# Patient Record
Sex: FEMALE | Race: WHITE | NOT HISPANIC OR LATINO
[De-identification: names, ages, dates, MRNs, and addresses within clinical notes are randomized per-mention and may not be internally consistent; named-entity substitution may affect disease eponyms.]

---

## 2021-03-29 PROBLEM — Z00.00 ENCOUNTER FOR PREVENTIVE HEALTH EXAMINATION: Status: ACTIVE | Noted: 2021-03-29

## 2021-04-06 ENCOUNTER — APPOINTMENT (OUTPATIENT)
Dept: ORTHOPEDIC SURGERY | Facility: CLINIC | Age: 71
End: 2021-04-06

## 2021-09-02 ENCOUNTER — TRANSCRIPTION ENCOUNTER (OUTPATIENT)
Age: 71
End: 2021-09-02

## 2022-01-01 ENCOUNTER — NON-APPOINTMENT (OUTPATIENT)
Age: 72
End: 2022-01-01

## 2022-04-12 ENCOUNTER — TRANSCRIPTION ENCOUNTER (OUTPATIENT)
Age: 72
End: 2022-04-12

## 2023-01-01 ENCOUNTER — TRANSCRIPTION ENCOUNTER (OUTPATIENT)
Age: 73
End: 2023-01-01

## 2023-01-01 ENCOUNTER — INPATIENT (INPATIENT)
Facility: HOSPITAL | Age: 73
LOS: 7 days | DRG: 870 | End: 2023-08-26
Attending: STUDENT IN AN ORGANIZED HEALTH CARE EDUCATION/TRAINING PROGRAM | Admitting: STUDENT IN AN ORGANIZED HEALTH CARE EDUCATION/TRAINING PROGRAM
Payer: MEDICARE

## 2023-01-01 ENCOUNTER — INPATIENT (INPATIENT)
Facility: HOSPITAL | Age: 73
LOS: 1 days | Discharge: HOME | DRG: 871 | End: 2023-07-21
Attending: INTERNAL MEDICINE | Admitting: INTERNAL MEDICINE
Payer: MEDICARE

## 2023-01-01 ENCOUNTER — INPATIENT (INPATIENT)
Facility: HOSPITAL | Age: 73
LOS: 7 days | Discharge: HOME | DRG: 435 | End: 2023-05-22
Attending: INTERNAL MEDICINE | Admitting: INTERNAL MEDICINE
Payer: COMMERCIAL

## 2023-01-01 ENCOUNTER — INPATIENT (INPATIENT)
Facility: HOSPITAL | Age: 73
LOS: 5 days | Discharge: HOME HEALTH | DRG: 435 | End: 2023-07-28
Attending: INTERNAL MEDICINE | Admitting: INTERNAL MEDICINE
Payer: MEDICARE

## 2023-01-01 VITALS
SYSTOLIC BLOOD PRESSURE: 153 MMHG | HEART RATE: 107 BPM | RESPIRATION RATE: 16 BRPM | HEIGHT: 66 IN | OXYGEN SATURATION: 96 % | TEMPERATURE: 99 F | WEIGHT: 145.06 LBS | DIASTOLIC BLOOD PRESSURE: 77 MMHG

## 2023-01-01 VITALS
HEART RATE: 138 BPM | RESPIRATION RATE: 18 BRPM | WEIGHT: 110.01 LBS | DIASTOLIC BLOOD PRESSURE: 48 MMHG | OXYGEN SATURATION: 91 % | HEIGHT: 65 IN | SYSTOLIC BLOOD PRESSURE: 82 MMHG

## 2023-01-01 VITALS
RESPIRATION RATE: 18 BRPM | DIASTOLIC BLOOD PRESSURE: 55 MMHG | TEMPERATURE: 98 F | OXYGEN SATURATION: 93 % | SYSTOLIC BLOOD PRESSURE: 104 MMHG | HEART RATE: 100 BPM

## 2023-01-01 VITALS
SYSTOLIC BLOOD PRESSURE: 117 MMHG | RESPIRATION RATE: 18 BRPM | DIASTOLIC BLOOD PRESSURE: 61 MMHG | TEMPERATURE: 99 F | HEART RATE: 102 BPM

## 2023-01-01 VITALS
SYSTOLIC BLOOD PRESSURE: 115 MMHG | HEIGHT: 65 IN | DIASTOLIC BLOOD PRESSURE: 70 MMHG | HEART RATE: 101 BPM | OXYGEN SATURATION: 96 % | RESPIRATION RATE: 22 BRPM | TEMPERATURE: 97 F

## 2023-01-01 VITALS — RESPIRATION RATE: 32 BRPM

## 2023-01-01 VITALS
DIASTOLIC BLOOD PRESSURE: 68 MMHG | HEART RATE: 89 BPM | TEMPERATURE: 98 F | SYSTOLIC BLOOD PRESSURE: 122 MMHG | OXYGEN SATURATION: 92 % | RESPIRATION RATE: 18 BRPM

## 2023-01-01 VITALS
RESPIRATION RATE: 26 BRPM | HEART RATE: 116 BPM | HEIGHT: 65 IN | SYSTOLIC BLOOD PRESSURE: 97 MMHG | DIASTOLIC BLOOD PRESSURE: 57 MMHG | WEIGHT: 147.05 LBS | OXYGEN SATURATION: 100 %

## 2023-01-01 DIAGNOSIS — N17.9 ACUTE KIDNEY FAILURE, UNSPECIFIED: ICD-10-CM

## 2023-01-01 DIAGNOSIS — C25.9 MALIGNANT NEOPLASM OF PANCREAS, UNSPECIFIED: ICD-10-CM

## 2023-01-01 DIAGNOSIS — A41.9 SEPSIS, UNSPECIFIED ORGANISM: ICD-10-CM

## 2023-01-01 DIAGNOSIS — E87.1 HYPO-OSMOLALITY AND HYPONATREMIA: ICD-10-CM

## 2023-01-01 DIAGNOSIS — Z78.9 OTHER SPECIFIED HEALTH STATUS: Chronic | ICD-10-CM

## 2023-01-01 DIAGNOSIS — Z71.89 OTHER SPECIFIED COUNSELING: ICD-10-CM

## 2023-01-01 DIAGNOSIS — T14.8XXA OTHER INJURY OF UNSPECIFIED BODY REGION, INITIAL ENCOUNTER: ICD-10-CM

## 2023-01-01 DIAGNOSIS — Z02.9 ENCOUNTER FOR ADMINISTRATIVE EXAMINATIONS, UNSPECIFIED: ICD-10-CM

## 2023-01-01 DIAGNOSIS — E87.20 ACIDOSIS, UNSPECIFIED: ICD-10-CM

## 2023-01-01 DIAGNOSIS — D64.9 ANEMIA, UNSPECIFIED: ICD-10-CM

## 2023-01-01 DIAGNOSIS — I81 PORTAL VEIN THROMBOSIS: ICD-10-CM

## 2023-01-01 DIAGNOSIS — D70.9 NEUTROPENIA, UNSPECIFIED: ICD-10-CM

## 2023-01-01 DIAGNOSIS — Z51.5 ENCOUNTER FOR PALLIATIVE CARE: ICD-10-CM

## 2023-01-01 DIAGNOSIS — Z29.9 ENCOUNTER FOR PROPHYLACTIC MEASURES, UNSPECIFIED: ICD-10-CM

## 2023-01-01 DIAGNOSIS — E03.9 HYPOTHYROIDISM, UNSPECIFIED: ICD-10-CM

## 2023-01-01 DIAGNOSIS — R79.89 OTHER SPECIFIED ABNORMAL FINDINGS OF BLOOD CHEMISTRY: ICD-10-CM

## 2023-01-01 DIAGNOSIS — R41.82 ALTERED MENTAL STATUS, UNSPECIFIED: ICD-10-CM

## 2023-01-01 DIAGNOSIS — R53.2 FUNCTIONAL QUADRIPLEGIA: ICD-10-CM

## 2023-01-01 DIAGNOSIS — E87.6 HYPOKALEMIA: ICD-10-CM

## 2023-01-01 DIAGNOSIS — I26.99 OTHER PULMONARY EMBOLISM WITHOUT ACUTE COR PULMONALE: ICD-10-CM

## 2023-01-01 LAB
-  AMIKACIN: SIGNIFICANT CHANGE UP
-  AMPICILLIN/SULBACTAM: SIGNIFICANT CHANGE UP
-  AMPICILLIN: SIGNIFICANT CHANGE UP
-  AZTREONAM: SIGNIFICANT CHANGE UP
-  CEFAZOLIN: SIGNIFICANT CHANGE UP
-  CEFEPIME: SIGNIFICANT CHANGE UP
-  CEFOXITIN: SIGNIFICANT CHANGE UP
-  CEFTRIAXONE: SIGNIFICANT CHANGE UP
-  CIPROFLOXACIN: SIGNIFICANT CHANGE UP
-  ERTAPENEM: SIGNIFICANT CHANGE UP
-  GENTAMICIN: SIGNIFICANT CHANGE UP
-  IMIPENEM: SIGNIFICANT CHANGE UP
-  LEVOFLOXACIN: SIGNIFICANT CHANGE UP
-  MEROPENEM: SIGNIFICANT CHANGE UP
-  PIPERACILLIN/TAZOBACTAM: SIGNIFICANT CHANGE UP
-  TOBRAMYCIN: SIGNIFICANT CHANGE UP
-  TRIMETHOPRIM/SULFAMETHOXAZOLE: SIGNIFICANT CHANGE UP
24R-OH-CALCIDIOL SERPL-MCNC: 15 NG/ML — LOW (ref 30–80)
A1C WITH ESTIMATED AVERAGE GLUCOSE RESULT: 5.3 % — SIGNIFICANT CHANGE UP (ref 4–5.6)
ABO RH CONFIRMATION: SIGNIFICANT CHANGE UP
AFP-TM SERPL-MCNC: <1.8 NG/ML — SIGNIFICANT CHANGE UP
ALBUMIN FLD-MCNC: 0.5 G/DL — SIGNIFICANT CHANGE UP
ALBUMIN SERPL ELPH-MCNC: 1.6 G/DL — LOW (ref 3.5–5)
ALBUMIN SERPL ELPH-MCNC: 1.6 G/DL — LOW (ref 3.5–5)
ALBUMIN SERPL ELPH-MCNC: 1.7 G/DL — LOW (ref 3.5–5)
ALBUMIN SERPL ELPH-MCNC: 1.9 G/DL — LOW (ref 3.3–5)
ALBUMIN SERPL ELPH-MCNC: 2 G/DL — LOW (ref 3.3–5)
ALBUMIN SERPL ELPH-MCNC: 2 G/DL — LOW (ref 3.3–5)
ALBUMIN SERPL ELPH-MCNC: 2.2 G/DL — LOW (ref 3.3–5)
ALBUMIN SERPL ELPH-MCNC: 2.2 G/DL — LOW (ref 3.3–5)
ALBUMIN SERPL ELPH-MCNC: 2.2 G/DL — LOW (ref 3.5–5)
ALBUMIN SERPL ELPH-MCNC: 2.3 G/DL — LOW (ref 3.3–5)
ALBUMIN SERPL ELPH-MCNC: 2.3 G/DL — LOW (ref 3.3–5)
ALBUMIN SERPL ELPH-MCNC: 2.4 G/DL — LOW (ref 3.3–5)
ALBUMIN SERPL ELPH-MCNC: 2.5 G/DL — LOW (ref 3.3–5)
ALBUMIN SERPL ELPH-MCNC: 2.6 G/DL — LOW (ref 3.3–5)
ALBUMIN SERPL ELPH-MCNC: 2.7 G/DL — LOW (ref 3.3–5)
ALBUMIN SERPL ELPH-MCNC: 2.8 G/DL — LOW (ref 3.3–5)
ALBUMIN SERPL ELPH-MCNC: 2.8 G/DL — LOW (ref 3.3–5)
ALBUMIN SERPL ELPH-MCNC: 2.9 G/DL — LOW (ref 3.3–5)
ALBUMIN SERPL ELPH-MCNC: 2.9 G/DL — LOW (ref 3.3–5)
ALBUMIN SERPL ELPH-MCNC: 3.1 G/DL — LOW (ref 3.3–5)
ALBUMIN SERPL ELPH-MCNC: 3.1 G/DL — LOW (ref 3.3–5)
ALBUMIN SERPL ELPH-MCNC: 3.2 G/DL — LOW (ref 3.3–5)
ALBUMIN SERPL ELPH-MCNC: 3.3 G/DL — SIGNIFICANT CHANGE UP (ref 3.3–5)
ALBUMIN SERPL ELPH-MCNC: 3.6 G/DL — SIGNIFICANT CHANGE UP (ref 3.3–5)
ALBUMIN SERPL ELPH-MCNC: 3.8 G/DL — SIGNIFICANT CHANGE UP (ref 3.3–5)
ALP SERPL-CCNC: 101 U/L — SIGNIFICANT CHANGE UP (ref 40–120)
ALP SERPL-CCNC: 104 U/L — SIGNIFICANT CHANGE UP (ref 40–120)
ALP SERPL-CCNC: 108 U/L — SIGNIFICANT CHANGE UP (ref 40–120)
ALP SERPL-CCNC: 110 U/L — SIGNIFICANT CHANGE UP (ref 40–120)
ALP SERPL-CCNC: 124 U/L — HIGH (ref 40–120)
ALP SERPL-CCNC: 131 U/L — HIGH (ref 40–120)
ALP SERPL-CCNC: 133 U/L — HIGH (ref 40–120)
ALP SERPL-CCNC: 142 U/L — HIGH (ref 40–120)
ALP SERPL-CCNC: 144 U/L — HIGH (ref 40–120)
ALP SERPL-CCNC: 145 U/L — HIGH (ref 40–120)
ALP SERPL-CCNC: 176 U/L — HIGH (ref 40–120)
ALP SERPL-CCNC: 178 U/L — HIGH (ref 40–120)
ALP SERPL-CCNC: 178 U/L — HIGH (ref 40–120)
ALP SERPL-CCNC: 183 U/L — HIGH (ref 40–120)
ALP SERPL-CCNC: 197 U/L — HIGH (ref 40–120)
ALP SERPL-CCNC: 201 U/L — HIGH (ref 40–120)
ALP SERPL-CCNC: 212 U/L — HIGH (ref 40–120)
ALP SERPL-CCNC: 482 U/L — HIGH (ref 40–120)
ALP SERPL-CCNC: 489 U/L — HIGH (ref 40–120)
ALP SERPL-CCNC: 493 U/L — HIGH (ref 40–120)
ALP SERPL-CCNC: 559 U/L — HIGH (ref 40–120)
ALP SERPL-CCNC: 575 U/L — HIGH (ref 40–120)
ALP SERPL-CCNC: 592 U/L — HIGH (ref 40–120)
ALP SERPL-CCNC: 614 U/L — HIGH (ref 40–120)
ALP SERPL-CCNC: 625 U/L — HIGH (ref 40–120)
ALP SERPL-CCNC: 674 U/L — HIGH (ref 40–120)
ALP SERPL-CCNC: 68 U/L — SIGNIFICANT CHANGE UP (ref 40–120)
ALP SERPL-CCNC: 75 U/L — SIGNIFICANT CHANGE UP (ref 40–120)
ALP SERPL-CCNC: 85 U/L — SIGNIFICANT CHANGE UP (ref 40–120)
ALP SERPL-CCNC: 93 U/L — SIGNIFICANT CHANGE UP (ref 40–120)
ALP SERPL-CCNC: 93 U/L — SIGNIFICANT CHANGE UP (ref 40–120)
ALP SERPL-CCNC: 95 U/L — SIGNIFICANT CHANGE UP (ref 40–120)
ALP SERPL-CCNC: 99 U/L — SIGNIFICANT CHANGE UP (ref 40–120)
ALT FLD-CCNC: 102 U/L — HIGH (ref 10–45)
ALT FLD-CCNC: 102 U/L — HIGH (ref 10–45)
ALT FLD-CCNC: 114 U/L — HIGH (ref 10–45)
ALT FLD-CCNC: 115 U/L — HIGH (ref 10–45)
ALT FLD-CCNC: 118 U/L — HIGH (ref 10–45)
ALT FLD-CCNC: 134 U/L — HIGH (ref 10–45)
ALT FLD-CCNC: 139 U/L — HIGH (ref 10–45)
ALT FLD-CCNC: 144 U/L — HIGH (ref 10–45)
ALT FLD-CCNC: 15 U/L — SIGNIFICANT CHANGE UP (ref 10–45)
ALT FLD-CCNC: 154 U/L — HIGH (ref 10–45)
ALT FLD-CCNC: 16 U/L — SIGNIFICANT CHANGE UP (ref 10–45)
ALT FLD-CCNC: 17 U/L — SIGNIFICANT CHANGE UP (ref 10–45)
ALT FLD-CCNC: 17 U/L — SIGNIFICANT CHANGE UP (ref 10–45)
ALT FLD-CCNC: 19 U/L — SIGNIFICANT CHANGE UP (ref 10–45)
ALT FLD-CCNC: 22 U/L — SIGNIFICANT CHANGE UP (ref 10–45)
ALT FLD-CCNC: 22 U/L — SIGNIFICANT CHANGE UP (ref 10–45)
ALT FLD-CCNC: 23 U/L — SIGNIFICANT CHANGE UP (ref 10–45)
ALT FLD-CCNC: 27 U/L — SIGNIFICANT CHANGE UP (ref 10–45)
ALT FLD-CCNC: 28 U/L — SIGNIFICANT CHANGE UP (ref 10–45)
ALT FLD-CCNC: 29 U/L — SIGNIFICANT CHANGE UP (ref 10–45)
ALT FLD-CCNC: 29 U/L — SIGNIFICANT CHANGE UP (ref 10–45)
ALT FLD-CCNC: 30 U/L — SIGNIFICANT CHANGE UP (ref 10–45)
ALT FLD-CCNC: 30 U/L — SIGNIFICANT CHANGE UP (ref 10–45)
ALT FLD-CCNC: 31 U/L — SIGNIFICANT CHANGE UP (ref 10–45)
ALT FLD-CCNC: 40 U/L DA — SIGNIFICANT CHANGE UP (ref 10–60)
ALT FLD-CCNC: 51 U/L DA — SIGNIFICANT CHANGE UP (ref 10–60)
ALT FLD-CCNC: 53 U/L DA — SIGNIFICANT CHANGE UP (ref 10–60)
ALT FLD-CCNC: 55 U/L DA — SIGNIFICANT CHANGE UP (ref 10–60)
AMMONIA BLD-MCNC: 10 UMOL/L — LOW (ref 11–55)
AMMONIA BLD-MCNC: 63 UMOL/L — HIGH (ref 11–55)
ANION GAP SERPL CALC-SCNC: 10 MMOL/L — SIGNIFICANT CHANGE UP (ref 5–17)
ANION GAP SERPL CALC-SCNC: 10 MMOL/L — SIGNIFICANT CHANGE UP (ref 5–17)
ANION GAP SERPL CALC-SCNC: 11 MMOL/L — SIGNIFICANT CHANGE UP (ref 5–17)
ANION GAP SERPL CALC-SCNC: 12 MMOL/L — SIGNIFICANT CHANGE UP (ref 5–17)
ANION GAP SERPL CALC-SCNC: 12 MMOL/L — SIGNIFICANT CHANGE UP (ref 5–17)
ANION GAP SERPL CALC-SCNC: 13 MMOL/L — SIGNIFICANT CHANGE UP (ref 5–17)
ANION GAP SERPL CALC-SCNC: 14 MMOL/L — SIGNIFICANT CHANGE UP (ref 5–17)
ANION GAP SERPL CALC-SCNC: 15 MMOL/L — SIGNIFICANT CHANGE UP (ref 5–17)
ANION GAP SERPL CALC-SCNC: 16 MMOL/L — SIGNIFICANT CHANGE UP (ref 5–17)
ANION GAP SERPL CALC-SCNC: 17 MMOL/L — SIGNIFICANT CHANGE UP (ref 5–17)
ANION GAP SERPL CALC-SCNC: 21 MMOL/L — HIGH (ref 5–17)
ANION GAP SERPL CALC-SCNC: 25 MMOL/L — HIGH (ref 5–17)
ANION GAP SERPL CALC-SCNC: 26 MMOL/L — HIGH (ref 5–17)
ANION GAP SERPL CALC-SCNC: 27 MMOL/L — HIGH (ref 5–17)
ANION GAP SERPL CALC-SCNC: 27 MMOL/L — HIGH (ref 5–17)
ANION GAP SERPL CALC-SCNC: 28 MMOL/L — HIGH (ref 5–17)
ANION GAP SERPL CALC-SCNC: 29 MMOL/L — HIGH (ref 5–17)
ANION GAP SERPL CALC-SCNC: 31 MMOL/L — HIGH (ref 5–17)
ANION GAP SERPL CALC-SCNC: 8 MMOL/L — SIGNIFICANT CHANGE UP (ref 5–17)
ANION GAP SERPL CALC-SCNC: 9 MMOL/L — SIGNIFICANT CHANGE UP (ref 5–17)
ANISOCYTOSIS BLD QL: SIGNIFICANT CHANGE UP
ANISOCYTOSIS BLD QL: SLIGHT — SIGNIFICANT CHANGE UP
APPEARANCE UR: ABNORMAL
APPEARANCE UR: ABNORMAL
APPEARANCE UR: CLEAR — SIGNIFICANT CHANGE UP
APTT BLD: 147.9 SEC — CRITICAL HIGH (ref 27.5–35.5)
APTT BLD: 174.5 SEC — CRITICAL HIGH (ref 27.5–35.5)
APTT BLD: 26.7 SEC — LOW (ref 27.5–35.5)
APTT BLD: 27.1 SEC — LOW (ref 27.5–35.5)
APTT BLD: 27.6 SEC — SIGNIFICANT CHANGE UP (ref 24.5–35.6)
APTT BLD: 27.6 SEC — SIGNIFICANT CHANGE UP (ref 27.5–35.5)
APTT BLD: 30.1 SEC — SIGNIFICANT CHANGE UP (ref 27.5–35.5)
APTT BLD: 35.2 SEC — SIGNIFICANT CHANGE UP (ref 27.5–35.5)
APTT BLD: 36.1 SEC — HIGH (ref 24.5–35.6)
APTT BLD: 38.5 SEC — HIGH (ref 27.5–35.5)
APTT BLD: 41.5 SEC — HIGH (ref 27.5–35.5)
APTT BLD: 47.4 SEC — HIGH (ref 24.5–35.6)
APTT BLD: 49.7 SEC — HIGH (ref 24.5–35.6)
APTT BLD: 51.6 SEC — HIGH (ref 27.5–35.5)
APTT BLD: 54.9 SEC — HIGH (ref 24.5–35.6)
APTT BLD: 60.5 SEC — HIGH (ref 24.5–35.6)
APTT BLD: 64.3 SEC — HIGH (ref 24.5–35.6)
APTT BLD: 72.4 SEC — HIGH (ref 24.5–35.6)
APTT BLD: 72.4 SEC — HIGH (ref 24.5–35.6)
APTT BLD: 75 SEC — HIGH (ref 24.5–35.6)
APTT BLD: 77.2 SEC — HIGH (ref 27.5–35.5)
AST SERPL-CCNC: 113 U/L — HIGH (ref 10–40)
AST SERPL-CCNC: 124 U/L — HIGH (ref 10–40)
AST SERPL-CCNC: 136 U/L — HIGH (ref 10–40)
AST SERPL-CCNC: 138 U/L — HIGH (ref 10–40)
AST SERPL-CCNC: 143 U/L — HIGH (ref 10–40)
AST SERPL-CCNC: 156 U/L — HIGH (ref 10–40)
AST SERPL-CCNC: 186 U/L — HIGH (ref 10–40)
AST SERPL-CCNC: 189 U/L — HIGH (ref 10–40)
AST SERPL-CCNC: 193 U/L — HIGH (ref 10–40)
AST SERPL-CCNC: 36 U/L — SIGNIFICANT CHANGE UP (ref 10–40)
AST SERPL-CCNC: 37 U/L — SIGNIFICANT CHANGE UP (ref 10–40)
AST SERPL-CCNC: 38 U/L — SIGNIFICANT CHANGE UP (ref 10–40)
AST SERPL-CCNC: 38 U/L — SIGNIFICANT CHANGE UP (ref 10–40)
AST SERPL-CCNC: 39 U/L — SIGNIFICANT CHANGE UP (ref 10–40)
AST SERPL-CCNC: 39 U/L — SIGNIFICANT CHANGE UP (ref 10–40)
AST SERPL-CCNC: 41 U/L — HIGH (ref 10–40)
AST SERPL-CCNC: 42 U/L — HIGH (ref 10–40)
AST SERPL-CCNC: 43 U/L — HIGH (ref 10–40)
AST SERPL-CCNC: 43 U/L — HIGH (ref 10–40)
AST SERPL-CCNC: 44 U/L — HIGH (ref 10–40)
AST SERPL-CCNC: 45 U/L — HIGH (ref 10–40)
AST SERPL-CCNC: 48 U/L — HIGH (ref 10–40)
AST SERPL-CCNC: 48 U/L — HIGH (ref 10–40)
AST SERPL-CCNC: 51 U/L — HIGH (ref 10–40)
AST SERPL-CCNC: 52 U/L — HIGH (ref 10–40)
AST SERPL-CCNC: 52 U/L — HIGH (ref 10–40)
AST SERPL-CCNC: 57 U/L — HIGH (ref 10–40)
AST SERPL-CCNC: 57 U/L — HIGH (ref 10–40)
AST SERPL-CCNC: 58 U/L — HIGH (ref 10–40)
AST SERPL-CCNC: 61 U/L — HIGH (ref 10–40)
AST SERPL-CCNC: 74 U/L — HIGH (ref 10–40)
AST SERPL-CCNC: 81 U/L — HIGH (ref 10–40)
AST SERPL-CCNC: 82 U/L — HIGH (ref 10–40)
B PERT IGG+IGM PNL SER: CLEAR — SIGNIFICANT CHANGE UP
BACTERIA # UR AUTO: ABNORMAL /HPF
BACTERIA # UR AUTO: NEGATIVE — SIGNIFICANT CHANGE UP
BASE EXCESS BLDV CALC-SCNC: -12.4 MMOL/L — LOW (ref -2–3)
BASE EXCESS BLDV CALC-SCNC: -14.2 MMOL/L — LOW (ref -2–3)
BASE EXCESS BLDV CALC-SCNC: -3.7 MMOL/L — LOW (ref -2–3)
BASE EXCESS BLDV CALC-SCNC: -8.1 MMOL/L — SIGNIFICANT CHANGE UP
BASE EXCESS BLDV CALC-SCNC: -8.2 MMOL/L — LOW (ref -2–3)
BASOPHILS # BLD AUTO: 0 K/UL — SIGNIFICANT CHANGE UP (ref 0–0.2)
BASOPHILS # BLD AUTO: 0.01 K/UL — SIGNIFICANT CHANGE UP (ref 0–0.2)
BASOPHILS # BLD AUTO: 0.04 K/UL — SIGNIFICANT CHANGE UP (ref 0–0.2)
BASOPHILS # BLD AUTO: 0.05 K/UL — SIGNIFICANT CHANGE UP (ref 0–0.2)
BASOPHILS # BLD AUTO: 0.06 K/UL — SIGNIFICANT CHANGE UP (ref 0–0.2)
BASOPHILS # BLD AUTO: 0.06 K/UL — SIGNIFICANT CHANGE UP (ref 0–0.2)
BASOPHILS # BLD AUTO: 0.07 K/UL — SIGNIFICANT CHANGE UP (ref 0–0.2)
BASOPHILS # BLD AUTO: 0.08 K/UL — SIGNIFICANT CHANGE UP (ref 0–0.2)
BASOPHILS # BLD AUTO: 0.08 K/UL — SIGNIFICANT CHANGE UP (ref 0–0.2)
BASOPHILS # BLD AUTO: 0.09 K/UL — SIGNIFICANT CHANGE UP (ref 0–0.2)
BASOPHILS # BLD AUTO: 0.1 K/UL — SIGNIFICANT CHANGE UP (ref 0–0.2)
BASOPHILS # BLD AUTO: SIGNIFICANT CHANGE UP K/UL (ref 0–0.2)
BASOPHILS NFR BLD AUTO: 0 % — SIGNIFICANT CHANGE UP (ref 0–2)
BASOPHILS NFR BLD AUTO: 0.1 % — SIGNIFICANT CHANGE UP (ref 0–2)
BASOPHILS NFR BLD AUTO: 0.2 % — SIGNIFICANT CHANGE UP (ref 0–2)
BASOPHILS NFR BLD AUTO: 0.4 % — SIGNIFICANT CHANGE UP (ref 0–2)
BASOPHILS NFR BLD AUTO: 0.5 % — SIGNIFICANT CHANGE UP (ref 0–2)
BASOPHILS NFR BLD AUTO: 0.7 % — SIGNIFICANT CHANGE UP (ref 0–2)
BASOPHILS NFR BLD AUTO: 0.7 % — SIGNIFICANT CHANGE UP (ref 0–2)
BASOPHILS NFR BLD AUTO: 0.8 % — SIGNIFICANT CHANGE UP (ref 0–2)
BASOPHILS NFR BLD AUTO: 0.8 % — SIGNIFICANT CHANGE UP (ref 0–2)
BASOPHILS NFR BLD AUTO: 1 % — SIGNIFICANT CHANGE UP (ref 0–2)
BASOPHILS NFR BLD AUTO: 1.1 % — SIGNIFICANT CHANGE UP (ref 0–2)
BASOPHILS NFR BLD AUTO: 1.3 % — SIGNIFICANT CHANGE UP (ref 0–2)
BASOPHILS NFR BLD AUTO: 1.9 % — SIGNIFICANT CHANGE UP (ref 0–2)
BASOPHILS NFR BLD AUTO: 2 % — SIGNIFICANT CHANGE UP (ref 0–2)
BASOPHILS NFR BLD AUTO: SIGNIFICANT CHANGE UP % (ref 0–2)
BILIRUB DIRECT SERPL-MCNC: 3.5 MG/DL — HIGH (ref 0–0.3)
BILIRUB DIRECT SERPL-MCNC: 3.8 MG/DL — HIGH (ref 0–0.3)
BILIRUB DIRECT SERPL-MCNC: 5.3 MG/DL — HIGH (ref 0–0.3)
BILIRUB INDIRECT FLD-MCNC: 1.9 MG/DL — HIGH (ref 0.2–1)
BILIRUB INDIRECT FLD-MCNC: 2 MG/DL — HIGH (ref 0.2–1)
BILIRUB SERPL-MCNC: 4.2 MG/DL — HIGH (ref 0.2–1.2)
BILIRUB SERPL-MCNC: 4.2 MG/DL — HIGH (ref 0.2–1.2)
BILIRUB SERPL-MCNC: 4.3 MG/DL — HIGH (ref 0.2–1.2)
BILIRUB SERPL-MCNC: 4.5 MG/DL — HIGH (ref 0.2–1.2)
BILIRUB SERPL-MCNC: 5.1 MG/DL — HIGH (ref 0.2–1.2)
BILIRUB SERPL-MCNC: 5.4 MG/DL — HIGH (ref 0.2–1.2)
BILIRUB SERPL-MCNC: 5.5 MG/DL — HIGH (ref 0.2–1.2)
BILIRUB SERPL-MCNC: 5.8 MG/DL — HIGH (ref 0.2–1.2)
BILIRUB SERPL-MCNC: 6.6 MG/DL — HIGH (ref 0.2–1.2)
BILIRUB SERPL-MCNC: 6.7 MG/DL — HIGH (ref 0.2–1.2)
BILIRUB SERPL-MCNC: 6.7 MG/DL — HIGH (ref 0.2–1.2)
BILIRUB SERPL-MCNC: 6.8 MG/DL — HIGH (ref 0.2–1.2)
BILIRUB SERPL-MCNC: 6.9 MG/DL — HIGH (ref 0.2–1.2)
BILIRUB SERPL-MCNC: 7 MG/DL — HIGH (ref 0.2–1.2)
BILIRUB SERPL-MCNC: 7.1 MG/DL — HIGH (ref 0.2–1.2)
BILIRUB SERPL-MCNC: 7.2 MG/DL — HIGH (ref 0.2–1.2)
BILIRUB SERPL-MCNC: 7.2 MG/DL — HIGH (ref 0.2–1.2)
BILIRUB SERPL-MCNC: 7.3 MG/DL — HIGH (ref 0.2–1.2)
BILIRUB SERPL-MCNC: 7.4 MG/DL — HIGH (ref 0.2–1.2)
BILIRUB SERPL-MCNC: 7.6 MG/DL — HIGH (ref 0.2–1.2)
BILIRUB SERPL-MCNC: 7.7 MG/DL — HIGH (ref 0.2–1.2)
BILIRUB SERPL-MCNC: 7.7 MG/DL — HIGH (ref 0.2–1.2)
BILIRUB SERPL-MCNC: 8 MG/DL — HIGH (ref 0.2–1.2)
BILIRUB SERPL-MCNC: 8.6 MG/DL — HIGH (ref 0.2–1.2)
BILIRUB SERPL-MCNC: 8.8 MG/DL — HIGH (ref 0.2–1.2)
BILIRUB SERPL-MCNC: 8.9 MG/DL — HIGH (ref 0.2–1.2)
BILIRUB UR-MCNC: ABNORMAL
BILIRUB UR-MCNC: NEGATIVE — SIGNIFICANT CHANGE UP
BLD GP AB SCN SERPL QL: NEGATIVE — SIGNIFICANT CHANGE UP
BLD GP AB SCN SERPL QL: SIGNIFICANT CHANGE UP
BLOOD GAS COMMENTS, VENOUS: SIGNIFICANT CHANGE UP
BUN SERPL-MCNC: 10 MG/DL — SIGNIFICANT CHANGE UP (ref 7–23)
BUN SERPL-MCNC: 10 MG/DL — SIGNIFICANT CHANGE UP (ref 7–23)
BUN SERPL-MCNC: 11 MG/DL — SIGNIFICANT CHANGE UP (ref 7–23)
BUN SERPL-MCNC: 11 MG/DL — SIGNIFICANT CHANGE UP (ref 7–23)
BUN SERPL-MCNC: 12 MG/DL — SIGNIFICANT CHANGE UP (ref 7–23)
BUN SERPL-MCNC: 12 MG/DL — SIGNIFICANT CHANGE UP (ref 7–23)
BUN SERPL-MCNC: 13 MG/DL — SIGNIFICANT CHANGE UP (ref 7–23)
BUN SERPL-MCNC: 15 MG/DL — SIGNIFICANT CHANGE UP (ref 7–18)
BUN SERPL-MCNC: 16 MG/DL — SIGNIFICANT CHANGE UP (ref 7–18)
BUN SERPL-MCNC: 16 MG/DL — SIGNIFICANT CHANGE UP (ref 7–23)
BUN SERPL-MCNC: 17 MG/DL — SIGNIFICANT CHANGE UP (ref 7–18)
BUN SERPL-MCNC: 17 MG/DL — SIGNIFICANT CHANGE UP (ref 7–23)
BUN SERPL-MCNC: 18 MG/DL — SIGNIFICANT CHANGE UP (ref 7–18)
BUN SERPL-MCNC: 22 MG/DL — SIGNIFICANT CHANGE UP (ref 7–23)
BUN SERPL-MCNC: 22 MG/DL — SIGNIFICANT CHANGE UP (ref 7–23)
BUN SERPL-MCNC: 23 MG/DL — SIGNIFICANT CHANGE UP (ref 7–23)
BUN SERPL-MCNC: 4 MG/DL — LOW (ref 7–23)
BUN SERPL-MCNC: 4 MG/DL — LOW (ref 7–23)
BUN SERPL-MCNC: 5 MG/DL — LOW (ref 7–23)
BUN SERPL-MCNC: 6 MG/DL — LOW (ref 7–23)
BUN SERPL-MCNC: 6 MG/DL — LOW (ref 7–23)
BUN SERPL-MCNC: 7 MG/DL — SIGNIFICANT CHANGE UP (ref 7–23)
BUN SERPL-MCNC: 8 MG/DL — SIGNIFICANT CHANGE UP (ref 7–23)
BUN SERPL-MCNC: 8 MG/DL — SIGNIFICANT CHANGE UP (ref 7–23)
BUN SERPL-MCNC: 9 MG/DL — SIGNIFICANT CHANGE UP (ref 7–23)
BURR CELLS BLD QL SMEAR: PRESENT — SIGNIFICANT CHANGE UP
CA-I SERPL-SCNC: 0.9 MMOL/L — LOW (ref 1.15–1.33)
CA-I SERPL-SCNC: 1.05 MMOL/L — LOW (ref 1.15–1.33)
CA-I SERPL-SCNC: 1.13 MMOL/L — LOW (ref 1.15–1.33)
CA-I SERPL-SCNC: 1.13 MMOL/L — LOW (ref 1.15–1.33)
CA-I SERPL-SCNC: SIGNIFICANT CHANGE UP MMOL/L (ref 1.15–1.33)
CALCIUM SERPL-MCNC: 6.9 MG/DL — LOW (ref 8.4–10.5)
CALCIUM SERPL-MCNC: 6.9 MG/DL — LOW (ref 8.4–10.5)
CALCIUM SERPL-MCNC: 7 MG/DL — LOW (ref 8.4–10.5)
CALCIUM SERPL-MCNC: 7.1 MG/DL — LOW (ref 8.4–10.5)
CALCIUM SERPL-MCNC: 7.2 MG/DL — LOW (ref 8.4–10.5)
CALCIUM SERPL-MCNC: 7.2 MG/DL — LOW (ref 8.4–10.5)
CALCIUM SERPL-MCNC: 7.3 MG/DL — LOW (ref 8.4–10.5)
CALCIUM SERPL-MCNC: 7.3 MG/DL — LOW (ref 8.4–10.5)
CALCIUM SERPL-MCNC: 7.4 MG/DL — LOW (ref 8.4–10.5)
CALCIUM SERPL-MCNC: 7.5 MG/DL — LOW (ref 8.4–10.5)
CALCIUM SERPL-MCNC: 7.6 MG/DL — LOW (ref 8.4–10.5)
CALCIUM SERPL-MCNC: 7.7 MG/DL — LOW (ref 8.4–10.5)
CALCIUM SERPL-MCNC: 7.7 MG/DL — LOW (ref 8.4–10.5)
CALCIUM SERPL-MCNC: 7.9 MG/DL — LOW (ref 8.4–10.5)
CALCIUM SERPL-MCNC: 8 MG/DL — LOW (ref 8.4–10.5)
CALCIUM SERPL-MCNC: 8.1 MG/DL — LOW (ref 8.4–10.5)
CALCIUM SERPL-MCNC: 8.2 MG/DL — LOW (ref 8.4–10.5)
CALCIUM SERPL-MCNC: 8.3 MG/DL — LOW (ref 8.4–10.5)
CALCIUM SERPL-MCNC: 8.4 MG/DL — SIGNIFICANT CHANGE UP (ref 8.4–10.5)
CALCIUM SERPL-MCNC: 8.5 MG/DL — SIGNIFICANT CHANGE UP (ref 8.4–10.5)
CALCIUM SERPL-MCNC: 8.6 MG/DL — SIGNIFICANT CHANGE UP (ref 8.4–10.5)
CALCIUM SERPL-MCNC: 8.7 MG/DL — SIGNIFICANT CHANGE UP (ref 8.4–10.5)
CALCIUM SERPL-MCNC: 8.8 MG/DL — SIGNIFICANT CHANGE UP (ref 8.4–10.5)
CALCIUM SERPL-MCNC: 8.9 MG/DL — SIGNIFICANT CHANGE UP (ref 8.4–10.5)
CALCIUM SERPL-MCNC: 9 MG/DL — SIGNIFICANT CHANGE UP (ref 8.4–10.5)
CALCIUM SERPL-MCNC: 9.1 MG/DL — SIGNIFICANT CHANGE UP (ref 8.4–10.5)
CALCIUM SERPL-MCNC: 9.1 MG/DL — SIGNIFICANT CHANGE UP (ref 8.4–10.5)
CALCIUM SERPL-MCNC: 9.3 MG/DL — SIGNIFICANT CHANGE UP (ref 8.4–10.5)
CALCIUM SERPL-MCNC: 9.3 MG/DL — SIGNIFICANT CHANGE UP (ref 8.4–10.5)
CANCER AG125 SERPL-ACNC: 4346 U/ML — HIGH
CANCER AG19-9 SERPL-ACNC: 16 U/ML — SIGNIFICANT CHANGE UP
CEA SERPL-MCNC: 68.5 NG/ML — HIGH (ref 0–3.8)
CHLORIDE BLDV-SCNC: 92 MMOL/L — LOW (ref 96–108)
CHLORIDE BLDV-SCNC: 96 MMOL/L — SIGNIFICANT CHANGE UP (ref 96–108)
CHLORIDE BLDV-SCNC: 96 MMOL/L — SIGNIFICANT CHANGE UP (ref 96–108)
CHLORIDE BLDV-SCNC: 97 MMOL/L — SIGNIFICANT CHANGE UP (ref 96–108)
CHLORIDE SERPL-SCNC: 100 MMOL/L — SIGNIFICANT CHANGE UP (ref 96–108)
CHLORIDE SERPL-SCNC: 100 MMOL/L — SIGNIFICANT CHANGE UP (ref 96–108)
CHLORIDE SERPL-SCNC: 101 MMOL/L — SIGNIFICANT CHANGE UP (ref 96–108)
CHLORIDE SERPL-SCNC: 102 MMOL/L — SIGNIFICANT CHANGE UP (ref 96–108)
CHLORIDE SERPL-SCNC: 102 MMOL/L — SIGNIFICANT CHANGE UP (ref 96–108)
CHLORIDE SERPL-SCNC: 103 MMOL/L — SIGNIFICANT CHANGE UP (ref 96–108)
CHLORIDE SERPL-SCNC: 103 MMOL/L — SIGNIFICANT CHANGE UP (ref 96–108)
CHLORIDE SERPL-SCNC: 104 MMOL/L — SIGNIFICANT CHANGE UP (ref 96–108)
CHLORIDE SERPL-SCNC: 104 MMOL/L — SIGNIFICANT CHANGE UP (ref 96–108)
CHLORIDE SERPL-SCNC: 105 MMOL/L — SIGNIFICANT CHANGE UP (ref 96–108)
CHLORIDE SERPL-SCNC: 107 MMOL/L — SIGNIFICANT CHANGE UP (ref 96–108)
CHLORIDE SERPL-SCNC: 94 MMOL/L — LOW (ref 96–108)
CHLORIDE SERPL-SCNC: 95 MMOL/L — LOW (ref 96–108)
CHLORIDE SERPL-SCNC: 96 MMOL/L — SIGNIFICANT CHANGE UP (ref 96–108)
CHLORIDE SERPL-SCNC: 96 MMOL/L — SIGNIFICANT CHANGE UP (ref 96–108)
CHLORIDE SERPL-SCNC: 97 MMOL/L — SIGNIFICANT CHANGE UP (ref 96–108)
CHLORIDE SERPL-SCNC: 98 MMOL/L — SIGNIFICANT CHANGE UP (ref 96–108)
CHLORIDE SERPL-SCNC: 99 MMOL/L — SIGNIFICANT CHANGE UP (ref 96–108)
CO2 BLDV-SCNC: 13 MMOL/L — LOW (ref 22–26)
CO2 BLDV-SCNC: 14 MMOL/L — LOW (ref 22–26)
CO2 BLDV-SCNC: 20 MMOL/L — LOW (ref 22–26)
CO2 BLDV-SCNC: 21 MMOL/L — LOW (ref 22–26)
CO2 SERPL-SCNC: 10 MMOL/L — CRITICAL LOW (ref 22–31)
CO2 SERPL-SCNC: 11 MMOL/L — LOW (ref 22–31)
CO2 SERPL-SCNC: 11 MMOL/L — LOW (ref 22–31)
CO2 SERPL-SCNC: 12 MMOL/L — LOW (ref 22–31)
CO2 SERPL-SCNC: 14 MMOL/L — LOW (ref 22–31)
CO2 SERPL-SCNC: 16 MMOL/L — LOW (ref 22–31)
CO2 SERPL-SCNC: 17 MMOL/L — LOW (ref 22–31)
CO2 SERPL-SCNC: 17 MMOL/L — LOW (ref 22–31)
CO2 SERPL-SCNC: 19 MMOL/L — LOW (ref 22–31)
CO2 SERPL-SCNC: 19 MMOL/L — LOW (ref 22–31)
CO2 SERPL-SCNC: 21 MMOL/L — LOW (ref 22–31)
CO2 SERPL-SCNC: 22 MMOL/L — SIGNIFICANT CHANGE UP (ref 22–31)
CO2 SERPL-SCNC: 23 MMOL/L — SIGNIFICANT CHANGE UP (ref 22–31)
CO2 SERPL-SCNC: 24 MMOL/L — SIGNIFICANT CHANGE UP (ref 22–31)
CO2 SERPL-SCNC: 25 MMOL/L — SIGNIFICANT CHANGE UP (ref 22–31)
CO2 SERPL-SCNC: <10 MMOL/L — CRITICAL LOW (ref 22–31)
COLOR FLD: YELLOW
COLOR SPEC: ABNORMAL
COLOR SPEC: YELLOW — SIGNIFICANT CHANGE UP
COMMENT - URINE: SIGNIFICANT CHANGE UP
CREAT ?TM UR-MCNC: 37 MG/DL — SIGNIFICANT CHANGE UP
CREAT ?TM UR-MCNC: 45 MG/DL — SIGNIFICANT CHANGE UP
CREAT SERPL-MCNC: 0.3 MG/DL — LOW (ref 0.5–1.3)
CREAT SERPL-MCNC: 0.37 MG/DL — LOW (ref 0.5–1.3)
CREAT SERPL-MCNC: 0.46 MG/DL — LOW (ref 0.5–1.3)
CREAT SERPL-MCNC: 0.46 MG/DL — LOW (ref 0.5–1.3)
CREAT SERPL-MCNC: 0.48 MG/DL — LOW (ref 0.5–1.3)
CREAT SERPL-MCNC: 0.51 MG/DL — SIGNIFICANT CHANGE UP (ref 0.5–1.3)
CREAT SERPL-MCNC: 0.52 MG/DL — SIGNIFICANT CHANGE UP (ref 0.5–1.3)
CREAT SERPL-MCNC: 0.52 MG/DL — SIGNIFICANT CHANGE UP (ref 0.5–1.3)
CREAT SERPL-MCNC: 0.54 MG/DL — SIGNIFICANT CHANGE UP (ref 0.5–1.3)
CREAT SERPL-MCNC: 0.55 MG/DL — SIGNIFICANT CHANGE UP (ref 0.5–1.3)
CREAT SERPL-MCNC: 0.55 MG/DL — SIGNIFICANT CHANGE UP (ref 0.5–1.3)
CREAT SERPL-MCNC: 0.56 MG/DL — SIGNIFICANT CHANGE UP (ref 0.5–1.3)
CREAT SERPL-MCNC: 0.58 MG/DL — SIGNIFICANT CHANGE UP (ref 0.5–1.3)
CREAT SERPL-MCNC: 0.58 MG/DL — SIGNIFICANT CHANGE UP (ref 0.5–1.3)
CREAT SERPL-MCNC: 0.61 MG/DL — SIGNIFICANT CHANGE UP (ref 0.5–1.3)
CREAT SERPL-MCNC: 0.61 MG/DL — SIGNIFICANT CHANGE UP (ref 0.5–1.3)
CREAT SERPL-MCNC: 0.63 MG/DL — SIGNIFICANT CHANGE UP (ref 0.5–1.3)
CREAT SERPL-MCNC: 0.64 MG/DL — SIGNIFICANT CHANGE UP (ref 0.5–1.3)
CREAT SERPL-MCNC: 0.66 MG/DL — SIGNIFICANT CHANGE UP (ref 0.5–1.3)
CREAT SERPL-MCNC: 0.68 MG/DL — SIGNIFICANT CHANGE UP (ref 0.5–1.3)
CREAT SERPL-MCNC: 0.78 MG/DL — SIGNIFICANT CHANGE UP (ref 0.5–1.3)
CREAT SERPL-MCNC: 0.84 MG/DL — SIGNIFICANT CHANGE UP (ref 0.5–1.3)
CREAT SERPL-MCNC: 0.9 MG/DL — SIGNIFICANT CHANGE UP (ref 0.5–1.3)
CREAT SERPL-MCNC: 0.9 MG/DL — SIGNIFICANT CHANGE UP (ref 0.5–1.3)
CREAT SERPL-MCNC: 0.92 MG/DL — SIGNIFICANT CHANGE UP (ref 0.5–1.3)
CREAT SERPL-MCNC: 0.92 MG/DL — SIGNIFICANT CHANGE UP (ref 0.5–1.3)
CREAT SERPL-MCNC: 0.96 MG/DL — SIGNIFICANT CHANGE UP (ref 0.5–1.3)
CREAT SERPL-MCNC: 0.97 MG/DL — SIGNIFICANT CHANGE UP (ref 0.5–1.3)
CREAT SERPL-MCNC: 0.97 MG/DL — SIGNIFICANT CHANGE UP (ref 0.5–1.3)
CREAT SERPL-MCNC: 1.03 MG/DL — SIGNIFICANT CHANGE UP (ref 0.5–1.3)
CREAT SERPL-MCNC: 1.54 MG/DL — HIGH (ref 0.5–1.3)
CREAT SERPL-MCNC: <0.3 MG/DL — LOW (ref 0.5–1.3)
CULTURE RESULTS: NO GROWTH — SIGNIFICANT CHANGE UP
CULTURE RESULTS: SIGNIFICANT CHANGE UP
D DIMER BLD IA.RAPID-MCNC: 2450 NG/ML DDU — HIGH
DACRYOCYTES BLD QL SMEAR: SLIGHT — SIGNIFICANT CHANGE UP
DIFF PNL FLD: ABNORMAL
DIFF PNL FLD: NEGATIVE — SIGNIFICANT CHANGE UP
E COLI DNA BLD POS QL NAA+NON-PROBE: SIGNIFICANT CHANGE UP
EGFR: 100 ML/MIN/1.73M2 — SIGNIFICANT CHANGE UP
EGFR: 101 ML/MIN/1.73M2 — SIGNIFICANT CHANGE UP
EGFR: 101 ML/MIN/1.73M2 — SIGNIFICANT CHANGE UP
EGFR: 106 ML/MIN/1.73M2 — SIGNIFICANT CHANGE UP
EGFR: 112 ML/MIN/1.73M2 — SIGNIFICANT CHANGE UP
EGFR: 112 ML/MIN/1.73M2 — SIGNIFICANT CHANGE UP
EGFR: 35 ML/MIN/1.73M2 — LOW
EGFR: 57 ML/MIN/1.73M2 — LOW
EGFR: 62 ML/MIN/1.73M2 — SIGNIFICANT CHANGE UP
EGFR: 66 ML/MIN/1.73M2 — SIGNIFICANT CHANGE UP
EGFR: 66 ML/MIN/1.73M2 — SIGNIFICANT CHANGE UP
EGFR: 68 ML/MIN/1.73M2 — SIGNIFICANT CHANGE UP
EGFR: 68 ML/MIN/1.73M2 — SIGNIFICANT CHANGE UP
EGFR: 73 ML/MIN/1.73M2 — SIGNIFICANT CHANGE UP
EGFR: 80 ML/MIN/1.73M2 — SIGNIFICANT CHANGE UP
EGFR: 92 ML/MIN/1.73M2 — SIGNIFICANT CHANGE UP
EGFR: 93 ML/MIN/1.73M2 — SIGNIFICANT CHANGE UP
EGFR: 94 ML/MIN/1.73M2 — SIGNIFICANT CHANGE UP
EGFR: 95 ML/MIN/1.73M2 — SIGNIFICANT CHANGE UP
EGFR: 95 ML/MIN/1.73M2 — SIGNIFICANT CHANGE UP
EGFR: 96 ML/MIN/1.73M2 — SIGNIFICANT CHANGE UP
EGFR: 97 ML/MIN/1.73M2 — SIGNIFICANT CHANGE UP
EGFR: 98 ML/MIN/1.73M2 — SIGNIFICANT CHANGE UP
ELLIPTOCYTES BLD QL SMEAR: SLIGHT — SIGNIFICANT CHANGE UP
EOSINOPHIL # BLD AUTO: 0 K/UL — SIGNIFICANT CHANGE UP (ref 0–0.5)
EOSINOPHIL # BLD AUTO: 0.02 K/UL — SIGNIFICANT CHANGE UP (ref 0–0.5)
EOSINOPHIL # BLD AUTO: 0.03 K/UL — SIGNIFICANT CHANGE UP (ref 0–0.5)
EOSINOPHIL # BLD AUTO: 0.03 K/UL — SIGNIFICANT CHANGE UP (ref 0–0.5)
EOSINOPHIL # BLD AUTO: 0.04 K/UL — SIGNIFICANT CHANGE UP (ref 0–0.5)
EOSINOPHIL # BLD AUTO: 0.07 K/UL — SIGNIFICANT CHANGE UP (ref 0–0.5)
EOSINOPHIL # BLD AUTO: 0.07 K/UL — SIGNIFICANT CHANGE UP (ref 0–0.5)
EOSINOPHIL # BLD AUTO: 0.08 K/UL — SIGNIFICANT CHANGE UP (ref 0–0.5)
EOSINOPHIL # BLD AUTO: 0.09 K/UL — SIGNIFICANT CHANGE UP (ref 0–0.5)
EOSINOPHIL # BLD AUTO: 0.14 K/UL — SIGNIFICANT CHANGE UP (ref 0–0.5)
EOSINOPHIL # BLD AUTO: 0.19 K/UL — SIGNIFICANT CHANGE UP (ref 0–0.5)
EOSINOPHIL # BLD AUTO: 0.19 K/UL — SIGNIFICANT CHANGE UP (ref 0–0.5)
EOSINOPHIL # BLD AUTO: 0.24 K/UL — SIGNIFICANT CHANGE UP (ref 0–0.5)
EOSINOPHIL # BLD AUTO: SIGNIFICANT CHANGE UP K/UL (ref 0–0.5)
EOSINOPHIL NFR BLD AUTO: 0 % — SIGNIFICANT CHANGE UP (ref 0–6)
EOSINOPHIL NFR BLD AUTO: 0.1 % — SIGNIFICANT CHANGE UP (ref 0–6)
EOSINOPHIL NFR BLD AUTO: 0.3 % — SIGNIFICANT CHANGE UP (ref 0–6)
EOSINOPHIL NFR BLD AUTO: 0.6 % — SIGNIFICANT CHANGE UP (ref 0–6)
EOSINOPHIL NFR BLD AUTO: 0.7 % — SIGNIFICANT CHANGE UP (ref 0–6)
EOSINOPHIL NFR BLD AUTO: 0.8 % — SIGNIFICANT CHANGE UP (ref 0–6)
EOSINOPHIL NFR BLD AUTO: 0.9 % — SIGNIFICANT CHANGE UP (ref 0–6)
EOSINOPHIL NFR BLD AUTO: 1.2 % — SIGNIFICANT CHANGE UP (ref 0–6)
EOSINOPHIL NFR BLD AUTO: 1.4 % — SIGNIFICANT CHANGE UP (ref 0–6)
EOSINOPHIL NFR BLD AUTO: 2 % — SIGNIFICANT CHANGE UP (ref 0–6)
EOSINOPHIL NFR BLD AUTO: 3 % — SIGNIFICANT CHANGE UP (ref 0–6)
EOSINOPHIL NFR BLD AUTO: 3 % — SIGNIFICANT CHANGE UP (ref 0–6)
EOSINOPHIL NFR BLD AUTO: 3.2 % — SIGNIFICANT CHANGE UP (ref 0–6)
EOSINOPHIL NFR BLD AUTO: 4.5 % — SIGNIFICANT CHANGE UP (ref 0–6)
EOSINOPHIL NFR BLD AUTO: 4.7 % — SIGNIFICANT CHANGE UP (ref 0–6)
EOSINOPHIL NFR BLD AUTO: SIGNIFICANT CHANGE UP % (ref 0–6)
EPI CELLS # UR: 0 /HPF — SIGNIFICANT CHANGE UP
EPI CELLS # UR: 0 /HPF — SIGNIFICANT CHANGE UP
EPI CELLS # UR: 2 /HPF — SIGNIFICANT CHANGE UP
EPI CELLS # UR: 3 /HPF — SIGNIFICANT CHANGE UP
EPI CELLS # UR: 5 /HPF — SIGNIFICANT CHANGE UP
EPI CELLS # UR: SIGNIFICANT CHANGE UP /HPF
ESTIMATED AVERAGE GLUCOSE: 105 MG/DL — SIGNIFICANT CHANGE UP (ref 68–114)
FACT VIII ACT/NOR PPP: 137 % — HIGH (ref 60–125)
FERRITIN SERPL-MCNC: 652 NG/ML — HIGH (ref 15–150)
FIBRINOGEN PPP-MCNC: 135 MG/DL — LOW (ref 200–445)
FIBRINOGEN PPP-MCNC: 155 MG/DL — LOW (ref 200–445)
FIBRINOGEN PPP-MCNC: 157 MG/DL — LOW (ref 200–445)
FIBRINOGEN PPP-MCNC: 84 MG/DL — CRITICAL LOW (ref 200–445)
FLUID INTAKE SUBSTANCE CLASS: SIGNIFICANT CHANGE UP
FSP PPP-MCNC: >=5 <20 UG/ML
GAS PNL BLDA: SIGNIFICANT CHANGE UP
GAS PNL BLDV: 128 MMOL/L — LOW (ref 136–145)
GAS PNL BLDV: 128 MMOL/L — LOW (ref 136–145)
GAS PNL BLDV: 130 MMOL/L — LOW (ref 136–145)
GAS PNL BLDV: 130 MMOL/L — LOW (ref 136–145)
GAS PNL BLDV: 131 MMOL/L — LOW (ref 136–145)
GAS PNL BLDV: SIGNIFICANT CHANGE UP
GI PCR PANEL: SIGNIFICANT CHANGE UP
GIANT PLATELETS BLD QL SMEAR: PRESENT — SIGNIFICANT CHANGE UP
GLUCOSE BLDC GLUCOMTR-MCNC: 117 MG/DL — HIGH (ref 70–99)
GLUCOSE BLDC GLUCOMTR-MCNC: 121 MG/DL — HIGH (ref 70–99)
GLUCOSE BLDC GLUCOMTR-MCNC: 138 MG/DL — HIGH (ref 70–99)
GLUCOSE BLDC GLUCOMTR-MCNC: 141 MG/DL — HIGH (ref 70–99)
GLUCOSE BLDC GLUCOMTR-MCNC: 155 MG/DL — HIGH (ref 70–99)
GLUCOSE BLDC GLUCOMTR-MCNC: 163 MG/DL — HIGH (ref 70–99)
GLUCOSE BLDC GLUCOMTR-MCNC: 189 MG/DL — HIGH (ref 70–99)
GLUCOSE BLDC GLUCOMTR-MCNC: 203 MG/DL — HIGH (ref 70–99)
GLUCOSE BLDC GLUCOMTR-MCNC: 206 MG/DL — HIGH (ref 70–99)
GLUCOSE BLDC GLUCOMTR-MCNC: 207 MG/DL — HIGH (ref 70–99)
GLUCOSE BLDC GLUCOMTR-MCNC: 212 MG/DL — HIGH (ref 70–99)
GLUCOSE BLDC GLUCOMTR-MCNC: 213 MG/DL — HIGH (ref 70–99)
GLUCOSE BLDC GLUCOMTR-MCNC: 214 MG/DL — HIGH (ref 70–99)
GLUCOSE BLDC GLUCOMTR-MCNC: 236 MG/DL — HIGH (ref 70–99)
GLUCOSE BLDC GLUCOMTR-MCNC: 251 MG/DL — HIGH (ref 70–99)
GLUCOSE BLDC GLUCOMTR-MCNC: 47 MG/DL — CRITICAL LOW (ref 70–99)
GLUCOSE BLDC GLUCOMTR-MCNC: 56 MG/DL — LOW (ref 70–99)
GLUCOSE BLDC GLUCOMTR-MCNC: 69 MG/DL — LOW (ref 70–99)
GLUCOSE BLDC GLUCOMTR-MCNC: 74 MG/DL — SIGNIFICANT CHANGE UP (ref 70–99)
GLUCOSE BLDC GLUCOMTR-MCNC: 76 MG/DL — SIGNIFICANT CHANGE UP (ref 70–99)
GLUCOSE BLDC GLUCOMTR-MCNC: 83 MG/DL — SIGNIFICANT CHANGE UP (ref 70–99)
GLUCOSE BLDC GLUCOMTR-MCNC: 86 MG/DL — SIGNIFICANT CHANGE UP (ref 70–99)
GLUCOSE BLDC GLUCOMTR-MCNC: 87 MG/DL — SIGNIFICANT CHANGE UP (ref 70–99)
GLUCOSE BLDC GLUCOMTR-MCNC: 97 MG/DL — SIGNIFICANT CHANGE UP (ref 70–99)
GLUCOSE BLDV-MCNC: 188 MG/DL — HIGH (ref 70–99)
GLUCOSE BLDV-MCNC: 356 MG/DL — HIGH (ref 70–99)
GLUCOSE BLDV-MCNC: 63 MG/DL — LOW (ref 70–99)
GLUCOSE BLDV-MCNC: 91 MG/DL — SIGNIFICANT CHANGE UP (ref 70–99)
GLUCOSE FLD-MCNC: 120 MG/DL — SIGNIFICANT CHANGE UP
GLUCOSE SERPL-MCNC: 100 MG/DL — HIGH (ref 70–99)
GLUCOSE SERPL-MCNC: 100 MG/DL — HIGH (ref 70–99)
GLUCOSE SERPL-MCNC: 103 MG/DL — HIGH (ref 70–99)
GLUCOSE SERPL-MCNC: 104 MG/DL — HIGH (ref 70–99)
GLUCOSE SERPL-MCNC: 107 MG/DL — HIGH (ref 70–99)
GLUCOSE SERPL-MCNC: 109 MG/DL — HIGH (ref 70–99)
GLUCOSE SERPL-MCNC: 109 MG/DL — HIGH (ref 70–99)
GLUCOSE SERPL-MCNC: 112 MG/DL — HIGH (ref 70–99)
GLUCOSE SERPL-MCNC: 114 MG/DL — HIGH (ref 70–99)
GLUCOSE SERPL-MCNC: 115 MG/DL — HIGH (ref 70–99)
GLUCOSE SERPL-MCNC: 116 MG/DL — HIGH (ref 70–99)
GLUCOSE SERPL-MCNC: 123 MG/DL — HIGH (ref 70–99)
GLUCOSE SERPL-MCNC: 124 MG/DL — HIGH (ref 70–99)
GLUCOSE SERPL-MCNC: 128 MG/DL — HIGH (ref 70–99)
GLUCOSE SERPL-MCNC: 129 MG/DL — HIGH (ref 70–99)
GLUCOSE SERPL-MCNC: 143 MG/DL — HIGH (ref 70–99)
GLUCOSE SERPL-MCNC: 144 MG/DL — HIGH (ref 70–99)
GLUCOSE SERPL-MCNC: 145 MG/DL — HIGH (ref 70–99)
GLUCOSE SERPL-MCNC: 145 MG/DL — HIGH (ref 70–99)
GLUCOSE SERPL-MCNC: 147 MG/DL — HIGH (ref 70–99)
GLUCOSE SERPL-MCNC: 151 MG/DL — HIGH (ref 70–99)
GLUCOSE SERPL-MCNC: 201 MG/DL — HIGH (ref 70–99)
GLUCOSE SERPL-MCNC: 224 MG/DL — HIGH (ref 70–99)
GLUCOSE SERPL-MCNC: 49 MG/DL — CRITICAL LOW (ref 70–99)
GLUCOSE SERPL-MCNC: 51 MG/DL — CRITICAL LOW (ref 70–99)
GLUCOSE SERPL-MCNC: 68 MG/DL — LOW (ref 70–99)
GLUCOSE SERPL-MCNC: 71 MG/DL — SIGNIFICANT CHANGE UP (ref 70–99)
GLUCOSE SERPL-MCNC: 73 MG/DL — SIGNIFICANT CHANGE UP (ref 70–99)
GLUCOSE SERPL-MCNC: 90 MG/DL — SIGNIFICANT CHANGE UP (ref 70–99)
GLUCOSE SERPL-MCNC: 91 MG/DL — SIGNIFICANT CHANGE UP (ref 70–99)
GLUCOSE SERPL-MCNC: 92 MG/DL — SIGNIFICANT CHANGE UP (ref 70–99)
GLUCOSE SERPL-MCNC: 97 MG/DL — SIGNIFICANT CHANGE UP (ref 70–99)
GLUCOSE SERPL-MCNC: 97 MG/DL — SIGNIFICANT CHANGE UP (ref 70–99)
GLUCOSE SERPL-MCNC: 98 MG/DL — SIGNIFICANT CHANGE UP (ref 70–99)
GLUCOSE SERPL-MCNC: 98 MG/DL — SIGNIFICANT CHANGE UP (ref 70–99)
GLUCOSE UR QL: ABNORMAL
GLUCOSE UR QL: NEGATIVE — SIGNIFICANT CHANGE UP
GRAM STN FLD: SIGNIFICANT CHANGE UP
HAPTOGLOB SERPL-MCNC: <20 MG/DL — LOW (ref 34–200)
HAPTOGLOB SERPL-MCNC: <20 MG/DL — LOW (ref 34–200)
HAV IGM SER-ACNC: SIGNIFICANT CHANGE UP
HBV CORE IGM SER-ACNC: SIGNIFICANT CHANGE UP
HBV SURFACE AG SER-ACNC: SIGNIFICANT CHANGE UP
HCO3 BLDV-SCNC: 12 MMOL/L — LOW (ref 22–29)
HCO3 BLDV-SCNC: 13 MMOL/L — LOW (ref 22–29)
HCO3 BLDV-SCNC: 17 MMOL/L — LOW (ref 22–29)
HCO3 BLDV-SCNC: 19 MMOL/L — LOW (ref 22–29)
HCO3 BLDV-SCNC: 20 MMOL/L — LOW (ref 22–29)
HCT VFR BLD CALC: 18.3 % — CRITICAL LOW (ref 34.5–45)
HCT VFR BLD CALC: 19.7 % — CRITICAL LOW (ref 34.5–45)
HCT VFR BLD CALC: 20.5 % — CRITICAL LOW (ref 34.5–45)
HCT VFR BLD CALC: 21 % — CRITICAL LOW (ref 34.5–45)
HCT VFR BLD CALC: 21.3 % — LOW (ref 34.5–45)
HCT VFR BLD CALC: 21.4 % — LOW (ref 34.5–45)
HCT VFR BLD CALC: 21.7 % — LOW (ref 34.5–45)
HCT VFR BLD CALC: 22.9 % — LOW (ref 34.5–45)
HCT VFR BLD CALC: 23.2 % — LOW (ref 34.5–45)
HCT VFR BLD CALC: 23.4 % — LOW (ref 34.5–45)
HCT VFR BLD CALC: 23.6 % — LOW (ref 34.5–45)
HCT VFR BLD CALC: 23.9 % — LOW (ref 34.5–45)
HCT VFR BLD CALC: 23.9 % — LOW (ref 34.5–45)
HCT VFR BLD CALC: 24 % — LOW (ref 34.5–45)
HCT VFR BLD CALC: 24.4 % — LOW (ref 34.5–45)
HCT VFR BLD CALC: 24.5 % — LOW (ref 34.5–45)
HCT VFR BLD CALC: 24.5 % — LOW (ref 34.5–45)
HCT VFR BLD CALC: 24.6 % — LOW (ref 34.5–45)
HCT VFR BLD CALC: 24.8 % — LOW (ref 34.5–45)
HCT VFR BLD CALC: 24.9 % — LOW (ref 34.5–45)
HCT VFR BLD CALC: 25.1 % — LOW (ref 34.5–45)
HCT VFR BLD CALC: 25.2 % — LOW (ref 34.5–45)
HCT VFR BLD CALC: 25.3 % — LOW (ref 34.5–45)
HCT VFR BLD CALC: 25.3 % — LOW (ref 34.5–45)
HCT VFR BLD CALC: 25.5 % — LOW (ref 34.5–45)
HCT VFR BLD CALC: 25.8 % — LOW (ref 34.5–45)
HCT VFR BLD CALC: 25.9 % — LOW (ref 34.5–45)
HCT VFR BLD CALC: 26.2 % — LOW (ref 34.5–45)
HCT VFR BLD CALC: 26.8 % — LOW (ref 34.5–45)
HCT VFR BLD CALC: 29.6 % — LOW (ref 34.5–45)
HCT VFR BLD CALC: 29.7 % — LOW (ref 34.5–45)
HCT VFR BLD CALC: 29.9 % — LOW (ref 34.5–45)
HCT VFR BLD CALC: 30.3 % — LOW (ref 34.5–45)
HCT VFR BLD CALC: 32.1 % — LOW (ref 34.5–45)
HCT VFR BLD CALC: 32.8 % — LOW (ref 34.5–45)
HCT VFR BLD CALC: 33.6 % — LOW (ref 34.5–45)
HCT VFR BLD CALC: 33.7 % — LOW (ref 34.5–45)
HCT VFR BLD CALC: 34.2 % — LOW (ref 34.5–45)
HCT VFR BLD CALC: 34.4 % — LOW (ref 34.5–45)
HCT VFR BLD CALC: 35.7 % — SIGNIFICANT CHANGE UP (ref 34.5–45)
HCT VFR BLDA CALC: 19 % — CRITICAL LOW (ref 34.5–46.5)
HCT VFR BLDA CALC: 24 % — LOW (ref 34.5–46.5)
HCT VFR BLDA CALC: 25 % — LOW (ref 34.5–46.5)
HCT VFR BLDA CALC: 27 % — LOW (ref 34.5–46.5)
HCV AB S/CO SERPL IA: 0.19 S/CO — SIGNIFICANT CHANGE UP (ref 0–0.99)
HCV AB S/CO SERPL IA: 0.2 S/CO — SIGNIFICANT CHANGE UP (ref 0–0.99)
HCV AB SERPL-IMP: SIGNIFICANT CHANGE UP
HCV AB SERPL-IMP: SIGNIFICANT CHANGE UP
HGB BLD CALC-MCNC: 6.3 G/DL — CRITICAL LOW (ref 11.7–16.1)
HGB BLD CALC-MCNC: 8 G/DL — LOW (ref 11.7–16.1)
HGB BLD CALC-MCNC: 8.3 G/DL — LOW (ref 11.7–16.1)
HGB BLD CALC-MCNC: 8.9 G/DL — LOW (ref 11.7–16.1)
HGB BLD-MCNC: 10 G/DL — LOW (ref 11.5–15.5)
HGB BLD-MCNC: 10.3 G/DL — LOW (ref 11.5–15.5)
HGB BLD-MCNC: 10.4 G/DL — LOW (ref 11.5–15.5)
HGB BLD-MCNC: 10.5 G/DL — LOW (ref 11.5–15.5)
HGB BLD-MCNC: 10.9 G/DL — LOW (ref 11.5–15.5)
HGB BLD-MCNC: 11 G/DL — LOW (ref 11.5–15.5)
HGB BLD-MCNC: 11.4 G/DL — LOW (ref 11.5–15.5)
HGB BLD-MCNC: 11.5 G/DL — SIGNIFICANT CHANGE UP (ref 11.5–15.5)
HGB BLD-MCNC: 5.9 G/DL — CRITICAL LOW (ref 11.5–15.5)
HGB BLD-MCNC: 6.3 G/DL — CRITICAL LOW (ref 11.5–15.5)
HGB BLD-MCNC: 6.6 G/DL — CRITICAL LOW (ref 11.5–15.5)
HGB BLD-MCNC: 6.8 G/DL — CRITICAL LOW (ref 11.5–15.5)
HGB BLD-MCNC: 6.9 G/DL — CRITICAL LOW (ref 11.5–15.5)
HGB BLD-MCNC: 6.9 G/DL — CRITICAL LOW (ref 11.5–15.5)
HGB BLD-MCNC: 7.1 G/DL — LOW (ref 11.5–15.5)
HGB BLD-MCNC: 7.2 G/DL — LOW (ref 11.5–15.5)
HGB BLD-MCNC: 7.5 G/DL — LOW (ref 11.5–15.5)
HGB BLD-MCNC: 7.7 G/DL — LOW (ref 11.5–15.5)
HGB BLD-MCNC: 7.8 G/DL — LOW (ref 11.5–15.5)
HGB BLD-MCNC: 7.8 G/DL — LOW (ref 11.5–15.5)
HGB BLD-MCNC: 8 G/DL — LOW (ref 11.5–15.5)
HGB BLD-MCNC: 8.1 G/DL — LOW (ref 11.5–15.5)
HGB BLD-MCNC: 8.1 G/DL — LOW (ref 11.5–15.5)
HGB BLD-MCNC: 8.2 G/DL — LOW (ref 11.5–15.5)
HGB BLD-MCNC: 8.4 G/DL — LOW (ref 11.5–15.5)
HGB BLD-MCNC: 8.4 G/DL — LOW (ref 11.5–15.5)
HGB BLD-MCNC: 8.5 G/DL — LOW (ref 11.5–15.5)
HGB BLD-MCNC: 8.5 G/DL — LOW (ref 11.5–15.5)
HGB BLD-MCNC: 8.6 G/DL — LOW (ref 11.5–15.5)
HGB BLD-MCNC: 8.6 G/DL — LOW (ref 11.5–15.5)
HGB BLD-MCNC: 8.8 G/DL — LOW (ref 11.5–15.5)
HGB BLD-MCNC: 8.8 G/DL — LOW (ref 11.5–15.5)
HGB BLD-MCNC: 9.1 G/DL — LOW (ref 11.5–15.5)
HGB BLD-MCNC: 9.4 G/DL — LOW (ref 11.5–15.5)
HGB BLD-MCNC: 9.7 G/DL — LOW (ref 11.5–15.5)
HGB BLD-MCNC: 9.9 G/DL — LOW (ref 11.5–15.5)
HOROWITZ INDEX BLDV+IHG-RTO: 21 — SIGNIFICANT CHANGE UP
HYALINE CASTS # UR AUTO: 29 /LPF — HIGH (ref 0–2)
HYALINE CASTS # UR AUTO: 6 /LPF — HIGH (ref 0–2)
HYALINE CASTS # UR AUTO: 8 /LPF — HIGH (ref 0–2)
HYPOCHROMIA BLD QL: SIGNIFICANT CHANGE UP
HYPOCHROMIA BLD QL: SLIGHT — SIGNIFICANT CHANGE UP
IMM GRANULOCYTES NFR BLD AUTO: 0.3 % — SIGNIFICANT CHANGE UP (ref 0–0.9)
IMM GRANULOCYTES NFR BLD AUTO: 0.4 % — SIGNIFICANT CHANGE UP (ref 0–0.9)
IMM GRANULOCYTES NFR BLD AUTO: 0.5 % — SIGNIFICANT CHANGE UP (ref 0–0.9)
IMM GRANULOCYTES NFR BLD AUTO: 0.9 % — SIGNIFICANT CHANGE UP (ref 0–0.9)
IMM GRANULOCYTES NFR BLD AUTO: 1.1 % — HIGH (ref 0–0.9)
IMM GRANULOCYTES NFR BLD AUTO: 1.9 % — HIGH (ref 0–0.9)
IMM GRANULOCYTES NFR BLD AUTO: 2 % — HIGH (ref 0–0.9)
IMM GRANULOCYTES NFR BLD AUTO: 2.2 % — HIGH (ref 0–0.9)
IMM GRANULOCYTES NFR BLD AUTO: 2.5 % — HIGH (ref 0–0.9)
IMM GRANULOCYTES NFR BLD AUTO: 2.5 % — HIGH (ref 0–0.9)
IMM GRANULOCYTES NFR BLD AUTO: 2.7 % — HIGH (ref 0–0.9)
IMM GRANULOCYTES NFR BLD AUTO: SIGNIFICANT CHANGE UP % (ref 0–0.9)
INR BLD: 1 RATIO — SIGNIFICANT CHANGE UP (ref 0.88–1.16)
INR BLD: 1.07 RATIO — SIGNIFICANT CHANGE UP (ref 0.88–1.16)
INR BLD: 1.21 RATIO — HIGH (ref 0.85–1.18)
INR BLD: 1.27 RATIO — HIGH (ref 0.88–1.16)
INR BLD: 1.66 RATIO — HIGH (ref 0.85–1.18)
INR BLD: 1.86 RATIO — HIGH (ref 0.88–1.16)
INR BLD: 2.19 RATIO — HIGH (ref 0.88–1.16)
INR BLD: 2.22 RATIO — HIGH (ref 0.85–1.18)
INR BLD: 2.24 RATIO — HIGH (ref 0.85–1.18)
INR BLD: 2.36 RATIO — HIGH (ref 0.85–1.18)
INR BLD: 2.65 RATIO — HIGH (ref 0.88–1.16)
INR BLD: 2.96 RATIO — HIGH (ref 0.85–1.18)
INR BLD: 3.09 RATIO — HIGH (ref 0.88–1.16)
INR BLD: 3.37 RATIO — HIGH (ref 0.85–1.18)
INR BLD: 3.58 RATIO — HIGH (ref 0.85–1.18)
INR BLD: 3.66 RATIO — HIGH (ref 0.85–1.18)
INR BLD: 3.99 RATIO — HIGH (ref 0.85–1.18)
KETONES UR-MCNC: NEGATIVE — SIGNIFICANT CHANGE UP
LACTATE BLDV-MCNC: 10.2 MMOL/L — CRITICAL HIGH (ref 0.5–2)
LACTATE BLDV-MCNC: 11.3 MMOL/L — CRITICAL HIGH (ref 0.5–2)
LACTATE BLDV-MCNC: 12.6 MMOL/L — CRITICAL HIGH (ref 0.5–2)
LACTATE BLDV-MCNC: 2 MMOL/L — SIGNIFICANT CHANGE UP (ref 0.5–2)
LACTATE BLDV-MCNC: 9.3 MMOL/L — CRITICAL HIGH (ref 0.5–2)
LACTATE BLDV-MCNC: >16 MMOL/L — CRITICAL HIGH (ref 0.5–2)
LACTATE SERPL-SCNC: 10.9 MMOL/L — CRITICAL HIGH (ref 0.7–2)
LACTATE SERPL-SCNC: 11.9 MMOL/L — CRITICAL HIGH (ref 0.7–2)
LACTATE SERPL-SCNC: 2.1 MMOL/L — HIGH (ref 0.7–2)
LACTATE SERPL-SCNC: 3.3 MMOL/L — HIGH (ref 0.7–2)
LACTATE SERPL-SCNC: 7.9 MMOL/L — CRITICAL HIGH (ref 0.7–2)
LDH SERPL L TO P-CCNC: 270 U/L — HIGH (ref 50–242)
LDH SERPL L TO P-CCNC: 356 U/L — HIGH (ref 50–242)
LDH SERPL L TO P-CCNC: 82 U/L — SIGNIFICANT CHANGE UP
LEUKOCYTE ESTERASE UR-ACNC: ABNORMAL
LEUKOCYTE ESTERASE UR-ACNC: ABNORMAL
LEUKOCYTE ESTERASE UR-ACNC: NEGATIVE — SIGNIFICANT CHANGE UP
LG PLATELETS BLD QL AUTO: SLIGHT — SIGNIFICANT CHANGE UP
LIDOCAIN IGE QN: 11 U/L — SIGNIFICANT CHANGE UP (ref 7–60)
LIDOCAIN IGE QN: 54 U/L — SIGNIFICANT CHANGE UP (ref 7–60)
LYMPHOCYTES # BLD AUTO: 0.18 K/UL — LOW (ref 1–3.3)
LYMPHOCYTES # BLD AUTO: 0.26 K/UL — LOW (ref 1–3.3)
LYMPHOCYTES # BLD AUTO: 0.38 K/UL — LOW (ref 1–3.3)
LYMPHOCYTES # BLD AUTO: 0.43 K/UL — LOW (ref 1–3.3)
LYMPHOCYTES # BLD AUTO: 0.47 K/UL — LOW (ref 1–3.3)
LYMPHOCYTES # BLD AUTO: 0.5 K/UL — LOW (ref 1–3.3)
LYMPHOCYTES # BLD AUTO: 0.53 K/UL — LOW (ref 1–3.3)
LYMPHOCYTES # BLD AUTO: 0.55 K/UL — LOW (ref 1–3.3)
LYMPHOCYTES # BLD AUTO: 0.6 K/UL — LOW (ref 1–3.3)
LYMPHOCYTES # BLD AUTO: 0.71 K/UL — LOW (ref 1–3.3)
LYMPHOCYTES # BLD AUTO: 0.73 K/UL — LOW (ref 1–3.3)
LYMPHOCYTES # BLD AUTO: 0.83 K/UL — LOW (ref 1–3.3)
LYMPHOCYTES # BLD AUTO: 0.84 K/UL — LOW (ref 1–3.3)
LYMPHOCYTES # BLD AUTO: 0.94 K/UL — LOW (ref 1–3.3)
LYMPHOCYTES # BLD AUTO: 1.03 K/UL — SIGNIFICANT CHANGE UP (ref 1–3.3)
LYMPHOCYTES # BLD AUTO: 1.15 K/UL — SIGNIFICANT CHANGE UP (ref 1–3.3)
LYMPHOCYTES # BLD AUTO: 1.25 K/UL — SIGNIFICANT CHANGE UP (ref 1–3.3)
LYMPHOCYTES # BLD AUTO: 1.36 K/UL — SIGNIFICANT CHANGE UP (ref 1–3.3)
LYMPHOCYTES # BLD AUTO: 1.49 K/UL — SIGNIFICANT CHANGE UP (ref 1–3.3)
LYMPHOCYTES # BLD AUTO: 1.49 K/UL — SIGNIFICANT CHANGE UP (ref 1–3.3)
LYMPHOCYTES # BLD AUTO: 1.66 K/UL — SIGNIFICANT CHANGE UP (ref 1–3.3)
LYMPHOCYTES # BLD AUTO: 12 % — LOW (ref 13–44)
LYMPHOCYTES # BLD AUTO: 13.6 % — SIGNIFICANT CHANGE UP (ref 13–44)
LYMPHOCYTES # BLD AUTO: 13.8 % — SIGNIFICANT CHANGE UP (ref 13–44)
LYMPHOCYTES # BLD AUTO: 14 % — SIGNIFICANT CHANGE UP (ref 13–44)
LYMPHOCYTES # BLD AUTO: 14 % — SIGNIFICANT CHANGE UP (ref 13–44)
LYMPHOCYTES # BLD AUTO: 15 % — SIGNIFICANT CHANGE UP (ref 13–44)
LYMPHOCYTES # BLD AUTO: 15.3 % — SIGNIFICANT CHANGE UP (ref 13–44)
LYMPHOCYTES # BLD AUTO: 17.5 % — SIGNIFICANT CHANGE UP (ref 13–44)
LYMPHOCYTES # BLD AUTO: 17.9 % — SIGNIFICANT CHANGE UP (ref 13–44)
LYMPHOCYTES # BLD AUTO: 18.9 % — SIGNIFICANT CHANGE UP (ref 13–44)
LYMPHOCYTES # BLD AUTO: 19.7 % — SIGNIFICANT CHANGE UP (ref 13–44)
LYMPHOCYTES # BLD AUTO: 2.06 K/UL — SIGNIFICANT CHANGE UP (ref 1–3.3)
LYMPHOCYTES # BLD AUTO: 23 % — SIGNIFICANT CHANGE UP (ref 13–44)
LYMPHOCYTES # BLD AUTO: 26 % — SIGNIFICANT CHANGE UP (ref 13–44)
LYMPHOCYTES # BLD AUTO: 31.7 % — SIGNIFICANT CHANGE UP (ref 13–44)
LYMPHOCYTES # BLD AUTO: 35.5 % — SIGNIFICANT CHANGE UP (ref 13–44)
LYMPHOCYTES # BLD AUTO: 54 % — HIGH (ref 13–44)
LYMPHOCYTES # BLD AUTO: 55 % — HIGH (ref 13–44)
LYMPHOCYTES # BLD AUTO: 6.1 % — LOW (ref 13–44)
LYMPHOCYTES # BLD AUTO: 61.1 % — HIGH (ref 13–44)
LYMPHOCYTES # BLD AUTO: 7.1 % — LOW (ref 13–44)
LYMPHOCYTES # BLD AUTO: 7.9 % — LOW (ref 13–44)
LYMPHOCYTES # BLD AUTO: 72 % — HIGH (ref 13–44)
LYMPHOCYTES # BLD AUTO: SIGNIFICANT CHANGE UP % (ref 13–44)
LYMPHOCYTES # BLD AUTO: SIGNIFICANT CHANGE UP K/UL (ref 1–3.3)
LYMPHOCYTES # FLD: 75 % — SIGNIFICANT CHANGE UP
MACROCYTES BLD QL: SIGNIFICANT CHANGE UP
MACROCYTES BLD QL: SLIGHT — SIGNIFICANT CHANGE UP
MAGNESIUM SERPL-MCNC: 1.4 MG/DL — LOW (ref 1.6–2.6)
MAGNESIUM SERPL-MCNC: 1.5 MG/DL — LOW (ref 1.6–2.6)
MAGNESIUM SERPL-MCNC: 1.6 MG/DL — SIGNIFICANT CHANGE UP (ref 1.6–2.6)
MAGNESIUM SERPL-MCNC: 1.7 MG/DL — SIGNIFICANT CHANGE UP (ref 1.6–2.6)
MAGNESIUM SERPL-MCNC: 1.8 MG/DL — SIGNIFICANT CHANGE UP (ref 1.6–2.6)
MAGNESIUM SERPL-MCNC: 1.8 MG/DL — SIGNIFICANT CHANGE UP (ref 1.6–2.6)
MAGNESIUM SERPL-MCNC: 1.9 MG/DL — SIGNIFICANT CHANGE UP (ref 1.6–2.6)
MAGNESIUM SERPL-MCNC: 2 MG/DL — SIGNIFICANT CHANGE UP (ref 1.6–2.6)
MAGNESIUM SERPL-MCNC: 2.1 MG/DL — SIGNIFICANT CHANGE UP (ref 1.6–2.6)
MAGNESIUM SERPL-MCNC: 2.1 MG/DL — SIGNIFICANT CHANGE UP (ref 1.6–2.6)
MANUAL SMEAR VERIFICATION: SIGNIFICANT CHANGE UP
MCHC RBC-ENTMCNC: 27.3 PG — SIGNIFICANT CHANGE UP (ref 27–34)
MCHC RBC-ENTMCNC: 27.4 PG — SIGNIFICANT CHANGE UP (ref 27–34)
MCHC RBC-ENTMCNC: 27.4 PG — SIGNIFICANT CHANGE UP (ref 27–34)
MCHC RBC-ENTMCNC: 28.1 PG — SIGNIFICANT CHANGE UP (ref 27–34)
MCHC RBC-ENTMCNC: 28.2 PG — SIGNIFICANT CHANGE UP (ref 27–34)
MCHC RBC-ENTMCNC: 28.2 PG — SIGNIFICANT CHANGE UP (ref 27–34)
MCHC RBC-ENTMCNC: 28.4 PG — SIGNIFICANT CHANGE UP (ref 27–34)
MCHC RBC-ENTMCNC: 29.1 PG — SIGNIFICANT CHANGE UP (ref 27–34)
MCHC RBC-ENTMCNC: 29.5 GM/DL — LOW (ref 32–36)
MCHC RBC-ENTMCNC: 29.6 PG — SIGNIFICANT CHANGE UP (ref 27–34)
MCHC RBC-ENTMCNC: 29.9 PG — SIGNIFICANT CHANGE UP (ref 27–34)
MCHC RBC-ENTMCNC: 30.1 PG — SIGNIFICANT CHANGE UP (ref 27–34)
MCHC RBC-ENTMCNC: 30.1 PG — SIGNIFICANT CHANGE UP (ref 27–34)
MCHC RBC-ENTMCNC: 30.2 PG — SIGNIFICANT CHANGE UP (ref 27–34)
MCHC RBC-ENTMCNC: 30.2 PG — SIGNIFICANT CHANGE UP (ref 27–34)
MCHC RBC-ENTMCNC: 30.4 PG — SIGNIFICANT CHANGE UP (ref 27–34)
MCHC RBC-ENTMCNC: 30.4 PG — SIGNIFICANT CHANGE UP (ref 27–34)
MCHC RBC-ENTMCNC: 30.5 PG — SIGNIFICANT CHANGE UP (ref 27–34)
MCHC RBC-ENTMCNC: 30.8 GM/DL — LOW (ref 32–36)
MCHC RBC-ENTMCNC: 31.1 PG — SIGNIFICANT CHANGE UP (ref 27–34)
MCHC RBC-ENTMCNC: 31.2 GM/DL — LOW (ref 32–36)
MCHC RBC-ENTMCNC: 31.2 PG — SIGNIFICANT CHANGE UP (ref 27–34)
MCHC RBC-ENTMCNC: 31.2 PG — SIGNIFICANT CHANGE UP (ref 27–34)
MCHC RBC-ENTMCNC: 31.3 GM/DL — LOW (ref 32–36)
MCHC RBC-ENTMCNC: 31.4 GM/DL — LOW (ref 32–36)
MCHC RBC-ENTMCNC: 31.4 PG — SIGNIFICANT CHANGE UP (ref 27–34)
MCHC RBC-ENTMCNC: 31.5 PG — SIGNIFICANT CHANGE UP (ref 27–34)
MCHC RBC-ENTMCNC: 31.7 GM/DL — LOW (ref 32–36)
MCHC RBC-ENTMCNC: 31.8 GM/DL — LOW (ref 32–36)
MCHC RBC-ENTMCNC: 31.8 GM/DL — LOW (ref 32–36)
MCHC RBC-ENTMCNC: 31.8 PG — SIGNIFICANT CHANGE UP (ref 27–34)
MCHC RBC-ENTMCNC: 31.9 GM/DL — LOW (ref 32–36)
MCHC RBC-ENTMCNC: 32 GM/DL — SIGNIFICANT CHANGE UP (ref 32–36)
MCHC RBC-ENTMCNC: 32.1 GM/DL — SIGNIFICANT CHANGE UP (ref 32–36)
MCHC RBC-ENTMCNC: 32.1 GM/DL — SIGNIFICANT CHANGE UP (ref 32–36)
MCHC RBC-ENTMCNC: 32.1 PG — SIGNIFICANT CHANGE UP (ref 27–34)
MCHC RBC-ENTMCNC: 32.2 GM/DL — SIGNIFICANT CHANGE UP (ref 32–36)
MCHC RBC-ENTMCNC: 32.3 GM/DL — SIGNIFICANT CHANGE UP (ref 32–36)
MCHC RBC-ENTMCNC: 32.3 GM/DL — SIGNIFICANT CHANGE UP (ref 32–36)
MCHC RBC-ENTMCNC: 32.4 GM/DL — SIGNIFICANT CHANGE UP (ref 32–36)
MCHC RBC-ENTMCNC: 32.4 GM/DL — SIGNIFICANT CHANGE UP (ref 32–36)
MCHC RBC-ENTMCNC: 32.4 PG — SIGNIFICANT CHANGE UP (ref 27–34)
MCHC RBC-ENTMCNC: 32.5 GM/DL — SIGNIFICANT CHANGE UP (ref 32–36)
MCHC RBC-ENTMCNC: 32.5 GM/DL — SIGNIFICANT CHANGE UP (ref 32–36)
MCHC RBC-ENTMCNC: 32.5 PG — SIGNIFICANT CHANGE UP (ref 27–34)
MCHC RBC-ENTMCNC: 32.5 PG — SIGNIFICANT CHANGE UP (ref 27–34)
MCHC RBC-ENTMCNC: 32.6 GM/DL — SIGNIFICANT CHANGE UP (ref 32–36)
MCHC RBC-ENTMCNC: 32.6 PG — SIGNIFICANT CHANGE UP (ref 27–34)
MCHC RBC-ENTMCNC: 32.7 GM/DL — SIGNIFICANT CHANGE UP (ref 32–36)
MCHC RBC-ENTMCNC: 32.7 PG — SIGNIFICANT CHANGE UP (ref 27–34)
MCHC RBC-ENTMCNC: 32.8 GM/DL — SIGNIFICANT CHANGE UP (ref 32–36)
MCHC RBC-ENTMCNC: 32.8 GM/DL — SIGNIFICANT CHANGE UP (ref 32–36)
MCHC RBC-ENTMCNC: 32.8 PG — SIGNIFICANT CHANGE UP (ref 27–34)
MCHC RBC-ENTMCNC: 32.9 GM/DL — SIGNIFICANT CHANGE UP (ref 32–36)
MCHC RBC-ENTMCNC: 32.9 GM/DL — SIGNIFICANT CHANGE UP (ref 32–36)
MCHC RBC-ENTMCNC: 33.1 GM/DL — SIGNIFICANT CHANGE UP (ref 32–36)
MCHC RBC-ENTMCNC: 33.1 GM/DL — SIGNIFICANT CHANGE UP (ref 32–36)
MCHC RBC-ENTMCNC: 33.1 PG — SIGNIFICANT CHANGE UP (ref 27–34)
MCHC RBC-ENTMCNC: 33.1 PG — SIGNIFICANT CHANGE UP (ref 27–34)
MCHC RBC-ENTMCNC: 33.2 GM/DL — SIGNIFICANT CHANGE UP (ref 32–36)
MCHC RBC-ENTMCNC: 33.2 PG — SIGNIFICANT CHANGE UP (ref 27–34)
MCHC RBC-ENTMCNC: 33.3 GM/DL — SIGNIFICANT CHANGE UP (ref 32–36)
MCHC RBC-ENTMCNC: 33.5 GM/DL — SIGNIFICANT CHANGE UP (ref 32–36)
MCHC RBC-ENTMCNC: 33.5 PG — SIGNIFICANT CHANGE UP (ref 27–34)
MCHC RBC-ENTMCNC: 33.6 GM/DL — SIGNIFICANT CHANGE UP (ref 32–36)
MCHC RBC-ENTMCNC: 33.8 PG — SIGNIFICANT CHANGE UP (ref 27–34)
MCHC RBC-ENTMCNC: 34 GM/DL — SIGNIFICANT CHANGE UP (ref 32–36)
MCHC RBC-ENTMCNC: 34.1 PG — HIGH (ref 27–34)
MCHC RBC-ENTMCNC: 34.3 GM/DL — SIGNIFICANT CHANGE UP (ref 32–36)
MCV RBC AUTO: 100.9 FL — HIGH (ref 80–100)
MCV RBC AUTO: 101.5 FL — HIGH (ref 80–100)
MCV RBC AUTO: 101.7 FL — HIGH (ref 80–100)
MCV RBC AUTO: 103.3 FL — HIGH (ref 80–100)
MCV RBC AUTO: 103.6 FL — HIGH (ref 80–100)
MCV RBC AUTO: 104.4 FL — HIGH (ref 80–100)
MCV RBC AUTO: 104.6 FL — HIGH (ref 80–100)
MCV RBC AUTO: 104.8 FL — HIGH (ref 80–100)
MCV RBC AUTO: 115.6 FL — HIGH (ref 80–100)
MCV RBC AUTO: 84.9 FL — SIGNIFICANT CHANGE UP (ref 80–100)
MCV RBC AUTO: 85.2 FL — SIGNIFICANT CHANGE UP (ref 80–100)
MCV RBC AUTO: 85.8 FL — SIGNIFICANT CHANGE UP (ref 80–100)
MCV RBC AUTO: 85.9 FL — SIGNIFICANT CHANGE UP (ref 80–100)
MCV RBC AUTO: 86 FL — SIGNIFICANT CHANGE UP (ref 80–100)
MCV RBC AUTO: 86.3 FL — SIGNIFICANT CHANGE UP (ref 80–100)
MCV RBC AUTO: 87 FL — SIGNIFICANT CHANGE UP (ref 80–100)
MCV RBC AUTO: 87.9 FL — SIGNIFICANT CHANGE UP (ref 80–100)
MCV RBC AUTO: 88.1 FL — SIGNIFICANT CHANGE UP (ref 80–100)
MCV RBC AUTO: 89 FL — SIGNIFICANT CHANGE UP (ref 80–100)
MCV RBC AUTO: 89.6 FL — SIGNIFICANT CHANGE UP (ref 80–100)
MCV RBC AUTO: 89.7 FL — SIGNIFICANT CHANGE UP (ref 80–100)
MCV RBC AUTO: 90.6 FL — SIGNIFICANT CHANGE UP (ref 80–100)
MCV RBC AUTO: 90.8 FL — SIGNIFICANT CHANGE UP (ref 80–100)
MCV RBC AUTO: 91.8 FL — SIGNIFICANT CHANGE UP (ref 80–100)
MCV RBC AUTO: 92.2 FL — SIGNIFICANT CHANGE UP (ref 80–100)
MCV RBC AUTO: 92.4 FL — SIGNIFICANT CHANGE UP (ref 80–100)
MCV RBC AUTO: 92.9 FL — SIGNIFICANT CHANGE UP (ref 80–100)
MCV RBC AUTO: 93.2 FL — SIGNIFICANT CHANGE UP (ref 80–100)
MCV RBC AUTO: 93.8 FL — SIGNIFICANT CHANGE UP (ref 80–100)
MCV RBC AUTO: 94.2 FL — SIGNIFICANT CHANGE UP (ref 80–100)
MCV RBC AUTO: 94.4 FL — SIGNIFICANT CHANGE UP (ref 80–100)
MCV RBC AUTO: 94.9 FL — SIGNIFICANT CHANGE UP (ref 80–100)
MCV RBC AUTO: 95 FL — SIGNIFICANT CHANGE UP (ref 80–100)
MCV RBC AUTO: 95.6 FL — SIGNIFICANT CHANGE UP (ref 80–100)
MCV RBC AUTO: 96.5 FL — SIGNIFICANT CHANGE UP (ref 80–100)
MCV RBC AUTO: 96.8 FL — SIGNIFICANT CHANGE UP (ref 80–100)
MCV RBC AUTO: 98.3 FL — SIGNIFICANT CHANGE UP (ref 80–100)
MCV RBC AUTO: 98.8 FL — SIGNIFICANT CHANGE UP (ref 80–100)
MCV RBC AUTO: 98.8 FL — SIGNIFICANT CHANGE UP (ref 80–100)
MCV RBC AUTO: 99.1 FL — SIGNIFICANT CHANGE UP (ref 80–100)
MCV RBC AUTO: 99.6 FL — SIGNIFICANT CHANGE UP (ref 80–100)
MESOTHL CELL # FLD: 1 % — SIGNIFICANT CHANGE UP
METAMYELOCYTES # FLD: 1 % — HIGH (ref 0–0)
METAMYELOCYTES # FLD: 2 % — HIGH (ref 0–0)
METAMYELOCYTES # FLD: 2 % — HIGH (ref 0–0)
METAMYELOCYTES # FLD: 3.6 % — HIGH (ref 0–0)
METAMYELOCYTES # FLD: 4 % — HIGH (ref 0–0)
METAMYELOCYTES # FLD: 5.6 % — HIGH (ref 0–0)
METHOD TYPE: SIGNIFICANT CHANGE UP
METHOD TYPE: SIGNIFICANT CHANGE UP
MICROCYTES BLD QL: SLIGHT — SIGNIFICANT CHANGE UP
MICROCYTES BLD QL: SLIGHT — SIGNIFICANT CHANGE UP
MONOCYTES # BLD AUTO: 0.03 K/UL — SIGNIFICANT CHANGE UP (ref 0–0.9)
MONOCYTES # BLD AUTO: 0.04 K/UL — SIGNIFICANT CHANGE UP (ref 0–0.9)
MONOCYTES # BLD AUTO: 0.04 K/UL — SIGNIFICANT CHANGE UP (ref 0–0.9)
MONOCYTES # BLD AUTO: 0.05 K/UL — SIGNIFICANT CHANGE UP (ref 0–0.9)
MONOCYTES # BLD AUTO: 0.1 K/UL — SIGNIFICANT CHANGE UP (ref 0–0.9)
MONOCYTES # BLD AUTO: 0.1 K/UL — SIGNIFICANT CHANGE UP (ref 0–0.9)
MONOCYTES # BLD AUTO: 0.15 K/UL — SIGNIFICANT CHANGE UP (ref 0–0.9)
MONOCYTES # BLD AUTO: 0.16 K/UL — SIGNIFICANT CHANGE UP (ref 0–0.9)
MONOCYTES # BLD AUTO: 0.17 K/UL — SIGNIFICANT CHANGE UP (ref 0–0.9)
MONOCYTES # BLD AUTO: 0.18 K/UL — SIGNIFICANT CHANGE UP (ref 0–0.9)
MONOCYTES # BLD AUTO: 0.28 K/UL — SIGNIFICANT CHANGE UP (ref 0–0.9)
MONOCYTES # BLD AUTO: 0.38 K/UL — SIGNIFICANT CHANGE UP (ref 0–0.9)
MONOCYTES # BLD AUTO: 0.47 K/UL — SIGNIFICANT CHANGE UP (ref 0–0.9)
MONOCYTES # BLD AUTO: 0.53 K/UL — SIGNIFICANT CHANGE UP (ref 0–0.9)
MONOCYTES # BLD AUTO: 0.81 K/UL — SIGNIFICANT CHANGE UP (ref 0–0.9)
MONOCYTES # BLD AUTO: 0.83 K/UL — SIGNIFICANT CHANGE UP (ref 0–0.9)
MONOCYTES # BLD AUTO: 0.99 K/UL — HIGH (ref 0–0.9)
MONOCYTES # BLD AUTO: 1.16 K/UL — HIGH (ref 0–0.9)
MONOCYTES # BLD AUTO: 1.24 K/UL — HIGH (ref 0–0.9)
MONOCYTES # BLD AUTO: 1.32 K/UL — HIGH (ref 0–0.9)
MONOCYTES # BLD AUTO: SIGNIFICANT CHANGE UP K/UL (ref 0–0.9)
MONOCYTES NFR BLD AUTO: 1.5 % — LOW (ref 2–14)
MONOCYTES NFR BLD AUTO: 1.8 % — LOW (ref 2–14)
MONOCYTES NFR BLD AUTO: 11 % — SIGNIFICANT CHANGE UP (ref 2–14)
MONOCYTES NFR BLD AUTO: 11.1 % — SIGNIFICANT CHANGE UP (ref 2–14)
MONOCYTES NFR BLD AUTO: 16 % — HIGH (ref 2–14)
MONOCYTES NFR BLD AUTO: 18 % — HIGH (ref 2–14)
MONOCYTES NFR BLD AUTO: 2 % — SIGNIFICANT CHANGE UP (ref 2–14)
MONOCYTES NFR BLD AUTO: 2.7 % — SIGNIFICANT CHANGE UP (ref 2–14)
MONOCYTES NFR BLD AUTO: 24 % — HIGH (ref 2–14)
MONOCYTES NFR BLD AUTO: 3 % — SIGNIFICANT CHANGE UP (ref 2–14)
MONOCYTES NFR BLD AUTO: 4.1 % — SIGNIFICANT CHANGE UP (ref 2–14)
MONOCYTES NFR BLD AUTO: 4.6 % — SIGNIFICANT CHANGE UP (ref 2–14)
MONOCYTES NFR BLD AUTO: 5.1 % — SIGNIFICANT CHANGE UP (ref 2–14)
MONOCYTES NFR BLD AUTO: 5.6 % — SIGNIFICANT CHANGE UP (ref 2–14)
MONOCYTES NFR BLD AUTO: 6 % — SIGNIFICANT CHANGE UP (ref 2–14)
MONOCYTES NFR BLD AUTO: 6.8 % — SIGNIFICANT CHANGE UP (ref 2–14)
MONOCYTES NFR BLD AUTO: 7.8 % — SIGNIFICANT CHANGE UP (ref 2–14)
MONOCYTES NFR BLD AUTO: 8.7 % — SIGNIFICANT CHANGE UP (ref 2–14)
MONOCYTES NFR BLD AUTO: 8.8 % — SIGNIFICANT CHANGE UP (ref 2–14)
MONOCYTES NFR BLD AUTO: 9 % — SIGNIFICANT CHANGE UP (ref 2–14)
MONOCYTES NFR BLD AUTO: 9.8 % — SIGNIFICANT CHANGE UP (ref 2–14)
MONOCYTES NFR BLD AUTO: 9.9 % — SIGNIFICANT CHANGE UP (ref 2–14)
MONOCYTES NFR BLD AUTO: SIGNIFICANT CHANGE UP % (ref 2–14)
MONOS+MACROS # FLD: 19 % — SIGNIFICANT CHANGE UP
MRSA PCR RESULT.: SIGNIFICANT CHANGE UP
MRSA PCR RESULT.: SIGNIFICANT CHANGE UP
MYELOCYTES NFR BLD: 1 % — HIGH (ref 0–0)
MYELOCYTES NFR BLD: 1.8 % — HIGH (ref 0–0)
MYELOCYTES NFR BLD: 2 % — HIGH (ref 0–0)
MYELOCYTES NFR BLD: 4.7 % — HIGH (ref 0–0)
NEUTROPHILS # BLD AUTO: 0.05 K/UL — LOW (ref 1.8–7.4)
NEUTROPHILS # BLD AUTO: 0.22 K/UL — LOW (ref 1.8–7.4)
NEUTROPHILS # BLD AUTO: 0.26 K/UL — LOW (ref 1.8–7.4)
NEUTROPHILS # BLD AUTO: 0.28 K/UL — LOW (ref 1.8–7.4)
NEUTROPHILS # BLD AUTO: 0.35 K/UL — LOW (ref 1.8–7.4)
NEUTROPHILS # BLD AUTO: 0.96 K/UL — LOW (ref 1.8–7.4)
NEUTROPHILS # BLD AUTO: 1.36 K/UL — LOW (ref 1.8–7.4)
NEUTROPHILS # BLD AUTO: 1.49 K/UL — LOW (ref 1.8–7.4)
NEUTROPHILS # BLD AUTO: 1.99 K/UL — SIGNIFICANT CHANGE UP (ref 1.8–7.4)
NEUTROPHILS # BLD AUTO: 2.2 K/UL — SIGNIFICANT CHANGE UP (ref 1.8–7.4)
NEUTROPHILS # BLD AUTO: 23.45 K/UL — HIGH (ref 1.8–7.4)
NEUTROPHILS # BLD AUTO: 3.56 K/UL — SIGNIFICANT CHANGE UP (ref 1.8–7.4)
NEUTROPHILS # BLD AUTO: 3.77 K/UL — SIGNIFICANT CHANGE UP (ref 1.8–7.4)
NEUTROPHILS # BLD AUTO: 3.98 K/UL — SIGNIFICANT CHANGE UP (ref 1.8–7.4)
NEUTROPHILS # BLD AUTO: 4.48 K/UL — SIGNIFICANT CHANGE UP (ref 1.8–7.4)
NEUTROPHILS # BLD AUTO: 4.91 K/UL — SIGNIFICANT CHANGE UP (ref 1.8–7.4)
NEUTROPHILS # BLD AUTO: 5.16 K/UL — SIGNIFICANT CHANGE UP (ref 1.8–7.4)
NEUTROPHILS # BLD AUTO: 6.6 K/UL — SIGNIFICANT CHANGE UP (ref 1.8–7.4)
NEUTROPHILS # BLD AUTO: 6.91 K/UL — SIGNIFICANT CHANGE UP (ref 1.8–7.4)
NEUTROPHILS # BLD AUTO: 8.09 K/UL — HIGH (ref 1.8–7.4)
NEUTROPHILS # BLD AUTO: 8.68 K/UL — HIGH (ref 1.8–7.4)
NEUTROPHILS # BLD AUTO: 9.71 K/UL — HIGH (ref 1.8–7.4)
NEUTROPHILS # BLD AUTO: SIGNIFICANT CHANGE UP K/UL (ref 1.8–7.4)
NEUTROPHILS NFR BLD AUTO: 22 % — LOW (ref 43–77)
NEUTROPHILS NFR BLD AUTO: 24.5 % — LOW (ref 43–77)
NEUTROPHILS NFR BLD AUTO: 3.7 % — LOW (ref 43–77)
NEUTROPHILS NFR BLD AUTO: 35 % — LOW (ref 43–77)
NEUTROPHILS NFR BLD AUTO: 36 % — LOW (ref 43–77)
NEUTROPHILS NFR BLD AUTO: 48 % — SIGNIFICANT CHANGE UP (ref 43–77)
NEUTROPHILS NFR BLD AUTO: 52 % — SIGNIFICANT CHANGE UP (ref 43–77)
NEUTROPHILS NFR BLD AUTO: 56.2 % — SIGNIFICANT CHANGE UP (ref 43–77)
NEUTROPHILS NFR BLD AUTO: 64.7 % — SIGNIFICANT CHANGE UP (ref 43–77)
NEUTROPHILS NFR BLD AUTO: 70.2 % — SIGNIFICANT CHANGE UP (ref 43–77)
NEUTROPHILS NFR BLD AUTO: 72.4 % — SIGNIFICANT CHANGE UP (ref 43–77)
NEUTROPHILS NFR BLD AUTO: 73 % — SIGNIFICANT CHANGE UP (ref 43–77)
NEUTROPHILS NFR BLD AUTO: 73.3 % — SIGNIFICANT CHANGE UP (ref 43–77)
NEUTROPHILS NFR BLD AUTO: 74 % — SIGNIFICANT CHANGE UP (ref 43–77)
NEUTROPHILS NFR BLD AUTO: 74.8 % — SIGNIFICANT CHANGE UP (ref 43–77)
NEUTROPHILS NFR BLD AUTO: 75.5 % — SIGNIFICANT CHANGE UP (ref 43–77)
NEUTROPHILS NFR BLD AUTO: 75.9 % — SIGNIFICANT CHANGE UP (ref 43–77)
NEUTROPHILS NFR BLD AUTO: 76 % — SIGNIFICANT CHANGE UP (ref 43–77)
NEUTROPHILS NFR BLD AUTO: 76.3 % — SIGNIFICANT CHANGE UP (ref 43–77)
NEUTROPHILS NFR BLD AUTO: 76.4 % — SIGNIFICANT CHANGE UP (ref 43–77)
NEUTROPHILS NFR BLD AUTO: 86.5 % — HIGH (ref 43–77)
NEUTROPHILS NFR BLD AUTO: 87 % — HIGH (ref 43–77)
NEUTROPHILS NFR BLD AUTO: SIGNIFICANT CHANGE UP % (ref 43–77)
NEUTROPHILS-BODY FLUID: 1 % — SIGNIFICANT CHANGE UP
NEUTS BAND # BLD: 1 % — SIGNIFICANT CHANGE UP (ref 0–8)
NEUTS BAND # BLD: 3 % — SIGNIFICANT CHANGE UP (ref 0–8)
NEUTS BAND # BLD: 3.7 % — SIGNIFICANT CHANGE UP (ref 0–8)
NEUTS BAND # BLD: 4 % — SIGNIFICANT CHANGE UP (ref 0–8)
NEUTS BAND # BLD: 4 % — SIGNIFICANT CHANGE UP (ref 0–8)
NEUTS BAND # BLD: 5 % — SIGNIFICANT CHANGE UP (ref 0–8)
NEUTS BAND # BLD: 8 % — SIGNIFICANT CHANGE UP (ref 0–8)
NEUTS BAND # BLD: 8.9 % — HIGH (ref 0–8)
NITRITE UR-MCNC: NEGATIVE — SIGNIFICANT CHANGE UP
NON-GYNECOLOGICAL CYTOLOGY STUDY: SIGNIFICANT CHANGE UP
NRBC # BLD: 0 /100 WBCS — SIGNIFICANT CHANGE UP (ref 0–0)
NRBC # BLD: 0 /100 — SIGNIFICANT CHANGE UP (ref 0–0)
NRBC # BLD: 15 /100 WBCS — HIGH (ref 0–0)
NRBC # BLD: 2 /100 WBCS — HIGH (ref 0–0)
NRBC # BLD: 21 /100 WBCS — HIGH (ref 0–0)
NRBC # BLD: 3 /100 WBCS — HIGH (ref 0–0)
NRBC # BLD: 3 /100 WBCS — HIGH (ref 0–0)
NRBC # BLD: 32 /100 — HIGH (ref 0–0)
NRBC # BLD: 36 /100 — HIGH (ref 0–0)
NRBC # BLD: 38 /100 WBCS — HIGH (ref 0–0)
NRBC # BLD: 4 /100 WBCS — HIGH (ref 0–0)
NRBC # BLD: 8 /100 WBCS — HIGH (ref 0–0)
OB PNL STL: POSITIVE
ORGANISM # SPEC MICROSCOPIC CNT: SIGNIFICANT CHANGE UP
OSMOLALITY SERPL: 282 MOSMOL/KG — SIGNIFICANT CHANGE UP (ref 280–301)
OTHER CELLS FLD MANUAL: 4 % — SIGNIFICANT CHANGE UP
OVALOCYTES BLD QL SMEAR: SLIGHT — SIGNIFICANT CHANGE UP
PCO2 BLDV: 24 MMHG — LOW (ref 39–42)
PCO2 BLDV: 29 MMHG — LOW (ref 39–42)
PCO2 BLDV: 32 MMHG — LOW (ref 39–42)
PCO2 BLDV: 36 MMHG — LOW (ref 39–42)
PCO2 BLDV: 45 MMHG — HIGH (ref 39–42)
PH BLDV: 7.17 — CRITICAL LOW (ref 7.32–7.43)
PH BLDV: 7.23 — LOW (ref 7.32–7.43)
PH BLDV: 7.32 — SIGNIFICANT CHANGE UP (ref 7.32–7.43)
PH BLDV: 7.33 — SIGNIFICANT CHANGE UP (ref 7.32–7.43)
PH BLDV: 7.44 — HIGH (ref 7.32–7.43)
PH UR: 5 — SIGNIFICANT CHANGE UP (ref 5–8)
PH UR: 6 — SIGNIFICANT CHANGE UP (ref 5–8)
PH UR: 6.5 — SIGNIFICANT CHANGE UP (ref 5–8)
PH UR: 6.5 — SIGNIFICANT CHANGE UP (ref 5–8)
PHOSPHATE SERPL-MCNC: 1.9 MG/DL — LOW (ref 2.5–4.5)
PHOSPHATE SERPL-MCNC: 2.3 MG/DL — LOW (ref 2.5–4.5)
PHOSPHATE SERPL-MCNC: 2.4 MG/DL — LOW (ref 2.5–4.5)
PHOSPHATE SERPL-MCNC: 2.5 MG/DL — SIGNIFICANT CHANGE UP (ref 2.5–4.5)
PHOSPHATE SERPL-MCNC: 2.5 MG/DL — SIGNIFICANT CHANGE UP (ref 2.5–4.5)
PHOSPHATE SERPL-MCNC: 2.6 MG/DL — SIGNIFICANT CHANGE UP (ref 2.5–4.5)
PHOSPHATE SERPL-MCNC: 2.8 MG/DL — SIGNIFICANT CHANGE UP (ref 2.5–4.5)
PHOSPHATE SERPL-MCNC: 3.2 MG/DL — SIGNIFICANT CHANGE UP (ref 2.5–4.5)
PHOSPHATE SERPL-MCNC: 3.3 MG/DL — SIGNIFICANT CHANGE UP (ref 2.5–4.5)
PHOSPHATE SERPL-MCNC: 3.4 MG/DL — SIGNIFICANT CHANGE UP (ref 2.5–4.5)
PHOSPHATE SERPL-MCNC: 3.5 MG/DL — SIGNIFICANT CHANGE UP (ref 2.5–4.5)
PHOSPHATE SERPL-MCNC: 3.6 MG/DL — SIGNIFICANT CHANGE UP (ref 2.5–4.5)
PHOSPHATE SERPL-MCNC: 3.8 MG/DL — SIGNIFICANT CHANGE UP (ref 2.5–4.5)
PLAT MORPH BLD: ABNORMAL
PLAT MORPH BLD: NORMAL — SIGNIFICANT CHANGE UP
PLATELET # BLD AUTO: 11 K/UL — CRITICAL LOW (ref 150–400)
PLATELET # BLD AUTO: 11 K/UL — CRITICAL LOW (ref 150–400)
PLATELET # BLD AUTO: 14 K/UL — CRITICAL LOW (ref 150–400)
PLATELET # BLD AUTO: 15 K/UL — CRITICAL LOW (ref 150–400)
PLATELET # BLD AUTO: 15 K/UL — CRITICAL LOW (ref 150–400)
PLATELET # BLD AUTO: 16 K/UL — CRITICAL LOW (ref 150–400)
PLATELET # BLD AUTO: 17 K/UL — CRITICAL LOW (ref 150–400)
PLATELET # BLD AUTO: 18 K/UL — CRITICAL LOW (ref 150–400)
PLATELET # BLD AUTO: 185 K/UL — SIGNIFICANT CHANGE UP (ref 150–400)
PLATELET # BLD AUTO: 19 K/UL — CRITICAL LOW (ref 150–400)
PLATELET # BLD AUTO: 190 K/UL — SIGNIFICANT CHANGE UP (ref 150–400)
PLATELET # BLD AUTO: 193 K/UL — SIGNIFICANT CHANGE UP (ref 150–400)
PLATELET # BLD AUTO: 195 K/UL — SIGNIFICANT CHANGE UP (ref 150–400)
PLATELET # BLD AUTO: 20 K/UL — CRITICAL LOW (ref 150–400)
PLATELET # BLD AUTO: 21 K/UL — LOW (ref 150–400)
PLATELET # BLD AUTO: 213 K/UL — SIGNIFICANT CHANGE UP (ref 150–400)
PLATELET # BLD AUTO: 216 K/UL — SIGNIFICANT CHANGE UP (ref 150–400)
PLATELET # BLD AUTO: 229 K/UL — SIGNIFICANT CHANGE UP (ref 150–400)
PLATELET # BLD AUTO: 23 K/UL — LOW (ref 150–400)
PLATELET # BLD AUTO: 232 K/UL — SIGNIFICANT CHANGE UP (ref 150–400)
PLATELET # BLD AUTO: 236 K/UL — SIGNIFICANT CHANGE UP (ref 150–400)
PLATELET # BLD AUTO: 241 K/UL — SIGNIFICANT CHANGE UP (ref 150–400)
PLATELET # BLD AUTO: 27 K/UL — LOW (ref 150–400)
PLATELET # BLD AUTO: 27 K/UL — LOW (ref 150–400)
PLATELET # BLD AUTO: 29 K/UL — LOW (ref 150–400)
PLATELET # BLD AUTO: 31 K/UL — LOW (ref 150–400)
PLATELET # BLD AUTO: 33 K/UL — LOW (ref 150–400)
PLATELET # BLD AUTO: 34 K/UL — LOW (ref 150–400)
PLATELET # BLD AUTO: 42 K/UL — LOW (ref 150–400)
PLATELET # BLD AUTO: 45 K/UL — LOW (ref 150–400)
PLATELET # BLD AUTO: 5 K/UL — CRITICAL LOW (ref 150–400)
PLATELET # BLD AUTO: 52 K/UL — LOW (ref 150–400)
PLATELET # BLD AUTO: 60 K/UL — LOW (ref 150–400)
PLATELET # BLD AUTO: 63 K/UL — LOW (ref 150–400)
PLATELET # BLD AUTO: 69 K/UL — LOW (ref 150–400)
PLATELET # BLD AUTO: 7 K/UL — CRITICAL LOW (ref 150–400)
PLATELET # BLD AUTO: 7 K/UL — CRITICAL LOW (ref 150–400)
PLATELET # BLD AUTO: 78 K/UL — LOW (ref 150–400)
PLATELET # BLD AUTO: 82 K/UL — LOW (ref 150–400)
PLATELET # BLD AUTO: 95 K/UL — LOW (ref 150–400)
PLATELET # BLD AUTO: SIGNIFICANT CHANGE UP (ref 150–400)
PLATELET COUNT - ESTIMATE: ABNORMAL
PO2 BLDV: 31 MMHG — SIGNIFICANT CHANGE UP (ref 25–45)
PO2 BLDV: 33 MMHG — SIGNIFICANT CHANGE UP (ref 25–45)
PO2 BLDV: 36 MMHG — SIGNIFICANT CHANGE UP
PO2 BLDV: 47 MMHG — HIGH (ref 25–45)
PO2 BLDV: 53 MMHG — HIGH (ref 25–45)
POIKILOCYTOSIS BLD QL AUTO: SLIGHT — SIGNIFICANT CHANGE UP
POLYCHROMASIA BLD QL SMEAR: SLIGHT — SIGNIFICANT CHANGE UP
POTASSIUM BLDV-SCNC: 2.7 MMOL/L — CRITICAL LOW (ref 3.5–5.1)
POTASSIUM BLDV-SCNC: 2.9 MMOL/L — CRITICAL LOW (ref 3.5–5.1)
POTASSIUM BLDV-SCNC: 2.9 MMOL/L — CRITICAL LOW (ref 3.5–5.1)
POTASSIUM BLDV-SCNC: 3.5 MMOL/L — SIGNIFICANT CHANGE UP (ref 3.5–5.1)
POTASSIUM BLDV-SCNC: 3.8 MMOL/L — SIGNIFICANT CHANGE UP (ref 3.5–5.1)
POTASSIUM SERPL-MCNC: 2.9 MMOL/L — CRITICAL LOW (ref 3.5–5.3)
POTASSIUM SERPL-MCNC: 3 MMOL/L — LOW (ref 3.5–5.3)
POTASSIUM SERPL-MCNC: 3.1 MMOL/L — LOW (ref 3.5–5.3)
POTASSIUM SERPL-MCNC: 3.4 MMOL/L — LOW (ref 3.5–5.3)
POTASSIUM SERPL-MCNC: 3.5 MMOL/L — SIGNIFICANT CHANGE UP (ref 3.5–5.3)
POTASSIUM SERPL-MCNC: 3.5 MMOL/L — SIGNIFICANT CHANGE UP (ref 3.5–5.3)
POTASSIUM SERPL-MCNC: 3.6 MMOL/L — SIGNIFICANT CHANGE UP (ref 3.5–5.3)
POTASSIUM SERPL-MCNC: 3.6 MMOL/L — SIGNIFICANT CHANGE UP (ref 3.5–5.3)
POTASSIUM SERPL-MCNC: 3.7 MMOL/L — SIGNIFICANT CHANGE UP (ref 3.5–5.3)
POTASSIUM SERPL-MCNC: 3.8 MMOL/L — SIGNIFICANT CHANGE UP (ref 3.5–5.3)
POTASSIUM SERPL-MCNC: 3.8 MMOL/L — SIGNIFICANT CHANGE UP (ref 3.5–5.3)
POTASSIUM SERPL-MCNC: 3.9 MMOL/L — SIGNIFICANT CHANGE UP (ref 3.5–5.3)
POTASSIUM SERPL-MCNC: 4 MMOL/L — SIGNIFICANT CHANGE UP (ref 3.5–5.3)
POTASSIUM SERPL-MCNC: 4.1 MMOL/L — SIGNIFICANT CHANGE UP (ref 3.5–5.3)
POTASSIUM SERPL-MCNC: 4.1 MMOL/L — SIGNIFICANT CHANGE UP (ref 3.5–5.3)
POTASSIUM SERPL-MCNC: 4.2 MMOL/L — SIGNIFICANT CHANGE UP (ref 3.5–5.3)
POTASSIUM SERPL-MCNC: 4.4 MMOL/L — SIGNIFICANT CHANGE UP (ref 3.5–5.3)
POTASSIUM SERPL-MCNC: 4.6 MMOL/L — SIGNIFICANT CHANGE UP (ref 3.5–5.3)
POTASSIUM SERPL-SCNC: 2.9 MMOL/L — CRITICAL LOW (ref 3.5–5.3)
POTASSIUM SERPL-SCNC: 3 MMOL/L — LOW (ref 3.5–5.3)
POTASSIUM SERPL-SCNC: 3.1 MMOL/L — LOW (ref 3.5–5.3)
POTASSIUM SERPL-SCNC: 3.4 MMOL/L — LOW (ref 3.5–5.3)
POTASSIUM SERPL-SCNC: 3.5 MMOL/L — SIGNIFICANT CHANGE UP (ref 3.5–5.3)
POTASSIUM SERPL-SCNC: 3.5 MMOL/L — SIGNIFICANT CHANGE UP (ref 3.5–5.3)
POTASSIUM SERPL-SCNC: 3.6 MMOL/L — SIGNIFICANT CHANGE UP (ref 3.5–5.3)
POTASSIUM SERPL-SCNC: 3.6 MMOL/L — SIGNIFICANT CHANGE UP (ref 3.5–5.3)
POTASSIUM SERPL-SCNC: 3.7 MMOL/L — SIGNIFICANT CHANGE UP (ref 3.5–5.3)
POTASSIUM SERPL-SCNC: 3.8 MMOL/L — SIGNIFICANT CHANGE UP (ref 3.5–5.3)
POTASSIUM SERPL-SCNC: 3.8 MMOL/L — SIGNIFICANT CHANGE UP (ref 3.5–5.3)
POTASSIUM SERPL-SCNC: 3.9 MMOL/L — SIGNIFICANT CHANGE UP (ref 3.5–5.3)
POTASSIUM SERPL-SCNC: 4 MMOL/L — SIGNIFICANT CHANGE UP (ref 3.5–5.3)
POTASSIUM SERPL-SCNC: 4.1 MMOL/L — SIGNIFICANT CHANGE UP (ref 3.5–5.3)
POTASSIUM SERPL-SCNC: 4.1 MMOL/L — SIGNIFICANT CHANGE UP (ref 3.5–5.3)
POTASSIUM SERPL-SCNC: 4.2 MMOL/L — SIGNIFICANT CHANGE UP (ref 3.5–5.3)
POTASSIUM SERPL-SCNC: 4.4 MMOL/L — SIGNIFICANT CHANGE UP (ref 3.5–5.3)
POTASSIUM SERPL-SCNC: 4.6 MMOL/L — SIGNIFICANT CHANGE UP (ref 3.5–5.3)
POTASSIUM UR-SCNC: 31 MMOL/L — SIGNIFICANT CHANGE UP
PROCALCITONIN SERPL-MCNC: 11 NG/ML — HIGH (ref 0.02–0.1)
PROMYELOCYTES # FLD: 2 % — HIGH (ref 0–0)
PROT FLD-MCNC: 1 G/DL — SIGNIFICANT CHANGE UP
PROT SERPL-MCNC: 3.9 G/DL — LOW (ref 6–8.3)
PROT SERPL-MCNC: 4 G/DL — LOW (ref 6–8.3)
PROT SERPL-MCNC: 4.1 G/DL — LOW (ref 6–8.3)
PROT SERPL-MCNC: 4.2 G/DL — LOW (ref 6–8.3)
PROT SERPL-MCNC: 4.3 G/DL — LOW (ref 6–8.3)
PROT SERPL-MCNC: 4.4 G/DL — LOW (ref 6–8.3)
PROT SERPL-MCNC: 4.5 G/DL — LOW (ref 6–8.3)
PROT SERPL-MCNC: 4.6 G/DL — LOW (ref 6–8.3)
PROT SERPL-MCNC: 4.7 G/DL — LOW (ref 6–8.3)
PROT SERPL-MCNC: 4.8 G/DL — LOW (ref 6–8.3)
PROT SERPL-MCNC: 4.9 G/DL — LOW (ref 6–8.3)
PROT SERPL-MCNC: 5 G/DL — LOW (ref 6–8.3)
PROT SERPL-MCNC: 5.2 G/DL — LOW (ref 6–8.3)
PROT SERPL-MCNC: 5.3 G/DL — LOW (ref 6–8.3)
PROT SERPL-MCNC: 5.5 G/DL — LOW (ref 6–8.3)
PROT SERPL-MCNC: 5.8 G/DL — LOW (ref 6–8.3)
PROT SERPL-MCNC: 5.8 G/DL — LOW (ref 6–8.3)
PROT SERPL-MCNC: 6 G/DL — SIGNIFICANT CHANGE UP (ref 6–8.3)
PROT SERPL-MCNC: 6.4 G/DL — SIGNIFICANT CHANGE UP (ref 6–8.3)
PROT SERPL-MCNC: 6.7 G/DL — SIGNIFICANT CHANGE UP (ref 6–8.3)
PROT SERPL-MCNC: 7 G/DL — SIGNIFICANT CHANGE UP (ref 6–8.3)
PROT SERPL-MCNC: 7.8 G/DL — SIGNIFICANT CHANGE UP (ref 6–8.3)
PROT UR-MCNC: 30 MG/DL
PROT UR-MCNC: ABNORMAL
PROT UR-MCNC: NEGATIVE — SIGNIFICANT CHANGE UP
PROTHROM AB SERPL-ACNC: 11.6 SEC — SIGNIFICANT CHANGE UP (ref 10.5–13.4)
PROTHROM AB SERPL-ACNC: 12.3 SEC — SIGNIFICANT CHANGE UP (ref 10.5–13.4)
PROTHROM AB SERPL-ACNC: 12.6 SEC — SIGNIFICANT CHANGE UP (ref 9.5–13)
PROTHROM AB SERPL-ACNC: 14.8 SEC — HIGH (ref 10.5–13.4)
PROTHROM AB SERPL-ACNC: 17.2 SEC — HIGH (ref 9.5–13)
PROTHROM AB SERPL-ACNC: 21.5 SEC — HIGH (ref 10.5–13.4)
PROTHROM AB SERPL-ACNC: 22.8 SEC — HIGH (ref 9.5–13)
PROTHROM AB SERPL-ACNC: 24.1 SEC — HIGH (ref 9.5–13)
PROTHROM AB SERPL-ACNC: 24.2 SEC — HIGH (ref 9.5–13)
PROTHROM AB SERPL-ACNC: 25.4 SEC — HIGH (ref 10.5–13.4)
PROTHROM AB SERPL-ACNC: 31.5 SEC — HIGH (ref 9.5–13)
PROTHROM AB SERPL-ACNC: 31.8 SEC — HIGH (ref 10.5–13.4)
PROTHROM AB SERPL-ACNC: 35.8 SEC — HIGH (ref 9.5–13)
PROTHROM AB SERPL-ACNC: 36.2 SEC — HIGH (ref 9.5–13)
PROTHROM AB SERPL-ACNC: 37.2 SEC — HIGH (ref 10.5–13.4)
PROTHROM AB SERPL-ACNC: 38.8 SEC — HIGH (ref 9.5–13)
PROTHROM AB SERPL-ACNC: 42.2 SEC — HIGH (ref 9.5–13)
RAPID RVP RESULT: DETECTED
RAPID RVP RESULT: SIGNIFICANT CHANGE UP
RAPID RVP RESULT: SIGNIFICANT CHANGE UP
RBC # BLD: 1.88 M/UL — LOW (ref 3.8–5.2)
RBC # BLD: 1.97 M/UL — LOW (ref 3.8–5.2)
RBC # BLD: 2.02 M/UL — LOW (ref 3.8–5.2)
RBC # BLD: 2.11 M/UL — LOW (ref 3.8–5.2)
RBC # BLD: 2.12 M/UL — LOW (ref 3.8–5.2)
RBC # BLD: 2.19 M/UL — LOW (ref 3.8–5.2)
RBC # BLD: 2.23 M/UL — LOW (ref 3.8–5.2)
RBC # BLD: 2.26 M/UL — LOW (ref 3.8–5.2)
RBC # BLD: 2.27 M/UL — LOW (ref 3.8–5.2)
RBC # BLD: 2.36 M/UL — LOW (ref 3.8–5.2)
RBC # BLD: 2.4 M/UL — LOW (ref 3.8–5.2)
RBC # BLD: 2.41 M/UL — LOW (ref 3.8–5.2)
RBC # BLD: 2.42 M/UL — LOW (ref 3.8–5.2)
RBC # BLD: 2.45 M/UL — LOW (ref 3.8–5.2)
RBC # BLD: 2.47 M/UL — LOW (ref 3.8–5.2)
RBC # BLD: 2.49 M/UL — LOW (ref 3.8–5.2)
RBC # BLD: 2.5 M/UL — LOW (ref 3.8–5.2)
RBC # BLD: 2.51 M/UL — LOW (ref 3.8–5.2)
RBC # BLD: 2.51 M/UL — LOW (ref 3.8–5.2)
RBC # BLD: 2.54 M/UL — LOW (ref 3.8–5.2)
RBC # BLD: 2.55 M/UL — LOW (ref 3.8–5.2)
RBC # BLD: 2.58 M/UL — LOW (ref 3.8–5.2)
RBC # BLD: 2.6 M/UL — LOW (ref 3.8–5.2)
RBC # BLD: 2.6 M/UL — LOW (ref 3.8–5.2)
RBC # BLD: 2.68 M/UL — LOW (ref 3.8–5.2)
RBC # BLD: 2.7 M/UL — LOW (ref 3.8–5.2)
RBC # BLD: 2.7 M/UL — LOW (ref 3.8–5.2)
RBC # BLD: 2.73 M/UL — LOW (ref 3.8–5.2)
RBC # BLD: 2.78 M/UL — LOW (ref 3.8–5.2)
RBC # BLD: 2.82 M/UL — LOW (ref 3.8–5.2)
RBC # BLD: 2.82 M/UL — LOW (ref 3.8–5.2)
RBC # BLD: 2.89 M/UL — LOW (ref 3.8–5.2)
RBC # BLD: 2.92 M/UL — LOW (ref 3.8–5.2)
RBC # BLD: 3.01 M/UL — LOW (ref 3.8–5.2)
RBC # BLD: 3.01 M/UL — LOW (ref 3.8–5.2)
RBC # BLD: 3.44 M/UL — LOW (ref 3.8–5.2)
RBC # BLD: 3.51 M/UL — LOW (ref 3.8–5.2)
RBC # BLD: 3.57 M/UL — LOW (ref 3.8–5.2)
RBC # BLD: 3.65 M/UL — LOW (ref 3.8–5.2)
RBC # BLD: 3.7 M/UL — LOW (ref 3.8–5.2)
RBC # BLD: 3.77 M/UL — LOW (ref 3.8–5.2)
RBC # BLD: 3.92 M/UL — SIGNIFICANT CHANGE UP (ref 3.8–5.2)
RBC # BLD: 3.98 M/UL — SIGNIFICANT CHANGE UP (ref 3.8–5.2)
RBC # BLD: 4.01 M/UL — SIGNIFICANT CHANGE UP (ref 3.8–5.2)
RBC # BLD: 4.05 M/UL — SIGNIFICANT CHANGE UP (ref 3.8–5.2)
RBC # FLD: 15.2 % — HIGH (ref 10.3–14.5)
RBC # FLD: 15.2 % — HIGH (ref 10.3–14.5)
RBC # FLD: 15.7 % — HIGH (ref 10.3–14.5)
RBC # FLD: 15.8 % — HIGH (ref 10.3–14.5)
RBC # FLD: 15.9 % — HIGH (ref 10.3–14.5)
RBC # FLD: 16.7 % — HIGH (ref 10.3–14.5)
RBC # FLD: 17 % — HIGH (ref 10.3–14.5)
RBC # FLD: 17.1 % — HIGH (ref 10.3–14.5)
RBC # FLD: 17.2 % — HIGH (ref 10.3–14.5)
RBC # FLD: 17.7 % — HIGH (ref 10.3–14.5)
RBC # FLD: 21.6 % — HIGH (ref 10.3–14.5)
RBC # FLD: 21.7 % — HIGH (ref 10.3–14.5)
RBC # FLD: 22.1 % — HIGH (ref 10.3–14.5)
RBC # FLD: 22.6 % — HIGH (ref 10.3–14.5)
RBC # FLD: 22.8 % — HIGH (ref 10.3–14.5)
RBC # FLD: 23.1 % — HIGH (ref 10.3–14.5)
RBC # FLD: 23.6 % — HIGH (ref 10.3–14.5)
RBC # FLD: 24.1 % — HIGH (ref 10.3–14.5)
RBC # FLD: 24.2 % — HIGH (ref 10.3–14.5)
RBC # FLD: 24.2 % — HIGH (ref 10.3–14.5)
RBC # FLD: 24.6 % — HIGH (ref 10.3–14.5)
RBC # FLD: 24.8 % — HIGH (ref 10.3–14.5)
RBC # FLD: 25.5 % — HIGH (ref 10.3–14.5)
RBC # FLD: 25.8 % — HIGH (ref 10.3–14.5)
RBC # FLD: 26 % — HIGH (ref 10.3–14.5)
RBC # FLD: 26 % — HIGH (ref 10.3–14.5)
RBC # FLD: 26.1 % — HIGH (ref 10.3–14.5)
RBC # FLD: 26.2 % — HIGH (ref 10.3–14.5)
RBC # FLD: 26.3 % — HIGH (ref 10.3–14.5)
RBC # FLD: 26.3 % — HIGH (ref 10.3–14.5)
RBC # FLD: 26.5 % — HIGH (ref 10.3–14.5)
RBC # FLD: 26.5 % — HIGH (ref 10.3–14.5)
RBC # FLD: 29.9 % — HIGH (ref 10.3–14.5)
RBC # FLD: 31 % — HIGH (ref 10.3–14.5)
RBC # FLD: 31.6 % — HIGH (ref 10.3–14.5)
RBC # FLD: 32.1 % — HIGH (ref 10.3–14.5)
RBC # FLD: 32.5 % — HIGH (ref 10.3–14.5)
RBC # FLD: 32.9 % — HIGH (ref 10.3–14.5)
RBC # FLD: 32.9 % — HIGH (ref 10.3–14.5)
RBC # FLD: 33.1 % — HIGH (ref 10.3–14.5)
RBC # FLD: 33.2 % — HIGH (ref 10.3–14.5)
RBC # FLD: 33.2 % — HIGH (ref 10.3–14.5)
RBC # FLD: 33.3 % — HIGH (ref 10.3–14.5)
RBC BLD AUTO: ABNORMAL
RBC BLD AUTO: SIGNIFICANT CHANGE UP
RBC CASTS # UR COMP ASSIST: 1 /HPF — SIGNIFICANT CHANGE UP (ref 0–4)
RBC CASTS # UR COMP ASSIST: 1 /HPF — SIGNIFICANT CHANGE UP (ref 0–4)
RBC CASTS # UR COMP ASSIST: 2 /HPF — SIGNIFICANT CHANGE UP (ref 0–4)
RBC CASTS # UR COMP ASSIST: 29 /HPF — HIGH (ref 0–4)
RBC CASTS # UR COMP ASSIST: 32 /HPF — HIGH (ref 0–4)
RBC CASTS # UR COMP ASSIST: ABNORMAL /HPF (ref 0–2)
RCV VOL RI: <2000 CELLS/UL — HIGH (ref 0–5)
RETICS #: 160.5 K/UL — HIGH (ref 25–125)
RETICS #: 79.5 K/UL — SIGNIFICANT CHANGE UP (ref 25–125)
RETICS/RBC NFR: 2.8 % — HIGH (ref 0.5–2.5)
RETICS/RBC NFR: 6.3 % — HIGH (ref 0.5–2.5)
RH IG SCN BLD-IMP: POSITIVE — SIGNIFICANT CHANGE UP
S AUREUS DNA NOSE QL NAA+PROBE: SIGNIFICANT CHANGE UP
S AUREUS DNA NOSE QL NAA+PROBE: SIGNIFICANT CHANGE UP
SAO2 % BLDV: 40.5 % — LOW (ref 67–88)
SAO2 % BLDV: 51.1 % — SIGNIFICANT CHANGE UP
SAO2 % BLDV: 56.1 % — LOW (ref 67–88)
SAO2 % BLDV: 76.8 % — SIGNIFICANT CHANGE UP (ref 67–88)
SAO2 % BLDV: 78.5 % — SIGNIFICANT CHANGE UP (ref 67–88)
SARS-COV-2 RNA SPEC QL NAA+PROBE: DETECTED
SARS-COV-2 RNA SPEC QL NAA+PROBE: SIGNIFICANT CHANGE UP
SCHISTOCYTES BLD QL AUTO: SLIGHT — SIGNIFICANT CHANGE UP
SCHISTOCYTES BLD QL AUTO: SLIGHT — SIGNIFICANT CHANGE UP
SMUDGE CELLS # BLD: PRESENT — SIGNIFICANT CHANGE UP
SMUDGE CELLS # BLD: PRESENT — SIGNIFICANT CHANGE UP
SODIUM SERPL-SCNC: 130 MMOL/L — LOW (ref 135–145)
SODIUM SERPL-SCNC: 130 MMOL/L — LOW (ref 135–145)
SODIUM SERPL-SCNC: 132 MMOL/L — LOW (ref 135–145)
SODIUM SERPL-SCNC: 133 MMOL/L — LOW (ref 135–145)
SODIUM SERPL-SCNC: 134 MMOL/L — LOW (ref 135–145)
SODIUM SERPL-SCNC: 135 MMOL/L — SIGNIFICANT CHANGE UP (ref 135–145)
SODIUM SERPL-SCNC: 136 MMOL/L — SIGNIFICANT CHANGE UP (ref 135–145)
SODIUM SERPL-SCNC: 136 MMOL/L — SIGNIFICANT CHANGE UP (ref 135–145)
SODIUM SERPL-SCNC: 137 MMOL/L — SIGNIFICANT CHANGE UP (ref 135–145)
SODIUM SERPL-SCNC: 140 MMOL/L — SIGNIFICANT CHANGE UP (ref 135–145)
SODIUM UR-SCNC: 21 MMOL/L — SIGNIFICANT CHANGE UP
SP GR SPEC: 1 — LOW (ref 1.01–1.02)
SP GR SPEC: 1.01 — SIGNIFICANT CHANGE UP (ref 1.01–1.02)
SP GR SPEC: 1.01 — SIGNIFICANT CHANGE UP (ref 1.01–1.02)
SP GR SPEC: 1.03 — HIGH (ref 1.01–1.02)
SP GR SPEC: 1.03 — HIGH (ref 1.01–1.02)
SP GR SPEC: 1.04 — HIGH (ref 1.01–1.02)
SPECIMEN SOURCE: SIGNIFICANT CHANGE UP
SPHEROCYTES BLD QL SMEAR: SLIGHT — SIGNIFICANT CHANGE UP
STOMATOCYTES BLD QL SMEAR: SLIGHT — SIGNIFICANT CHANGE UP
T3 SERPL-MCNC: 83 NG/DL — SIGNIFICANT CHANGE UP (ref 80–200)
T3 SERPL-MCNC: 87 NG/DL — SIGNIFICANT CHANGE UP (ref 80–200)
T4 AB SER-ACNC: 8.7 UG/DL — SIGNIFICANT CHANGE UP (ref 4.6–12)
T4 AB SER-ACNC: 9.4 UG/DL — SIGNIFICANT CHANGE UP (ref 4.6–12)
TARGETS BLD QL SMEAR: SLIGHT — SIGNIFICANT CHANGE UP
TOTAL NUCLEATED CELL COUNT, BODY FLUID: 22 CELLS/UL — HIGH (ref 0–5)
TROPONIN T, HIGH SENSITIVITY RESULT: 32 NG/L — SIGNIFICANT CHANGE UP (ref 0–51)
TSH SERPL-MCNC: 10.5 UIU/ML — HIGH (ref 0.27–4.2)
TSH SERPL-MCNC: 8.46 UIU/ML — HIGH (ref 0.27–4.2)
TUBE TYPE: SIGNIFICANT CHANGE UP
UROBILINOGEN FLD QL: 1 MG/DL
UROBILINOGEN FLD QL: ABNORMAL
UROBILINOGEN FLD QL: NEGATIVE — SIGNIFICANT CHANGE UP
VANCOMYCIN TROUGH SERPL-MCNC: 14.4 UG/ML — SIGNIFICANT CHANGE UP (ref 10–20)
VANCOMYCIN TROUGH SERPL-MCNC: 6.6 UG/ML — LOW (ref 10–20)
VARIANT LYMPHS # BLD: 5 % — SIGNIFICANT CHANGE UP (ref 0–6)
WBC # BLD: 0.48 K/UL — CRITICAL LOW (ref 3.8–10.5)
WBC # BLD: 0.71 K/UL — CRITICAL LOW (ref 3.8–10.5)
WBC # BLD: 0.74 K/UL — CRITICAL LOW (ref 3.8–10.5)
WBC # BLD: 0.9 K/UL — CRITICAL LOW (ref 3.8–10.5)
WBC # BLD: 0.9 K/UL — CRITICAL LOW (ref 3.8–10.5)
WBC # BLD: 1.12 K/UL — LOW (ref 3.8–10.5)
WBC # BLD: 1.15 K/UL — LOW (ref 3.8–10.5)
WBC # BLD: 1.25 K/UL — LOW (ref 3.8–10.5)
WBC # BLD: 1.72 K/UL — LOW (ref 3.8–10.5)
WBC # BLD: 1.72 K/UL — LOW (ref 3.8–10.5)
WBC # BLD: 10.82 K/UL — HIGH (ref 3.8–10.5)
WBC # BLD: 10.89 K/UL — HIGH (ref 3.8–10.5)
WBC # BLD: 11.36 K/UL — HIGH (ref 3.8–10.5)
WBC # BLD: 11.72 K/UL — HIGH (ref 3.8–10.5)
WBC # BLD: 12.17 K/UL — HIGH (ref 3.8–10.5)
WBC # BLD: 13.44 K/UL — HIGH (ref 3.8–10.5)
WBC # BLD: 13.88 K/UL — HIGH (ref 3.8–10.5)
WBC # BLD: 2 K/UL — LOW (ref 3.8–10.5)
WBC # BLD: 2.65 K/UL — LOW (ref 3.8–10.5)
WBC # BLD: 2.71 K/UL — LOW (ref 3.8–10.5)
WBC # BLD: 2.72 K/UL — LOW (ref 3.8–10.5)
WBC # BLD: 27.12 K/UL — HIGH (ref 3.8–10.5)
WBC # BLD: 3.06 K/UL — LOW (ref 3.8–10.5)
WBC # BLD: 3.38 K/UL — LOW (ref 3.8–10.5)
WBC # BLD: 3.59 K/UL — LOW (ref 3.8–10.5)
WBC # BLD: 4.66 K/UL — SIGNIFICANT CHANGE UP (ref 3.8–10.5)
WBC # BLD: 4.77 K/UL — SIGNIFICANT CHANGE UP (ref 3.8–10.5)
WBC # BLD: 5.37 K/UL — SIGNIFICANT CHANGE UP (ref 3.8–10.5)
WBC # BLD: 5.44 K/UL — SIGNIFICANT CHANGE UP (ref 3.8–10.5)
WBC # BLD: 5.49 K/UL — SIGNIFICANT CHANGE UP (ref 3.8–10.5)
WBC # BLD: 5.82 K/UL — SIGNIFICANT CHANGE UP (ref 3.8–10.5)
WBC # BLD: 50.47 K/UL — CRITICAL HIGH (ref 3.8–10.5)
WBC # BLD: 58.2 K/UL — CRITICAL HIGH (ref 3.8–10.5)
WBC # BLD: 6 K/UL — SIGNIFICANT CHANGE UP (ref 3.8–10.5)
WBC # BLD: 6.09 K/UL — SIGNIFICANT CHANGE UP (ref 3.8–10.5)
WBC # BLD: 6.47 K/UL — SIGNIFICANT CHANGE UP (ref 3.8–10.5)
WBC # BLD: 7.57 K/UL — SIGNIFICANT CHANGE UP (ref 3.8–10.5)
WBC # BLD: 7.88 K/UL — SIGNIFICANT CHANGE UP (ref 3.8–10.5)
WBC # BLD: 7.91 K/UL — SIGNIFICANT CHANGE UP (ref 3.8–10.5)
WBC # BLD: 8.18 K/UL — SIGNIFICANT CHANGE UP (ref 3.8–10.5)
WBC # BLD: 8.59 K/UL — SIGNIFICANT CHANGE UP (ref 3.8–10.5)
WBC # BLD: 9.16 K/UL — SIGNIFICANT CHANGE UP (ref 3.8–10.5)
WBC # BLD: 9.29 K/UL — SIGNIFICANT CHANGE UP (ref 3.8–10.5)
WBC # FLD AUTO: 0.48 K/UL — CRITICAL LOW (ref 3.8–10.5)
WBC # FLD AUTO: 0.71 K/UL — CRITICAL LOW (ref 3.8–10.5)
WBC # FLD AUTO: 0.74 K/UL — CRITICAL LOW (ref 3.8–10.5)
WBC # FLD AUTO: 0.9 K/UL — CRITICAL LOW (ref 3.8–10.5)
WBC # FLD AUTO: 0.9 K/UL — CRITICAL LOW (ref 3.8–10.5)
WBC # FLD AUTO: 1.12 K/UL — LOW (ref 3.8–10.5)
WBC # FLD AUTO: 1.15 K/UL — LOW (ref 3.8–10.5)
WBC # FLD AUTO: 1.25 K/UL — LOW (ref 3.8–10.5)
WBC # FLD AUTO: 1.72 K/UL — LOW (ref 3.8–10.5)
WBC # FLD AUTO: 1.72 K/UL — LOW (ref 3.8–10.5)
WBC # FLD AUTO: 10.82 K/UL — HIGH (ref 3.8–10.5)
WBC # FLD AUTO: 10.89 K/UL — HIGH (ref 3.8–10.5)
WBC # FLD AUTO: 11.36 K/UL — HIGH (ref 3.8–10.5)
WBC # FLD AUTO: 11.72 K/UL — HIGH (ref 3.8–10.5)
WBC # FLD AUTO: 12.17 K/UL — HIGH (ref 3.8–10.5)
WBC # FLD AUTO: 13.44 K/UL — HIGH (ref 3.8–10.5)
WBC # FLD AUTO: 13.88 K/UL — HIGH (ref 3.8–10.5)
WBC # FLD AUTO: 2 K/UL — LOW (ref 3.8–10.5)
WBC # FLD AUTO: 2.65 K/UL — LOW (ref 3.8–10.5)
WBC # FLD AUTO: 2.71 K/UL — LOW (ref 3.8–10.5)
WBC # FLD AUTO: 2.72 K/UL — LOW (ref 3.8–10.5)
WBC # FLD AUTO: 27.12 K/UL — HIGH (ref 3.8–10.5)
WBC # FLD AUTO: 3.06 K/UL — LOW (ref 3.8–10.5)
WBC # FLD AUTO: 3.38 K/UL — LOW (ref 3.8–10.5)
WBC # FLD AUTO: 3.59 K/UL — LOW (ref 3.8–10.5)
WBC # FLD AUTO: 4.66 K/UL — SIGNIFICANT CHANGE UP (ref 3.8–10.5)
WBC # FLD AUTO: 4.77 K/UL — SIGNIFICANT CHANGE UP (ref 3.8–10.5)
WBC # FLD AUTO: 5.37 K/UL — SIGNIFICANT CHANGE UP (ref 3.8–10.5)
WBC # FLD AUTO: 5.44 K/UL — SIGNIFICANT CHANGE UP (ref 3.8–10.5)
WBC # FLD AUTO: 5.49 K/UL — SIGNIFICANT CHANGE UP (ref 3.8–10.5)
WBC # FLD AUTO: 5.82 K/UL — SIGNIFICANT CHANGE UP (ref 3.8–10.5)
WBC # FLD AUTO: 50.47 K/UL — CRITICAL HIGH (ref 3.8–10.5)
WBC # FLD AUTO: 58.2 K/UL — CRITICAL HIGH (ref 3.8–10.5)
WBC # FLD AUTO: 6 K/UL — SIGNIFICANT CHANGE UP (ref 3.8–10.5)
WBC # FLD AUTO: 6.09 K/UL — SIGNIFICANT CHANGE UP (ref 3.8–10.5)
WBC # FLD AUTO: 6.47 K/UL — SIGNIFICANT CHANGE UP (ref 3.8–10.5)
WBC # FLD AUTO: 7.57 K/UL — SIGNIFICANT CHANGE UP (ref 3.8–10.5)
WBC # FLD AUTO: 7.88 K/UL — SIGNIFICANT CHANGE UP (ref 3.8–10.5)
WBC # FLD AUTO: 7.91 K/UL — SIGNIFICANT CHANGE UP (ref 3.8–10.5)
WBC # FLD AUTO: 8.18 K/UL — SIGNIFICANT CHANGE UP (ref 3.8–10.5)
WBC # FLD AUTO: 8.59 K/UL — SIGNIFICANT CHANGE UP (ref 3.8–10.5)
WBC # FLD AUTO: 9.16 K/UL — SIGNIFICANT CHANGE UP (ref 3.8–10.5)
WBC # FLD AUTO: 9.29 K/UL — SIGNIFICANT CHANGE UP (ref 3.8–10.5)
WBC UR QL: 0 /HPF — SIGNIFICANT CHANGE UP (ref 0–5)
WBC UR QL: 1 /HPF — SIGNIFICANT CHANGE UP (ref 0–5)
WBC UR QL: 14 /HPF — HIGH (ref 0–5)
WBC UR QL: 21 /HPF — HIGH (ref 0–5)
WBC UR QL: 3 /HPF — SIGNIFICANT CHANGE UP (ref 0–5)
WBC UR QL: ABNORMAL /HPF (ref 0–5)

## 2023-01-01 PROCEDURE — 99292 CRITICAL CARE ADDL 30 MIN: CPT | Mod: 25

## 2023-01-01 PROCEDURE — 71250 CT THORAX DX C-: CPT

## 2023-01-01 PROCEDURE — 99291 CRITICAL CARE FIRST HOUR: CPT | Mod: GC,25

## 2023-01-01 PROCEDURE — 71045 X-RAY EXAM CHEST 1 VIEW: CPT

## 2023-01-01 PROCEDURE — 86923 COMPATIBILITY TEST ELECTRIC: CPT

## 2023-01-01 PROCEDURE — 99291 CRITICAL CARE FIRST HOUR: CPT

## 2023-01-01 PROCEDURE — 99233 SBSQ HOSP IP/OBS HIGH 50: CPT | Mod: GC

## 2023-01-01 PROCEDURE — 84480 ASSAY TRIIODOTHYRONINE (T3): CPT

## 2023-01-01 PROCEDURE — 84100 ASSAY OF PHOSPHORUS: CPT

## 2023-01-01 PROCEDURE — 70470 CT HEAD/BRAIN W/O & W/DYE: CPT | Mod: 26,MA

## 2023-01-01 PROCEDURE — 73701 CT LOWER EXTREMITY W/DYE: CPT

## 2023-01-01 PROCEDURE — 82570 ASSAY OF URINE CREATININE: CPT

## 2023-01-01 PROCEDURE — 83690 ASSAY OF LIPASE: CPT

## 2023-01-01 PROCEDURE — 36800 INSERTION OF CANNULA: CPT | Mod: GC

## 2023-01-01 PROCEDURE — 36415 COLL VENOUS BLD VENIPUNCTURE: CPT

## 2023-01-01 PROCEDURE — 70450 CT HEAD/BRAIN W/O DYE: CPT | Mod: MA

## 2023-01-01 PROCEDURE — 71045 X-RAY EXAM CHEST 1 VIEW: CPT | Mod: 26

## 2023-01-01 PROCEDURE — 86900 BLOOD TYPING SEROLOGIC ABO: CPT

## 2023-01-01 PROCEDURE — 93005 ELECTROCARDIOGRAM TRACING: CPT

## 2023-01-01 PROCEDURE — 74177 CT ABD & PELVIS W/CONTRAST: CPT | Mod: 26,MA

## 2023-01-01 PROCEDURE — 99223 1ST HOSP IP/OBS HIGH 75: CPT | Mod: GC

## 2023-01-01 PROCEDURE — 0225U NFCT DS DNA&RNA 21 SARSCOV2: CPT

## 2023-01-01 PROCEDURE — 93010 ELECTROCARDIOGRAM REPORT: CPT

## 2023-01-01 PROCEDURE — 74177 CT ABD & PELVIS W/CONTRAST: CPT | Mod: MA

## 2023-01-01 PROCEDURE — 47000 NEEDLE BIOPSY OF LIVER PERQ: CPT

## 2023-01-01 PROCEDURE — 99232 SBSQ HOSP IP/OBS MODERATE 35: CPT | Mod: GC

## 2023-01-01 PROCEDURE — 83880 ASSAY OF NATRIURETIC PEPTIDE: CPT

## 2023-01-01 PROCEDURE — 86304 IMMUNOASSAY TUMOR CA 125: CPT

## 2023-01-01 PROCEDURE — 82306 VITAMIN D 25 HYDROXY: CPT

## 2023-01-01 PROCEDURE — 96375 TX/PRO/DX INJ NEW DRUG ADDON: CPT

## 2023-01-01 PROCEDURE — 70470 CT HEAD/BRAIN W/O & W/DYE: CPT | Mod: MA

## 2023-01-01 PROCEDURE — 86850 RBC ANTIBODY SCREEN: CPT

## 2023-01-01 PROCEDURE — 74183 MRI ABD W/O CNTR FLWD CNTR: CPT

## 2023-01-01 PROCEDURE — 76942 ECHO GUIDE FOR BIOPSY: CPT

## 2023-01-01 PROCEDURE — 74177 CT ABD & PELVIS W/CONTRAST: CPT | Mod: 26

## 2023-01-01 PROCEDURE — 76705 ECHO EXAM OF ABDOMEN: CPT

## 2023-01-01 PROCEDURE — 84484 ASSAY OF TROPONIN QUANT: CPT

## 2023-01-01 PROCEDURE — 88342 IMHCHEM/IMCYTCHM 1ST ANTB: CPT | Mod: 26

## 2023-01-01 PROCEDURE — 81001 URINALYSIS AUTO W/SCOPE: CPT

## 2023-01-01 PROCEDURE — 73701 CT LOWER EXTREMITY W/DYE: CPT | Mod: 26,50

## 2023-01-01 PROCEDURE — 80053 COMPREHEN METABOLIC PANEL: CPT

## 2023-01-01 PROCEDURE — 82248 BILIRUBIN DIRECT: CPT

## 2023-01-01 PROCEDURE — 83735 ASSAY OF MAGNESIUM: CPT

## 2023-01-01 PROCEDURE — 93306 TTE W/DOPPLER COMPLETE: CPT | Mod: 26

## 2023-01-01 PROCEDURE — 93308 TTE F-UP OR LMTD: CPT | Mod: 26

## 2023-01-01 PROCEDURE — 99222 1ST HOSP IP/OBS MODERATE 55: CPT

## 2023-01-01 PROCEDURE — 99232 SBSQ HOSP IP/OBS MODERATE 35: CPT

## 2023-01-01 PROCEDURE — 85730 THROMBOPLASTIN TIME PARTIAL: CPT

## 2023-01-01 PROCEDURE — 36556 INSERT NON-TUNNEL CV CATH: CPT

## 2023-01-01 PROCEDURE — 99222 1ST HOSP IP/OBS MODERATE 55: CPT | Mod: GC

## 2023-01-01 PROCEDURE — 99497 ADVNCD CARE PLAN 30 MIN: CPT | Mod: 25

## 2023-01-01 PROCEDURE — 85027 COMPLETE CBC AUTOMATED: CPT

## 2023-01-01 PROCEDURE — P9016: CPT

## 2023-01-01 PROCEDURE — 85025 COMPLETE CBC W/AUTO DIFF WBC: CPT

## 2023-01-01 PROCEDURE — 87640 STAPH A DNA AMP PROBE: CPT

## 2023-01-01 PROCEDURE — 71275 CT ANGIOGRAPHY CHEST: CPT | Mod: MA

## 2023-01-01 PROCEDURE — 96374 THER/PROPH/DIAG INJ IV PUSH: CPT

## 2023-01-01 PROCEDURE — 86803 HEPATITIS C AB TEST: CPT

## 2023-01-01 PROCEDURE — 84436 ASSAY OF TOTAL THYROXINE: CPT

## 2023-01-01 PROCEDURE — 71275 CT ANGIOGRAPHY CHEST: CPT | Mod: 26,MA

## 2023-01-01 PROCEDURE — 85610 PROTHROMBIN TIME: CPT

## 2023-01-01 PROCEDURE — 71275 CT ANGIOGRAPHY CHEST: CPT | Mod: 26

## 2023-01-01 PROCEDURE — 82272 OCCULT BLD FECES 1-3 TESTS: CPT

## 2023-01-01 PROCEDURE — 82962 GLUCOSE BLOOD TEST: CPT

## 2023-01-01 PROCEDURE — P9100: CPT

## 2023-01-01 PROCEDURE — 86301 IMMUNOASSAY TUMOR CA 19-9: CPT

## 2023-01-01 PROCEDURE — 96372 THER/PROPH/DIAG INJ SC/IM: CPT | Mod: XU

## 2023-01-01 PROCEDURE — 88305 TISSUE EXAM BY PATHOLOGIST: CPT

## 2023-01-01 PROCEDURE — 84133 ASSAY OF URINE POTASSIUM: CPT

## 2023-01-01 PROCEDURE — 84295 ASSAY OF SERUM SODIUM: CPT

## 2023-01-01 PROCEDURE — 83036 HEMOGLOBIN GLYCOSYLATED A1C: CPT

## 2023-01-01 PROCEDURE — 99285 EMERGENCY DEPT VISIT HI MDM: CPT

## 2023-01-01 PROCEDURE — 97161 PT EVAL LOW COMPLEX 20 MIN: CPT

## 2023-01-01 PROCEDURE — 87086 URINE CULTURE/COLONY COUNT: CPT

## 2023-01-01 PROCEDURE — 88173 CYTOPATH EVAL FNA REPORT: CPT

## 2023-01-01 PROCEDURE — 80076 HEPATIC FUNCTION PANEL: CPT

## 2023-01-01 PROCEDURE — 82140 ASSAY OF AMMONIA: CPT

## 2023-01-01 PROCEDURE — 87641 MR-STAPH DNA AMP PROBE: CPT

## 2023-01-01 PROCEDURE — 43753 TX GASTRO INTUB W/ASP: CPT

## 2023-01-01 PROCEDURE — 87507 IADNA-DNA/RNA PROBE TQ 12-25: CPT

## 2023-01-01 PROCEDURE — 93970 EXTREMITY STUDY: CPT

## 2023-01-01 PROCEDURE — 99221 1ST HOSP IP/OBS SF/LOW 40: CPT

## 2023-01-01 PROCEDURE — 70450 CT HEAD/BRAIN W/O DYE: CPT | Mod: 26

## 2023-01-01 PROCEDURE — 80202 ASSAY OF VANCOMYCIN: CPT

## 2023-01-01 PROCEDURE — 93970 EXTREMITY STUDY: CPT | Mod: 26

## 2023-01-01 PROCEDURE — 49083 ABD PARACENTESIS W/IMAGING: CPT | Mod: GC

## 2023-01-01 PROCEDURE — 76705 ECHO EXAM OF ABDOMEN: CPT | Mod: 26

## 2023-01-01 PROCEDURE — 74183 MRI ABD W/O CNTR FLWD CNTR: CPT | Mod: 26

## 2023-01-01 PROCEDURE — 71250 CT THORAX DX C-: CPT | Mod: 26

## 2023-01-01 PROCEDURE — A9585: CPT

## 2023-01-01 PROCEDURE — 88172 CYTP DX EVAL FNA 1ST EA SITE: CPT

## 2023-01-01 PROCEDURE — 80048 BASIC METABOLIC PNL TOTAL CA: CPT

## 2023-01-01 PROCEDURE — 99231 SBSQ HOSP IP/OBS SF/LOW 25: CPT

## 2023-01-01 PROCEDURE — 99223 1ST HOSP IP/OBS HIGH 75: CPT

## 2023-01-01 PROCEDURE — 86901 BLOOD TYPING SEROLOGIC RH(D): CPT

## 2023-01-01 PROCEDURE — 97530 THERAPEUTIC ACTIVITIES: CPT

## 2023-01-01 PROCEDURE — 87045 FECES CULTURE AEROBIC BACT: CPT

## 2023-01-01 PROCEDURE — 36620 INSERTION CATHETER ARTERY: CPT

## 2023-01-01 PROCEDURE — 36569 INSJ PICC 5 YR+ W/O IMAGING: CPT

## 2023-01-01 PROCEDURE — 76604 US EXAM CHEST: CPT | Mod: 26

## 2023-01-01 PROCEDURE — 83605 ASSAY OF LACTIC ACID: CPT

## 2023-01-01 PROCEDURE — 84132 ASSAY OF SERUM POTASSIUM: CPT

## 2023-01-01 PROCEDURE — 99291 CRITICAL CARE FIRST HOUR: CPT | Mod: 25

## 2023-01-01 PROCEDURE — 87040 BLOOD CULTURE FOR BACTERIA: CPT

## 2023-01-01 PROCEDURE — 88341 IMHCHEM/IMCYTCHM EA ADD ANTB: CPT

## 2023-01-01 PROCEDURE — 97116 GAIT TRAINING THERAPY: CPT

## 2023-01-01 PROCEDURE — 76775 US EXAM ABDO BACK WALL LIM: CPT

## 2023-01-01 PROCEDURE — 93308 TTE F-UP OR LMTD: CPT

## 2023-01-01 PROCEDURE — 88108 CYTOPATH CONCENTRATE TECH: CPT | Mod: 26

## 2023-01-01 PROCEDURE — P9037: CPT

## 2023-01-01 PROCEDURE — 76942 ECHO GUIDE FOR BIOPSY: CPT | Mod: 26

## 2023-01-01 PROCEDURE — 87635 SARS-COV-2 COVID-19 AMP PRB: CPT

## 2023-01-01 PROCEDURE — 88173 CYTOPATH EVAL FNA REPORT: CPT | Mod: 26

## 2023-01-01 PROCEDURE — 87046 STOOL CULTR AEROBIC BACT EA: CPT

## 2023-01-01 PROCEDURE — 36430 TRANSFUSION BLD/BLD COMPNT: CPT

## 2023-01-01 PROCEDURE — 82378 CARCINOEMBRYONIC ANTIGEN: CPT

## 2023-01-01 PROCEDURE — C1751: CPT

## 2023-01-01 PROCEDURE — 82728 ASSAY OF FERRITIN: CPT

## 2023-01-01 PROCEDURE — P9047: CPT

## 2023-01-01 PROCEDURE — 88342 IMHCHEM/IMCYTCHM 1ST ANTB: CPT

## 2023-01-01 PROCEDURE — 84443 ASSAY THYROID STIM HORMONE: CPT

## 2023-01-01 PROCEDURE — 76775 US EXAM ABDO BACK WALL LIM: CPT | Mod: 26

## 2023-01-01 PROCEDURE — 82105 ALPHA-FETOPROTEIN SERUM: CPT

## 2023-01-01 PROCEDURE — 88307 TISSUE EXAM BY PATHOLOGIST: CPT | Mod: 26

## 2023-01-01 PROCEDURE — 88341 IMHCHEM/IMCYTCHM EA ADD ANTB: CPT | Mod: 26

## 2023-01-01 PROCEDURE — 82803 BLOOD GASES ANY COMBINATION: CPT

## 2023-01-01 PROCEDURE — 83930 ASSAY OF BLOOD OSMOLALITY: CPT

## 2023-01-01 PROCEDURE — 88305 TISSUE EXAM BY PATHOLOGIST: CPT | Mod: 26

## 2023-01-01 PROCEDURE — 93306 TTE W/DOPPLER COMPLETE: CPT

## 2023-01-01 PROCEDURE — 87077 CULTURE AEROBIC IDENTIFY: CPT

## 2023-01-01 PROCEDURE — P9040: CPT

## 2023-01-01 PROCEDURE — 80074 ACUTE HEPATITIS PANEL: CPT

## 2023-01-01 PROCEDURE — 82330 ASSAY OF CALCIUM: CPT

## 2023-01-01 PROCEDURE — 88307 TISSUE EXAM BY PATHOLOGIST: CPT

## 2023-01-01 RX ORDER — SENNA PLUS 8.6 MG/1
2 TABLET ORAL ONCE
Refills: 0 | Status: COMPLETED | OUTPATIENT
Start: 2023-01-01 | End: 2023-01-01

## 2023-01-01 RX ORDER — PANTOPRAZOLE SODIUM 20 MG/1
1 TABLET, DELAYED RELEASE ORAL
Qty: 30 | Refills: 0
Start: 2023-01-01 | End: 2023-01-01

## 2023-01-01 RX ORDER — CHLORHEXIDINE GLUCONATE 213 G/1000ML
1 SOLUTION TOPICAL
Refills: 0 | Status: DISCONTINUED | OUTPATIENT
Start: 2023-01-01 | End: 2023-01-01

## 2023-01-01 RX ORDER — DEXTROSE 10 % IN WATER 10 %
1000 INTRAVENOUS SOLUTION INTRAVENOUS
Refills: 0 | Status: DISCONTINUED | OUTPATIENT
Start: 2023-01-01 | End: 2023-01-01

## 2023-01-01 RX ORDER — NOREPINEPHRINE BITARTRATE/D5W 8 MG/250ML
0.05 PLASTIC BAG, INJECTION (ML) INTRAVENOUS
Qty: 8 | Refills: 0 | Status: DISCONTINUED | OUTPATIENT
Start: 2023-01-01 | End: 2023-01-01

## 2023-01-01 RX ORDER — POLYETHYLENE GLYCOL 3350 17 G/17G
17 POWDER, FOR SOLUTION ORAL ONCE
Refills: 0 | Status: COMPLETED | OUTPATIENT
Start: 2023-01-01 | End: 2023-01-01

## 2023-01-01 RX ORDER — DEXTROSE 10 % IN WATER 10 %
1000 INTRAVENOUS SOLUTION INTRAVENOUS
Refills: 0 | Status: COMPLETED | OUTPATIENT
Start: 2023-01-01 | End: 2023-01-01

## 2023-01-01 RX ORDER — PIPERACILLIN AND TAZOBACTAM 4; .5 G/20ML; G/20ML
3.38 INJECTION, POWDER, LYOPHILIZED, FOR SOLUTION INTRAVENOUS EVERY 8 HOURS
Refills: 0 | Status: DISCONTINUED | OUTPATIENT
Start: 2023-01-01 | End: 2023-01-01

## 2023-01-01 RX ORDER — ONDANSETRON 8 MG/1
1 TABLET, FILM COATED ORAL
Qty: 1 | Refills: 0
Start: 2023-01-01 | End: 2023-01-01

## 2023-01-01 RX ORDER — PIPERACILLIN AND TAZOBACTAM 4; .5 G/20ML; G/20ML
3.38 INJECTION, POWDER, LYOPHILIZED, FOR SOLUTION INTRAVENOUS ONCE
Refills: 0 | Status: DISCONTINUED | OUTPATIENT
Start: 2023-01-01 | End: 2023-01-01

## 2023-01-01 RX ORDER — FUROSEMIDE 40 MG
20 TABLET ORAL ONCE
Refills: 0 | Status: DISCONTINUED | OUTPATIENT
Start: 2023-01-01 | End: 2023-01-01

## 2023-01-01 RX ORDER — MAGNESIUM SULFATE 500 MG/ML
1 VIAL (ML) INJECTION ONCE
Refills: 0 | Status: COMPLETED | OUTPATIENT
Start: 2023-01-01 | End: 2023-01-01

## 2023-01-01 RX ORDER — SODIUM CHLORIDE 9 MG/ML
500 INJECTION INTRAMUSCULAR; INTRAVENOUS; SUBCUTANEOUS ONCE
Refills: 0 | Status: COMPLETED | OUTPATIENT
Start: 2023-01-01 | End: 2023-01-01

## 2023-01-01 RX ORDER — SODIUM BICARBONATE 1 MEQ/ML
0.22 SYRINGE (ML) INTRAVENOUS
Qty: 150 | Refills: 0 | Status: DISCONTINUED | OUTPATIENT
Start: 2023-01-01 | End: 2023-01-01

## 2023-01-01 RX ORDER — POTASSIUM PHOSPHATE, MONOBASIC POTASSIUM PHOSPHATE, DIBASIC 236; 224 MG/ML; MG/ML
15 INJECTION, SOLUTION INTRAVENOUS ONCE
Refills: 0 | Status: COMPLETED | OUTPATIENT
Start: 2023-01-01 | End: 2023-01-01

## 2023-01-01 RX ORDER — DEXTROSE 50 % IN WATER 50 %
50 SYRINGE (ML) INTRAVENOUS ONCE
Refills: 0 | Status: COMPLETED | OUTPATIENT
Start: 2023-01-01 | End: 2023-01-01

## 2023-01-01 RX ORDER — VANCOMYCIN HCL 1 G
1000 VIAL (EA) INTRAVENOUS ONCE
Refills: 0 | Status: COMPLETED | OUTPATIENT
Start: 2023-01-01 | End: 2023-01-01

## 2023-01-01 RX ORDER — FUROSEMIDE 40 MG
20 TABLET ORAL ONCE
Refills: 0 | Status: COMPLETED | OUTPATIENT
Start: 2023-01-01 | End: 2023-01-01

## 2023-01-01 RX ORDER — PIPERACILLIN AND TAZOBACTAM 4; .5 G/20ML; G/20ML
3.38 INJECTION, POWDER, LYOPHILIZED, FOR SOLUTION INTRAVENOUS ONCE
Refills: 0 | Status: COMPLETED | OUTPATIENT
Start: 2023-01-01 | End: 2023-01-01

## 2023-01-01 RX ORDER — DEXMEDETOMIDINE HYDROCHLORIDE IN 0.9% SODIUM CHLORIDE 4 UG/ML
0.1 INJECTION INTRAVENOUS
Qty: 200 | Refills: 0 | Status: DISCONTINUED | OUTPATIENT
Start: 2023-01-01 | End: 2023-01-01

## 2023-01-01 RX ORDER — URSODIOL 250 MG/1
300 TABLET, FILM COATED ORAL
Refills: 0 | Status: DISCONTINUED | OUTPATIENT
Start: 2023-01-01 | End: 2023-01-01

## 2023-01-01 RX ORDER — LEVOTHYROXINE SODIUM 125 MCG
1 TABLET ORAL
Qty: 30 | Refills: 0
Start: 2023-01-01 | End: 2023-01-01

## 2023-01-01 RX ORDER — SODIUM CHLORIDE 9 MG/ML
1000 INJECTION INTRAMUSCULAR; INTRAVENOUS; SUBCUTANEOUS ONCE
Refills: 0 | Status: COMPLETED | OUTPATIENT
Start: 2023-01-01 | End: 2023-01-01

## 2023-01-01 RX ORDER — POTASSIUM CHLORIDE 20 MEQ
40 PACKET (EA) ORAL ONCE
Refills: 0 | Status: DISCONTINUED | OUTPATIENT
Start: 2023-01-01 | End: 2023-01-01

## 2023-01-01 RX ORDER — MIDAZOLAM HYDROCHLORIDE 1 MG/ML
2 INJECTION, SOLUTION INTRAMUSCULAR; INTRAVENOUS ONCE
Refills: 0 | Status: DISCONTINUED | OUTPATIENT
Start: 2023-01-01 | End: 2023-01-01

## 2023-01-01 RX ORDER — HEPARIN SODIUM 5000 [USP'U]/ML
5500 INJECTION INTRAVENOUS; SUBCUTANEOUS ONCE
Refills: 0 | Status: DISCONTINUED | OUTPATIENT
Start: 2023-01-01 | End: 2023-01-01

## 2023-01-01 RX ORDER — HEPARIN SODIUM 5000 [USP'U]/ML
5500 INJECTION INTRAVENOUS; SUBCUTANEOUS EVERY 6 HOURS
Refills: 0 | Status: DISCONTINUED | OUTPATIENT
Start: 2023-01-01 | End: 2023-01-01

## 2023-01-01 RX ORDER — POTASSIUM CHLORIDE 20 MEQ
10 PACKET (EA) ORAL ONCE
Refills: 0 | Status: COMPLETED | OUTPATIENT
Start: 2023-01-01 | End: 2023-01-01

## 2023-01-01 RX ORDER — SODIUM,POTASSIUM PHOSPHATES 278-250MG
1 POWDER IN PACKET (EA) ORAL ONCE
Refills: 0 | Status: DISCONTINUED | OUTPATIENT
Start: 2023-01-01 | End: 2023-01-01

## 2023-01-01 RX ORDER — CHLORHEXIDINE GLUCONATE 213 G/1000ML
15 SOLUTION TOPICAL EVERY 12 HOURS
Refills: 0 | Status: DISCONTINUED | OUTPATIENT
Start: 2023-01-01 | End: 2023-01-01

## 2023-01-01 RX ORDER — BUMETANIDE 0.25 MG/ML
4 INJECTION INTRAMUSCULAR; INTRAVENOUS ONCE
Refills: 0 | Status: COMPLETED | OUTPATIENT
Start: 2023-01-01 | End: 2023-01-01

## 2023-01-01 RX ORDER — DEXTROSE 50 % IN WATER 50 %
12.5 SYRINGE (ML) INTRAVENOUS ONCE
Refills: 0 | Status: DISCONTINUED | OUTPATIENT
Start: 2023-01-01 | End: 2023-01-01

## 2023-01-01 RX ORDER — PANTOPRAZOLE SODIUM 20 MG/1
40 TABLET, DELAYED RELEASE ORAL
Refills: 0 | Status: DISCONTINUED | OUTPATIENT
Start: 2023-01-01 | End: 2023-01-01

## 2023-01-01 RX ORDER — SODIUM BICARBONATE 1 MEQ/ML
50 SYRINGE (ML) INTRAVENOUS ONCE
Refills: 0 | Status: COMPLETED | OUTPATIENT
Start: 2023-01-01 | End: 2023-01-01

## 2023-01-01 RX ORDER — ALBUMIN HUMAN 25 %
500 VIAL (ML) INTRAVENOUS ONCE
Refills: 0 | Status: DISCONTINUED | OUTPATIENT
Start: 2023-01-01 | End: 2023-01-01

## 2023-01-01 RX ORDER — MORPHINE SULFATE 50 MG/1
1 CAPSULE, EXTENDED RELEASE ORAL EVERY 6 HOURS
Refills: 0 | Status: DISCONTINUED | OUTPATIENT
Start: 2023-01-01 | End: 2023-01-01

## 2023-01-01 RX ORDER — METOCLOPRAMIDE HCL 10 MG
10 TABLET ORAL EVERY 6 HOURS
Refills: 0 | Status: DISCONTINUED | OUTPATIENT
Start: 2023-01-01 | End: 2023-01-01

## 2023-01-01 RX ORDER — PANTOPRAZOLE SODIUM 20 MG/1
80 TABLET, DELAYED RELEASE ORAL ONCE
Refills: 0 | Status: COMPLETED | OUTPATIENT
Start: 2023-01-01 | End: 2023-01-01

## 2023-01-01 RX ORDER — GLUCAGON INJECTION, SOLUTION 0.5 MG/.1ML
1 INJECTION, SOLUTION SUBCUTANEOUS ONCE
Refills: 0 | Status: DISCONTINUED | OUTPATIENT
Start: 2023-01-01 | End: 2023-01-01

## 2023-01-01 RX ORDER — POTASSIUM CHLORIDE 20 MEQ
40 PACKET (EA) ORAL ONCE
Refills: 0 | Status: COMPLETED | OUTPATIENT
Start: 2023-01-01 | End: 2023-01-01

## 2023-01-01 RX ORDER — POTASSIUM CHLORIDE 20 MEQ
10 PACKET (EA) ORAL
Refills: 0 | Status: COMPLETED | OUTPATIENT
Start: 2023-01-01 | End: 2023-01-01

## 2023-01-01 RX ORDER — ONDANSETRON 8 MG/1
4 TABLET, FILM COATED ORAL EVERY 8 HOURS
Refills: 0 | Status: DISCONTINUED | OUTPATIENT
Start: 2023-01-01 | End: 2023-01-01

## 2023-01-01 RX ORDER — DEXTROSE 50 % IN WATER 50 %
25 SYRINGE (ML) INTRAVENOUS ONCE
Refills: 0 | Status: DISCONTINUED | OUTPATIENT
Start: 2023-01-01 | End: 2023-01-01

## 2023-01-01 RX ORDER — MAGNESIUM SULFATE 500 MG/ML
2 VIAL (ML) INJECTION ONCE
Refills: 0 | Status: DISCONTINUED | OUTPATIENT
Start: 2023-01-01 | End: 2023-01-01

## 2023-01-01 RX ORDER — CEFTRIAXONE 500 MG/1
2000 INJECTION, POWDER, FOR SOLUTION INTRAMUSCULAR; INTRAVENOUS EVERY 24 HOURS
Refills: 0 | Status: DISCONTINUED | OUTPATIENT
Start: 2023-01-01 | End: 2023-01-01

## 2023-01-01 RX ORDER — ENOXAPARIN SODIUM 100 MG/ML
70 INJECTION SUBCUTANEOUS
Qty: 53 | Refills: 0
Start: 2023-01-01 | End: 2023-01-01

## 2023-01-01 RX ORDER — POLYETHYLENE GLYCOL 3350 17 G/17G
17 POWDER, FOR SOLUTION ORAL
Refills: 0 | Status: DISCONTINUED | OUTPATIENT
Start: 2023-01-01 | End: 2023-01-01

## 2023-01-01 RX ORDER — FLUOROURACIL 50 MG/ML
700 INJECTION, SOLUTION INTRAVENOUS ONCE
Refills: 0 | Status: COMPLETED | OUTPATIENT
Start: 2023-01-01 | End: 2023-01-01

## 2023-01-01 RX ORDER — POTASSIUM CHLORIDE 20 MEQ
40 PACKET (EA) ORAL EVERY 4 HOURS
Refills: 0 | Status: DISCONTINUED | OUTPATIENT
Start: 2023-01-01 | End: 2023-01-01

## 2023-01-01 RX ORDER — SODIUM CHLORIDE 9 MG/ML
10 INJECTION INTRAMUSCULAR; INTRAVENOUS; SUBCUTANEOUS
Refills: 0 | Status: DISCONTINUED | OUTPATIENT
Start: 2023-01-01 | End: 2023-01-01

## 2023-01-01 RX ORDER — FLUOROURACIL 50 MG/ML
2100 INJECTION, SOLUTION INTRAVENOUS DAILY
Refills: 0 | Status: COMPLETED | OUTPATIENT
Start: 2023-01-01 | End: 2023-01-01

## 2023-01-01 RX ORDER — PROTHROMBIN COMPLEX CONCENTRATE (HUMAN) 25.5; 16.5; 24; 22; 22; 26 [IU]/ML; [IU]/ML; [IU]/ML; [IU]/ML; [IU]/ML; [IU]/ML
2000 POWDER, FOR SOLUTION INTRAVENOUS ONCE
Refills: 0 | Status: COMPLETED | OUTPATIENT
Start: 2023-01-01 | End: 2023-01-01

## 2023-01-01 RX ORDER — PHENYLEPHRINE HYDROCHLORIDE 10 MG/ML
0.1 INJECTION INTRAVENOUS
Qty: 40 | Refills: 0 | Status: DISCONTINUED | OUTPATIENT
Start: 2023-01-01 | End: 2023-01-01

## 2023-01-01 RX ORDER — FUROSEMIDE 40 MG
1 TABLET ORAL
Qty: 1 | Refills: 0
Start: 2023-01-01 | End: 2023-01-01

## 2023-01-01 RX ORDER — CALCIUM GLUCONATE 100 MG/ML
2 VIAL (ML) INTRAVENOUS ONCE
Refills: 0 | Status: COMPLETED | OUTPATIENT
Start: 2023-01-01 | End: 2023-01-01

## 2023-01-01 RX ORDER — HEPARIN SODIUM 5000 [USP'U]/ML
2500 INJECTION INTRAVENOUS; SUBCUTANEOUS EVERY 6 HOURS
Refills: 0 | Status: DISCONTINUED | OUTPATIENT
Start: 2023-01-01 | End: 2023-01-01

## 2023-01-01 RX ORDER — POTASSIUM CHLORIDE 20 MEQ
10 PACKET (EA) ORAL ONCE
Refills: 0 | Status: DISCONTINUED | OUTPATIENT
Start: 2023-01-01 | End: 2023-01-01

## 2023-01-01 RX ORDER — FUROSEMIDE 40 MG
20 TABLET ORAL DAILY
Refills: 0 | Status: DISCONTINUED | OUTPATIENT
Start: 2023-01-01 | End: 2023-01-01

## 2023-01-01 RX ORDER — VANCOMYCIN HCL 1 G
1000 VIAL (EA) INTRAVENOUS EVERY 24 HOURS
Refills: 0 | Status: DISCONTINUED | OUTPATIENT
Start: 2023-01-01 | End: 2023-01-01

## 2023-01-01 RX ORDER — CHLOROTHIAZIDE 500 MG
500 TABLET ORAL ONCE
Refills: 0 | Status: COMPLETED | OUTPATIENT
Start: 2023-01-01 | End: 2023-01-01

## 2023-01-01 RX ORDER — SODIUM BICARBONATE 1 MEQ/ML
150 SYRINGE (ML) INTRAVENOUS ONCE
Refills: 0 | Status: DISCONTINUED | OUTPATIENT
Start: 2023-01-01 | End: 2023-01-01

## 2023-01-01 RX ORDER — DEXAMETHASONE 0.5 MG/5ML
10 ELIXIR ORAL ONCE
Refills: 0 | Status: COMPLETED | OUTPATIENT
Start: 2023-01-01 | End: 2023-01-01

## 2023-01-01 RX ORDER — KETAMINE HYDROCHLORIDE 100 MG/ML
70 INJECTION INTRAMUSCULAR; INTRAVENOUS ONCE
Refills: 0 | Status: DISCONTINUED | OUTPATIENT
Start: 2023-01-01 | End: 2023-01-01

## 2023-01-01 RX ORDER — HEPARIN SODIUM 5000 [USP'U]/ML
INJECTION INTRAVENOUS; SUBCUTANEOUS
Qty: 25000 | Refills: 0 | Status: DISCONTINUED | OUTPATIENT
Start: 2023-01-01 | End: 2023-01-01

## 2023-01-01 RX ORDER — THIAMINE MONONITRATE (VIT B1) 100 MG
200 TABLET ORAL
Refills: 0 | Status: COMPLETED | OUTPATIENT
Start: 2023-01-01 | End: 2023-01-01

## 2023-01-01 RX ORDER — FUROSEMIDE 40 MG
1 TABLET ORAL
Qty: 30 | Refills: 0
Start: 2023-01-01 | End: 2023-01-01

## 2023-01-01 RX ORDER — ACETAMINOPHEN 500 MG
1000 TABLET ORAL ONCE
Refills: 0 | Status: COMPLETED | OUTPATIENT
Start: 2023-01-01 | End: 2023-01-01

## 2023-01-01 RX ORDER — PROPOFOL 10 MG/ML
5 INJECTION, EMULSION INTRAVENOUS
Qty: 1000 | Refills: 0 | Status: DISCONTINUED | OUTPATIENT
Start: 2023-01-01 | End: 2023-01-01

## 2023-01-01 RX ORDER — SODIUM CHLORIDE 9 MG/ML
1000 INJECTION INTRAMUSCULAR; INTRAVENOUS; SUBCUTANEOUS
Refills: 0 | Status: DISCONTINUED | OUTPATIENT
Start: 2023-01-01 | End: 2023-01-01

## 2023-01-01 RX ORDER — ALBUMIN HUMAN 25 %
500 VIAL (ML) INTRAVENOUS ONCE
Refills: 0 | Status: COMPLETED | OUTPATIENT
Start: 2023-01-01 | End: 2023-01-01

## 2023-01-01 RX ORDER — FENTANYL CITRATE 50 UG/ML
50 INJECTION INTRAVENOUS ONCE
Refills: 0 | Status: DISCONTINUED | OUTPATIENT
Start: 2023-01-01 | End: 2023-01-01

## 2023-01-01 RX ORDER — ONDANSETRON 8 MG/1
8 TABLET, FILM COATED ORAL ONCE
Refills: 0 | Status: COMPLETED | OUTPATIENT
Start: 2023-01-01 | End: 2023-01-01

## 2023-01-01 RX ORDER — FUROSEMIDE 40 MG
40 TABLET ORAL DAILY
Refills: 0 | Status: DISCONTINUED | OUTPATIENT
Start: 2023-01-01 | End: 2023-01-01

## 2023-01-01 RX ORDER — VASOPRESSIN 20 [USP'U]/ML
0.01 INJECTION INTRAVENOUS
Qty: 40 | Refills: 0 | Status: DISCONTINUED | OUTPATIENT
Start: 2023-01-01 | End: 2023-01-01

## 2023-01-01 RX ORDER — FOSAPREPITANT DIMEGLUMINE 150 MG/5ML
150 INJECTION, POWDER, LYOPHILIZED, FOR SOLUTION INTRAVENOUS ONCE
Refills: 0 | Status: COMPLETED | OUTPATIENT
Start: 2023-01-01 | End: 2023-01-01

## 2023-01-01 RX ORDER — ONDANSETRON 8 MG/1
1 TABLET, FILM COATED ORAL
Qty: 90 | Refills: 0
Start: 2023-01-01 | End: 2023-01-01

## 2023-01-01 RX ORDER — VANCOMYCIN HCL 1 G
VIAL (EA) INTRAVENOUS
Refills: 0 | Status: DISCONTINUED | OUTPATIENT
Start: 2023-01-01 | End: 2023-01-01

## 2023-01-01 RX ORDER — ACETAMINOPHEN 500 MG
650 TABLET ORAL EVERY 6 HOURS
Refills: 0 | Status: DISCONTINUED | OUTPATIENT
Start: 2023-01-01 | End: 2023-01-01

## 2023-01-01 RX ORDER — POTASSIUM CHLORIDE 20 MEQ
20 PACKET (EA) ORAL ONCE
Refills: 0 | Status: DISCONTINUED | OUTPATIENT
Start: 2023-01-01 | End: 2023-01-01

## 2023-01-01 RX ORDER — ONDANSETRON 8 MG/1
4 TABLET, FILM COATED ORAL EVERY 4 HOURS
Refills: 0 | Status: DISCONTINUED | OUTPATIENT
Start: 2023-01-01 | End: 2023-01-01

## 2023-01-01 RX ORDER — SODIUM CHLORIDE 9 MG/ML
1000 INJECTION, SOLUTION INTRAVENOUS
Refills: 0 | Status: DISCONTINUED | OUTPATIENT
Start: 2023-01-01 | End: 2023-01-01

## 2023-01-01 RX ORDER — SODIUM BICARBONATE 1 MEQ/ML
0.33 SYRINGE (ML) INTRAVENOUS
Qty: 150 | Refills: 0 | Status: DISCONTINUED | OUTPATIENT
Start: 2023-01-01 | End: 2023-01-01

## 2023-01-01 RX ORDER — SODIUM CHLORIDE 9 MG/ML
2100 INJECTION, SOLUTION INTRAVENOUS ONCE
Refills: 0 | Status: COMPLETED | OUTPATIENT
Start: 2023-01-01 | End: 2023-01-01

## 2023-01-01 RX ORDER — LEVOTHYROXINE SODIUM 125 MCG
25 TABLET ORAL DAILY
Refills: 0 | Status: DISCONTINUED | OUTPATIENT
Start: 2023-01-01 | End: 2023-01-01

## 2023-01-01 RX ORDER — NOREPINEPHRINE BITARTRATE/D5W 8 MG/250ML
1 PLASTIC BAG, INJECTION (ML) INTRAVENOUS
Qty: 32 | Refills: 0 | Status: DISCONTINUED | OUTPATIENT
Start: 2023-01-01 | End: 2023-01-01

## 2023-01-01 RX ORDER — SENNA PLUS 8.6 MG/1
2 TABLET ORAL AT BEDTIME
Refills: 0 | Status: DISCONTINUED | OUTPATIENT
Start: 2023-01-01 | End: 2023-01-01

## 2023-01-01 RX ORDER — TAMSULOSIN HYDROCHLORIDE 0.4 MG/1
0.4 CAPSULE ORAL AT BEDTIME
Refills: 0 | Status: DISCONTINUED | OUTPATIENT
Start: 2023-01-01 | End: 2023-01-01

## 2023-01-01 RX ORDER — SODIUM BICARBONATE 1 MEQ/ML
50 SYRINGE (ML) INTRAVENOUS ONCE
Refills: 0 | Status: DISCONTINUED | OUTPATIENT
Start: 2023-01-01 | End: 2023-01-01

## 2023-01-01 RX ORDER — VASOPRESSIN 20 [USP'U]/ML
0.04 INJECTION INTRAVENOUS
Qty: 40 | Refills: 0 | Status: DISCONTINUED | OUTPATIENT
Start: 2023-01-01 | End: 2023-01-01

## 2023-01-01 RX ORDER — POTASSIUM CHLORIDE 20 MEQ
40 PACKET (EA) ORAL
Refills: 0 | Status: COMPLETED | OUTPATIENT
Start: 2023-01-01 | End: 2023-01-01

## 2023-01-01 RX ORDER — BACITRACIN ZINC 500 UNIT/G
1 OINTMENT IN PACKET (EA) TOPICAL
Refills: 0 | Status: DISCONTINUED | OUTPATIENT
Start: 2023-01-01 | End: 2023-01-01

## 2023-01-01 RX ORDER — ACETAMINOPHEN 500 MG
750 TABLET ORAL ONCE
Refills: 0 | Status: COMPLETED | OUTPATIENT
Start: 2023-01-01 | End: 2023-01-01

## 2023-01-01 RX ORDER — INSULIN LISPRO 100/ML
VIAL (ML) SUBCUTANEOUS EVERY 6 HOURS
Refills: 0 | Status: DISCONTINUED | OUTPATIENT
Start: 2023-01-01 | End: 2023-01-01

## 2023-01-01 RX ORDER — ALBUMIN HUMAN 25 %
50 VIAL (ML) INTRAVENOUS EVERY 6 HOURS
Refills: 0 | Status: COMPLETED | OUTPATIENT
Start: 2023-01-01 | End: 2023-01-01

## 2023-01-01 RX ORDER — MAGNESIUM SULFATE 500 MG/ML
2 VIAL (ML) INJECTION ONCE
Refills: 0 | Status: COMPLETED | OUTPATIENT
Start: 2023-01-01 | End: 2023-01-01

## 2023-01-01 RX ORDER — POTASSIUM CHLORIDE 20 MEQ
10 PACKET (EA) ORAL
Refills: 0 | Status: DISCONTINUED | OUTPATIENT
Start: 2023-01-01 | End: 2023-01-01

## 2023-01-01 RX ORDER — HYDROMORPHONE HYDROCHLORIDE 2 MG/ML
0.25 INJECTION INTRAMUSCULAR; INTRAVENOUS; SUBCUTANEOUS EVERY 4 HOURS
Refills: 0 | Status: DISCONTINUED | OUTPATIENT
Start: 2023-01-01 | End: 2023-01-01

## 2023-01-01 RX ORDER — URSODIOL 250 MG/1
1 TABLET, FILM COATED ORAL
Qty: 60 | Refills: 0
Start: 2023-01-01 | End: 2023-01-01

## 2023-01-01 RX ORDER — OXALIPLATIN 5 MG/ML
150 INJECTION, SOLUTION INTRAVENOUS ONCE
Refills: 0 | Status: COMPLETED | OUTPATIENT
Start: 2023-01-01 | End: 2023-01-01

## 2023-01-01 RX ORDER — FILGRASTIM 480MCG/1.6
300 VIAL (ML) INJECTION DAILY
Refills: 0 | Status: DISCONTINUED | OUTPATIENT
Start: 2023-01-01 | End: 2023-01-01

## 2023-01-01 RX ORDER — HEPARIN SODIUM 5000 [USP'U]/ML
5500 INJECTION INTRAVENOUS; SUBCUTANEOUS ONCE
Refills: 0 | Status: COMPLETED | OUTPATIENT
Start: 2023-01-01 | End: 2023-01-01

## 2023-01-01 RX ORDER — ALBUMIN HUMAN 25 %
25 VIAL (ML) INTRAVENOUS ONCE
Refills: 0 | Status: COMPLETED | OUTPATIENT
Start: 2023-01-01 | End: 2023-01-01

## 2023-01-01 RX ORDER — LEUCOVORIN CALCIUM 5 MG
700 TABLET ORAL ONCE
Refills: 0 | Status: COMPLETED | OUTPATIENT
Start: 2023-01-01 | End: 2023-01-01

## 2023-01-01 RX ORDER — PHYTONADIONE (VIT K1) 5 MG
2.5 TABLET ORAL ONCE
Refills: 0 | Status: COMPLETED | OUTPATIENT
Start: 2023-01-01 | End: 2023-01-01

## 2023-01-01 RX ORDER — MEROPENEM 1 G/30ML
1000 INJECTION INTRAVENOUS EVERY 8 HOURS
Refills: 0 | Status: DISCONTINUED | OUTPATIENT
Start: 2023-01-01 | End: 2023-01-01

## 2023-01-01 RX ORDER — POTASSIUM CHLORIDE 20 MEQ
40 PACKET (EA) ORAL EVERY 4 HOURS
Refills: 0 | Status: COMPLETED | OUTPATIENT
Start: 2023-01-01 | End: 2023-01-01

## 2023-01-01 RX ORDER — DEXTROSE 50 % IN WATER 50 %
25 SYRINGE (ML) INTRAVENOUS ONCE
Refills: 0 | Status: COMPLETED | OUTPATIENT
Start: 2023-01-01 | End: 2023-01-01

## 2023-01-01 RX ORDER — SODIUM CHLORIDE 9 MG/ML
1000 INJECTION, SOLUTION INTRAVENOUS ONCE
Refills: 0 | Status: DISCONTINUED | OUTPATIENT
Start: 2023-01-01 | End: 2023-01-01

## 2023-01-01 RX ORDER — VANCOMYCIN HCL 1 G
1000 VIAL (EA) INTRAVENOUS EVERY 12 HOURS
Refills: 0 | Status: DISCONTINUED | OUTPATIENT
Start: 2023-01-01 | End: 2023-01-01

## 2023-01-01 RX ORDER — HYDROMORPHONE HYDROCHLORIDE 2 MG/ML
0.5 INJECTION INTRAMUSCULAR; INTRAVENOUS; SUBCUTANEOUS ONCE
Refills: 0 | Status: DISCONTINUED | OUTPATIENT
Start: 2023-01-01 | End: 2023-01-01

## 2023-01-01 RX ORDER — TAMSULOSIN HYDROCHLORIDE 0.4 MG/1
1 CAPSULE ORAL
Qty: 5 | Refills: 0
Start: 2023-01-01 | End: 2023-01-01

## 2023-01-01 RX ORDER — DEXTROSE 50 % IN WATER 50 %
15 SYRINGE (ML) INTRAVENOUS ONCE
Refills: 0 | Status: DISCONTINUED | OUTPATIENT
Start: 2023-01-01 | End: 2023-01-01

## 2023-01-01 RX ORDER — ENOXAPARIN SODIUM 100 MG/ML
80 INJECTION SUBCUTANEOUS
Qty: 60 | Refills: 0
Start: 2023-01-01 | End: 2023-01-01

## 2023-01-01 RX ORDER — POTASSIUM CHLORIDE 20 MEQ
20 PACKET (EA) ORAL
Refills: 0 | Status: COMPLETED | OUTPATIENT
Start: 2023-01-01 | End: 2023-01-01

## 2023-01-01 RX ORDER — SODIUM CHLORIDE 9 MG/ML
1000 INJECTION, SOLUTION INTRAVENOUS ONCE
Refills: 0 | Status: COMPLETED | OUTPATIENT
Start: 2023-01-01 | End: 2023-01-01

## 2023-01-01 RX ORDER — ALBUMIN HUMAN 25 %
100 VIAL (ML) INTRAVENOUS EVERY 8 HOURS
Refills: 0 | Status: DISCONTINUED | OUTPATIENT
Start: 2023-01-01 | End: 2023-01-01

## 2023-01-01 RX ORDER — ALBUMIN HUMAN 25 %
250 VIAL (ML) INTRAVENOUS ONCE
Refills: 0 | Status: COMPLETED | OUTPATIENT
Start: 2023-01-01 | End: 2023-01-01

## 2023-01-01 RX ORDER — PANTOPRAZOLE SODIUM 20 MG/1
40 TABLET, DELAYED RELEASE ORAL EVERY 12 HOURS
Refills: 0 | Status: DISCONTINUED | OUTPATIENT
Start: 2023-01-01 | End: 2023-01-01

## 2023-01-01 RX ORDER — ENOXAPARIN SODIUM 100 MG/ML
80 INJECTION SUBCUTANEOUS
Qty: 1 | Refills: 0
Start: 2023-01-01 | End: 2023-01-01

## 2023-01-01 RX ORDER — SODIUM CHLORIDE 9 MG/ML
500 INJECTION, SOLUTION INTRAVENOUS ONCE
Refills: 0 | Status: COMPLETED | OUTPATIENT
Start: 2023-01-01 | End: 2023-01-01

## 2023-01-01 RX ORDER — TAMSULOSIN HYDROCHLORIDE 0.4 MG/1
1 CAPSULE ORAL
Qty: 0 | Refills: 0 | DISCHARGE
Start: 2023-01-01

## 2023-01-01 RX ORDER — ONDANSETRON 8 MG/1
4 TABLET, FILM COATED ORAL ONCE
Refills: 0 | Status: COMPLETED | OUTPATIENT
Start: 2023-01-01 | End: 2023-01-01

## 2023-01-01 RX ORDER — LEVOTHYROXINE SODIUM 125 MCG
1 TABLET ORAL
Qty: 30 | Refills: 0
Start: 2023-01-01 | End: 2023-08-29

## 2023-01-01 RX ORDER — ENOXAPARIN SODIUM 100 MG/ML
70 INJECTION SUBCUTANEOUS EVERY 12 HOURS
Refills: 0 | Status: DISCONTINUED | OUTPATIENT
Start: 2023-01-01 | End: 2023-01-01

## 2023-01-01 RX ORDER — CHLORHEXIDINE GLUCONATE 213 G/1000ML
1 SOLUTION TOPICAL DAILY
Refills: 0 | Status: DISCONTINUED | OUTPATIENT
Start: 2023-01-01 | End: 2023-01-01

## 2023-01-01 RX ORDER — FUROSEMIDE 40 MG
40 TABLET ORAL ONCE
Refills: 0 | Status: COMPLETED | OUTPATIENT
Start: 2023-01-01 | End: 2023-01-01

## 2023-01-01 RX ORDER — CALCIUM CARBONATE 500(1250)
1 TABLET ORAL ONCE
Refills: 0 | Status: COMPLETED | OUTPATIENT
Start: 2023-01-01 | End: 2023-01-01

## 2023-01-01 RX ORDER — PROPOFOL 10 MG/ML
5 INJECTION, EMULSION INTRAVENOUS
Qty: 500 | Refills: 0 | Status: DISCONTINUED | OUTPATIENT
Start: 2023-01-01 | End: 2023-01-01

## 2023-01-01 RX ORDER — CEFEPIME 1 G/1
2000 INJECTION, POWDER, FOR SOLUTION INTRAMUSCULAR; INTRAVENOUS EVERY 8 HOURS
Refills: 0 | Status: DISCONTINUED | OUTPATIENT
Start: 2023-01-01 | End: 2023-01-01

## 2023-01-01 RX ORDER — ONDANSETRON 8 MG/1
4 TABLET, FILM COATED ORAL THREE TIMES A DAY
Refills: 0 | Status: DISCONTINUED | OUTPATIENT
Start: 2023-01-01 | End: 2023-01-01

## 2023-01-01 RX ORDER — SODIUM BICARBONATE 1 MEQ/ML
0.16 SYRINGE (ML) INTRAVENOUS
Qty: 150 | Refills: 0 | Status: DISCONTINUED | OUTPATIENT
Start: 2023-01-01 | End: 2023-01-01

## 2023-01-01 RX ORDER — PANTOPRAZOLE SODIUM 20 MG/1
40 TABLET, DELAYED RELEASE ORAL DAILY
Refills: 0 | Status: DISCONTINUED | OUTPATIENT
Start: 2023-01-01 | End: 2023-01-01

## 2023-01-01 RX ORDER — PHENYLEPHRINE HYDROCHLORIDE 10 MG/ML
4 INJECTION INTRAVENOUS
Qty: 160 | Refills: 0 | Status: DISCONTINUED | OUTPATIENT
Start: 2023-01-01 | End: 2023-01-01

## 2023-01-01 RX ADMIN — Medication 1 APPLICATION(S): at 05:56

## 2023-01-01 RX ADMIN — Medication 30 MILLILITER(S): at 20:15

## 2023-01-01 RX ADMIN — URSODIOL 300 MILLIGRAM(S): 250 TABLET, FILM COATED ORAL at 18:02

## 2023-01-01 RX ADMIN — PIPERACILLIN AND TAZOBACTAM 25 GRAM(S): 4; .5 INJECTION, POWDER, LYOPHILIZED, FOR SOLUTION INTRAVENOUS at 07:58

## 2023-01-01 RX ADMIN — SODIUM CHLORIDE 50 MILLILITER(S): 9 INJECTION INTRAMUSCULAR; INTRAVENOUS; SUBCUTANEOUS at 11:49

## 2023-01-01 RX ADMIN — CHLORHEXIDINE GLUCONATE 1 APPLICATION(S): 213 SOLUTION TOPICAL at 05:14

## 2023-01-01 RX ADMIN — PANTOPRAZOLE SODIUM 40 MILLIGRAM(S): 20 TABLET, DELAYED RELEASE ORAL at 11:47

## 2023-01-01 RX ADMIN — Medication 30 MILLILITER(S): at 10:26

## 2023-01-01 RX ADMIN — BUMETANIDE 132 MILLIGRAM(S): 0.25 INJECTION INTRAMUSCULAR; INTRAVENOUS at 11:25

## 2023-01-01 RX ADMIN — Medication 250 MILLIGRAM(S): at 05:18

## 2023-01-01 RX ADMIN — Medication 50 GRAM(S): at 17:17

## 2023-01-01 RX ADMIN — PANTOPRAZOLE SODIUM 40 MILLIGRAM(S): 20 TABLET, DELAYED RELEASE ORAL at 18:12

## 2023-01-01 RX ADMIN — HEPARIN SODIUM 1200 UNIT(S)/HR: 5000 INJECTION INTRAVENOUS; SUBCUTANEOUS at 22:37

## 2023-01-01 RX ADMIN — PANTOPRAZOLE SODIUM 40 MILLIGRAM(S): 20 TABLET, DELAYED RELEASE ORAL at 11:31

## 2023-01-01 RX ADMIN — Medication 40 MILLIEQUIVALENT(S): at 19:42

## 2023-01-01 RX ADMIN — MEROPENEM 100 MILLIGRAM(S): 1 INJECTION INTRAVENOUS at 13:30

## 2023-01-01 RX ADMIN — Medication 102 MILLIGRAM(S): at 23:44

## 2023-01-01 RX ADMIN — SODIUM CHLORIDE 1000 MILLILITER(S): 9 INJECTION INTRAMUSCULAR; INTRAVENOUS; SUBCUTANEOUS at 22:31

## 2023-01-01 RX ADMIN — Medication 250 MILLIGRAM(S): at 14:35

## 2023-01-01 RX ADMIN — Medication 100 MILLIEQUIVALENT(S): at 10:36

## 2023-01-01 RX ADMIN — Medication 102 MILLIGRAM(S): at 11:03

## 2023-01-01 RX ADMIN — VASOPRESSIN 1.5 UNIT(S)/MIN: 20 INJECTION INTRAVENOUS at 07:49

## 2023-01-01 RX ADMIN — Medication 30 MILLILITER(S): at 01:13

## 2023-01-01 RX ADMIN — VASOPRESSIN 1.5 UNIT(S)/MIN: 20 INJECTION INTRAVENOUS at 18:33

## 2023-01-01 RX ADMIN — PANTOPRAZOLE SODIUM 40 MILLIGRAM(S): 20 TABLET, DELAYED RELEASE ORAL at 15:01

## 2023-01-01 RX ADMIN — Medication 25 MILLILITER(S): at 05:00

## 2023-01-01 RX ADMIN — CEFTRIAXONE 100 MILLIGRAM(S): 500 INJECTION, POWDER, FOR SOLUTION INTRAMUSCULAR; INTRAVENOUS at 05:05

## 2023-01-01 RX ADMIN — Medication 65 MICROGRAM(S)/KG/MIN: at 21:09

## 2023-01-01 RX ADMIN — Medication 50 MILLILITER(S): at 19:52

## 2023-01-01 RX ADMIN — ENOXAPARIN SODIUM 70 MILLIGRAM(S): 100 INJECTION SUBCUTANEOUS at 05:56

## 2023-01-01 RX ADMIN — PROPOFOL 1.5 MICROGRAM(S)/KG/MIN: 10 INJECTION, EMULSION INTRAVENOUS at 21:09

## 2023-01-01 RX ADMIN — Medication 50 MILLILITER(S): at 10:18

## 2023-01-01 RX ADMIN — Medication 25 MILLILITER(S): at 21:41

## 2023-01-01 RX ADMIN — PANTOPRAZOLE SODIUM 40 MILLIGRAM(S): 20 TABLET, DELAYED RELEASE ORAL at 17:45

## 2023-01-01 RX ADMIN — CEFEPIME 100 MILLIGRAM(S): 1 INJECTION, POWDER, FOR SOLUTION INTRAMUSCULAR; INTRAVENOUS at 11:48

## 2023-01-01 RX ADMIN — PIPERACILLIN AND TAZOBACTAM 25 GRAM(S): 4; .5 INJECTION, POWDER, LYOPHILIZED, FOR SOLUTION INTRAVENOUS at 15:22

## 2023-01-01 RX ADMIN — Medication 20 MILLIGRAM(S): at 05:22

## 2023-01-01 RX ADMIN — ONDANSETRON 4 MILLIGRAM(S): 8 TABLET, FILM COATED ORAL at 22:27

## 2023-01-01 RX ADMIN — URSODIOL 300 MILLIGRAM(S): 250 TABLET, FILM COATED ORAL at 09:13

## 2023-01-01 RX ADMIN — Medication 4.68 MICROGRAM(S)/KG/MIN: at 03:00

## 2023-01-01 RX ADMIN — TAMSULOSIN HYDROCHLORIDE 0.4 MILLIGRAM(S): 0.4 CAPSULE ORAL at 23:10

## 2023-01-01 RX ADMIN — PANTOPRAZOLE SODIUM 40 MILLIGRAM(S): 20 TABLET, DELAYED RELEASE ORAL at 06:02

## 2023-01-01 RX ADMIN — URSODIOL 300 MILLIGRAM(S): 250 TABLET, FILM COATED ORAL at 18:32

## 2023-01-01 RX ADMIN — PIPERACILLIN AND TAZOBACTAM 25 GRAM(S): 4; .5 INJECTION, POWDER, LYOPHILIZED, FOR SOLUTION INTRAVENOUS at 15:07

## 2023-01-01 RX ADMIN — Medication 50 MILLILITER(S): at 23:50

## 2023-01-01 RX ADMIN — HEPARIN SODIUM 1300 UNIT(S)/HR: 5000 INJECTION INTRAVENOUS; SUBCUTANEOUS at 22:30

## 2023-01-01 RX ADMIN — Medication 4.68 MICROGRAM(S)/KG/MIN: at 10:13

## 2023-01-01 RX ADMIN — CHLORHEXIDINE GLUCONATE 1 APPLICATION(S): 213 SOLUTION TOPICAL at 05:56

## 2023-01-01 RX ADMIN — DEXMEDETOMIDINE HYDROCHLORIDE IN 0.9% SODIUM CHLORIDE 1.73 MICROGRAM(S)/KG/HR: 4 INJECTION INTRAVENOUS at 21:40

## 2023-01-01 RX ADMIN — HEPARIN SODIUM 1200 UNIT(S)/HR: 5000 INJECTION INTRAVENOUS; SUBCUTANEOUS at 05:02

## 2023-01-01 RX ADMIN — PANTOPRAZOLE SODIUM 40 MILLIGRAM(S): 20 TABLET, DELAYED RELEASE ORAL at 17:08

## 2023-01-01 RX ADMIN — SODIUM CHLORIDE 2000 MILLILITER(S): 9 INJECTION INTRAMUSCULAR; INTRAVENOUS; SUBCUTANEOUS at 23:01

## 2023-01-01 RX ADMIN — MEROPENEM 100 MILLIGRAM(S): 1 INJECTION INTRAVENOUS at 14:08

## 2023-01-01 RX ADMIN — ENOXAPARIN SODIUM 70 MILLIGRAM(S): 100 INJECTION SUBCUTANEOUS at 05:23

## 2023-01-01 RX ADMIN — Medication 250 MILLIGRAM(S): at 05:23

## 2023-01-01 RX ADMIN — PANTOPRAZOLE SODIUM 40 MILLIGRAM(S): 20 TABLET, DELAYED RELEASE ORAL at 17:31

## 2023-01-01 RX ADMIN — Medication 65 MICROGRAM(S)/KG/MIN: at 21:40

## 2023-01-01 RX ADMIN — PHENYLEPHRINE HYDROCHLORIDE 52 MICROGRAM(S)/KG/MIN: 10 INJECTION INTRAVENOUS at 18:31

## 2023-01-01 RX ADMIN — Medication 1000 MILLIGRAM(S): at 21:48

## 2023-01-01 RX ADMIN — Medication 65 MICROGRAM(S)/KG/MIN: at 18:33

## 2023-01-01 RX ADMIN — PIPERACILLIN AND TAZOBACTAM 25 GRAM(S): 4; .5 INJECTION, POWDER, LYOPHILIZED, FOR SOLUTION INTRAVENOUS at 08:29

## 2023-01-01 RX ADMIN — CHLORHEXIDINE GLUCONATE 15 MILLILITER(S): 213 SOLUTION TOPICAL at 05:13

## 2023-01-01 RX ADMIN — CEFEPIME 100 MILLIGRAM(S): 1 INJECTION, POWDER, FOR SOLUTION INTRAMUSCULAR; INTRAVENOUS at 02:34

## 2023-01-01 RX ADMIN — PROPOFOL 1.5 MICROGRAM(S)/KG/MIN: 10 INJECTION, EMULSION INTRAVENOUS at 20:03

## 2023-01-01 RX ADMIN — Medication 100 GRAM(S): at 22:13

## 2023-01-01 RX ADMIN — PROTHROMBIN COMPLEX CONCENTRATE (HUMAN) 400 INTERNATIONAL UNIT(S): 25.5; 16.5; 24; 22; 22; 26 POWDER, FOR SOLUTION INTRAVENOUS at 12:34

## 2023-01-01 RX ADMIN — CHLORHEXIDINE GLUCONATE 15 MILLILITER(S): 213 SOLUTION TOPICAL at 05:16

## 2023-01-01 RX ADMIN — Medication 25 GRAM(S): at 19:43

## 2023-01-01 RX ADMIN — PIPERACILLIN AND TAZOBACTAM 25 GRAM(S): 4; .5 INJECTION, POWDER, LYOPHILIZED, FOR SOLUTION INTRAVENOUS at 23:34

## 2023-01-01 RX ADMIN — FOSAPREPITANT DIMEGLUMINE 300 MILLIGRAM(S): 150 INJECTION, POWDER, LYOPHILIZED, FOR SOLUTION INTRAVENOUS at 11:37

## 2023-01-01 RX ADMIN — ONDANSETRON 4 MILLIGRAM(S): 8 TABLET, FILM COATED ORAL at 06:54

## 2023-01-01 RX ADMIN — Medication 40 MILLIEQUIVALENT(S): at 15:13

## 2023-01-01 RX ADMIN — PROPOFOL 1.5 MICROGRAM(S)/KG/MIN: 10 INJECTION, EMULSION INTRAVENOUS at 08:25

## 2023-01-01 RX ADMIN — URSODIOL 300 MILLIGRAM(S): 250 TABLET, FILM COATED ORAL at 06:24

## 2023-01-01 RX ADMIN — Medication 1000 MILLIGRAM(S): at 04:38

## 2023-01-01 RX ADMIN — Medication 400 MILLIGRAM(S): at 04:12

## 2023-01-01 RX ADMIN — POLYETHYLENE GLYCOL 3350 17 GRAM(S): 17 POWDER, FOR SOLUTION ORAL at 18:13

## 2023-01-01 RX ADMIN — Medication 40 MILLIEQUIVALENT(S): at 13:23

## 2023-01-01 RX ADMIN — URSODIOL 300 MILLIGRAM(S): 250 TABLET, FILM COATED ORAL at 20:11

## 2023-01-01 RX ADMIN — Medication 50 MILLILITER(S): at 06:30

## 2023-01-01 RX ADMIN — Medication 50 MILLILITER(S): at 12:34

## 2023-01-01 RX ADMIN — PHENYLEPHRINE HYDROCHLORIDE 52 MICROGRAM(S)/KG/MIN: 10 INJECTION INTRAVENOUS at 19:45

## 2023-01-01 RX ADMIN — PANTOPRAZOLE SODIUM 40 MILLIGRAM(S): 20 TABLET, DELAYED RELEASE ORAL at 06:47

## 2023-01-01 RX ADMIN — PHENYLEPHRINE HYDROCHLORIDE 52 MICROGRAM(S)/KG/MIN: 10 INJECTION INTRAVENOUS at 11:00

## 2023-01-01 RX ADMIN — PIPERACILLIN AND TAZOBACTAM 200 GRAM(S): 4; .5 INJECTION, POWDER, LYOPHILIZED, FOR SOLUTION INTRAVENOUS at 13:28

## 2023-01-01 RX ADMIN — PIPERACILLIN AND TAZOBACTAM 25 GRAM(S): 4; .5 INJECTION, POWDER, LYOPHILIZED, FOR SOLUTION INTRAVENOUS at 00:41

## 2023-01-01 RX ADMIN — Medication 25 MILLILITER(S): at 07:00

## 2023-01-01 RX ADMIN — HEPARIN SODIUM 1000 UNIT(S)/HR: 5000 INJECTION INTRAVENOUS; SUBCUTANEOUS at 14:13

## 2023-01-01 RX ADMIN — CEFEPIME 100 MILLIGRAM(S): 1 INJECTION, POWDER, FOR SOLUTION INTRAMUSCULAR; INTRAVENOUS at 02:59

## 2023-01-01 RX ADMIN — Medication 50 MILLILITER(S): at 19:00

## 2023-01-01 RX ADMIN — PROPOFOL 1.5 MICROGRAM(S)/KG/MIN: 10 INJECTION, EMULSION INTRAVENOUS at 18:33

## 2023-01-01 RX ADMIN — ONDANSETRON 4 MILLIGRAM(S): 8 TABLET, FILM COATED ORAL at 22:25

## 2023-01-01 RX ADMIN — URSODIOL 300 MILLIGRAM(S): 250 TABLET, FILM COATED ORAL at 17:03

## 2023-01-01 RX ADMIN — Medication 65 MICROGRAM(S)/KG/MIN: at 20:03

## 2023-01-01 RX ADMIN — POTASSIUM PHOSPHATE, MONOBASIC POTASSIUM PHOSPHATE, DIBASIC 62.5 MILLIMOLE(S): 236; 224 INJECTION, SOLUTION INTRAVENOUS at 23:26

## 2023-01-01 RX ADMIN — Medication 40 MILLIEQUIVALENT(S): at 11:48

## 2023-01-01 RX ADMIN — URSODIOL 300 MILLIGRAM(S): 250 TABLET, FILM COATED ORAL at 05:39

## 2023-01-01 RX ADMIN — Medication 125 MILLIGRAM(S): at 17:57

## 2023-01-01 RX ADMIN — Medication 200 GRAM(S): at 03:05

## 2023-01-01 RX ADMIN — Medication 250 MILLIGRAM(S): at 04:45

## 2023-01-01 RX ADMIN — Medication 20 MILLIGRAM(S): at 15:21

## 2023-01-01 RX ADMIN — VASOPRESSIN 1.5 UNIT(S)/MIN: 20 INJECTION INTRAVENOUS at 23:50

## 2023-01-01 RX ADMIN — SODIUM CHLORIDE 100 MILLILITER(S): 9 INJECTION INTRAMUSCULAR; INTRAVENOUS; SUBCUTANEOUS at 11:25

## 2023-01-01 RX ADMIN — CEFTRIAXONE 100 MILLIGRAM(S): 500 INJECTION, POWDER, FOR SOLUTION INTRAMUSCULAR; INTRAVENOUS at 04:54

## 2023-01-01 RX ADMIN — PHENYLEPHRINE HYDROCHLORIDE 52 MICROGRAM(S)/KG/MIN: 10 INJECTION INTRAVENOUS at 21:15

## 2023-01-01 RX ADMIN — Medication 25 MILLILITER(S): at 22:00

## 2023-01-01 RX ADMIN — CHLORHEXIDINE GLUCONATE 15 MILLILITER(S): 213 SOLUTION TOPICAL at 05:03

## 2023-01-01 RX ADMIN — URSODIOL 300 MILLIGRAM(S): 250 TABLET, FILM COATED ORAL at 05:28

## 2023-01-01 RX ADMIN — Medication 25 MILLILITER(S): at 00:00

## 2023-01-01 RX ADMIN — Medication 125 MILLIGRAM(S): at 15:12

## 2023-01-01 RX ADMIN — Medication 300 MILLIGRAM(S): at 21:17

## 2023-01-01 RX ADMIN — VASOPRESSIN 1.5 UNIT(S)/MIN: 20 INJECTION INTRAVENOUS at 21:40

## 2023-01-01 RX ADMIN — PROPOFOL 1.5 MICROGRAM(S)/KG/MIN: 10 INJECTION, EMULSION INTRAVENOUS at 06:25

## 2023-01-01 RX ADMIN — Medication 25 MICROGRAM(S): at 06:07

## 2023-01-01 RX ADMIN — Medication 50 MILLILITER(S): at 19:39

## 2023-01-01 RX ADMIN — PIPERACILLIN AND TAZOBACTAM 25 GRAM(S): 4; .5 INJECTION, POWDER, LYOPHILIZED, FOR SOLUTION INTRAVENOUS at 06:01

## 2023-01-01 RX ADMIN — VASOPRESSIN 1.5 UNIT(S)/MIN: 20 INJECTION INTRAVENOUS at 21:10

## 2023-01-01 RX ADMIN — ONDANSETRON 4 MILLIGRAM(S): 8 TABLET, FILM COATED ORAL at 23:09

## 2023-01-01 RX ADMIN — Medication 50 MILLILITER(S): at 07:37

## 2023-01-01 RX ADMIN — Medication 25 MICROGRAM(S): at 06:24

## 2023-01-01 RX ADMIN — FENTANYL CITRATE 50 MICROGRAM(S): 50 INJECTION INTRAVENOUS at 14:05

## 2023-01-01 RX ADMIN — Medication 250 MILLILITER(S): at 05:00

## 2023-01-01 RX ADMIN — Medication 50 MILLILITER(S): at 11:03

## 2023-01-01 RX ADMIN — SODIUM CHLORIDE 75 MILLILITER(S): 9 INJECTION INTRAMUSCULAR; INTRAVENOUS; SUBCUTANEOUS at 03:15

## 2023-01-01 RX ADMIN — FLUOROURACIL 2100 MILLIGRAM(S): 50 INJECTION, SOLUTION INTRAVENOUS at 14:21

## 2023-01-01 RX ADMIN — Medication 40 MILLIEQUIVALENT(S): at 18:52

## 2023-01-01 RX ADMIN — PANTOPRAZOLE SODIUM 40 MILLIGRAM(S): 20 TABLET, DELAYED RELEASE ORAL at 05:13

## 2023-01-01 RX ADMIN — URSODIOL 300 MILLIGRAM(S): 250 TABLET, FILM COATED ORAL at 06:47

## 2023-01-01 RX ADMIN — ONDANSETRON 4 MILLIGRAM(S): 8 TABLET, FILM COATED ORAL at 05:38

## 2023-01-01 RX ADMIN — HYDROMORPHONE HYDROCHLORIDE 0.5 MILLIGRAM(S): 2 INJECTION INTRAMUSCULAR; INTRAVENOUS; SUBCUTANEOUS at 08:10

## 2023-01-01 RX ADMIN — Medication 50 MILLILITER(S): at 21:50

## 2023-01-01 RX ADMIN — Medication 25 MICROGRAM(S): at 06:53

## 2023-01-01 RX ADMIN — URSODIOL 300 MILLIGRAM(S): 250 TABLET, FILM COATED ORAL at 17:20

## 2023-01-01 RX ADMIN — Medication 50 MILLILITER(S): at 01:33

## 2023-01-01 RX ADMIN — HEPARIN SODIUM 1000 UNIT(S)/HR: 5000 INJECTION INTRAVENOUS; SUBCUTANEOUS at 08:30

## 2023-01-01 RX ADMIN — Medication 25 MICROGRAM(S): at 05:19

## 2023-01-01 RX ADMIN — Medication 50 MILLILITER(S): at 22:21

## 2023-01-01 RX ADMIN — VASOPRESSIN 6 UNIT(S)/MIN: 20 INJECTION INTRAVENOUS at 16:04

## 2023-01-01 RX ADMIN — MIDAZOLAM HYDROCHLORIDE 2 MILLIGRAM(S): 1 INJECTION, SOLUTION INTRAMUSCULAR; INTRAVENOUS at 04:30

## 2023-01-01 RX ADMIN — CEFTRIAXONE 100 MILLIGRAM(S): 500 INJECTION, POWDER, FOR SOLUTION INTRAMUSCULAR; INTRAVENOUS at 05:03

## 2023-01-01 RX ADMIN — URSODIOL 300 MILLIGRAM(S): 250 TABLET, FILM COATED ORAL at 17:02

## 2023-01-01 RX ADMIN — Medication 25 MILLILITER(S): at 02:00

## 2023-01-01 RX ADMIN — Medication 25 GRAM(S): at 08:39

## 2023-01-01 RX ADMIN — CHLORHEXIDINE GLUCONATE 15 MILLILITER(S): 213 SOLUTION TOPICAL at 07:01

## 2023-01-01 RX ADMIN — Medication 250 MILLIGRAM(S): at 00:51

## 2023-01-01 RX ADMIN — MEROPENEM 100 MILLIGRAM(S): 1 INJECTION INTRAVENOUS at 21:04

## 2023-01-01 RX ADMIN — PANTOPRAZOLE SODIUM 40 MILLIGRAM(S): 20 TABLET, DELAYED RELEASE ORAL at 12:01

## 2023-01-01 RX ADMIN — SODIUM CHLORIDE 2100 MILLILITER(S): 9 INJECTION, SOLUTION INTRAVENOUS at 13:15

## 2023-01-01 RX ADMIN — Medication 65 MICROGRAM(S)/KG/MIN: at 11:03

## 2023-01-01 RX ADMIN — CHLORHEXIDINE GLUCONATE 1 APPLICATION(S): 213 SOLUTION TOPICAL at 05:03

## 2023-01-01 RX ADMIN — Medication 250 MILLIGRAM(S): at 18:05

## 2023-01-01 RX ADMIN — Medication 10 MILLIGRAM(S): at 11:25

## 2023-01-01 RX ADMIN — Medication 25 MICROGRAM(S): at 07:10

## 2023-01-01 RX ADMIN — VASOPRESSIN 6 UNIT(S)/MIN: 20 INJECTION INTRAVENOUS at 19:39

## 2023-01-01 RX ADMIN — CHLORHEXIDINE GLUCONATE 1 APPLICATION(S): 213 SOLUTION TOPICAL at 05:58

## 2023-01-01 RX ADMIN — Medication 4.68 MICROGRAM(S)/KG/MIN: at 00:26

## 2023-01-01 RX ADMIN — CHLORHEXIDINE GLUCONATE 1 APPLICATION(S): 213 SOLUTION TOPICAL at 12:01

## 2023-01-01 RX ADMIN — Medication 20 MILLIGRAM(S): at 12:04

## 2023-01-01 RX ADMIN — PANTOPRAZOLE SODIUM 40 MILLIGRAM(S): 20 TABLET, DELAYED RELEASE ORAL at 18:30

## 2023-01-01 RX ADMIN — Medication 100 MILLIEQUIVALENT(S): at 05:55

## 2023-01-01 RX ADMIN — Medication 25 MILLILITER(S): at 21:00

## 2023-01-01 RX ADMIN — URSODIOL 300 MILLIGRAM(S): 250 TABLET, FILM COATED ORAL at 18:13

## 2023-01-01 RX ADMIN — Medication 25 MICROGRAM(S): at 06:02

## 2023-01-01 RX ADMIN — PANTOPRAZOLE SODIUM 40 MILLIGRAM(S): 20 TABLET, DELAYED RELEASE ORAL at 06:54

## 2023-01-01 RX ADMIN — CHLORHEXIDINE GLUCONATE 15 MILLILITER(S): 213 SOLUTION TOPICAL at 17:31

## 2023-01-01 RX ADMIN — SODIUM CHLORIDE 500 MILLILITER(S): 9 INJECTION, SOLUTION INTRAVENOUS at 22:08

## 2023-01-01 RX ADMIN — CHLORHEXIDINE GLUCONATE 1 APPLICATION(S): 213 SOLUTION TOPICAL at 11:15

## 2023-01-01 RX ADMIN — CHLORHEXIDINE GLUCONATE 1 APPLICATION(S): 213 SOLUTION TOPICAL at 11:49

## 2023-01-01 RX ADMIN — SODIUM CHLORIDE 500 MILLILITER(S): 9 INJECTION INTRAMUSCULAR; INTRAVENOUS; SUBCUTANEOUS at 03:16

## 2023-01-01 RX ADMIN — ONDANSETRON 4 MILLIGRAM(S): 8 TABLET, FILM COATED ORAL at 14:23

## 2023-01-01 RX ADMIN — URSODIOL 300 MILLIGRAM(S): 250 TABLET, FILM COATED ORAL at 17:12

## 2023-01-01 RX ADMIN — Medication 4.68 MICROGRAM(S)/KG/MIN: at 06:25

## 2023-01-01 RX ADMIN — PANTOPRAZOLE SODIUM 40 MILLIGRAM(S): 20 TABLET, DELAYED RELEASE ORAL at 17:16

## 2023-01-01 RX ADMIN — ENOXAPARIN SODIUM 70 MILLIGRAM(S): 100 INJECTION SUBCUTANEOUS at 06:25

## 2023-01-01 RX ADMIN — URSODIOL 300 MILLIGRAM(S): 250 TABLET, FILM COATED ORAL at 17:59

## 2023-01-01 RX ADMIN — Medication 300 MICROGRAM(S): at 14:52

## 2023-01-01 RX ADMIN — Medication 65 MICROGRAM(S)/KG/MIN: at 07:48

## 2023-01-01 RX ADMIN — Medication 50 MILLILITER(S): at 16:04

## 2023-01-01 RX ADMIN — PANTOPRAZOLE SODIUM 40 MILLIGRAM(S): 20 TABLET, DELAYED RELEASE ORAL at 05:03

## 2023-01-01 RX ADMIN — Medication 1 APPLICATION(S): at 18:05

## 2023-01-01 RX ADMIN — ENOXAPARIN SODIUM 70 MILLIGRAM(S): 100 INJECTION SUBCUTANEOUS at 17:59

## 2023-01-01 RX ADMIN — ENOXAPARIN SODIUM 70 MILLIGRAM(S): 100 INJECTION SUBCUTANEOUS at 18:13

## 2023-01-01 RX ADMIN — Medication 200 GRAM(S): at 06:40

## 2023-01-01 RX ADMIN — SODIUM CHLORIDE 1000 MILLILITER(S): 9 INJECTION, SOLUTION INTRAVENOUS at 00:37

## 2023-01-01 RX ADMIN — Medication 10 MILLIGRAM(S): at 17:21

## 2023-01-01 RX ADMIN — CHLORHEXIDINE GLUCONATE 15 MILLILITER(S): 213 SOLUTION TOPICAL at 17:09

## 2023-01-01 RX ADMIN — CHLORHEXIDINE GLUCONATE 15 MILLILITER(S): 213 SOLUTION TOPICAL at 17:20

## 2023-01-01 RX ADMIN — HEPARIN SODIUM 5500 UNIT(S): 5000 INJECTION INTRAVENOUS; SUBCUTANEOUS at 22:34

## 2023-01-01 RX ADMIN — URSODIOL 300 MILLIGRAM(S): 250 TABLET, FILM COATED ORAL at 05:19

## 2023-01-01 RX ADMIN — CHLORHEXIDINE GLUCONATE 1 APPLICATION(S): 213 SOLUTION TOPICAL at 06:00

## 2023-01-01 RX ADMIN — URSODIOL 300 MILLIGRAM(S): 250 TABLET, FILM COATED ORAL at 07:02

## 2023-01-01 RX ADMIN — ONDANSETRON 4 MILLIGRAM(S): 8 TABLET, FILM COATED ORAL at 11:47

## 2023-01-01 RX ADMIN — CHLORHEXIDINE GLUCONATE 1 APPLICATION(S): 213 SOLUTION TOPICAL at 17:15

## 2023-01-01 RX ADMIN — Medication 20 MILLIGRAM(S): at 05:18

## 2023-01-01 RX ADMIN — HEPARIN SODIUM 1200 UNIT(S)/HR: 5000 INJECTION INTRAVENOUS; SUBCUTANEOUS at 08:12

## 2023-01-01 RX ADMIN — ONDANSETRON 4 MILLIGRAM(S): 8 TABLET, FILM COATED ORAL at 11:25

## 2023-01-01 RX ADMIN — Medication 300 MICROGRAM(S): at 02:33

## 2023-01-01 RX ADMIN — CEFTRIAXONE 100 MILLIGRAM(S): 500 INJECTION, POWDER, FOR SOLUTION INTRAMUSCULAR; INTRAVENOUS at 05:59

## 2023-01-01 RX ADMIN — URSODIOL 300 MILLIGRAM(S): 250 TABLET, FILM COATED ORAL at 05:23

## 2023-01-01 RX ADMIN — Medication 10 MILLIGRAM(S): at 05:16

## 2023-01-01 RX ADMIN — POLYETHYLENE GLYCOL 3350 17 GRAM(S): 17 POWDER, FOR SOLUTION ORAL at 12:55

## 2023-01-01 RX ADMIN — CHLORHEXIDINE GLUCONATE 1 APPLICATION(S): 213 SOLUTION TOPICAL at 06:25

## 2023-01-01 RX ADMIN — Medication 40 MILLIEQUIVALENT(S): at 18:45

## 2023-01-01 RX ADMIN — URSODIOL 300 MILLIGRAM(S): 250 TABLET, FILM COATED ORAL at 17:33

## 2023-01-01 RX ADMIN — PANTOPRAZOLE SODIUM 40 MILLIGRAM(S): 20 TABLET, DELAYED RELEASE ORAL at 17:02

## 2023-01-01 RX ADMIN — SODIUM CHLORIDE 75 MILLILITER(S): 9 INJECTION INTRAMUSCULAR; INTRAVENOUS; SUBCUTANEOUS at 17:53

## 2023-01-01 RX ADMIN — Medication 1 APPLICATION(S): at 18:13

## 2023-01-01 RX ADMIN — Medication 10 MILLIGRAM(S): at 00:00

## 2023-01-01 RX ADMIN — Medication 65 MICROGRAM(S)/KG/MIN: at 23:50

## 2023-01-01 RX ADMIN — URSODIOL 300 MILLIGRAM(S): 250 TABLET, FILM COATED ORAL at 06:02

## 2023-01-01 RX ADMIN — Medication 25 MICROGRAM(S): at 05:24

## 2023-01-01 RX ADMIN — CHLORHEXIDINE GLUCONATE 1 APPLICATION(S): 213 SOLUTION TOPICAL at 15:19

## 2023-01-01 RX ADMIN — CHLORHEXIDINE GLUCONATE 1 APPLICATION(S): 213 SOLUTION TOPICAL at 13:08

## 2023-01-01 RX ADMIN — Medication 50 MILLILITER(S): at 06:20

## 2023-01-01 RX ADMIN — Medication 25 MICROGRAM(S): at 05:55

## 2023-01-01 RX ADMIN — MEROPENEM 100 MILLIGRAM(S): 1 INJECTION INTRAVENOUS at 10:12

## 2023-01-01 RX ADMIN — PIPERACILLIN AND TAZOBACTAM 200 GRAM(S): 4; .5 INJECTION, POWDER, LYOPHILIZED, FOR SOLUTION INTRAVENOUS at 01:33

## 2023-01-01 RX ADMIN — Medication 25 MILLILITER(S): at 06:00

## 2023-01-01 RX ADMIN — ONDANSETRON 4 MILLIGRAM(S): 8 TABLET, FILM COATED ORAL at 17:15

## 2023-01-01 RX ADMIN — FLUOROURACIL 168 MILLIGRAM(S): 50 INJECTION, SOLUTION INTRAVENOUS at 14:20

## 2023-01-01 RX ADMIN — PANTOPRAZOLE SODIUM 40 MILLIGRAM(S): 20 TABLET, DELAYED RELEASE ORAL at 05:58

## 2023-01-01 RX ADMIN — HEPARIN SODIUM 1200 UNIT(S)/HR: 5000 INJECTION INTRAVENOUS; SUBCUTANEOUS at 13:21

## 2023-01-01 RX ADMIN — Medication 102 MILLIGRAM(S): at 10:16

## 2023-01-01 RX ADMIN — Medication 25 GRAM(S): at 03:15

## 2023-01-01 RX ADMIN — Medication 150 MEQ/KG/HR: at 07:48

## 2023-01-01 RX ADMIN — URSODIOL 300 MILLIGRAM(S): 250 TABLET, FILM COATED ORAL at 18:05

## 2023-01-01 RX ADMIN — CHLORHEXIDINE GLUCONATE 1 APPLICATION(S): 213 SOLUTION TOPICAL at 11:29

## 2023-01-01 RX ADMIN — Medication 250 MILLIGRAM(S): at 05:32

## 2023-01-01 RX ADMIN — Medication 50 MILLILITER(S): at 14:00

## 2023-01-01 RX ADMIN — CEFTRIAXONE 100 MILLIGRAM(S): 500 INJECTION, POWDER, FOR SOLUTION INTRAMUSCULAR; INTRAVENOUS at 05:16

## 2023-01-01 RX ADMIN — CHLORHEXIDINE GLUCONATE 15 MILLILITER(S): 213 SOLUTION TOPICAL at 05:24

## 2023-01-01 RX ADMIN — PANTOPRAZOLE SODIUM 40 MILLIGRAM(S): 20 TABLET, DELAYED RELEASE ORAL at 11:25

## 2023-01-01 RX ADMIN — SODIUM CHLORIDE 1000 MILLILITER(S): 9 INJECTION INTRAMUSCULAR; INTRAVENOUS; SUBCUTANEOUS at 20:21

## 2023-01-01 RX ADMIN — Medication 100 MILLIGRAM(S): at 20:09

## 2023-01-01 RX ADMIN — PANTOPRAZOLE SODIUM 40 MILLIGRAM(S): 20 TABLET, DELAYED RELEASE ORAL at 06:22

## 2023-01-01 RX ADMIN — SODIUM CHLORIDE 500 MILLILITER(S): 9 INJECTION INTRAMUSCULAR; INTRAVENOUS; SUBCUTANEOUS at 13:22

## 2023-01-01 RX ADMIN — Medication 50 MILLILITER(S): at 14:08

## 2023-01-01 RX ADMIN — MEROPENEM 100 MILLIGRAM(S): 1 INJECTION INTRAVENOUS at 21:40

## 2023-01-01 RX ADMIN — Medication 65 MICROGRAM(S)/KG/MIN: at 07:37

## 2023-01-01 RX ADMIN — PHENYLEPHRINE HYDROCHLORIDE 2.6 MICROGRAM(S)/KG/MIN: 10 INJECTION INTRAVENOUS at 23:49

## 2023-01-01 RX ADMIN — CHLORHEXIDINE GLUCONATE 15 MILLILITER(S): 213 SOLUTION TOPICAL at 17:03

## 2023-01-01 RX ADMIN — Medication 65 MICROGRAM(S)/KG/MIN: at 19:38

## 2023-01-01 RX ADMIN — FENTANYL CITRATE 50 MICROGRAM(S): 50 INJECTION INTRAVENOUS at 13:50

## 2023-01-01 RX ADMIN — PANTOPRAZOLE SODIUM 40 MILLIGRAM(S): 20 TABLET, DELAYED RELEASE ORAL at 18:02

## 2023-01-01 RX ADMIN — Medication 100 MILLIEQUIVALENT(S): at 08:25

## 2023-01-01 RX ADMIN — SODIUM CHLORIDE 75 MILLILITER(S): 9 INJECTION, SOLUTION INTRAVENOUS at 17:33

## 2023-01-01 RX ADMIN — ONDANSETRON 8 MILLIGRAM(S): 8 TABLET, FILM COATED ORAL at 11:36

## 2023-01-01 RX ADMIN — Medication 100 MILLIEQUIVALENT(S): at 07:01

## 2023-01-01 RX ADMIN — Medication 700 MILLIGRAM(S): at 12:12

## 2023-01-01 RX ADMIN — Medication 50 MILLILITER(S): at 06:26

## 2023-01-01 RX ADMIN — PIPERACILLIN AND TAZOBACTAM 25 GRAM(S): 4; .5 INJECTION, POWDER, LYOPHILIZED, FOR SOLUTION INTRAVENOUS at 08:33

## 2023-01-01 RX ADMIN — CHLORHEXIDINE GLUCONATE 1 APPLICATION(S): 213 SOLUTION TOPICAL at 05:15

## 2023-01-01 RX ADMIN — CHLORHEXIDINE GLUCONATE 1 APPLICATION(S): 213 SOLUTION TOPICAL at 12:04

## 2023-01-01 RX ADMIN — URSODIOL 300 MILLIGRAM(S): 250 TABLET, FILM COATED ORAL at 05:55

## 2023-01-01 RX ADMIN — Medication 4.68 MICROGRAM(S)/KG/MIN: at 08:25

## 2023-01-01 RX ADMIN — Medication 102 MILLIGRAM(S): at 16:03

## 2023-01-01 RX ADMIN — PANTOPRAZOLE SODIUM 40 MILLIGRAM(S): 20 TABLET, DELAYED RELEASE ORAL at 17:57

## 2023-01-01 RX ADMIN — Medication 20 MILLIGRAM(S): at 16:45

## 2023-01-01 RX ADMIN — Medication 102 MILLIGRAM(S): at 00:19

## 2023-01-01 RX ADMIN — Medication 200 GRAM(S): at 17:15

## 2023-01-01 RX ADMIN — MEROPENEM 100 MILLIGRAM(S): 1 INJECTION INTRAVENOUS at 05:13

## 2023-01-01 RX ADMIN — ONDANSETRON 4 MILLIGRAM(S): 8 TABLET, FILM COATED ORAL at 06:07

## 2023-01-01 RX ADMIN — VASOPRESSIN 1.5 UNIT(S)/MIN: 20 INJECTION INTRAVENOUS at 07:37

## 2023-01-01 RX ADMIN — PANTOPRAZOLE SODIUM 40 MILLIGRAM(S): 20 TABLET, DELAYED RELEASE ORAL at 11:56

## 2023-01-01 RX ADMIN — VASOPRESSIN 1.5 UNIT(S)/MIN: 20 INJECTION INTRAVENOUS at 20:03

## 2023-01-01 RX ADMIN — Medication 50 MILLILITER(S): at 23:46

## 2023-01-01 RX ADMIN — ONDANSETRON 4 MILLIGRAM(S): 8 TABLET, FILM COATED ORAL at 05:59

## 2023-01-01 RX ADMIN — Medication 50 MILLILITER(S): at 05:05

## 2023-01-01 RX ADMIN — Medication 250 MILLIGRAM(S): at 17:59

## 2023-01-01 RX ADMIN — HEPARIN SODIUM 1200 UNIT(S)/HR: 5000 INJECTION INTRAVENOUS; SUBCUTANEOUS at 19:33

## 2023-01-01 RX ADMIN — ONDANSETRON 4 MILLIGRAM(S): 8 TABLET, FILM COATED ORAL at 21:59

## 2023-01-01 RX ADMIN — Medication 50 MILLILITER(S): at 00:15

## 2023-01-01 RX ADMIN — Medication 40 MILLIGRAM(S): at 13:23

## 2023-01-01 RX ADMIN — CHLORHEXIDINE GLUCONATE 1 APPLICATION(S): 213 SOLUTION TOPICAL at 06:31

## 2023-01-01 RX ADMIN — CHLORHEXIDINE GLUCONATE 15 MILLILITER(S): 213 SOLUTION TOPICAL at 06:01

## 2023-01-01 RX ADMIN — URSODIOL 300 MILLIGRAM(S): 250 TABLET, FILM COATED ORAL at 06:27

## 2023-01-01 RX ADMIN — Medication 102 MILLIGRAM(S): at 12:28

## 2023-01-01 RX ADMIN — Medication 102 MILLIGRAM(S): at 23:52

## 2023-01-01 RX ADMIN — Medication 102 MILLIGRAM(S): at 23:20

## 2023-01-01 RX ADMIN — DEXMEDETOMIDINE HYDROCHLORIDE IN 0.9% SODIUM CHLORIDE 1.73 MICROGRAM(S)/KG/HR: 4 INJECTION INTRAVENOUS at 07:49

## 2023-01-01 RX ADMIN — Medication 100 MILLIEQUIVALENT(S): at 02:54

## 2023-01-01 RX ADMIN — Medication 50 MILLIEQUIVALENT(S): at 13:11

## 2023-01-01 RX ADMIN — POLYETHYLENE GLYCOL 3350 17 GRAM(S): 17 POWDER, FOR SOLUTION ORAL at 08:24

## 2023-01-01 RX ADMIN — Medication 250 MILLILITER(S): at 03:00

## 2023-01-01 RX ADMIN — Medication 1 TABLET(S): at 00:44

## 2023-01-01 RX ADMIN — Medication 750 MILLIGRAM(S): at 21:32

## 2023-01-01 RX ADMIN — SENNA PLUS 2 TABLET(S): 8.6 TABLET ORAL at 06:24

## 2023-01-01 RX ADMIN — Medication 1 APPLICATION(S): at 06:24

## 2023-01-01 RX ADMIN — Medication 65 MICROGRAM(S)/KG/MIN: at 19:52

## 2023-01-01 RX ADMIN — URSODIOL 300 MILLIGRAM(S): 250 TABLET, FILM COATED ORAL at 17:57

## 2023-01-01 RX ADMIN — Medication 400 MILLIGRAM(S): at 18:30

## 2023-01-01 RX ADMIN — URSODIOL 300 MILLIGRAM(S): 250 TABLET, FILM COATED ORAL at 07:10

## 2023-01-01 RX ADMIN — PIPERACILLIN AND TAZOBACTAM 25 GRAM(S): 4; .5 INJECTION, POWDER, LYOPHILIZED, FOR SOLUTION INTRAVENOUS at 15:01

## 2023-01-01 RX ADMIN — CHLORHEXIDINE GLUCONATE 1 APPLICATION(S): 213 SOLUTION TOPICAL at 05:06

## 2023-01-01 RX ADMIN — POTASSIUM PHOSPHATE, MONOBASIC POTASSIUM PHOSPHATE, DIBASIC 62.5 MILLIMOLE(S): 236; 224 INJECTION, SOLUTION INTRAVENOUS at 06:49

## 2023-01-01 RX ADMIN — VASOPRESSIN 6 UNIT(S)/MIN: 20 INJECTION INTRAVENOUS at 11:03

## 2023-01-01 RX ADMIN — CEFTRIAXONE 100 MILLIGRAM(S): 500 INJECTION, POWDER, FOR SOLUTION INTRAMUSCULAR; INTRAVENOUS at 05:57

## 2023-01-01 RX ADMIN — Medication 150 MEQ/KG/HR: at 21:41

## 2023-01-01 RX ADMIN — PIPERACILLIN AND TAZOBACTAM 25 GRAM(S): 4; .5 INJECTION, POWDER, LYOPHILIZED, FOR SOLUTION INTRAVENOUS at 21:56

## 2023-01-01 RX ADMIN — Medication 65 MICROGRAM(S)/KG/MIN: at 16:03

## 2023-01-01 RX ADMIN — Medication 25 MILLILITER(S): at 01:00

## 2023-01-01 RX ADMIN — URSODIOL 300 MILLIGRAM(S): 250 TABLET, FILM COATED ORAL at 05:33

## 2023-01-01 RX ADMIN — SODIUM CHLORIDE 50 MILLILITER(S): 9 INJECTION INTRAMUSCULAR; INTRAVENOUS; SUBCUTANEOUS at 04:13

## 2023-01-01 RX ADMIN — Medication 65 MICROGRAM(S)/KG/MIN: at 23:44

## 2023-01-01 RX ADMIN — Medication 102 MILLIGRAM(S): at 00:00

## 2023-01-01 RX ADMIN — PROPOFOL 1.5 MICROGRAM(S)/KG/MIN: 10 INJECTION, EMULSION INTRAVENOUS at 21:40

## 2023-01-01 RX ADMIN — PIPERACILLIN AND TAZOBACTAM 25 GRAM(S): 4; .5 INJECTION, POWDER, LYOPHILIZED, FOR SOLUTION INTRAVENOUS at 08:09

## 2023-01-01 RX ADMIN — Medication 40 MILLIGRAM(S): at 05:56

## 2023-01-01 RX ADMIN — Medication 50 MILLIEQUIVALENT(S): at 13:00

## 2023-01-01 RX ADMIN — KETAMINE HYDROCHLORIDE 70 MILLIGRAM(S): 100 INJECTION INTRAMUSCULAR; INTRAVENOUS at 04:30

## 2023-01-01 RX ADMIN — HEPARIN SODIUM 0 UNIT(S)/HR: 5000 INJECTION INTRAVENOUS; SUBCUTANEOUS at 13:13

## 2023-01-01 RX ADMIN — Medication 102 MILLIGRAM(S): at 11:22

## 2023-01-01 RX ADMIN — ENOXAPARIN SODIUM 70 MILLIGRAM(S): 100 INJECTION SUBCUTANEOUS at 06:12

## 2023-01-01 RX ADMIN — VASOPRESSIN 1.5 UNIT(S)/MIN: 20 INJECTION INTRAVENOUS at 23:45

## 2023-01-01 RX ADMIN — PANTOPRAZOLE SODIUM 40 MILLIGRAM(S): 20 TABLET, DELAYED RELEASE ORAL at 05:35

## 2023-01-01 RX ADMIN — VASOPRESSIN 1.5 UNIT(S)/MIN: 20 INJECTION INTRAVENOUS at 10:32

## 2023-01-01 RX ADMIN — Medication 125 MILLIGRAM(S): at 06:47

## 2023-01-01 RX ADMIN — PIPERACILLIN AND TAZOBACTAM 200 GRAM(S): 4; .5 INJECTION, POWDER, LYOPHILIZED, FOR SOLUTION INTRAVENOUS at 22:17

## 2023-01-01 RX ADMIN — PROPOFOL 1.5 MICROGRAM(S)/KG/MIN: 10 INJECTION, EMULSION INTRAVENOUS at 07:49

## 2023-01-01 RX ADMIN — CHLORHEXIDINE GLUCONATE 15 MILLILITER(S): 213 SOLUTION TOPICAL at 17:08

## 2023-01-01 RX ADMIN — SODIUM CHLORIDE 100 MILLILITER(S): 9 INJECTION INTRAMUSCULAR; INTRAVENOUS; SUBCUTANEOUS at 01:34

## 2023-01-01 RX ADMIN — PANTOPRAZOLE SODIUM 40 MILLIGRAM(S): 20 TABLET, DELAYED RELEASE ORAL at 06:00

## 2023-01-01 RX ADMIN — CHLORHEXIDINE GLUCONATE 15 MILLILITER(S): 213 SOLUTION TOPICAL at 18:30

## 2023-01-01 RX ADMIN — PANTOPRAZOLE SODIUM 80 MILLIGRAM(S): 20 TABLET, DELAYED RELEASE ORAL at 22:08

## 2023-01-01 RX ADMIN — PANTOPRAZOLE SODIUM 40 MILLIGRAM(S): 20 TABLET, DELAYED RELEASE ORAL at 17:20

## 2023-01-01 RX ADMIN — Medication 100 GRAM(S): at 11:21

## 2023-01-01 RX ADMIN — CEFEPIME 100 MILLIGRAM(S): 1 INJECTION, POWDER, FOR SOLUTION INTRAMUSCULAR; INTRAVENOUS at 11:47

## 2023-01-01 RX ADMIN — Medication 25 MILLILITER(S): at 04:00

## 2023-01-01 RX ADMIN — PANTOPRAZOLE SODIUM 80 MILLIGRAM(S): 20 TABLET, DELAYED RELEASE ORAL at 12:40

## 2023-01-01 RX ADMIN — ENOXAPARIN SODIUM 70 MILLIGRAM(S): 100 INJECTION SUBCUTANEOUS at 17:03

## 2023-01-01 RX ADMIN — PANTOPRAZOLE SODIUM 40 MILLIGRAM(S): 20 TABLET, DELAYED RELEASE ORAL at 12:55

## 2023-01-01 RX ADMIN — PANTOPRAZOLE SODIUM 40 MILLIGRAM(S): 20 TABLET, DELAYED RELEASE ORAL at 17:04

## 2023-01-01 RX ADMIN — HEPARIN SODIUM 0 UNIT(S)/HR: 5000 INJECTION INTRAVENOUS; SUBCUTANEOUS at 07:02

## 2023-01-01 RX ADMIN — HEPARIN SODIUM 5500 UNIT(S): 5000 INJECTION INTRAVENOUS; SUBCUTANEOUS at 05:01

## 2023-01-01 RX ADMIN — Medication 25 MICROGRAM(S): at 05:22

## 2023-01-01 RX ADMIN — PANTOPRAZOLE SODIUM 40 MILLIGRAM(S): 20 TABLET, DELAYED RELEASE ORAL at 06:26

## 2023-01-01 RX ADMIN — ONDANSETRON 4 MILLIGRAM(S): 8 TABLET, FILM COATED ORAL at 13:42

## 2023-01-01 RX ADMIN — Medication 50 MILLILITER(S): at 11:00

## 2023-01-01 RX ADMIN — Medication 2.5 MILLIGRAM(S): at 15:26

## 2023-01-01 RX ADMIN — HYDROMORPHONE HYDROCHLORIDE 0.5 MILLIGRAM(S): 2 INJECTION INTRAMUSCULAR; INTRAVENOUS; SUBCUTANEOUS at 08:40

## 2023-01-01 RX ADMIN — URSODIOL 300 MILLIGRAM(S): 250 TABLET, FILM COATED ORAL at 17:15

## 2023-01-01 RX ADMIN — Medication 250 MILLIGRAM(S): at 04:58

## 2023-01-01 RX ADMIN — Medication 100 MILLIGRAM(S): at 10:33

## 2023-01-01 RX ADMIN — PIPERACILLIN AND TAZOBACTAM 25 GRAM(S): 4; .5 INJECTION, POWDER, LYOPHILIZED, FOR SOLUTION INTRAVENOUS at 07:46

## 2023-01-01 RX ADMIN — Medication 50 MILLILITER(S): at 21:08

## 2023-01-01 RX ADMIN — SODIUM CHLORIDE 100 MILLILITER(S): 9 INJECTION INTRAMUSCULAR; INTRAVENOUS; SUBCUTANEOUS at 15:33

## 2023-01-01 RX ADMIN — PIPERACILLIN AND TAZOBACTAM 25 GRAM(S): 4; .5 INJECTION, POWDER, LYOPHILIZED, FOR SOLUTION INTRAVENOUS at 00:03

## 2023-01-01 RX ADMIN — Medication 50 MILLILITER(S): at 18:00

## 2023-01-01 RX ADMIN — PHENYLEPHRINE HYDROCHLORIDE 52 MICROGRAM(S)/KG/MIN: 10 INJECTION INTRAVENOUS at 07:38

## 2023-01-01 RX ADMIN — Medication 50 MILLILITER(S): at 11:45

## 2023-01-01 RX ADMIN — Medication 102 MILLIGRAM(S): at 11:42

## 2023-01-01 RX ADMIN — Medication 25 MILLILITER(S): at 20:00

## 2023-01-01 RX ADMIN — PANTOPRAZOLE SODIUM 40 MILLIGRAM(S): 20 TABLET, DELAYED RELEASE ORAL at 17:10

## 2023-01-01 RX ADMIN — Medication 1 APPLICATION(S): at 17:02

## 2023-01-01 RX ADMIN — ONDANSETRON 4 MILLIGRAM(S): 8 TABLET, FILM COATED ORAL at 22:08

## 2023-01-01 RX ADMIN — FLUOROURACIL 2100 MILLIGRAM(S): 50 INJECTION, SOLUTION INTRAVENOUS at 13:38

## 2023-01-01 RX ADMIN — PROPOFOL 1.5 MICROGRAM(S)/KG/MIN: 10 INJECTION, EMULSION INTRAVENOUS at 23:50

## 2023-01-01 RX ADMIN — PANTOPRAZOLE SODIUM 40 MILLIGRAM(S): 20 TABLET, DELAYED RELEASE ORAL at 05:06

## 2023-01-01 RX ADMIN — Medication 25 MICROGRAM(S): at 06:47

## 2023-01-01 RX ADMIN — URSODIOL 300 MILLIGRAM(S): 250 TABLET, FILM COATED ORAL at 17:47

## 2023-01-01 RX ADMIN — CHLORHEXIDINE GLUCONATE 15 MILLILITER(S): 213 SOLUTION TOPICAL at 17:16

## 2023-01-01 RX ADMIN — CHLORHEXIDINE GLUCONATE 15 MILLILITER(S): 213 SOLUTION TOPICAL at 17:45

## 2023-01-01 RX ADMIN — PANTOPRAZOLE SODIUM 40 MILLIGRAM(S): 20 TABLET, DELAYED RELEASE ORAL at 05:16

## 2023-01-01 RX ADMIN — POLYETHYLENE GLYCOL 3350 17 GRAM(S): 17 POWDER, FOR SOLUTION ORAL at 18:00

## 2023-01-01 RX ADMIN — Medication 102 MILLIGRAM(S): at 11:25

## 2023-01-01 RX ADMIN — Medication 150 MEQ/KG/HR: at 14:45

## 2023-01-01 RX ADMIN — Medication 50 MILLILITER(S): at 23:48

## 2023-01-01 RX ADMIN — Medication 50 MILLIEQUIVALENT(S): at 16:20

## 2023-01-01 RX ADMIN — VASOPRESSIN 1.5 UNIT(S)/MIN: 20 INJECTION INTRAVENOUS at 19:52

## 2023-01-01 RX ADMIN — CEFEPIME 100 MILLIGRAM(S): 1 INJECTION, POWDER, FOR SOLUTION INTRAMUSCULAR; INTRAVENOUS at 17:47

## 2023-01-01 RX ADMIN — OXALIPLATIN 150 MILLIGRAM(S): 5 INJECTION, SOLUTION INTRAVENOUS at 12:12

## 2023-01-01 RX ADMIN — PANTOPRAZOLE SODIUM 40 MILLIGRAM(S): 20 TABLET, DELAYED RELEASE ORAL at 11:13

## 2023-01-01 RX ADMIN — Medication 125 MILLILITER(S): at 16:45

## 2023-01-01 RX ADMIN — Medication 50 MILLIEQUIVALENT(S): at 11:15

## 2023-01-01 RX ADMIN — SODIUM CHLORIDE 100 MILLILITER(S): 9 INJECTION INTRAMUSCULAR; INTRAVENOUS; SUBCUTANEOUS at 05:39

## 2023-01-01 RX ADMIN — PIPERACILLIN AND TAZOBACTAM 25 GRAM(S): 4; .5 INJECTION, POWDER, LYOPHILIZED, FOR SOLUTION INTRAVENOUS at 15:11

## 2023-01-01 RX ADMIN — PROPOFOL 1.5 MICROGRAM(S)/KG/MIN: 10 INJECTION, EMULSION INTRAVENOUS at 04:32

## 2023-01-01 RX ADMIN — SODIUM CHLORIDE 1000 MILLILITER(S): 9 INJECTION INTRAMUSCULAR; INTRAVENOUS; SUBCUTANEOUS at 02:00

## 2023-01-01 RX ADMIN — CHLORHEXIDINE GLUCONATE 1 APPLICATION(S): 213 SOLUTION TOPICAL at 05:16

## 2023-01-01 RX ADMIN — Medication 50 MILLILITER(S): at 10:39

## 2023-05-14 NOTE — ED ADULT TRIAGE NOTE - CHIEF COMPLAINT QUOTE
chest heaviness and abd pain. pt states she has a known "fatty liver" and appears more "yellow" to her.

## 2023-05-14 NOTE — ED ADULT NURSE NOTE - NSFALLUNIVINTERV_ED_ALL_ED
Bed/Stretcher in lowest position, wheels locked, appropriate side rails in place/Call bell, personal items and telephone in reach/Instruct patient to call for assistance before getting out of bed/chair/stretcher/Non-slip footwear applied when patient is off stretcher/[place] to call system/Physically safe environment - no spills, clutter or unnecessary equipment/Purposeful proactive rounding/Room/bathroom lighting operational, light cord in reach

## 2023-05-14 NOTE — ED PROVIDER NOTE - CLINICAL SUMMARY MEDICAL DECISION MAKING FREE TEXT BOX
Attending note.  Painless jaundice with elevated liver enzymes since February.  CT scan abdomen pelvis, labs, lipase, hepatitis panel, urinalysis

## 2023-05-14 NOTE — ED PROVIDER NOTE - PROGRESS NOTE DETAILS
Marbin, PGY-2, EM: Patient had a CT abdomen pelvis for evaluating the source of her painless jaundice.  Found to have mets to the liver, no known primary cancer.  Bilirubin elevated.  Also a PE found subsegmental PT.  Would get a dedicated chest CTA to evaluate for PE.  We will also get a CT of her head to evaluate for masses as patient would be treated with heparin for her PE.  Dr. Alen Vyas is a friend of the family and would not have the patient admitted to their service, pending the CTs being performed and read. Marbin, PGY-2, EM: PT pending CTA chest and CT head. Discussed results of workup with pt, and two sons. Advised that pt has masses in her liver likely metastasis from an unknown primary. Advised that given the PE we need additional imaging. Discussed that I cannot provide information for treatment or primary for the possible cancer that the pt will likely need a biopsy.

## 2023-05-14 NOTE — ED PROVIDER NOTE - OBJECTIVE STATEMENT
Attending note.  Patient was seen in room #36 to the right.  History is from patient and her son.  Family noticed increased jaundice in the last 3 days.  Patient had elevated ALT and AST in February.  Ultrasound at that time was unremarkable.  Patient reports increasing belching increasing abdominal distention and 10 to 12 pound weight loss in the last 1 to 2 months.  She denies any fever, respiratory difficulty.  She does report darker urine in the last few days.  She has past surgical history of  x3, 40 years ago.  She is non-smoker.  She does not drink alcohol.  She has an appointment with GI in 2 weeks time.
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
## 2023-05-14 NOTE — ED PROVIDER NOTE - PHYSICAL EXAMINATION
Attending note.  Patient is alert and in no acute distress.  Patient is jaundiced.  She has dry mucous membranes.  Lungs are clear and equal bilaterally.  Heart is regular rate and rhythm.  Abdomen is soft, nontender and nondistended.  There is no CVA tenderness.  There is no leg edema.  Neurologic exam is grossly intact.

## 2023-05-15 NOTE — H&P ADULT - NSHPPHYSICALEXAM_GEN_ALL_CORE
Vital Signs Last 24 Hrs  T(C): 36.8 (15 May 2023 08:13), Max: 37.1 (14 May 2023 16:03)  T(F): 98.3 (15 May 2023 08:13), Max: 98.8 (14 May 2023 16:03)  HR: 76 (15 May 2023 08:13) (76 - 107)  BP: 115/68 (15 May 2023 08:13) (115/68 - 153/77)  BP(mean): 67 (15 May 2023 08:13) (67 - 80)  RR: 18 (15 May 2023 08:13) (16 - 20)  SpO2: 99% (15 May 2023 08:13) (96% - 99%)    Parameters below as of 15 May 2023 08:13  Patient On (Oxygen Delivery Method): room air      PHYSICAL EXAM:  GENERAL: NAD, well-developed +jaundice   HEAD:  Atraumatic, Normocephalic  EYES: EOMI, PERRLA, conjunctiva and sclera icteteric   NECK: Supple, No JVD  CHEST/LUNG: Clear to auscultation bilaterally; No wheeze  HEART: Regular rate and rhythm; No murmurs, rubs, or gallops  ABDOMEN: Soft, Nontender, Nondistended; Bowel sounds present  EXTREMITIES:  2+ Peripheral Pulses, No clubbing, cyanosis, or edema  PSYCH: AAOx3  NEUROLOGY: non-focal  SKIN: No rashes or lesions

## 2023-05-15 NOTE — PATIENT PROFILE ADULT - FALL HARM RISK - UNIVERSAL INTERVENTIONS
Bed in lowest position, wheels locked, appropriate side rails in place/Call bell, personal items and telephone in reach/Instruct patient to call for assistance before getting out of bed or chair/Non-slip footwear when patient is out of bed/[place] to call system/Physically safe environment - no spills, clutter or unnecessary equipment/Purposeful Proactive Rounding/Room/bathroom lighting operational, light cord in reach

## 2023-05-15 NOTE — PATIENT PROFILE ADULT - NSPROEXTENSIONSOFSELF_GEN_A_NUR
Prep Survey      Responses   [facility] facility patient discharged from? [place]   Is LACE score < 7 ? No   Emergency Room discharge w/ pulse ox? No   Eligibility Readm Mgmt   Discharge diagnosis Acute on chronic hypoxic respiratory failure COPD exacerbation Acute diastolic heart failure    Does the patient have one of the following disease processes/diagnoses(primary or secondary)? COPD/Pneumonia   Does the patient have Home health ordered? Yes   What is the Home health agency?  [facility]    Is there a DME ordered? Yes   What DME was ordered? [employer]   Prep survey completed? Yes          Tereza Cohen RN        
none

## 2023-05-15 NOTE — ED ADULT NURSE REASSESSMENT NOTE - NS ED NURSE REASSESS COMMENT FT1
Introduced self to patient. Pt son at bedside. Heparin drip verified w/ ALMITA Martin. Pt assisted to bathroom. NAD, VSS. Yes

## 2023-05-15 NOTE — PATIENT PROFILE ADULT - FUNCTIONAL ASSESSMENT - BASIC MOBILITY 6.
4-calculated by average/Not able to assess (calculate score using [facility] averaging method)

## 2023-05-15 NOTE — H&P ADULT - HISTORY OF PRESENT ILLNESS
73 female h/o htn, chol, presenting with c/o jaundice  Family noticed increased jaundice in the last 3 days.  Patient had elevated ALT and AST in February.  Ultrasound at that time was unremarkable.  Patient reports increasing belching increasing abdominal distention and 10 to 12 pound weight loss in the last 1 to 2 months.  She denies any fever, respiratory difficulty.  She does report darker urine in the last few days.  She has past surgical history of  x3, 40 years ago.  She is non-smoker.  She does not drink alcohol.  She has an appointment with GI in 2 weeks time.

## 2023-05-15 NOTE — H&P ADULT - ASSESSMENT
73 female h/o htn, chol, not on any meds, presenting with painless jaundice. found to have liver lesions, PE, portal v thrombosis    liver lesion on CT  suspicious for metastatic dz  unknown primary  f/u MRI, MRCP abd  f/u tumor marker  gi and onc consulted    PE  portal v thrombosis  on hep gtt  f/u echo  f/u lower ext dopplers     htn  not on meds  monitor      d/w family bedside    Advanced care planning was discussed with patient and family.  Advanced care planning forms were reviewed and discussed as appropriate.  Differential diagnosis and plan of care discussed with patient after the evaluation.   Pain assessed and judicious use of narcotics when appropriate was discussed.  Importance of Fall prevention discussed.  Counseling on Smoking and Alcohol cessation was offered when appropriate.  Counseling on Diet, exercise, and medication compliance was done.       Approx 60 minutes spent.

## 2023-05-15 NOTE — PATIENT PROFILE ADULT - HAVE YOU BEEN EATING POORLY BECAUSE OF A DECREASED APPETITE?
Yes (1) No Repair - Repaired With Adjacent Surgical Defect Text (Leave Blank If You Do Not Want): After the excision the defect was repaired concurrently with another surgical defect which was in close approximation.

## 2023-05-16 NOTE — CONSULT NOTE ADULT - ATTENDING COMMENTS
As above  72yo F with PMH of HTN, HLD who presents with jaundice  Found to have TOP mass and many liver lesions with associated intrahepatic strictures and segmental ridge dil  MRI reviewed, no hilar mass, no role for ERCP stent  Followup liver met bx , please let GI know if TOP mass bx by EUS is needed  Med onc consult    Thank you for this interesting consult.  Please call the advanced GI service with any questions or concerns.

## 2023-05-16 NOTE — PROCEDURE NOTE - PROCEDURE FINDINGS AND DETAILS
- Successful core biopsy and fine needle aspiration of right hepatic lobe liver lesion.  - Full report to follow

## 2023-05-16 NOTE — PRE PROCEDURE NOTE - PRE PROCEDURE EVALUATION
Interventional Radiology    HPI:  72yo F with PMH of HTN, HLD who presents with jaundice. GI consulted for pancreatic tail mass.    Patient reports 2 months of 10lbs weight loss, abdominal pain, distention. She noticed jaundice for the past 3 days. On presentation VSS, elevated liver tests with bilirubin of 7. MRCP showing numerous liver lesions which demonstrate mild T2 hyperintense signal, diffusion restriction and hyperenhancement; findings consistent with metastases. A reference left hepatic lobe mass measures 2.5 x 1.8 cm (23, 22). No extrahepatic biliary ductal dilatation. Numerous intrahepatic biliary strictures with mild to moderate intrahepatic ductal dilatation, suspected to be due to infiltrative hepatic metastatic disease. Pancreas with distal pancreatic body and tail is suspicious for infiltrative tumor. A suspected distal pancreatic body mass measures at least 3.5 cm on DWI (26, 63). Pancreas duct is not well-visualized within the distal body/tail. A 1.5 cm loculated fluid collection adjacent to pancreatic tail.    Allergies: No Known Allergies    Medications (Abx/Cardiac/Anticoagulation/Blood Products)    heparin   Injectable: 5500 Unit(s) IV Push (05-15 @ 05:01)  heparin  Infusion.: 1300 Unit(s)/Hr IV Continuous (05-15 @ 14:14)    Data:  165.1  68.3  T(C): 36.7  HR: 71  BP: 124/57  RR: 18  SpO2: 95%    Exam  General: No acute distress  Chest: Non labored breathing  Abdomen: Non-distended  Extremities: No swelling, warm    -WBC 7.57 / HgB 10.3 / Hct 32.8 / Plt 185  -Na 134 / Cl 97 / BUN 6 / Glucose 109  -K 3.6 / CO2 21 / Cr 0.63  - / Alk Phos 575 / T.Bili 6.7  -INR1.00    Imaging: reviewed    Plan: 73y Female presents for liver lesion biopsy- discussed with primary team/GI; requesting targeted biopsy vs random  -Risks/Benefits/alternatives explained with the patient and/or healthcare proxy and witnessed informed consent obtained.

## 2023-05-17 NOTE — DIETITIAN INITIAL EVALUATION ADULT - REASON FOR ADMISSION
Per chart, "73 female h/o htn, chol, presenting with c/o jaundice  Family noticed increased jaundice in the last 3 days.  Patient had elevated ALT and AST in February.  Ultrasound at that time was unremarkable.  Patient reports increasing belching increasing abdominal distention and 10 to 12 pound weight loss in the last 1 to 2 months.  She denies any fever, respiratory difficulty.  She does report darker urine in the last few days.  She has past surgical history of  x3, 40 years ago.  She is non-smoker.  She does not drink alcohol.  She has an appointment with GI in 2 weeks time."

## 2023-05-17 NOTE — DIETITIAN INITIAL EVALUATION ADULT - ORAL INTAKE PTA/DIET HISTORY
Pt reports decreased appetite/PO intake x 1-2 months PTA. Reports consuming 3 meals a day at baseline, however has been unable to hold food down in setting of nausea/vomiting and abdominal pain/bloating. Pt and family confirm pt has been consuming <75% of meals.    - NKFA/intolerances reported.   - Therapeutic restrictions reported: Kosher diet   - Micronutrient/Other supplementation: none   - Protein-energy supplementation: none   - No hx of chewing/swallowing difficulties.

## 2023-05-17 NOTE — DIETITIAN INITIAL EVALUATION ADULT - PERTINENT LABORATORY DATA
05-17    133<L>  |  98  |  6<L>  ----------------------------<  114<H>  3.9   |  22  |  0.66    Ca    9.1      17 May 2023 06:24  Mg     2.1     05-16    TPro  6.7  /  Alb  3.3  /  TBili  7.4<H>  /  DBili  x   /  AST  193<H>  /  ALT  134<H>  /  AlkPhos  614<H>  05-17

## 2023-05-17 NOTE — DIETITIAN INITIAL EVALUATION ADULT - NSFNSGIIOFT_GEN_A_CORE
- Pt confirms nausea. Denies vomiting, diarrhea, or constipation.   - Last BM: 5/17; not currently ordered for bowel regimen.

## 2023-05-17 NOTE — DIETITIAN INITIAL EVALUATION ADULT - ADD RECOMMEND
1) New malnutrition notification sent.   2) Continue kosher diet as ordered/tolerated.   3) Recommend trial of Ensure Enlive and Ensure Clear 2x/day. RD to follow up for acceptability.   4) Encourage adequate intake of meals and supplements to optimize PO intake.   5) Monitor PO intake/tolerance, weights, labs, hydration status, bowels, and skin integrity.

## 2023-05-17 NOTE — DIETITIAN INITIAL EVALUATION ADULT - REASON INDICATOR FOR ASSESSMENT
Nutrition consult warranted for: MST score 2 or more   Information obtained from: electronic medical record, patient and family at bedside.   Chart reviewed, events noted.

## 2023-05-17 NOTE — DIETITIAN INITIAL EVALUATION ADULT - OTHER INFO
Nutrition-related concerns:   - Noted hyponatremic (133). IVF: Lactated Ringers @ 75 ml/hr.   - Per GI: "Jaundice - in the setting of hepatic metastases with associated intrahepatic biliary strictures, not amendable to ERCP and stenting. Pancreatic tail mass- likely the origin of this metastatic disease. Agree with IR biopsy of the liver for tissue biopsy. GI available for EUS if results unrevealing or if more data is needed."

## 2023-05-17 NOTE — DIETITIAN INITIAL EVALUATION ADULT - ENERGY INTAKE
- Pt reports poor appetite/PO intake since admission, is currently ordered for a kosher diet. <50% of meals consumed x 3 days. Pt's family is requesting more liquids in diet for pt to better tolerate.    - Pt is amenable to receiving oral nutrition supplements at this time. Will trial Ensure enlive and Ensure clear, RD to follow up for acceptability.   - Pt made aware of menu ordering procedures in house, has no additional food preferences at this time.  Poor (<50%)

## 2023-05-17 NOTE — DIETITIAN INITIAL EVALUATION ADULT - PERTINENT MEDS FT
MEDICATIONS  (STANDING):  heparin  Infusion.  Unit(s)/Hr (12 mL/Hr) IV Continuous <Continuous>  ondansetron Injectable 4 milliGRAM(s) IV Push three times a day  pantoprazole  Injectable 40 milliGRAM(s) IV Push daily  sodium chloride 0.9%. 1000 milliLiter(s) (100 mL/Hr) IV Continuous <Continuous>  ursodiol Capsule 300 milliGRAM(s) Oral two times a day    MEDICATIONS  (PRN):  heparin   Injectable 2500 Unit(s) IV Push every 6 hours PRN For aPTT between 40 - 57  heparin   Injectable 5500 Unit(s) IV Push every 6 hours PRN For aPTT less than 40

## 2023-05-17 NOTE — DIETITIAN INITIAL EVALUATION ADULT - PHYSCIAL ASSESSMENT
Weight Hx Per:  - Source: patient   - UBW: 155 pounds   - Reported weight changes: Pt and family endorse weight loss x 1-2 months.     [facility] no weights available to assess.     Current Admission Weights:  - Dosing weight: 150.9 pounds (68.3 kg)    Weight Change:  - 10% weight loss x 1-2 months. (UBW vs stated weight)    **  Will continue to monitor weight trends as available/able.     IBW: 125 pounds   %IBW: 112%

## 2023-05-18 NOTE — PROGRESS NOTE ADULT - ASSESSMENT
AMY JAIMES is a 73y Female who presents with a chief complaint of jaundice.    Suspected Malignancy  Hyperbilirubinemia  ·	Patient with imaging showing multiple liver lesions, mesenteric lymphadenopathy; suspicious for neoplasm.  ·	Tumor markers noted for elevated CEA and CA-125; suspect GI vs  origin.  ·	Liver biopsy positive for metastatic adenocarcinoma with  mucinous features  ·	MRCP findings suspicious for pancreatic adenocarcinoma with numerous hepatic metastases.  ·	Given pathology findings discussed treatment options as well as possible side effects of treatment. Patient verbalized understanding and consented to start chemotherapy while inpatient. Plan to start treatment with 5FU while inpatient   ·	Patient will need a PICC line prior to initiation of treatment.      Venous Thromboembolism  ·	Patient on heparin drip at this time.  ·	Once no further procedures are planned, transition to enoxaparin or apixaban.       Will continue to follow.    Nicky Dugan NP  Hematology/ Oncology  [facility]  [phone] (office)  [phone] (alt office)  Evenings and weekends please call MD on call or office   AMY JAIMES is a 73y Female who presents with a chief complaint of jaundice.    Suspected Malignancy  Hyperbilirubinemia  ·	Patient with imaging showing multiple liver lesions, mesenteric lymphadenopathy; suspicious for neoplasm.  ·	Tumor markers noted for elevated CEA and CA-125; suspect GI vs  origin.  ·	Liver biopsy positive for metastatic adenocarcinoma with  mucinous features  ·	MRCP findings suspicious for pancreatic adenocarcinoma with numerous hepatic metastases.  ·	Given pathology findings discussed treatment options as well as possible side effects of treatment. Patient verbalized understanding and consented to start chemotherapy while inpatient. Plan to start treatment with FOLFOX while inpatient   ·	Patient will need a PICC line prior to initiation of treatment.      Venous Thromboembolism  ·	Patient on heparin drip at this time.  ·	Once no further procedures are planned, transition to enoxaparin or apixaban.       Will continue to follow.    Nicky Dugan NP  Hematology/ Oncology  [facility]  [phone] (office)  [phone] (alt office)  Evenings and weekends please call MD on call or office

## 2023-05-18 NOTE — PROGRESS NOTE ADULT - ASSESSMENT
73 female h/o htn, chol, not on any meds, presenting with painless jaundice. found to have liver lesions, PE, portal v thrombosis  on workup including mri and mrcp found to have pancreatic mass with liver mets  s/p liver lesion biopsy    metastatic pancreatic ca  pathology from liver biopsy noted  onc f/u  plan to start chemo inpatient    hyperbilirubinemia  secondary to liver mets  d/w gi no role for ercp  d/w IR no role for stenting or drain given extensive tumor infiltration     cont iv hydration  cont ester    PE/DVT  portal v thrombosis  now on lovenox    hypothyroid  resume home synthroid dose    htn  not on meds  monitor      d/w family bedside  d/w consultants     Advanced care planning was discussed with patient and family.  Advanced care planning forms were reviewed and discussed as appropriate.  Differential diagnosis and plan of care discussed with patient after the evaluation.   Pain assessed and judicious use of narcotics when appropriate was discussed.  Importance of Fall prevention discussed.  Counseling on Smoking and Alcohol cessation was offered when appropriate.  Counseling on Diet, exercise, and medication compliance was done.       Approx 60 minutes spent.

## 2023-05-19 NOTE — CONSULT NOTE ADULT - SUBJECTIVE AND OBJECTIVE BOX
Date of Service, 05-15-23 @ 09:06  CHIEF COMPLAINT:Patient is a 73y old  Female who presents with a chief complaint of     HPI:  73 female h/o htn, chol, presenting with c/o jaundice  Family noticed increased jaundice in the last 3 days.  Patient had elevated ALT and AST in February.  Ultrasound at that time was unremarkable.  Patient reports increasing belching increasing abdominal distention and 10 to 12 pound weight loss in the last 1 to 2 months.  She denies any fever, respiratory difficulty.  She does report darker urine in the last few days.  She has past surgical history of  x3, 40 years ago.  She is non-smoker.  She does not drink alcohol.  She has an appointment with GI in 2 weeks time.       PAST MEDICAL & SURGICAL HISTORY:  Essential hypertension  Hyperlipidemia      MEDICATIONS  (STANDING):  heparin  Infusion.  Unit(s)/Hr (12 mL/Hr) IV Continuous <Continuous>    MEDICATIONS  (PRN):  heparin   Injectable 5500 Unit(s) IV Push every 6 hours PRN For aPTT less than 40  heparin   Injectable 2500 Unit(s) IV Push every 6 hours PRN For aPTT between 40 - 57      FAMILY HISTORY:      SOCIAL HISTORY:    [x ] Non-smoker  [ ] Smoker  [ ] Alcohol    Allergies    No Known Allergies    Intolerances    	    REVIEW OF SYSTEMS:  CONSTITUTIONAL: No fever, weight loss, or fatigue, +jaundice  EYES: No eye pain, visual disturbances, or discharge  ENT:  No difficulty hearing, tinnitus, vertigo; No sinus or throat pain  NECK: No pain or stiffness  RESPIRATORY: No cough, wheezing, chills or hemoptysis; No Shortness of Breath  CARDIOVASCULAR: No chest pain, palpitations, passing out, dizziness, or leg swelling  GASTROINTESTINAL: No abdominal or epigastric pain. No nausea, vomiting, or hematemesis; No diarrhea or constipation. No melena or hematochezia.  GENITOURINARY: No dysuria, frequency, hematuria, or incontinence  NEUROLOGICAL: No headaches, memory loss, loss of strength, numbness, or tremors  SKIN: No itching, burning, rashes, or lesions   LYMPH Nodes: No enlarged glands  ENDOCRINE: No heat or cold intolerance; No hair loss  MUSCULOSKELETAL: No joint pain or swelling; No muscle, back, or extremity pain  PSYCHIATRIC: No depression, anxiety, mood swings, or difficulty sleeping  HEME/LYMPH: No easy bruising, or bleeding gums  ALLERGY AND IMMUNOLOGIC: No hives or eczema	    [ ] All others negative	  [ ] Unable to obtain    PHYSICAL EXAM:  T(C): 36.8 (05-15-23 @ 08:13), Max: 37.1 (23 @ 16:03)  HR: 76 (05-15-23 @ 08:13) (76 - 107)  BP: 115/68 (05-15-23 @ 08:13) (115/68 - 153/77)  RR: 18 (05-15-23 @ 08:13) (16 - 20)  SpO2: 99% (05-15-23 @ 08:13) (96% - 99%)  Wt(kg): --  I&O's Summary      Appearance: Normal	  HEENT:   Normal oral mucosa, PERRL, EOMI	  Lymphatic: No lymphadenopathy  Cardiovascular: Normal S1 S2, No JVD,+murmurs, No edema  Respiratory: Lungs clear to auscultation	  Psychiatry: A & O x 3, Mood & affect appropriate  Gastrointestinal:  Soft, Non-tender, + BS	  Skin: No rashes, No ecchymoses, No cyanosis	  Neurologic: Non-focal  Extremities: Normal range of motion, No clubbing, cyanosis or edema  Vascular: Peripheral pulses palpable 2+ bilaterally    TELEMETRY: 	    ECG:  	  RADIOLOGY:  OTHER: 	  	  LABS:	 	    CARDIAC MARKERS:                              11.5   13.44 )-----------( 232      ( 14 May 2023 19:59 )             35.7         132<L>  |  94<L>  |  10  ----------------------------<  124<H>  3.8   |  23  |  0.68    Ca    9.3      14 May 2023 19:59    TPro  7.8  /  Alb  3.8  /  TBili  7.2<H>  /  DBili  5.3<H>  /  AST  138<H>  /  ALT  114<H>  /  AlkPhos  674<H>      proBNP:   Lipid Profile:   HgA1c:   TSH:   PT/INR - ( 14 May 2023 19:59 )   PT: 12.3 sec;   INR: 1.07 ratio         PTT - ( 14 May 2023 19:59 )  PTT:27.1 sec    PREVIOUS DIAGNOSTIC TESTING:      < from: CT Angio Chest PE Protocol w/ IV Cont (05.15.23 @ 02:08) >  Additional acute left upper lobe segmental pulmonary embolism is   visualized. No convincing evidence of right heart strain at this time.    Redemonstration of acute subsegmental right lower lobe pulmonary   embolism, similar to prior exam from same day.    No evidence of malignancy in the thorax.      < from: CT Abdomen and Pelvis w/ IV Cont (23 @ 21:59) >  Thrombosis of the main portal vein is not the portosplenic confluence.   Nonopacification of the right portal vein and splenic vein, suggestive of   occlusion.    Multiple hypodense liver lesions, with nodular liver contour and   mesenteric lymphadenopathy. Findings concerning for metastatic disease.    Filling defect within subsegmental branch of the right pulmonary artery   supplying the right lower lobe, suggestive of pulmonary embolism. CTPA   chest can be performed for further evaluation.    Poor visualization of the distal pancreatic body and tail due to   significant edematous change and ascites in this region. Underlying mass   lesion not excluded. MRCP could be considered for further   characterization.              
HPI:   Patient is a 73y female with PMH of HTN & HLD, but was otherwise in good health until a few months ago. She developed increased belching, abdominal distention with10 to 12 pound weight loss in the last 1 to 2 months. Outpatient labs in Feb 2023 had revealed an elevated ALT and AST. And now with above complaints & increasing jaundice & a dark colored urine brought to ER on 5/15/23.    On admission, she was afebrile with leukocytosis of 13.44 with a normal differential, elevated LFTS & T ridge of 7.    CT chest angio, revealed acute LETITIA segmental PE & RLL subsegmental PE. CT Abdomen and Pelvis revealed right portal vein and splenic vein thrombosis and liver lesions plus mesenteric lymphadenopathy,  concerning for metastatic disease. MRCP revealed pancreatic adenocarcinoma with numerous hepatic metastases.  Main portal vein and right portal vein thrombus, unclear if bland or tumor thrombus. S/p IR guided bx of right hepatic lobe liver lesion on 5/16/23. S/p right arm PICC placement on 5/18/23.    Given elevated tumor markers, now scheduled for chemo. ID called given leukocytosis of 11K. Her WBC was 13 K on admission, it had normalized & now up again to 11K.     REVIEW OF SYSTEMS:  All other review of systems negative (Comprehensive ROS)    PAST MEDICAL & SURGICAL HISTORY:  Essential hypertension  Hyperlipidemia      Allergies  No Known Allergies    Antimicrobials Day #  :    Other Medications:  chlorhexidine 4% Liquid 1 Application(s) Topical <User Schedule>  enoxaparin Injectable 70 milliGRAM(s) SubCutaneous every 12 hours  levothyroxine 25 MICROGram(s) Oral daily  ondansetron Injectable 4 milliGRAM(s) IV Push three times a day  pantoprazole  Injectable 40 milliGRAM(s) IV Push daily  polyethylene glycol 3350 17 Gram(s) Oral two times a day  sodium chloride 0.9% lock flush 10 milliLiter(s) IV Push every 1 hour PRN  sodium chloride 0.9%. 1000 milliLiter(s) IV Continuous <Continuous>  ursodiol Capsule 300 milliGRAM(s) Oral two times a day      FAMILY HISTORY:  No hx of CA in the family     SOCIAL HISTORY:  Smoking:  No   ETOH:  No   Drug Use:   No         T(F): 99 (05-19-23 @ 12:43), Max: 99.2 (05-18-23 @ 15:54)  HR: 91 (05-19-23 @ 12:43)  BP: 132/79 (05-19-23 @ 12:43)  RR: 18 (05-19-23 @ 12:43)  SpO2: 95% (05-19-23 @ 12:43)  Wt(kg): --    PHYSICAL EXAM:  General: alert, no acute distress  Eyes:  anicteric, no conjunctival injection, no discharge  Oropharynx: no lesions or injection 	  Neck: supple, without adenopathy  Lungs: clear to auscultation  Heart: regular rate and rhythm; no murmurs  Abdomen: soft, nondistended, nontender. Bowel sounds +  Skin: no lesions  Extremities: no clubbing, cyanosis, or edema.                   Left antecubital very small area of phlebitis - ~ 1 cm.                   Right arm PICC +  Neurologic: alert, oriented, moves all extremities    LAB RESULTS:                        10.0   11.36 )-----------( 213      ( 19 May 2023 11:36 )             32.1     05-19    135  |  103  |  7   ----------------------------<  115<H>  3.9   |  19<L>  |  0.66    Ca    8.7      19 May 2023 06:48    TPro  6.4  /  Alb  3.1<L>  /  TBili  8.0<H>  /  DBili  x   /  AST  156<H>  /  ALT  144<H>  /  AlkPhos  559<H>  05-19    LIVER FUNCTIONS - ( 19 May 2023 06:48 )  Alb: 3.1 g/dL / Pro: 6.4 g/dL / ALK PHOS: 559 U/L / ALT: 144 U/L / AST: 156 U/L / GGT: x               MICROBIOLOGY:  RECENT CULTURES:        RADIOLOGY REVIEWED:  
Chief Complaint:  Patient is a 73y old  Female who presents with a chief complaint of PE (15 May 2023 09:06)      HPI:  Ms. Haywood is a 72yo F with PMH of HTN, HLD who presents with jaundice. GI consulted for pancreatic tail mass.    Patient reports 2 months of 10lbs weight loss, abdominal pain, distention. She noticed jaundice for the past 3 days. On presentation VSS, elevated liver tests with bilirubin of 7. MRCP showing numerous liver lesions which demonstrate mild T2 hyperintense signal, diffusion restriction and hyperenhancement; findings consistent with metastases. A reference left hepatic lobe mass measures 2.5 x 1.8 cm (23, 22). No extrahepatic biliary ductal dilatation. Numerous intrahepatic biliary strictures with mild to moderate intrahepatic ductal dilatation, suspected to be due to infiltrative hepatic metastatic disease. Pancreas with distal pancreatic body and tail is suspicious for infiltrative tumor. A suspected distal pancreatic body mass measures at least 3.5 cm on DWI (26, 63). Pancreas duct is not well-visualized within the distal body/tail. A 1.5 cm loculated fluid collection adjacent to pancreatic tail.    Patient is planned for IR guided liver biopsy.      Allergies:  No Known Allergies      Home Medications:    Hospital Medications:  heparin   Injectable 5500 Unit(s) IV Push every 6 hours PRN  heparin   Injectable 2500 Unit(s) IV Push every 6 hours PRN  heparin  Infusion.  Unit(s)/Hr IV Continuous <Continuous>      PMHX/PSHX:  Essential hypertension    Hyperlipidemia        Family history:      Denies family history of colon cancer/polyps, stomach cancer/polyps, pancreatic cancer/masses, liver cancer/disease, ovarian cancer and endometrial cancer.    Social History:     Tob: Denies  EtOH: As above  Illicit Drugs: Denies    ROS:   General:  No wt loss, fevers, chills, night sweats, fatigue  Eyes:  Good vision, no reported pain  ENT:  No sore throat, pain, runny nose, dysphagia  CV:  No pain, palpitations, hypo/hypertension  Pulm:  No dyspnea, cough, tachypnea, wheezing  GI:  As per HPI  :  No pain, bleeding, incontinence, nocturia  Muscle:  No pain, weakness  Neuro:  No weakness, tingling, memory problems  Psych:  No fatigue, insomnia, mood problems, depression  Endocrine:  No polyuria, polydipsia, cold/heat intolerance  Heme:  No petechiae, ecchymosis, easy bruisability  Skin:  No rash, tattoos, scars, edema    PHYSICAL EXAM:   GENERAL:  No acute distress  HEENT:  Normocephalic/atraumatic, +scleral icterus  CHEST:  No accessory muscle use  HEART:  Regular rate and rhythm  ABDOMEN:  Soft, non-tender, non-distended  EXTREMITIES: No cyanosis, clubbing, or edema  SKIN:  No rash  NEURO:  Alert and oriented x 3, no asterixis    Vital Signs:  Vital Signs Last 24 Hrs  T(C): 36.7 (16 May 2023 09:59), Max: 37.2 (15 May 2023 16:49)  T(F): 98 (16 May 2023 09:59), Max: 98.9 (15 May 2023 16:49)  HR: 71 (16 May 2023 09:59) (71 - 88)  BP: 124/57 (16 May 2023 09:59) (103/61 - 134/74)  BP(mean): --  RR: 18 (16 May 2023 09:59) (18 - 18)  SpO2: 95% (16 May 2023 09:59) (95% - 98%)    Parameters below as of 16 May 2023 09:59  Patient On (Oxygen Delivery Method): room air      Daily Height in cm: 165.1 (15 May 2023 21:20)    Daily     LABS:                        10.3   7.57  )-----------( 185      ( 16 May 2023 07:12 )             32.8     Mean Cell Volume: 87.0 fl (-23 @ 07:12)        134<L>  |  97  |  6<L>  ----------------------------<  109<H>  3.6   |  21<L>  |  0.63    Ca    9.1      16 May 2023 07:12  Mg     2.1         TPro  6.4  /  Alb  2.9<L>  /  TBili  6.7<H>  /  DBili  x   /  AST  143<H>  /  ALT  102<H>  /  AlkPhos  575<H>      LIVER FUNCTIONS - ( 16 May 2023 07:12 )  Alb: 2.9 g/dL / Pro: 6.4 g/dL / ALK PHOS: 575 U/L / ALT: 102 U/L / AST: 143 U/L / GGT: x           PT/INR - ( 16 May 2023 07:12 )   PT: 11.6 sec;   INR: 1.00 ratio         PTT - ( 16 May 2023 07:12 )  PTT:26.7 sec  Urinalysis Basic - ( 14 May 2023 19:59 )    Color: Yellow / Appearance: Clear / S.004 / pH: x  Gluc: x / Ketone: Negative  / Bili: Negative / Urobili: Negative   Blood: x / Protein: Negative / Nitrite: Negative   Leuk Esterase: Negative / RBC: 1 /hpf / WBC 0 /HPF   Sq Epi: x / Non Sq Epi: x / Bacteria: Negative                              10.3   7.57  )-----------( 185      ( 16 May 2023 07:12 )             32.8                         10.9   9.16  )-----------( 190      ( 15 May 2023 11:55 )             34.2                         11.5   13.44 )-----------( 232      ( 14 May 2023 19:59 )             35.7       Imaging:          
Interventional Radiology    Evaluate for Procedure: Chest wall port placement     HPI: 73 female h/o htn, chol, not on any meds, presenting with painless jaundice. found to have liver lesions, PE, portal v thrombosis on workup including mri and mrcp found to have pancreatic mass with liver mets s/p liver lesion biopsy on 5/16. Patient now requiring access for chemotherapy. IR consulted for Port placement.       Allergies: No Known Allergies    Medications (Abx/Cardiac/Anticoagulation/Blood Products)  heparin  Infusion.: 1000 Unit(s)/Hr IV Continuous (05-18 @ 07:03)    Data:  165.1  69.2  T(C): 37  HR: 77  BP: 128/73  RR: 18  SpO2: 95%    -WBC 13.88 / HgB 11.0 / Hct 33.7 / Plt 241  -Na 136 / Cl 101 / BUN 4 / Glucose 103  -K 4.0 / CO2 23 / Cr 0.66  - / Alk Phos 592 / T.Bili 7.7  -INR -- / .9    Radiology:     Assessment/Plan: 73 female h/o htn, chol, not on any meds, presenting with painless jaundice. found to have liver lesions, PE, portal v thrombosis on workup including mri and mrcp found to have pancreatic mass with liver mets s/p liver lesion biopsy on 5/16. Patient now requiring access for chemotherapy. IR consulted for Port placement.     - Port placement in an outpatient setting results in a longer infection-free survival time for a wide variety of placement indications compared with placement in an inpatient setting. Recommend coordination of chest port placement as an outpatient (minimum 5 days post discharge). IR booking office [phone].  Should patient require inpatient chemotherapy, would recommend PICC line placement which may be placed at bedside.   -Above d/w primary team  -Please conact IR at [phone] with any questions/ concerns regarding above.   
CHIEF COMPLAINT  Jaundice    HISTORY OF PRESENT ILLNESS  AMY JAIMES is a 73y Female who presents with a chief complaint of jaundice.    Patient was admitted on May 15th after presenting to the Emergency Department with ongoing jaundice. Family reports that patient had lost about 10-15 pounds within the last two months. She endorses nausea, loss of appetite. She also reports that she has been becoming more jaundiced.     Imaging in the ER showed liver lesions and mesenteric lymphadenopathy; admitted for further workup    PAST MEDICAL AND SURGICAL HISTORY  Hypertension  Hyperlipidemia    FAMILY HISTORY  No family history of malignancy    SOCIAL HISTORY  Denies tobacco use    REVIEW OF SYSTEMS  A complete review of systems was performed; negative except per HPI    PHYSICAL EXAM  T(C): 37.2 (05-15-23 @ 16:49), Max: 37.2 (05-15-23 @ 16:49)  HR: 76 (05-15-23 @ 16:49) (76 - 98)  BP: 113/67 (05-15-23 @ 16:49) (113/67 - 144/72)  RR: 18 (05-15-23 @ 16:49) (17 - 20)  SpO2: 97% (05-15-23 @ 16:49) (96% - 99%)  Constitutional: alert, awake, in no acute distress  Eyes: PERRL, EOMI  HEENT: normocephalic, atraumatic  Neck: supple, non-tender  Cardiovascular: normal perfusion, no peripheral edema  Respiratory: normal respiratory efforts; no increased use of accessory muscles  Gastrointestinal: soft, non-tender  Musculoskeletal: normal range of motion, no deformities noted  Neurological: alert, CN II to XI grossly intact  Skin: warm, dry, jaundiced    LABORATORY DATA                        10.9   9.16  )-----------( 190      ( 15 May 2023 11:55 )             34.2     05-15    135  |  97  |  7   ----------------------------<  143<H>  3.5   |  24  |  0.56    Ca    9.3      15 May 2023 11:55  Mg     2.1     05-15    TPro  7.0  /  Alb  3.6  /  TBili  6.9<H>  /  DBili  x   /  AST  124<H>  /  ALT  102<H>  /  AlkPhos  625<H>  05-15    RADIOLOGY REVIEW  IMPRESSION:    Additional acute left upper lobe segmental pulmonary embolism is   visualized. No convincing evidence of right heart strain at this time.    Redemonstration of acute subsegmental right lower lobe pulmonary   embolism, similar to prior exam from same day.    No evidence of malignancy in the thorax.    IMPRESSION:  Thrombosis of the main portal vein is not the portosplenic confluence.   Nonopacification of the right portal vein and splenic vein, suggestive of   occlusion.    Multiple hypodense liver lesions, with nodular liver contour and   mesenteric lymphadenopathy. Findings concerning for metastatic disease.    Filling defect within subsegmental branch of the right pulmonary artery   supplying the right lower lobe, suggestive of pulmonary embolism. CTPA   chest can be performed for further evaluation.    Poor visualization of the distal pancreatic body and tail due to   significant edematous change and ascites in this region. Underlying mass   lesion not excluded. MRCP could be considered for further   characterization.    
Interventional Radiology  Evaluate for Procedure: Liver Biopsy    HPI: 73 female h/o htn, chol, presenting with c/o jaundice  Family noticed increased jaundice in the last 3 days.  Patient had elevated ALT and AST in February.  Ultrasound at that time was unremarkable.  Patient reports increasing belching increasing abdominal distention and 10 to 12 pound weight loss in the last 1 to 2 months.  She denies any fever, respiratory difficulty.  She does report darker urine in the last few days.  She has past surgical history of  x3, 40 years ago.  She is non-smoker.  She does not drink alcohol.  She has an appointment with GI in 2 weeks time. IR consulted for liver biopsy.     Allergies: No Known Allergies    Medications (Abx/Cardiac/Anticoagulation/Blood Products)  heparin   Injectable: 5500 Unit(s) IV Push (05-15 @ 05:01)  heparin  Infusion.: 1000 Unit(s)/Hr IV Continuous (05-15 @ 13:14)    Data:  167.6  65.7  T(C): 36.5  HR: 88  BP: 134/74  RR: 18  SpO2: 96%    -WBC 9.16 / HgB 10.9 / Hct 34.2 / Plt 190  -Na 135 / Cl 97 / BUN 7 / Glucose 143  -K 3.5 / CO2 24 / Cr 0.56  - / Alk Phos 625 / T.Bili 6.9  -INR -- / .5    Assessment/Plan: 73 female h/o htn, chol, presenting with c/o jaundice  Family noticed increased jaundice in the last 3 days.  Patient had elevated ALT and AST in February.  Ultrasound at that time was unremarkable.  Patient reports increasing belching increasing abdominal distention and 10 to 12 pound weight loss in the last 1 to 2 months.  She denies any fever, respiratory difficulty.  She does report darker urine in the last few days.  She has past surgical history of  x3, 40 years ago.  She is non-smoker.  She does not drink alcohol.  She has an appointment with GI in 2 weeks time. IR consulted for liver biopsy.     - case reviewed and approved for Tuesday,   - please place IR procedure order under LAYA Freitas  - STAT labs in AM (cbc,coags, bmp, T&S)  - hold AC  - NPO tonight at 11pm  - d/w primary team    Debi Freitas NP  Available on Teams

## 2023-05-19 NOTE — PROGRESS NOTE ADULT - ASSESSMENT
AMY JAIMES is a 73y Female who presents with a chief complaint of jaundice.    Suspected Malignancy  Hyperbilirubinemia  ·	Patient with imaging showing multiple liver lesions, mesenteric lymphadenopathy; suspicious for neoplasm.  ·	Tumor markers noted for elevated CEA and CA-125; suspect GI vs  origin.  ·	Liver biopsy positive for metastatic adenocarcinoma with  mucinous features  ·	MRCP findings suspicious for pancreatic adenocarcinoma with numerous hepatic metastases.  ·	Given pathology findings discussed treatment options as well as possible side effects of treatment. Patient verbalized understanding and consented to start chemotherapy while inpatient. Plan to start treatment with FOLFOX tomorrow 5/20  ·	s/p PICC placement      Venous Thromboembolism  ·	On Lovenox  ·	Once no further procedures are planned, transition to enoxaparin or apixaban.       Will continue to follow.    Nicky Dugan NP  Hematology/ Oncology  [facility]  [phone] (office)  [phone] (alt office)  Evenings and weekends please call MD on call or office   AMY JAIMES is a 73y Female who presents with a chief complaint of jaundice.    Suspected Malignancy  Hyperbilirubinemia  ·	Patient with imaging showing multiple liver lesions, mesenteric lymphadenopathy; suspicious for neoplasm.  ·	Tumor markers noted for elevated CEA and CA-125; suspect GI vs  origin.  ·	Liver biopsy positive for metastatic adenocarcinoma with  mucinous features  ·	MRCP findings suspicious for pancreatic adenocarcinoma with numerous hepatic metastases.  ·	Given pathology findings discussed treatment options as well as possible side effects of treatment. Patient verbalized understanding and consented to start chemotherapy while inpatient. Plan to start treatment with FOLFOX tomorrow 5/20  ·	s/p PICC placement      Venous Thromboembolism  ·	On Lovenox  ·	      Will continue to follow.    Nicky Dugan NP  Hematology/ Oncology  [facility]  [phone] (office)  [phone] (alt office)  Evenings and weekends please call MD on call or office

## 2023-05-19 NOTE — PROGRESS NOTE ADULT - ASSESSMENT
73 female h/o htn, chol, not on any meds, presenting with painless jaundice. found to have liver lesions, PE, portal v thrombosis  on workup including mri and mrcp found to have pancreatic mass with liver mets  s/p liver lesion biopsy    metastatic pancreatic ca  pathology from liver biopsy noted  onc f/u  plan to start chemo inpatient tomorrow. picc line in place   d/w oncology and [facility] onc DR. WALTER     hyperbilirubinemia  secondary to liver mets  d/w gi no role for ercp  d/w IR no role for stenting or drain given extensive tumor infiltration     cont iv hydration  cont ester    PE/DVT  portal v thrombosis  now on lovenox    hypothyroid  resume home synthroid dose    htn  not on meds  monitor    leukocytosis  low grade fevers  suspect 2/2 tumor  watch off abx  f/u ID eval    d/w family bedside  d/w consultants     Advanced care planning was discussed with patient and family.  Advanced care planning forms were reviewed and discussed as appropriate.  Differential diagnosis and plan of care discussed with patient after the evaluation.   Pain assessed and judicious use of narcotics when appropriate was discussed.  Importance of Fall prevention discussed.  Counseling on Smoking and Alcohol cessation was offered when appropriate.  Counseling on Diet, exercise, and medication compliance was done.       Approx 60 minutes spent.

## 2023-05-19 NOTE — CHART NOTE - NSCHARTNOTEFT_GEN_A_CORE
Nutrition Follow Up Note  Patient seen for: nutrition consult received  for "mets cancer requesting Ensure"    Chart reviewed, events noted. Pt is a 72 y/o F with PMH: "htn, chol, not on any meds, presenting with painless jaundice. found to have liver lesions, PE, portal v thrombosis. Pt s/p MR Abd & MRCP w/ pancreatic cancer with liver mets diagnosis; plan to start inpt chemo, s/p PICC placement ."    Source: [x] Patient       [x] EMR        [] RN        [x] Family at bedside       [] Other:    -If unable to interview patient: [] Trach/Vent/BiPAP  [] Disoriented/confused/inappropriate to interview    Diet Order:   Diet, Regular:   Kosher (05-15-23)    - Is current order appropriate/adequate? [] Yes  [x]  No: previously recommended oral nutrition supplements not ordered    - PO intake :   [] >75%  Adequate    [] 50-75%  Fair       [x] <50%  Poor    - Nutrition-related concerns:      -Pt reports decreased PO intake 2/2 nausea (not on any anti-emetic meds)      -Pt likes Ensure supplements (requesting vanilla and strawberry) and also amenable to trial of Ensure Clear      -Discussed ways to increase calorie/protein intake and PO tolerance - son requesting Smoothies; Pt consumed outside foods from home for dinner meal  per RN flowsheets      -Currently receiving IVF @ 100 mL/hr    GI: Pt reporting nausea, no vomiting. Pt c/o gas and constipation. Last BM  per RN flowsheets, Pt reports small BM today but difficult.   Bowel Regimen? [x] Yes - miralax  [] No    Weights:   Daily Weight in k.2 (, dosing wt), Weight in k.9 ()    Nutritionally Pertinent MEDICATIONS  (STANDING):  levothyroxine  pantoprazole  Injectable  polyethylene glycol 3350  polyethylene glycol 3350  sodium chloride 0.9%.  ursodiol Capsule    Pertinent Labs:  @ 06:48: Na 135, BUN 7, Cr 0.66, <H>, K+ 3.9, Phos --, Mg --, Alk Phos 559<H>, ALT/SGPT 144<H>, AST/SGOT 156<H>, HbA1c --  A1C with Estimated Average Glucose Result: 5.3 % (23 @ 06:14)    Skin per nursing documentation: no pressure injuries documented  Edema: 2+ b/l feet edema per RN flowsheets     Estimated Needs: based on IBW 56.6 kg  Estimated Energy Needs: 4531-2235 kcal/day (30-35 kcal/kg)  Estimated Protein Needs: 67.92-79.24 g Pro/day (1.2-1.4 g Pro/kg)  Estimated Fluid Needs: 9600-6114 mL/day (30-35 mL/kg)  [x] no change since previous assessment  [] recalculated:     Previous Nutrition Diagnosis: Severe malnutrition of chronic disease - addressed with diet/supplements/food preferences  Nutrition Diagnosis is: [x] ongoing  [] resolved [] not applicable     New Nutrition Diagnosis: [x] Not applicable    Nutrition Care Plan:  [x] In Progress  [] Achieved  [] Not applicable    Nutrition Interventions:     Education Provided:       [x] Yes: adequate calorie/protein intake + oral nutrition supplements [] No:        Recommendations:         [x] Continue current diet order: Regular, Kosher            [x] Add oral nutrition supplement: Ensure Plus High Protein 2x/day 1 strawberry, 1 vanilla (350 kcal, 20 g Pro/8oz) + Ensure Clear 1x/day berry (240 kcal, 8 g Pro/8oz)     [] Discontinue current diet order. Recommend:      [x] Add micronutrient supplementation: MVI daily if not contraindicated     [] Continue current micronutrient supplementation:      [x] Other: RD to add smoothie of the day to maximize calorie/protein intake; recommend more aggressive bowel regimen 2/2 constipation and anti-emetic PRN    Monitoring and Evaluation:   Continue to monitor nutritional intake, tolerance to diet prescription, weights, labs, skin integrity    RD remains available upon request and will follow up per protocol  Kimi Pan MPH, RD, CDN - TEAMS/Pager# [phone] Nutrition Follow Up Note  Patient seen for: nutrition consult received  for "mets cancer requesting Ensure"    Chart reviewed, events noted. Pt is a 74 y/o F with PMH: "htn, chol, not on any meds, presenting with painless jaundice. found to have liver lesions, PE, portal v thrombosis. Pt s/p MR Abd & MRCP w/ pancreatic cancer with liver mets diagnosis; plan to start inpt chemo, s/p PICC placement ."    Source: [x] Patient       [x] EMR        [] RN        [x] Family at bedside       [] Other:    -If unable to interview patient: [] Trach/Vent/BiPAP  [] Disoriented/confused/inappropriate to interview    Diet Order:   Diet, Regular:   Kosher (05-15-23)    - Is current order appropriate/adequate? [] Yes  [x]  No: previously recommended oral nutrition supplements not ordered    - PO intake :   [] >75%  Adequate    [] 50-75%  Fair       [x] <50%  Poor    - Nutrition-related concerns:      -Pt reports decreased PO intake 2/2 nausea (not on any anti-emetic meds)      -Pt likes Ensure supplements (requesting vanilla and strawberry) and also amenable to trial of Ensure Clear      -Discussed ways to increase calorie/protein intake and PO tolerance - son requesting Smoothies; Pt consumed outside foods from home for dinner meal  per RN flowsheets      -Currently receiving IVF @ 100 mL/hr    GI: Pt reporting nausea, no vomiting. Pt c/o gas and constipation. Last BM  per RN flowsheets, Pt reports small BM today but difficult.   Bowel Regimen? [x] Yes - miralax  [] No    Weights:   Daily Weight in k.2 (, dosing wt), Weight in k.9 () *question accuracy of wts given large change x 1 day, anticipate some wt changes 2/2 edema    Nutritionally Pertinent MEDICATIONS  (STANDING):  levothyroxine  pantoprazole  Injectable  polyethylene glycol 3350  polyethylene glycol 3350  sodium chloride 0.9%.  ursodiol Capsule    Pertinent Labs:  @ 06:48: Na 135, BUN 7, Cr 0.66, <H>, K+ 3.9, Phos --, Mg --, Alk Phos 559<H>, ALT/SGPT 144<H>, AST/SGOT 156<H>, HbA1c --  A1C with Estimated Average Glucose Result: 5.3 % (23 @ 06:14)    Skin per nursing documentation: no pressure injuries documented  Edema: 2+ b/l feet edema per RN flowsheets     Estimated Needs: based on IBW 56.6 kg  Estimated Energy Needs: 7936-5330 kcal/day (30-35 kcal/kg)  Estimated Protein Needs: 67.92-79.24 g Pro/day (1.2-1.4 g Pro/kg)  Estimated Fluid Needs: 3031-5147 mL/day (30-35 mL/kg)  [x] no change since previous assessment  [] recalculated:     Previous Nutrition Diagnosis: Severe malnutrition of chronic disease - addressed with diet/supplements/food preferences  Nutrition Diagnosis is: [x] ongoing  [] resolved [] not applicable     New Nutrition Diagnosis: [x] Not applicable    Nutrition Care Plan:  [x] In Progress  [] Achieved  [] Not applicable    Nutrition Interventions:     Education Provided:       [x] Yes: adequate calorie/protein intake + oral nutrition supplements [] No:        Recommendations:         [x] Continue current diet order: Regular, Kosher            [x] Add oral nutrition supplement: Ensure Plus High Protein 2x/day 1 strawberry, 1 vanilla (350 kcal, 20 g Pro/8oz) + Ensure Clear 1x/day berry (240 kcal, 8 g Pro/8oz)     [] Discontinue current diet order. Recommend:      [x] Add micronutrient supplementation: MVI daily if not contraindicated     [] Continue current micronutrient supplementation:      [x] Other: RD to add smoothie of the day to maximize calorie/protein intake; recommend more aggressive bowel regimen 2/2 constipation and anti-emetic PRN; obtain standing wt as able to verify wt status    Monitoring and Evaluation:   Continue to monitor nutritional intake, tolerance to diet prescription, weights, labs, skin integrity    RD remains available upon request and will follow up per protocol  Kimi Pan MPH, RD, CDN - TEAMS/Pager# [phone]

## 2023-05-19 NOTE — PROGRESS NOTE ADULT - NS ATTEND AMEND GEN_ALL_CORE FT
plan for FOLFOX tomorrow. Consent obtained by Dr Haynes. cont to monitor cmp and CBC.   pt to follow up with Dr Camp for [facility] at Discharge
As above.    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 No interventions per advanced GI.    Given likely upcoming visceral crisis, plan to start inpatient chemotherapy with FOLFOX. Consented. Planned to start in two days. Will need central line/PICC placement.     Outpatient will need further testing to confirm origin, including CancerTypeID, endoscopy/colonoscopy.

## 2023-05-19 NOTE — CONSULT NOTE ADULT - ASSESSMENT
AMY JAIMES is a 73y Female who presents with a chief complaint of jaundice.    Suspected Malignancy  Hyperbilirubinemia  ·	Patient with imaging showing multiple liver lesions, mesenteric lymphadenopathy; suspicious for neoplasm.  ·	Tumor markers noted for elevated CEA and CA-125; suspect GI vs  origin.  ·	Plan for liver biopsy tomorrow with interventional radiology.  ·	I discussed case with the patient's family; will need further MRI as well as biopsy results before further management planning.  ·	Follow-up abdominal MRI/MRCP for better visualization of abdominal organs as well as rule out obstructive jaundice.  ·	Gastroenterology evaluation after MRI/MRCP for possible interventions, as well as EGD/colonoscopy. Patient last had those procedures four years ago and they were reportedly normal.    Venous Thromboembolism  ·	Patient on heparin drip at this time.  ·	Once no further procedures are planned, transition to enoxaparin or apixaban.     Case discussed with medicine.    Will continue to follow.    Malik Haynes MD  Hematology/Oncology  O: [phone]
73 female h/o htn, chol, presenting with c/o jaundice  Family noticed increased jaundice in the last 3 days.  Patient had elevated ALT and AST in February.  Ultrasound at that time was unremarkable.  Patient reports increasing belching increasing abdominal distention and 10 to 12 pound weight loss in the last 1 to 2 months.  She denies any fever, respiratory difficulty.  She does report darker urine in the last few days.  She has past surgical history of  x3, 40 years ago.  She is non-smoker.  She does not drink alcohol.  She has an appointment with GI in 2 weeks time.   pt with acute PE and thrombosis on iv heparin  no r heart strain on ct, check echo today  MRCP  onc eval  GI called, will call the family  
72yo F with PMH of HTN, HLD who presents with jaundice. GI consulted for pancreatic tail mass.    # Jaundice - in the setting of hepatic metastases with associated intrahepatic biliary strictures, not amendable to ERCP and stenting.  # Pancreatic tail mass- likely the origin of this metastatic disease. Agree with IR biopsy of the liver for tissue biopsy. GI available for EUS if results unrevealing or if more data is needed.    Recommendations:  - Agree with IR biospy of liver metastases  - Please call back if EUS is needed  - No role for ERCP given multiple diffuse intrahepatic strictures with no dominant stricture  - Trend CMP, CBC, coag  - Can start ursodiol  - Heme/onc followup    Please note that the recommendations are not final until attested by an attending.    Thank you for involving us in the care of this patient. Please reach out if any further questions.    Russ Richter, PGY-6  Gastroenterology/Hepatology Fellow    Available on [employer] Teams  After 5PM/Weekends, please contact the on-call GI fellow: [phone]  
73y female with PMH of HTN & HLD, but was otherwise in good health until a few months ago. She developed increased belching, abdominal distention with 10 to 12 pound weight loss in the last 1 to 2 months. Outpatient labs in Feb 2023 had revealed an elevated ALT and AST. And now with above complaints & increasing jaundice & a dark colored urine brought to ER on 5/15/23.    On admission, she was afebrile with leukocytosis of 13.44 with a normal differential, elevated LFTs & T ridge of 7.  CT chest angio, revealed acute LETITIA segmental PE & RLL subsegmental PE.   CT Abd & Pelvis revealed right portal vein and splenic vein thrombosis and liver lesions plus mesenteric lymphadenopathy, concerning for metastatic disease.   MRCP revealed pancreatic adenocarcinoma with numerous hepatic metastases.  Main portal vein and right portal vein thrombus, unclear if bland or tumor thrombus.   S/p IR guided bx of right hepatic lobe liver lesion on 5/16/23.   S/p right arm PICC placement on 5/18/23.    Hem/onc input noted - "Tumor markers noted for elevated CEA and CA-125; suspect GI vs  origin. Liver biopsy positive for metastatic adenocarcinoma with mucinous features. Plan is start chemo on 5/20/23.  ID called given leukocytosis of 11K. Her WBC was 13 K on admission, it had normalized & now up again to 11K.   Exam reveals a small area of left antecubital phlebitis, not tender, not fluctuant & no warmth or purulence    PLAN:  Recommend warm compress to left antecubital fossa   Monitor the area, if it starts worsening, will use doxycycline 100 mg BID. In case fevers develop, we should draw blood cx as well.   Leukocytosis is unreliable as a marker for any infection in the setting of untreated malignancy & thrombosis   Case d/w Dr Alen Vyas

## 2023-05-20 NOTE — PROGRESS NOTE ADULT - ASSESSMENT
73y female with PMH of HTN & HLD, but was otherwise in good health until a few months ago. She developed increased belching, abdominal distention with 10 to 12 pound weight loss in the last 1 to 2 months. Outpatient labs in Feb 2023 had revealed an elevated ALT and AST. And now with above complaints & increasing jaundice & a dark colored urine brought to ER on 5/15/23.    On admission, she was afebrile with leukocytosis of 13.44 with a normal differential, elevated LFTs & T ridge of 7.  CT chest angio, revealed acute LETITIA segmental PE & RLL subsegmental PE.   CT Abd & Pelvis revealed right portal vein and splenic vein thrombosis and liver lesions plus mesenteric lymphadenopathy, concerning for metastatic disease.   MRCP revealed pancreatic adenocarcinoma with numerous hepatic metastases.  Main portal vein and right portal vein thrombus, unclear if bland or tumor thrombus.   S/p IR guided bx of right hepatic lobe liver lesion on 5/16/23.   S/p right arm PICC placement on 5/18/23.    Hem/onc input noted - "Tumor markers noted for elevated CEA and CA-125; suspect GI vs  origin. Liver biopsy positive for metastatic adenocarcinoma with mucinous features. Plan is start chemo on 5/20/23.  ID called given leukocytosis of 11K. Her WBC was 13 K on admission, it had normalized & now up again to 10.89K.   Exam reveals a small area of left antecubital phlebitis, not tender, not fluctuant & no warmth or purulence    PLAN:  Recommend warm compress to left antecubital fossa   Monitor the area clinically for any signs of worsening.  In case she develops fevers, we should draw blood cx and start Doxycycline 100 mg PO BID   Leukocytosis is unreliable as a marker for any infection in the setting of untreated malignancy & thrombosis   Case was d/w Dr Alen Vyas

## 2023-05-20 NOTE — PROGRESS NOTE ADULT - ASSESSMENT
AMY JAIMES is a 73y Female who presents with a chief complaint of jaundice    Metastatic Pancreatic Cancer  ·	Patient presents with imaging showing multiple liver lesion, pancreatic tail lesion, mesenteric lymphadenopathy. Liver biopsy positive for adenocarcinoma, likely pancreatic vs biliary origin.  ·	Tumor markers elevated for CEA and CA-125.  ·	Patient with elevated bilirubin due to intrahepatic stenosis and multiple liver tumors. No interventions via ERCP per gastroenterology.  ·	Patient to start inpatient FOLFOX today, cycle 1, for impending visceral crisis.  ·	On discharge, patient to follow-up at [facility].    Venous Thromboembolism  ·	Patient is currently on enoxaparin.  ·	Monitor for any bleeding.     Anemia  ·	In the setting of malignancy  ·	Maintain HGB > 7    Will continue to follow.    Malik Haynes MD  Hematology/Oncology  O: [phone]

## 2023-05-21 NOTE — ADVANCED PRACTICE NURSE CONSULT - ASSESSMENT
PICC Line Insertion Note  Patient or patient representative educated about central line associated blood stream infection prevention practices.  Catheter type: 5 F,  DL Picc  : Bard  Power injectable: Yes  LOT# [id number]    Informed consent obtained by covering floor team.  Procedure assisted by: MAHAMED Brown RN  Time out was preformed, confirming the patient's first and last name, date of birth, procedure, and correct site prior to start of procedure.    Patient was placed with HOB 30 degrees. Patient placement site was prepped with chlorhexidine solution, then draped using maximum sterile barrier protection. The area was injected with 2 ml of 1% lidocaine. Using the Bard Site Rite 8, the catheter was placed using the Modified Seldinger Technique. Strict adherence to outline aseptic technique including handwashing, glove and gown, utilizaing mask and cap, plus draping the patient with a sterile drape was observed. Upon completion of line placement, the insertion site was covered with a sterile CHG dressing. Pt tolerated procedure well.   Pre procedure v/s: /63, HR 96, TEMP 98.2, O2SAT 96%  Post procedure v/s: /59, HR 91, TEMP 98.2, O2SAT 95%    All materials used for catheter insertion, including the intact guide wires, were accounted for at the end of the procedure.  Number of attempts: 1  Complications/Comments: None    Emergency Placement: No  Site: New  Anatomical Site of insertion: Right Basilic  Catheter size/length: 5F, 35cm  US guided Bard Double lumen power picc placed    Post procedure verification with chest Xray as per orders.  
Patient received in bed, alert and oriented x 3, capable of expressing all needs to staff. Cycle 1, day 2/2,Height and weight verified. Consent in chart. Lab results as per Md srini aware of same. Vital signs stable prior to chemotherapy and within acceptable parameters, see sunrise. Pt educated on the importance of saving urine, verbalizes good understanding. Pt education done re chemo regimen, drug effects and potential side effects, written materials provided, cold sensitivity pt states understanding. Pt with right DL PICC line, site free from signs and symptoms of infection, good blood return obtained. Pre- medicated with Zofran 4mg IVP. Fluorouracil 1200mg/m2 = 2100mg IV to infused at a rate of 24cc/hr via locked pump over 23hours, started at 1340. Pt tolerating well, no adverse reaction noted. Report given to area nurse.   
Patient received in bed, alert and oriented x 3, capable of expressing all needs to staff. Cycle 1, day 1/2,Height and weight verified. Consent in chart. Lab results as per Md srini aware of same. Vital signs stable prior to chemotherapy and within acceptable parameters, see sunrise. Pt educated on the importance of saving urine, verbalizes good understanding. Pt education done re chemo regimen, drug effects and potential side effects, written materials provided, cold sensitivity pt states understanding. Pt with right DL PICC line, site free from signs and symptoms of infection, good blood return obtained. Pre- medicated with Emend 150mg; Zofran 8mg IVP; Decadron 10mg IV. Oxaliplatin 85mg/m2 = 150mg IV infused over 2 hours via locked pump. Leucovorin 400mg/m2 = 700mg IV infused over 2 hours via locked pump. Oxaliplatin and Leucovorin infused concurrently @ 1215. Fluorouracil 400mg/m2 = 700mg bolus given IVP via lowest port of D5w free flowing IV over 5min at 1430.  Fluorouracil 1200mg/m2 = 2100mg IV to infused at a rate of 24cc/hr via locked pump over 23hours, started at 1440. Pt tolerating well, no adverse reaction noted. Report given to area nurse.

## 2023-05-21 NOTE — PROGRESS NOTE ADULT - ASSESSMENT
73y female with PMH of HTN & HLD, but was otherwise in good health until a few months ago. She developed increased belching, abdominal distention with 10 to 12 pound weight loss in the last 1 to 2 months. Outpatient labs in Feb 2023 had revealed an elevated ALT and AST. And now with above complaints & increasing jaundice & a dark colored urine brought to ER on 5/15/23.    On admission, she was afebrile with leukocytosis of 13.44 with a normal differential, elevated LFTs & T ridge of 7.  CT chest angio, revealed acute LETITIA segmental PE & RLL subsegmental PE.   CT Abd & Pelvis revealed right portal vein and splenic vein thrombosis and liver lesions plus mesenteric lymphadenopathy, concerning for metastatic disease.   MRCP revealed pancreatic adenocarcinoma with numerous hepatic metastases.  Main portal vein and right portal vein thrombus, unclear if bland or tumor thrombus.   S/p IR guided bx of right hepatic lobe liver lesion on 5/16/23.   S/p right arm PICC placement on 5/18/23.    Hem/onc input noted - "Tumor markers noted for elevated CEA and CA-125; suspect GI vs  origin. Liver biopsy positive for metastatic adenocarcinoma with mucinous features. Plan is start chemo on 5/20/23.  ID called given leukocytosis of 11K. Her WBC was 13 K on admission, it had normalized & then increased to 10.89K.   Leukocytosis is unreliable as a marker for any infection in the setting of untreated malignancy & thrombosis   Exam revealed a small area of left antecubital phlebitis, not tender, not fluctuant & no warmth or purulence - this is improving w warm compresses.   Chemo started on 5/20/23    PLAN:  Monitor the left antecubital area clinically for any signs of worsening, it appears to be improving as was expected  In case she develops fevers, we should draw blood cx x 2

## 2023-05-21 NOTE — ADVANCED PRACTICE NURSE CONSULT - RECOMMEDATIONS
Post procedure verbal instructions given to the patient or patient representative. Patient or patient representative  instructed regarding signs and symptoms of infection. Patient instructed to keep dressing dry and clean. Care for catheter as per hospital protocols  
Strict I &O's  Monitor labs  Encourage Fluids 
Strict I &O's  Monitor labs  Encourage Fluids

## 2023-05-21 NOTE — PROGRESS NOTE ADULT - ASSESSMENT
73 female h/o htn, chol, not on any meds, presenting with painless jaundice. found to have liver lesions, PE, portal v thrombosis  on workup including mri and mrcp found to have pancreatic mass with liver mets  s/p liver lesion biopsy    metastatic pancreatic ca  pathology from liver biopsy noted  onc f/u apprec  pt started on chemo 5/20 via picc line. tolerating well  monitor cbc, cmp   d/w oncology and [facility] onc DR. WALTER     hyperbilirubinemia  secondary to liver mets  d/w gi no role for ercp  d/w IR no role for stenting or drain given extensive tumor infiltration     cont iv hydration - lower rate given edema   cont ester    PE/DVT  portal v thrombosis  now on lovenox  monitor for bleeding     edema  decrease ivf rate  lasix iv as ordered  gentle diuresis     anemia  2/2 malignancy  monitor  transfuse goal hgb >7    hypothyroid  resume home synthroid dose    htn  not on meds  monitor    leukocytosis  low grade fevers  suspect 2/2 tumor  watch off abx  ID eval apprec  if spikes fever, will check cultures and start empiric abx     c/o sob  f/u cxr  suspect pulm congestion  gentle diuresis    d/w family bedside  d/w consultants     Advanced care planning was discussed with patient and family.  Advanced care planning forms were reviewed and discussed as appropriate.  Differential diagnosis and plan of care discussed with patient after the evaluation.   Pain assessed and judicious use of narcotics when appropriate was discussed.  Importance of Fall prevention discussed.  Counseling on Smoking and Alcohol cessation was offered when appropriate.  Counseling on Diet, exercise, and medication compliance was done.       Approx 60 minutes spent.

## 2023-05-21 NOTE — PROGRESS NOTE ADULT - ASSESSMENT
AMY JAIMES is a 73y Female who presents with a chief complaint of jaundice    Metastatic Pancreatic Cancer  ·	Patient presents with imaging showing multiple liver lesion, pancreatic tail lesion, mesenteric lymphadenopathy. Liver biopsy positive for adenocarcinoma, likely pancreatic vs biliary origin.  ·	Tumor markers elevated for CEA and CA-125.  ·	Patient with elevated bilirubin due to intrahepatic stenosis and multiple liver tumors. No interventions via ERCP per gastroenterology.  ·	Patient is currently on FOLFOX, C1D2, for impending visceral crisis.  ·	No contraindication from oncological perspective for discharge after FOLFOX is finished.   ·	Continue to monitor for side effects from FOLFOX.  ·	On discharge, patient to follow-up at [facility].    Venous Thromboembolism  ·	Patient is currently on enoxaparin.  ·	Monitor for any bleeding.     Anemia  ·	In the setting of malignancy  ·	Maintain HGB > 7    Will continue to follow.    Malik Haynes MD  Hematology/Oncology  O: [phone]

## 2023-05-22 NOTE — DISCHARGE NOTE PROVIDER - NSDCCPCAREPLAN_GEN_ALL_CORE_FT
PRINCIPAL DISCHARGE DIAGNOSIS  Diagnosis: Malignant tumor of pancreas  Assessment and Plan of Treatment: Pt found to have metastatic adenocarinoma with mucinous features on liver biopsy. Pt had PICC line placed and began chemo inpatient, with plans to continue treatment at [facility]. Appt at [facility] on 5/24      SECONDARY DISCHARGE DIAGNOSES  Diagnosis: Painless jaundice  Assessment and Plan of Treatment: Symptoms due to malignant pancreatic tumor    Diagnosis: Pulmonary embolism  Assessment and Plan of Treatment: Pt found to have pulmonary embolism and was on heparin drip. Pt was then transitioned to lovenox and will continue this outpatient.     PRINCIPAL DISCHARGE DIAGNOSIS  Diagnosis: Malignant tumor of pancreas  Assessment and Plan of Treatment: Pt found to have metastatic adenocarinoma with mucinous features on liver biopsy. Pt had PICC line placed and began chemo inpatient, with plans to continue treatment at [facility] [place]. Appt at [facility] on 5/24      SECONDARY DISCHARGE DIAGNOSES  Diagnosis: Painless jaundice  Assessment and Plan of Treatment: Symptoms due to malignant pancreatic tumor    Diagnosis: Pulmonary embolism  Assessment and Plan of Treatment: Pt found to have pulmonary embolism and was on heparin drip. Pt was then transitioned to lovenox and will continue this outpatient.       PRINCIPAL DISCHARGE DIAGNOSIS  Diagnosis: Malignant tumor of pancreas  Assessment and Plan of Treatment: Pt found to have metastatic adenocarinoma with mucinous features on liver biopsy. Pt had PICC line placed and began chemo inpatient, with plans to continue treatment at [facility]. Appt at [facility] on 5/24.      SECONDARY DISCHARGE DIAGNOSES  Diagnosis: Painless jaundice  Assessment and Plan of Treatment: Symptoms due to malignant pancreatic tumor. Pt had MRI and MRCP done showing positive bx for pancreatic cancer with mets to liver.    Diagnosis: Pulmonary embolism  Assessment and Plan of Treatment: Pt found to have pulmonary embolism and was on heparin drip. Pt was then transitioned to lovenox and will continue this outpatient. CT angio chest revealed acute LETITIA segmental PE & RLL subsegmental PE.        PRINCIPAL DISCHARGE DIAGNOSIS  Diagnosis: Malignant tumor of pancreas  Assessment and Plan of Treatment: Pt found to have metastatic adenocarinoma with mucinous features on liver biopsy. Pt had PICC line placed and began chemo inpatient, with plans to continue treatment at [facility] [place] [facility]. Appt at [facility] on 5/24.      SECONDARY DISCHARGE DIAGNOSES  Diagnosis: Painless jaundice  Assessment and Plan of Treatment: Symptoms due to malignant pancreatic tumor. Pt had MRI and MRCP done showing positive bx for pancreatic cancer with mets to liver.    Diagnosis: Portal vein thrombosis  Assessment and Plan of Treatment: Pt found to have portal vein thrombosis on imaging. CT Abd & Pelvis revealed right portal vein and splenic vein thrombosis and liver lesions plus mesenteric lymphadenopathy which were concerning for metastatic disease.    Diagnosis: Hyponatremia  Assessment and Plan of Treatment: Pt sodium 130, improved to 132.  HOME CARE INSTRUCTIONS  Only take medicines as directed by your caregiver. Many medicines can make hyponatremia worse. Discuss all your medicines with your caregiver.  Carefully follow any recommended diet, including any fluid restrictions.  You may be asked to repeat lab tests. Follow these directions.  Avoid alcohol and recreational drugs.  SEEK MEDICAL CARE IF:  You develop worsening nausea, fatigue, headache, confusion, or weakness.  Your original hyponatremia symptoms return.  You have problems following the recommended diet.   SEEK IMMEDIATE MEDICAL CARE IF:  You have a seizure.  You faint.  You have ongoing diarrhea or vomiting      Diagnosis: Pulmonary embolism  Assessment and Plan of Treatment: Pt found to have pulmonary embolism and was on heparin drip. Pt was then transitioned to lovenox and will continue this outpatient. CT angio chest revealed acute LETITIA segmental PE & RLL subsegmental PE.   Take your anticoagulation (lovenox) as directed.  Follow up with your health care provider within one week. Call for appointment.  If you develop shortness of breath or if your shortness of breath worsens call your Health Care Provider or go to the Emergency Department.

## 2023-05-22 NOTE — DISCHARGE NOTE PROVIDER - NSDCFUADDAPPT_GEN_ALL_CORE_FT
APPTS ARE READY TO BE MADE: [ ] YES    Best Family or Patient Contact (if needed):    Additional Information about above appointments (if needed):    1:   2:   3:     Other comments or requests:    APPTS ARE READY TO BE MADE: [x] YES    Best Family or Patient Contact (if needed):    Additional Information about above appointments (if needed):    1:   2:   3:     Other comments or requests:   Pt has appt at [facility] Wednesday 5/24 APPTS ARE READY TO BE MADE: [x] YES    Best Family or Patient Contact (if needed):    Additional Information about above appointments (if needed):    1:   2:   3:     Other comments or requests:   Pt has appt at [facility] Wednesday 5/24    Patient was provided with follow up request details and was advised to call to schedule follow up within specified time frame.

## 2023-05-22 NOTE — DISCHARGE NOTE PROVIDER - NSRESEARCHGRANT_OVERRIDEREC_GEN_A_CORE
Active cancer (i.e. undergoing acute, in-hospital cancer treatment)/IMPROVE-DD Application Not Available IMPROVE-DD Application Not Available

## 2023-05-22 NOTE — DISCHARGE NOTE NURSING/CASE MANAGEMENT/SOCIAL WORK - PATIENT PORTAL LINK FT
You can access the FollowMyHealth Patient Portal offered by [facility] by registering at the following website: [email or web address]. By joining [facility]’s FollowMyHealth portal, you will also be able to view your health information using other applications (apps) compatible with our system.

## 2023-05-22 NOTE — PROGRESS NOTE ADULT - SUBJECTIVE AND OBJECTIVE BOX
CC: f/u for leukocytosis    Patient reports nothing     REVIEW OF SYSTEMS:  unobtainable - somnolent this AM    Antimicrobials Day #  :    Other Medications Reviewed    T(F): 99 (05-20-23 @ 08:29), Max: 99.4 (05-20-23 @ 00:30)  HR: 90 (05-20-23 @ 08:29)  BP: 113/70 (05-20-23 @ 08:29)  RR: 18 (05-20-23 @ 08:29)  SpO2: 96% (05-20-23 @ 08:29)  Wt(kg): --    PHYSICAL EXAM:  General: alert, no acute distress  Eyes:  anicteric, no conjunctival injection, no discharge  Neck: supple  Lungs: clear to auscultation  Heart: regular rate and rhythm; no murmurs  Abdomen: soft, nondistended, nontender. Bowel sounds +  Skin: no lesions  Extremities: no edema.                   Left antecubital very small area of phlebitis - ~ 1 cm. No fluctuance, no warm or TTP                    Right arm PICC +  Neurologic: somnolent      LAB RESULTS:                        9.7    10.89 )-----------( 216      ( 20 May 2023 06:47 )             29.7     05-20    135  |  101  |  5<L>  ----------------------------<  97  3.7   |  21<L>  |  0.55    Ca    8.5      20 May 2023 06:46    TPro  5.8<L>  /  Alb  2.7<L>  /  TBili  7.6<H>  /  DBili  x   /  AST  136<H>  /  ALT  118<H>  /  AlkPhos  493<H>  05-20    LIVER FUNCTIONS - ( 20 May 2023 06:46 )  Alb: 2.7 g/dL / Pro: 5.8 g/dL / ALK PHOS: 493 U/L / ALT: 118 U/L / AST: 136 U/L / GGT: x             MICROBIOLOGY:  RECENT CULTURES:            RADIOLOGY REVIEWED:    
CHIEF COMPLAINT  Jaundice    HISTORY OF PRESENT ILLNESS  AMY JAIMES is a 73y Female who presents with a chief complaint of jaundice    No acute events. No complaints.    REVIEW OF SYSTEMS  A complete review of systems was performed; negative except per HPI    PHYSICAL EXAM  T(C): 37.2 (05-20-23 @ 08:29), Max: 37.4 (05-20-23 @ 00:30)  HR: 90 (05-20-23 @ 08:29) (90 - 107)  BP: 113/70 (05-20-23 @ 08:29) (113/70 - 142/84)  RR: 18 (05-20-23 @ 08:29) (16 - 18)  SpO2: 96% (05-20-23 @ 08:29) (95% - 99%)  Constitutional: alert, awake, in no acute distress  Eyes: PERRL, EOMI  HEENT: normocephalic, atraumatic  Neck: supple, non-tender  Cardiovascular: normal perfusion, tachycardic  Respiratory: normal respiratory efforts; no increased use of accessory muscles  Gastrointestinal: soft, non-tender  Musculoskeletal: normal range of motion, no deformities noted  Neurological: alert, CN II to XI grossly intact  Skin: warm, dry, jaundiced    LABORATORY DATA                        9.7    10.89 )-----------( 216      ( 20 May 2023 06:47 )             29.7     05-20    135  |  101  |  5<L>  ----------------------------<  97  3.7   |  21<L>  |  0.55    Ca    8.5      20 May 2023 06:46    TPro  5.8<L>  /  Alb  2.7<L>  /  TBili  7.6<H>  /  DBili  x   /  AST  136<H>  /  ALT  118<H>  /  AlkPhos  493<H>  05-20    
Date of Service: 23 @ 08:49           CARDIOLOGY     PROGRESS  NOTE   ________________________________________________    CHIEF COMPLAINT:Patient is a 73y old  Female who presents with a chief complaint of Per chart, "73 female h/o htn, chol, presenting with c/o jaundice  Family noticed increased jaundice in the last 3 days.  Patient had elevated ALT and AST in February.  Ultrasound at that time was unremarkable.  Patient reports increasing belching increasing abdominal distention and 10 to 12 pound weight loss in the last 1 to 2 months.  She denies any fever, respiratory difficulty.  She does report darker urine in the last few days.  She has past surgical history of  x3, 40 years ago.  She is non-smoker.  She does not drink alcohol.  She has an appointment with GI in 2 weeks time."      	  REVIEW OF SYSTEMS:  CONSTITUTIONAL: No fever, weight loss, or fatigue  EYES: No eye pain, visual disturbances, or discharge  ENT:  No difficulty hearing, tinnitus, vertigo; No sinus or throat pain  NECK: No pain or stiffness  RESPIRATORY: No cough, wheezing, chills or hemoptysis; No Shortness of Breath  CARDIOVASCULAR: No chest pain, palpitations, passing out, dizziness, or leg swelling  GASTROINTESTINAL: No abdominal or epigastric pain. No nausea, vomiting, or hematemesis; No diarrhea or constipation. No melena or hematochezia.  GENITOURINARY: No dysuria, frequency, hematuria, or incontinence  NEUROLOGICAL: No headaches, memory loss, loss of strength, numbness, or tremors  SKIN: No itching, burning, rashes, or lesions   LYMPH Nodes: No enlarged glands  ENDOCRINE: No heat or cold intolerance; No hair loss  MUSCULOSKELETAL: No joint pain or swelling; No muscle, back, or extremity pain  PSYCHIATRIC: No depression, anxiety, mood swings, or difficulty sleeping  HEME/LYMPH: No easy bruising, or bleeding gums  ALLERGY AND IMMUNOLOGIC: No hives or eczema	    [x ] All others negative	  [ ] Unable to obtain    PHYSICAL EXAM:  T(C): 36.7 (23 @ 00:11), Max: 36.9 (23 @ 08:55)  HR: 86 (23 @ 00:11) (86 - 100)  BP: 108/66 (23 @ 00:11) (102/51 - 148/77)  RR: 17 (23 @ 00:11) (17 - 17)  SpO2: 95% (23 @ 00:11) (95% - 95%)  Wt(kg): --  I&O's Summary    21 May 2023 07:01  -  22 May 2023 07:00  --------------------------------------------------------  IN: 1250 mL / OUT: 2000 mL / NET: -750 mL        Appearance: Normal	  HEENT:   Normal oral mucosa, PERRL, EOMI	  Lymphatic: No lymphadenopathy  Cardiovascular: Normal S1 S2, No JVD, + murmurs, No edema  Respiratory:rhonchi  Psychiatry: A & O x 3, Mood & affect appropriate  Gastrointestinal:  Soft, Non-tender, + BS	  Skin: No rashes, No ecchymoses, No cyanosis	  Neurologic: Non-focal  Extremities: Normal range of motion, No clubbing, cyanosis or edema  Vascular: Peripheral pulses palpable 2+ bilaterally    MEDICATIONS  (STANDING):  bacitracin   Ointment 1 Application(s) Topical two times a day  chlorhexidine 4% Liquid 1 Application(s) Topical <User Schedule>  enoxaparin Injectable 70 milliGRAM(s) SubCutaneous every 12 hours  furosemide   Injectable 40 milliGRAM(s) IV Push daily  levothyroxine 25 MICROGram(s) Oral daily  pantoprazole  Injectable 40 milliGRAM(s) IV Push daily  polyethylene glycol 3350 17 Gram(s) Oral two times a day  senna 2 Tablet(s) Oral at bedtime  sodium chloride 0.9%. 1000 milliLiter(s) (50 mL/Hr) IV Continuous <Continuous>  ursodiol Capsule 300 milliGRAM(s) Oral two times a day      TELEMETRY: 	    ECG:  	  RADIOLOGY:  OTHER: 	  	  LABS:	 	    CARDIAC MARKERS:                                10.4   12.17 )-----------( 236      ( 22 May 2023 07:05 )             30.3     05    132<L>  |  94<L>  |  12  ----------------------------<  103<H>  3.7   |  25  |  0.61    Ca    8.5      22 May 2023 07:05    TPro  5.8<L>  /  Alb  2.8<L>  /  TBili  8.9<H>  /  DBili  x   /  AST  186<H>  /  ALT  139<H>  /  AlkPhos  482<H>      proBNP:   Lipid Profile:   HgA1c:   TSH: Thyroid Stimulating Hormone, Serum: 8.46 uIU/mL ( @ 06:48)  Thyroid Stimulating Hormone, Serum: 10.50 uIU/mL ( @ 06:14)      < from: TTE W or WO Ultrasound Enhancing Agent (05.15.23 @ 14:55) >   1. Normal right ventricular cavity size and normal wall thickness.   2. Global longitudinal strain is -27.5 % (normal < -18%). GLS was assessed on [employer] echo machine with a heart rate of 75 bpm and a blood pressure of 134/74 mmHg.   3. No prior echocardiogram is available for comparison.   4. The proximal ascending aorta is borderline dilated.          Assessment and plan  ---------------------------  73 female h/o htn, chol, presenting with c/o jaundice  Family noticed increased jaundice in the last 3 days.  Patient had elevated ALT and AST in February.  Ultrasound at that time was unremarkable.  Patient reports increasing belching increasing abdominal distention and 10 to 12 pound weight loss in the last 1 to 2 months.  She denies any fever, respiratory difficulty.  She does report darker urine in the last few days.  She has past surgical history of  x3, 40 years ago.  She is non-smoker.  She does not drink alcohol.  She has an appointment with GI in 2 weeks time.   pt with acute PE and thrombosis on iv heparin  no r heart strain on ct, check echo today  MRCP noted will discuss with family  onc eval called awaiting call back  GI called, will call the family  re start on ac as clear by IR, will discuss with onc regarding switching to Lovenox  pt wants to be transferred to [facility] is getting chemo , started on chemo before transfer  increaase bili  oob to chair  PE continue Lovenox  laxative  onc appreciated  check lytes      	        
Date of Service: 23 @ 08:49           CARDIOLOGY     PROGRESS  NOTE   ________________________________________________    CHIEF COMPLAINT:Patient is a 73y old  Female who presents with a chief complaint of Per chart, "73 female h/o htn, chol, presenting with c/o jaundice  Family noticed increased jaundice in the last 3 days.  Patient had elevated ALT and AST in February.  Ultrasound at that time was unremarkable.  Patient reports increasing belching increasing abdominal distention and 10 to 12 pound weight loss in the last 1 to 2 months.  She denies any fever, respiratory difficulty.  She does report darker urine in the last few days.  She has past surgical history of  x3, 40 years ago.  She is non-smoker.  She does not drink alcohol.  She has an appointment with GI in 2 weeks time."  no complain    	  REVIEW OF SYSTEMS:  CONSTITUTIONAL: No fever, weight loss, or fatigue  EYES: No eye pain, visual disturbances, or discharge  ENT:  No difficulty hearing, tinnitus, vertigo; No sinus or throat pain  NECK: No pain or stiffness  RESPIRATORY: No cough, wheezing, chills or hemoptysis; No Shortness of Breath  CARDIOVASCULAR: No chest pain, palpitations, passing out, dizziness, or leg swelling  GASTROINTESTINAL: No abdominal or epigastric pain. No nausea, vomiting, or hematemesis; No diarrhea or constipation. No melena or hematochezia.  GENITOURINARY: No dysuria, frequency, hematuria, or incontinence  NEUROLOGICAL: No headaches, memory loss, loss of strength, numbness, or tremors  SKIN: No itching, burning, rashes, or lesions   LYMPH Nodes: No enlarged glands  ENDOCRINE: No heat or cold intolerance; No hair loss  MUSCULOSKELETAL: No joint pain or swelling; No muscle, back, or extremity pain  PSYCHIATRIC: No depression, anxiety, mood swings, or difficulty sleeping  HEME/LYMPH: No easy bruising, or bleeding gums  ALLERGY AND IMMUNOLOGIC: No hives or eczema	    x ] All others negative	  [ ] Unable to obtain    PHYSICAL EXAM:  T(C): 36.8 (23 @ 08:35), Max: 37.3 (23 @ 15:54)  HR: 86 (23 @ 08:35) (81 - 94)  BP: 113/72 (23 @ 08:35) (110/58 - 122/61)  RR: 18 (23 @ 08:35) (18 - 18)  SpO2: 95% (23 @ 08:35) (94% - 95%)  Wt(kg): --  I&O's Summary    18 May 2023 07:01  -  19 May 2023 07:00  --------------------------------------------------------  IN: 2400 mL / OUT: 0 mL / NET: 2400 mL        Appearance: jaundice  HEENT:   Normal oral mucosa, PERRL, EOMI	  Lymphatic: No lymphadenopathy  Cardiovascular: Normal S1 S2, No JVD, + murmurs, No edema  Respiratory: rhonchi  Psychiatry: A & O x 3, Mood & affect appropriate  Gastrointestinal:  Soft, Non-tender, + BS	  Skin: No rashes, No ecchymoses, No cyanosis	  Neurologic: Non-focal  Extremities: Normal range of motion, No clubbing, cyanosis or edema  Vascular: Peripheral pulses palpable 2+ bilaterally    MEDICATIONS  (STANDING):  enoxaparin Injectable 70 milliGRAM(s) SubCutaneous every 12 hours  levothyroxine 25 MICROGram(s) Oral daily  ondansetron Injectable 4 milliGRAM(s) IV Push three times a day  pantoprazole  Injectable 40 milliGRAM(s) IV Push daily  sodium chloride 0.9%. 1000 milliLiter(s) (100 mL/Hr) IV Continuous <Continuous>  ursodiol Capsule 300 milliGRAM(s) Oral two times a day      TELEMETRY: 	    ECG:  	  RADIOLOGY:  OTHER: 	  	  LABS:	 	    CARDIAC MARKERS:                                See note  See Note )-----------( Clotted    ( 19 May 2023 06:47 )             See note        135  |  103  |  7   ----------------------------<  115<H>  3.9   |  19<L>  |  0.66    Ca    8.7      19 May 2023 06:48    TPro  6.4  /  Alb  3.1<L>  /  TBili  8.0<H>  /  DBili  x   /  AST  156<H>  /  ALT  144<H>  /  AlkPhos  559<H>      proBNP:   Lipid Profile:   HgA1c:   TSH: Thyroid Stimulating Hormone, Serum: 10.50 uIU/mL ( @ 06:14)    PTT - ( 18 May 2023 06:14 )  PTT:147.9 sec    < from: TTE W or WO Ultrasound Enhancing Agent (05.15.23 @ 14:55) >   1. Normal right ventricular cavity size and normal wall thickness.   2. Global longitudinal strain is -27.5 % (normal < -18%). GLS was assessed on [employer] echo machine with a heart rate of 75 bpm and a blood pressure of 134/74 mmHg.   3. No prior echocardiogram is available for comparison.   4. The proximal ascending aorta is borderline dilated.    Assessment and plan  ---------------------------  73 female h/o htn, chol, presenting with c/o jaundice  Family noticed increased jaundice in the last 3 days.  Patient had elevated ALT and AST in February.  Ultrasound at that time was unremarkable.  Patient reports increasing belching increasing abdominal distention and 10 to 12 pound weight loss in the last 1 to 2 months.  She denies any fever, respiratory difficulty.  She does report darker urine in the last few days.  She has past surgical history of  x3, 40 years ago.  She is non-smoker.  She does not drink alcohol.  She has an appointment with GI in 2 weeks time.   pt with acute PE and thrombosis on iv heparin  no r heart strain on ct, check echo today  MRCP noted will discuss with family  onc eval called awaiting call back  GI called, will call the family  re start on ac as clear by IR, will discuss with onc regarding switching to Lovenox  pt wants to be transferred to [facility] to chair  chemo  	    	        
Date of Service: 23 @ 09:37           CARDIOLOGY     PROGRESS  NOTE   ________________________________________________    CHIEF COMPLAINT:Patient is a 73y old  Female who presents with a chief complaint of PE (15 May 2023 09:06)  no complain  	  REVIEW OF SYSTEMS:  CONSTITUTIONAL: No fever, weight loss, or fatigue  EYES: No eye pain, visual disturbances, or discharge  ENT:  No difficulty hearing, tinnitus, vertigo; No sinus or throat pain  NECK: No pain or stiffness  RESPIRATORY: No cough, wheezing, chills or hemoptysis; No Shortness of Breath  CARDIOVASCULAR: No chest pain, palpitations, passing out, dizziness, or leg swelling  GASTROINTESTINAL: No abdominal or epigastric pain. No nausea, vomiting, or hematemesis; No diarrhea or constipation. No melena or hematochezia.  GENITOURINARY: No dysuria, frequency, hematuria, or incontinence  NEUROLOGICAL: No headaches, memory loss, loss of strength, numbness, or tremors  SKIN: No itching, burning, rashes, or lesions   LYMPH Nodes: No enlarged glands  ENDOCRINE: No heat or cold intolerance; No hair loss  MUSCULOSKELETAL: No joint pain or swelling; No muscle, back, or extremity pain  PSYCHIATRIC: No depression, anxiety, mood swings, or difficulty sleeping  HEME/LYMPH: No easy bruising, or bleeding gums  ALLERGY AND IMMUNOLOGIC: No hives or eczema	    [x ] All others negative	  [ ] Unable to obtain    PHYSICAL EXAM:  T(C): 37.2 (23 @ 05:31), Max: 37.2 (23 @ 00:00)  HR: 86 (23 @ 05:31) (71 - 86)  BP: 116/64 (23 @ 05:31) (116/60 - 138/65)  RR: 18 (23 @ 05:31) (15 - 20)  SpO2: 95% (23 @ 05:31) (95% - 100%)  Wt(kg): --  I&O's Summary      Appearance: jaundice  HEENT:   Normal oral mucosa, PERRL, EOMI	  Lymphatic: No lymphadenopathy  Cardiovascular: Normal S1 S2, No JVD, + murmurs, No edema  Respiratory: Lungs clear to auscultation	  Psychiatry: A & O x 3, Mood & affect appropriate  Gastrointestinal:  Soft, Non-tender, + BS	  Skin: No rashes, No ecchymoses, No cyanosis	  Neurologic: Non-focal  Extremities: Normal range of motion, No clubbing, cyanosis or edema  Vascular: Peripheral pulses palpable 2+ bilaterally    MEDICATIONS  (STANDING):  lactated ringers. 1000 milliLiter(s) (75 mL/Hr) IV Continuous <Continuous>  pantoprazole    Tablet 40 milliGRAM(s) Oral before breakfast  ursodiol Capsule 300 milliGRAM(s) Oral two times a day      TELEMETRY: 	    ECG:  	  RADIOLOGY:  OTHER: 	  	  LABS:	 	    CARDIAC MARKERS:                                11.4   11.72 )-----------( 229      ( 17 May 2023 06:24 )             34.4     05    133<L>  |  98  |  6<L>  ----------------------------<  114<H>  3.9   |  22  |  0.66    Ca    9.1      17 May 2023 06:24  Mg     2.1     -    TPro  6.7  /  Alb  3.3  /  TBili  7.4<H>  /  DBili  x   /  AST  193<H>  /  ALT  134<H>  /  AlkPhos  614<H>      proBNP:   Lipid Profile:   HgA1c:   TSH:   PT/INR - ( 16 May 2023 07:12 )   PT: 11.6 sec;   INR: 1.00 ratio         PTT - ( 16 May 2023 07:12 )  PTT:26.7 sec    < from: [facility] Duplex Lower Ext Vein Scan, Bilat (05.15.23 @ 16:25) >  Acute right gastrocnemius vein thrombus. Findings discussed with Dr. Shah by the ultrasound tech at 4:24 PM.  No left lower extremity DVT.      - From IR procedure standpoint: OK to resume hep gtt 24 hours post procedure  - f/u cytopathology  - OK to remove dressing 24hrs post procedure  - IR will sign off      Assessment and plan  ---------------------------  73 female h/o htn, chol, presenting with c/o jaundice  Family noticed increased jaundice in the last 3 days.  Patient had elevated ALT and AST in February.  Ultrasound at that time was unremarkable.  Patient reports increasing belching increasing abdominal distention and 10 to 12 pound weight loss in the last 1 to 2 months.  She denies any fever, respiratory difficulty.  She does report darker urine in the last few days.  She has past surgical history of  x3, 40 years ago.  She is non-smoker.  She does not drink alcohol.  She has an appointment with GI in 2 weeks time.   pt with acute PE and thrombosis on iv heparin  no r heart strain on ct, check echo today  MRCP noted will discuss with family  onc eval called awaiting call back  GI called, will call the family  re start on ac as clear by IR    	        
Date of Service: 23 @ 09:49           CARDIOLOGY     PROGRESS  NOTE   ________________________________________________    CHIEF COMPLAINT:Patient is a 73y old  Female who presents with a chief complaint of Per chart, "73 female h/o htn, chol, presenting with c/o jaundice  Family noticed increased jaundice in the last 3 days.  Patient had elevated ALT and AST in February.  Ultrasound at that time was unremarkable.  Patient reports increasing belching increasing abdominal distention and 10 to 12 pound weight loss in the last 1 to 2 months.  She denies any fever, respiratory difficulty.  She does report darker urine in the last few days.  She has past surgical history of  x3, 40 years ago.  She is non-smoker.  She does not drink alcohol.  She has an appointment with GI in 2 weeks time."  jaundice    	  REVIEW OF SYSTEMS:  CONSTITUTIONAL: No fever, weight loss, or fatigue  EYES: No eye pain, visual disturbances, or discharge  ENT:  No difficulty hearing, tinnitus, vertigo; No sinus or throat pain  NECK: No pain or stiffness  RESPIRATORY: No cough, wheezing, chills or hemoptysis; +milfd Shortness of Breath  CARDIOVASCULAR: No chest pain, palpitations, passing out, dizziness, or leg swelling  GASTROINTESTINAL: No abdominal or epigastric pain. No nausea, vomiting, or hematemesis; No diarrhea or constipation. No melena or hematochezia.  GENITOURINARY: No dysuria, frequency, hematuria, or incontinence  NEUROLOGICAL: No headaches, memory loss, loss of strength, numbness, or tremors  SKIN: No itching, burning, rashes, or lesions   LYMPH Nodes: No enlarged glands  ENDOCRINE: No heat or cold intolerance; No hair loss  MUSCULOSKELETAL: No joint pain or swelling; No muscle, back, or extremity pain  PSYCHIATRIC: No depression, anxiety, mood swings, or difficulty sleeping  HEME/LYMPH: No easy bruising, or bleeding gums  ALLERGY AND IMMUNOLOGIC: No hives or eczema	    [ ] All others negative	  [ ] Unable to obtain    PHYSICAL EXAM:  T(C): 37 (23 @ 08:49), Max: 37.1 (23 @ 23:41)  HR: 77 (23 @ 08:49) (77 - 82)  BP: 128/73 (23 @ 08:49) (115/70 - 135/68)  RR: 18 (23 @ 08:49) (18 - 18)  SpO2: 95% (23 @ 08:49) (95% - 97%)  Wt(kg): --  I&O's Summary    17 May 2023 07:01  -  18 May 2023 07:00  --------------------------------------------------------  IN: 440 mL / OUT: 0 mL / NET: 440 mL        Appearance: Normal/ jaundice	  HEENT:   Normal oral mucosa, PERRL, EOMI	  Lymphatic: No lymphadenopathy  Cardiovascular: Normal S1 S2, No JVD, + murmurs, No edema  Respiratory: rhonchi  Psychiatry: A & O x 3, Mood & affect appropriate  Gastrointestinal:  Soft, Non-tender, + BS	  Skin: No rashes, No ecchymoses, No cyanosis	  Neurologic: Non-focal  Extremities: Normal range of motion, No clubbing, cyanosis or edema  Vascular: Peripheral pulses palpable 2+ bilaterally    MEDICATIONS  (STANDING):  heparin  Infusion.  Unit(s)/Hr (12 mL/Hr) IV Continuous <Continuous>  ondansetron Injectable 4 milliGRAM(s) IV Push three times a day  pantoprazole  Injectable 40 milliGRAM(s) IV Push daily  sodium chloride 0.9%. 1000 milliLiter(s) (100 mL/Hr) IV Continuous <Continuous>  ursodiol Capsule 300 milliGRAM(s) Oral two times a day      TELEMETRY: 	    ECG:  	  RADIOLOGY:  OTHER: 	  	  LABS:	 	    CARDIAC MARKERS:                                11.0   13.88 )-----------( 241      ( 18 May 2023 06:14 )             33.7         136  |  101  |  4<L>  ----------------------------<  103<H>  4.0   |  23  |  0.66    Ca    9.0      18 May 2023 06:14    TPro  6.4  /  Alb  3.2<L>  /  TBili  7.7<H>  /  DBili  x   /  AST  189<H>  /  ALT  154<H>  /  AlkPhos  592<H>      proBNP:   Lipid Profile:   HgA1c:   TSH:   PTT - ( 18 May 2023 06:14 )  PTT:147.9 sec    < from: MR MRCP w/wo IV Cont (05.15.23 @ 23:34) >  1.  Findings suspicious for pancreatic adenocarcinoma with numerous   hepatic metastases.  2.  Intrahepatic biliary strictures with mild-moderate segmental   intrahepatic ductal dilatation, likely due to infiltrative hepatic   metastatic disease.  3.  Main portal vein and right portal vein thrombus, unclear if bland or   tumor thrombus.      Assessment and plan  ---------------------------  73 female h/o htn, chol, presenting with c/o jaundice  Family noticed increased jaundice in the last 3 days.  Patient had elevated ALT and AST in February.  Ultrasound at that time was unremarkable.  Patient reports increasing belching increasing abdominal distention and 10 to 12 pound weight loss in the last 1 to 2 months.  She denies any fever, respiratory difficulty.  She does report darker urine in the last few days.  She has past surgical history of  x3, 40 years ago.  She is non-smoker.  She does not drink alcohol.  She has an appointment with GI in 2 weeks time.   pt with acute PE and thrombosis on iv heparin  no r heart strain on ct, check echo today  MRCP noted will discuss with family  onc eval called awaiting call back  GI called, will call the family  re start on ac as clear by IR, will discuss with onc regarding switching to DOAC  pt wants to be transferred to [facility]  	        
Date of Service: 23 @ 10:05           CARDIOLOGY     PROGRESS  NOTE   ________________________________________________    CHIEF COMPLAINT:Patient is a 73y old  Female who presents with a chief complaint of PE (15 May 2023 09:06)  no complain  	  REVIEW OF SYSTEMS:  CONSTITUTIONAL: No fever, weight loss, or fatigue  EYES: No eye pain, visual disturbances, or discharge  ENT:  No difficulty hearing, tinnitus, vertigo; No sinus or throat pain  NECK: No pain or stiffness  RESPIRATORY: No cough, wheezing, chills or hemoptysis; No Shortness of Breath  CARDIOVASCULAR: No chest pain, palpitations, passing out, dizziness, or leg swelling  GASTROINTESTINAL: + abdominal or epigastric pain. No nausea, vomiting, or hematemesis; No diarrhea or constipation. No melena or hematochezia.  GENITOURINARY: No dysuria, frequency, hematuria, or incontinence  NEUROLOGICAL: No headaches, memory loss, loss of strength, numbness, or tremors  SKIN: No itching, burning, rashes, or lesions   LYMPH Nodes: No enlarged glands  ENDOCRINE: No heat or cold intolerance; No hair loss  MUSCULOSKELETAL: No joint pain or swelling; No muscle, back, or extremity pain  PSYCHIATRIC: No depression, anxiety, mood swings, or difficulty sleeping  HEME/LYMPH: No easy bruising, or bleeding gums  ALLERGY AND IMMUNOLOGIC: No hives or eczema	    [ x] All others negative	  [ ] Unable to obtain    PHYSICAL EXAM:  T(C): 36.7 (23 @ 09:59), Max: 37.2 (05-15-23 @ 16:49)  HR: 71 (23 @ 09:59) (71 - 88)  BP: 124/57 (23 @ 09:59) (103/61 - 134/74)  RR: 18 (23 @ 09:59) (18 - 18)  SpO2: 95% (23 @ 09:59) (95% - 98%)  Wt(kg): --  I&O's Summary    15 May 2023 07:01  -  16 May 2023 07:00  --------------------------------------------------------  IN: 0 mL / OUT: 0 mL / NET: 0 mL        Appearance: Normal, jaundice	  HEENT:   Normal oral mucosa, PERRL, EOMI	  Lymphatic: No lymphadenopathy  Cardiovascular: Normal S1 S2, No JVD, + murmurs, No edema  Respiratory: Lungs clear to auscultation	  Psychiatry: A & O x 3, Mood & affect appropriate  Gastrointestinal:  Soft, Non-tender, + BS	  Skin: No rashes, No ecchymoses, No cyanosis	  Neurologic: Non-focal  Extremities: Normal range of motion, No clubbing, cyanosis or edema  Vascular: Peripheral pulses palpable 2+ bilaterally    MEDICATIONS  (STANDING):  heparin  Infusion.  Unit(s)/Hr (12 mL/Hr) IV Continuous <Continuous>      TELEMETRY: 	    ECG:  	  RADIOLOGY:  OTHER: 	  	  LABS:	 	    CARDIAC MARKERS:                                10.3   7.57  )-----------( 185      ( 16 May 2023 07:12 )             32.8         134<L>  |  97  |  6<L>  ----------------------------<  109<H>  3.6   |  21<L>  |  0.63    Ca    9.1      16 May 2023 07:12  Mg     2.1         TPro  6.4  /  Alb  2.9<L>  /  TBili  6.7<H>  /  DBili  x   /  AST  143<H>  /  ALT  102<H>  /  AlkPhos  575<H>      proBNP:   Lipid Profile:   HgA1c:   TSH:   PT/INR - ( 16 May 2023 07:12 )   PT: 11.6 sec;   INR: 1.00 ratio         PTT - ( 16 May 2023 07:12 )  PTT:26.7 sec      < from: MR MRCP w/wo IV Cont (05.15.23 @ 23:34) >  1.  Findings suspicious for pancreatic adenocarcinoma with numerous   hepatic metastases.  2.  Intrahepatic biliary strictures with mild-moderate segmental   intrahepatic ductal dilatation, likely due to infiltrative hepatic   metastatic disease.  3.  Main portal vein and right portal vein thrombus, unclear if bland or   tumor thrombus.    < from: TTE W or WO Ultrasound Enhancing Agent (05.15.23 @ 14:55) >   1. Normal right ventricular cavity size and normal wall thickness.   2. Global longitudinal strain is -27.5 % (normal < -18%). GLS was assessed on Spaces 2 HostTePageStitch echo machine with a heart rate of 75 bpm and a blood pressure of 134/74 mmHg.   3. No prior echocardiogram is available for comparison.   4. The proximal ascending aorta is borderline dilated.        Assessment and plan  ---------------------------  73 female h/o htn, chol, presenting with c/o jaundice  Family noticed increased jaundice in the last 3 days.  Patient had elevated ALT and AST in February.  Ultrasound at that time was unremarkable.  Patient reports increasing belching increasing abdominal distention and 10 to 12 pound weight loss in the last 1 to 2 months.  She denies any fever, respiratory difficulty.  She does report darker urine in the last few days.  She has past surgical history of  x3, 40 years ago.  She is non-smoker.  She does not drink alcohol.  She has an appointment with GI in 2 weeks time.   pt with acute PE and thrombosis on iv heparin  no r heart strain on ct, check echo today  MRCP noted will discuss with family  onc eval called awaiting call back  GI called, will call the family  continue AC    	        
Date of Service: 23 @ 12:21           CARDIOLOGY     PROGRESS  NOTE   ________________________________________________    CHIEF COMPLAINT:Patient is a 73y old  Female who presents with a chief complaint of Per chart, "73 female h/o htn, chol, presenting with c/o jaundice  Family noticed increased jaundice in the last 3 days.  Patient had elevated ALT and AST in February.  Ultrasound at that time was unremarkable.  Patient reports increasing belching increasing abdominal distention and 10 to 12 pound weight loss in the last 1 to 2 months.  She denies any fever, respiratory difficulty.  She does report darker urine in the last few days.  She has past surgical history of  x3, 40 years ago.  She is non-smoker.  She does not drink alcohol.  She has an appointment with GI in 2 weeks time."  c/o constipation    	  REVIEW OF SYSTEMS:  CONSTITUTIONAL: No fever, weight loss, or fatigue  EYES: No eye pain, visual disturbances, or discharge  ENT:  No difficulty hearing, tinnitus, vertigo; No sinus or throat pain  NECK: No pain or stiffness  RESPIRATORY: No cough, wheezing, chills or hemoptysis; No Shortness of Breath  CARDIOVASCULAR: No chest pain, palpitations, passing out, dizziness, or leg swelling  GASTROINTESTINAL: + abdominal or epigastric pain. No nausea, vomiting, or hematemesis; No diarrhea or constipation. No melena or hematochezia.  GENITOURINARY: No dysuria, frequency, hematuria, or incontinence  NEUROLOGICAL: No headaches, memory loss, loss of strength, numbness, or tremors  SKIN: No itching, burning, rashes, or lesions   LYMPH Nodes: No enlarged glands  ENDOCRINE: No heat or cold intolerance; No hair loss  MUSCULOSKELETAL: No joint pain or swelling; No muscle, back, or extremity pain  PSYCHIATRIC: No depression, anxiety, mood swings, or difficulty sleeping  HEME/LYMPH: No easy bruising, or bleeding gums  ALLERGY AND IMMUNOLOGIC: No hives or eczema	    [x ] All others negative	  [ ] Unable to obtain    PHYSICAL EXAM:  T(C): 37.2 (23 @ 08:29), Max: 37.4 (23 @ 00:30)  HR: 90 (23 @ 08:29) (90 - 107)  BP: 113/70 (23 @ 08:29) (113/70 - 142/84)  RR: 18 (23 @ 08:29) (16 - 18)  SpO2: 96% (23 @ 08:29) (95% - 99%)  Wt(kg): --  I&O's Summary    20 May 2023 07:01  -  20 May 2023 12:21  --------------------------------------------------------  IN: 201 mL / OUT: 0 mL / NET: 201 mL        Appearance: Normal	  HEENT:   Normal oral mucosa, PERRL, EOMI	  Lymphatic: No lymphadenopathy  Cardiovascular: Normal S1 S2, No JVD, + murmurs, No edema  Respiratory: rhonchi  Psychiatry: A & O x 3, Mood & affect appropriate  Gastrointestinal:  Soft, Non-tender, + BS	  Skin: No rashes, No ecchymoses, No cyanosis	  Neurologic: Non-focal  Extremities: Normal range of motion, No clubbing, cyanosis or edema  Vascular: Peripheral pulses palpable 2+ bilaterally    MEDICATIONS  (STANDING):  bacitracin   Ointment 1 Application(s) Topical two times a day  chlorhexidine 4% Liquid 1 Application(s) Topical <User Schedule>  enoxaparin Injectable 70 milliGRAM(s) SubCutaneous every 12 hours  fluorouracil Injectable (eMAR) 700 milliGRAM(s) IV Push Chemo once  fluorouracil IVPB (eMAR) 2100 milliGRAM(s) IV Intermittent daily  levothyroxine 25 MICROGram(s) Oral daily  pantoprazole  Injectable 40 milliGRAM(s) IV Push daily  polyethylene glycol 3350 17 Gram(s) Oral two times a day  sodium chloride 0.9%. 1000 milliLiter(s) (100 mL/Hr) IV Continuous <Continuous>  ursodiol Capsule 300 milliGRAM(s) Oral two times a day      TELEMETRY: 	    ECG:  	  RADIOLOGY:  OTHER: 	  	  LABS:	 	    CARDIAC MARKERS:                                9.7    10.89 )-----------( 216      ( 20 May 2023 06:47 )             29.7         135  |  101  |  5<L>  ----------------------------<  97  3.7   |  21<L>  |  0.55    Ca    8.5      20 May 2023 06:46    TPro  5.8<L>  /  Alb  2.7<L>  /  TBili  7.6<H>  /  DBili  x   /  AST  136<H>  /  ALT  118<H>  /  AlkPhos  493<H>      proBNP:   Lipid Profile:   HgA1c:   TSH: Thyroid Stimulating Hormone, Serum: 8.46 uIU/mL ( @ 06:48)  Thyroid Stimulating Hormone, Serum: 10.50 uIU/mL ( @ 06:14)          Assessment and plan  ---------------------------  73 female h/o htn, chol, presenting with c/o jaundice  Family noticed increased jaundice in the last 3 days.  Patient had elevated ALT and AST in February.  Ultrasound at that time was unremarkable.  Patient reports increasing belching increasing abdominal distention and 10 to 12 pound weight loss in the last 1 to 2 months.  She denies any fever, respiratory difficulty.  She does report darker urine in the last few days.  She has past surgical history of  x3, 40 years ago.  She is non-smoker.  She does not drink alcohol.  She has an appointment with GI in 2 weeks time.   pt with acute PE and thrombosis on iv heparin  no r heart strain on ct, check echo today  MRCP noted will discuss with family  onc eval called awaiting call back  GI called, will call the family  re start on ac as clear by IR, will discuss with onc regarding switching to Lovenox  pt wants to be transferred to [facility] is getting chemo today  oob to chair  PE continue LOvenox  laxative    	        
Patient is a 73y old  Female who presents with a chief complaint of Per chart, "73 female h/o htn, chol, presenting with c/o jaundice  Family noticed increased jaundice in the last 3 days.  Patient had elevated ALT and AST in February.  Ultrasound at that time was unremarkable.  Patient reports increasing belching increasing abdominal distention and 10 to 12 pound weight loss in the last 1 to 2 months.  She denies any fever, respiratory difficulty.  She does report darker urine in the last few days.  She has past surgical history of  x3, 40 years ago.  She is non-smoker.  She does not drink alcohol.  She has an appointment with GI in 2 weeks time."    Patient seen and examined this morning. Patient res      MEDICATIONS  (STANDING):  chlorhexidine 4% Liquid 1 Application(s) Topical <User Schedule>  enoxaparin Injectable 70 milliGRAM(s) SubCutaneous every 12 hours  levothyroxine 25 MICROGram(s) Oral daily  ondansetron Injectable 4 milliGRAM(s) IV Push three times a day  pantoprazole  Injectable 40 milliGRAM(s) IV Push daily  polyethylene glycol 3350 17 Gram(s) Oral once  polyethylene glycol 3350 17 Gram(s) Oral two times a day  sodium chloride 0.9%. 1000 milliLiter(s) (100 mL/Hr) IV Continuous <Continuous>  ursodiol Capsule 300 milliGRAM(s) Oral two times a day    MEDICATIONS  (PRN):  sodium chloride 0.9% lock flush 10 milliLiter(s) IV Push every 1 hour PRN Pre/post blood products, medications, blood draw, and to maintain line patency      Vital Signs Last 24 Hrs  T(C): 36.8 (19 May 2023 08:35), Max: 37.3 (18 May 2023 15:54)  T(F): 98.2 (19 May 2023 08:35), Max: 99.2 (18 May 2023 15:54)  HR: 86 (19 May 2023 08:35) (81 - 94)  BP: 113/72 (19 May 2023 08:35) (110/58 - 122/61)  BP(mean): --  RR: 18 (19 May 2023 08:35) (18 - 18)  SpO2: 95% (19 May 2023 08:35) (94% - 95%)    Parameters below as of 19 May 2023 08:35  Patient On (Oxygen Delivery Method): room air      PE  NAD  Awake, alert  Anicteric, MMM  RRR  CTAB  Abd soft, NT, ND  No c/c/e                          10.0   11.36 )-----------( 213      ( 19 May 2023 11:36 )             32.1           135  |  103  |  7   ----------------------------<  115<H>  3.9   |  19<L>  |  0.66    Ca    8.7      19 May 2023 06:48    TPro  6.4  /  Alb  3.1<L>  /  TBili  8.0<H>  /  DBili  x   /  AST  156<H>  /  ALT  144<H>  /  AlkPhos  559<H>        
THEODORE LENOAS  73y  Female      Patient is a 73y old  Female who presents with a chief complaint of Per chart, "73 female h/o htn, chol, presenting with c/o jaundice  Family noticed increased jaundice in the last 3 days.  Patient had elevated ALT and AST in February.  Ultrasound at that time was unremarkable.  Patient reports increasing belching increasing abdominal distention and 10 to 12 pound weight loss in the last 1 to 2 months.  She denies any fever, respiratory difficulty.  She does report darker urine in the last few days.  She has past surgical history of  x3, 40 years ago.  She is non-smoker.  She does not drink alcohol.  She has an appointment with GI in 2 weeks time."     (17 May 2023 14:46)      INTERVAL HPI/OVERNIGHT EVENTS:        REVIEW OF SYSTEMS:  CONSTITUTIONAL: No fever, weight loss, or fatigue  EYES: No eye pain, visual disturbances, or discharge  ENMT:   No sinus or throat pain  NECK: No pain or stiffness  BREASTS: No pain, masses, or nipple discharge  RESPIRATORY: No cough, wheezing, chills or hemoptysis; No shortness of breath  CARDIOVASCULAR: No chest pain, palpitations, dizziness, or leg swelling  GASTROINTESTINAL: No abdominal or epigastric pain. No nausea, vomiting, or hematemesis; No diarrhea or constipation. No melena or hematochezia.  GENITOURINARY: No hematuria  NEUROLOGICAL: demented    SKIN: No itching, burning, rashes, or lesions   LYMPH NODES: No enlarged glands  ENDOCRINE: No heat or cold intolerance; No hair loss  MUSCULOSKELETAL: No joint pain    HEME/LYMPH: No easy bruising, or bleeding gums  ALLERY AND IMMUNOLOGIC: No hives or eczema    T(C): 36.6 (23 @ 15:42), Max: 37.2 (23 @ 00:00)  HR: 82 (23 @ 15:42) (76 - 86)  BP: 135/68 (23 @ 15:42) (110/61 - 135/68)  RR: 18 (23 @ 15:42) (18 - 18)  SpO2: 95% (23 @ 15:42) (94% - 99%)  Wt(kg): --Vital Signs Last 24 Hrs  T(C): 36.6 (17 May 2023 15:42), Max: 37.2 (17 May 2023 00:00)  T(F): 97.9 (17 May 2023 15:42), Max: 99 (17 May 2023 00:00)  HR: 82 (17 May 2023 15:42) (76 - 86)  BP: 135/68 (17 May 2023 15:42) (110/61 - 135/68)  BP(mean): --  RR: 18 (17 May 2023 15:42) (18 - 18)  SpO2: 95% (17 May 2023 15:42) (94% - 99%)    Parameters below as of 17 May 2023 15:42  Patient On (Oxygen Delivery Method): room air        PHYSICAL EXAM:  GENERAL: NAD, well-groomed, well-developed  HEAD:  Atraumatic, Normocephalic  EYES: EOMI, PERRLA, conjunctiva and sclera clear  ENMT: No tonsillar erythema, exudates, or enlargement; Moist mucous membranes, Good dentition, No lesions  NECK: Supple, No JVD, Normal thyroid  NERVOUS SYSTEM:  demented.no focal neurodeficit.  CHEST/LUNG: Clear to percussion bilaterally; No rales, rhonchi, wheezing, or rubs  HEART: Regular rate and rhythm; No murmurs, rubs, or gallops  ABDOMEN: Soft, Nontender, Nondistended; Bowel sounds present  EXTREMITIES:  2+ Peripheral Pulses, No clubbing, cyanosis.  LYMPH: No lymphadenopathy noted  SKIN: No rashes or lesions    Consultant(s) Notes Reviewed:  [x ] YES  [ ] NO  Care Discussed with Consultants/Other Providers [ x] YES  [ ] NO    LABS:                        11.4   11.72 )-----------( 229      ( 17 May 2023 06:24 )             34.4     05-17    133<L>  |  98  |  6<L>  ----------------------------<  114<H>  3.9   |  22  |  0.66    Ca    9.1      17 May 2023 06:24  Mg     2.1     05-16    TPro  6.7  /  Alb  3.3  /  TBili  7.4<H>  /  DBili  x   /  AST  193<H>  /  ALT  134<H>  /  AlkPhos  614<H>  05-17    PT/INR - ( 16 May 2023 07:12 )   PT: 11.6 sec;   INR: 1.00 ratio         PTT - ( 16 May 2023 07:12 )  PTT:26.7 sec    CAPILLARY BLOOD GLUCOSE                RADIOLOGY & ADDITIONAL TESTS:    Imaging Personally Reviewed:  [ ] YES  [ ] NO
AMY JAIMES  73y Female  MRN:[id number]      HPI:  73 female h/o htn, chol, presenting with c/o jaundice  Family noticed increased jaundice in the last 3 days.  Patient had elevated ALT and AST in February.  Ultrasound at that time was unremarkable.  Patient reports increasing belching increasing abdominal distention and 10 to 12 pound weight loss in the last 1 to 2 months.  She denies any fever, respiratory difficulty.  She does report darker urine in the last few days.  She has past surgical history of  x3, 40 years ago.  She is non-smoker.  She does not drink alcohol.  She has an appointment with GI in 2 weeks time. (15 May 2023 07:45)      Patient seen and evaluated at bedside. No acute events overnight except as noted.    Interval HPI: s/p liver lesion biopsy. path confirms adeno ca.     PAST MEDICAL & SURGICAL HISTORY:  Essential hypertension      Hyperlipidemia          REVIEW OF SYSTEMS:  as per hpi     VITALS:  Vital Signs Last 24 Hrs  T(C): 37 (18 May 2023 08:49), Max: 37.1 (17 May 2023 23:41)  T(F): 98.6 (18 May 2023 08:49), Max: 98.7 (17 May 2023 23:41)  HR: 77 (18 May 2023 08:49) (77 - 82)  BP: 128/73 (18 May 2023 08:49) (115/70 - 135/68)  BP(mean): --  RR: 18 (18 May 2023 08:49) (18 - 18)  SpO2: 95% (18 May 2023 08:49) (95% - 97%)    Parameters below as of 18 May 2023 08:49  Patient On (Oxygen Delivery Method): room air      CAPILLARY BLOOD GLUCOSE        I&O's Summary    17 May 2023 07:01  -  18 May 2023 07:00  --------------------------------------------------------  IN: 440 mL / OUT: 0 mL / NET: 440 mL        PHYSICAL EXAM:  GENERAL: NAD, well-developed  HEAD:  Atraumatic, Normocephalic  EYES: EOMI, PERRLA, conjunctiva and sclera icteric   NECK: Supple, No JVD  CHEST/LUNG: Clear to auscultation bilaterally; No wheeze  HEART: S1, S2; No murmurs, rubs, or gallops  ABDOMEN: Soft, Nontender, Nondistended; Bowel sounds present  EXTREMITIES:  2+ Peripheral Pulses, No clubbing, cyanosis, or edema  PSYCH: Normal affect  NEUROLOGY: AAOX3; non-focal  SKIN: No rashes or lesions  +jaundice     Consultant(s) Notes Reviewed:  [x ] YES  [ ] NO  Care Discussed with Consultants/Other Providers [ x] YES  [ ] NO    MEDS:  MEDICATIONS  (STANDING):  ondansetron Injectable 4 milliGRAM(s) IV Push three times a day  pantoprazole  Injectable 40 milliGRAM(s) IV Push daily  sodium chloride 0.9%. 1000 milliLiter(s) (100 mL/Hr) IV Continuous <Continuous>  ursodiol Capsule 300 milliGRAM(s) Oral two times a day    MEDICATIONS  (PRN):    ALLERGIES:  No Known Allergies      LABS:                        11.0   13.88 )-----------( 241      ( 18 May 2023 06:14 )             33.7         136  |  101  |  4<L>  ----------------------------<  103<H>  4.0   |  23  |  0.66    Ca    9.0      18 May 2023 06:14    TPro  6.4  /  Alb  3.2<L>  /  TBili  7.7<H>  /  DBili  x   /  AST  189<H>  /  ALT  154<H>  /  AlkPhos  592<H>      PTT - ( 18 May 2023 06:14 )  PTT:147.9 sec      LIVER FUNCTIONS - ( 18 May 2023 06:14 )  Alb: 3.2 g/dL / Pro: 6.4 g/dL / ALK PHOS: 592 U/L / ALT: 154 U/L / AST: 189 U/L / GGT: x             TSH: Thyroid Stimulating Hormone, Serum: 10.50 uIU/mL ( @ 06:14)        LIVER, RIGHT, US GUIDED CORE BIOPSY AND FNA   Final Diagnosis   LIVER, RIGHT, US GUIDED CORE BIOPSY AND FNA   POSITIVE FOR MALIGNANT CELLS.   Metastatic adenocarcinoma with mucinous features   Core biopsies Â reveal infiltrating malignant process of adenocarcinoma   with mucinous features. Cytology slides show hypercellular   specimen composed of crowded groups and single-lying malignant cells   displaying anisonucleosis , irregular chromatin, and prominent nucleoli   in   a background of benign hepatocytes.        < from: MR MRCP w/wo IV Cont (05.15.23 @ 23:34) >  IMPRESSION:  1.  Findings suspicious for pancreatic adenocarcinoma with numerous   hepatic metastases.  2.  Intrahepatic biliary strictures with mild-moderate segmental   intrahepatic ductal dilatation, likely due to infiltrative hepatic   metastatic disease.  3.  Main portal vein and right portal vein thrombus, unclear if bland or   tumor thrombus.      --- End of Report ---    < end of copied text >  < from: [facility] Duplex Lower Ext Vein Scan, Bilat (05.15.23 @ 16:25) >  IMPRESSION:    Acute right gastrocnemius vein thrombus. Findings discussed with Dr. Shah by the ultrasound tech at 4:24 PM.  No left lower extremity DVT.    --- End of Report ---    < end of copied text >  < from: CT Head w/wo IV Cont (05.15.23 @ 02:08) >  IMPRESSION:  No acute intra-cranial hemorrhage, mass effect, or midline shift.    --- End of Report ---    < end of copied text >  < from: CT Angio Chest PE Protocol w/ IV Cont (05.15.23 @ 02:08) >  IMPRESSION:    Additional acute left upper lobe segmental pulmonary embolism is   visualized. No convincing evidence of right heart strain at this time.    Redemonstration of acute subsegmental right lower lobe pulmonary   embolism, similar to prior exam from same day.    No evidence of malignancy in the thorax.    --- End of Report ---    < end of copied text >  
AMY JAIMES  73y Female  MRN:[id number]      HPI:  73 female h/o htn, chol, presenting with c/o jaundice  Family noticed increased jaundice in the last 3 days.  Patient had elevated ALT and AST in February.  Ultrasound at that time was unremarkable.  Patient reports increasing belching increasing abdominal distention and 10 to 12 pound weight loss in the last 1 to 2 months.  She denies any fever, respiratory difficulty.  She does report darker urine in the last few days.  She has past surgical history of  x3, 40 years ago.  She is non-smoker.  She does not drink alcohol.  She has an appointment with GI in 2 weeks time. (15 May 2023 07:45)      Patient seen and evaluated at bedside. No acute events overnight except as noted.    Interval HPI: picc line placed     PAST MEDICAL & SURGICAL HISTORY:  Essential hypertension      Hyperlipidemia          REVIEW OF SYSTEMS:  as per hpi     VITALS:   Vital Signs Last 24 Hrs  T(C): 36.8 (19 May 2023 08:35), Max: 37.3 (18 May 2023 15:54)  T(F): 98.2 (19 May 2023 08:35), Max: 99.2 (18 May 2023 15:54)  HR: 86 (19 May 2023 08:35) (81 - 94)  BP: 113/72 (19 May 2023 08:35) (110/58 - 122/61)  BP(mean): --  RR: 18 (19 May 2023 08:35) (18 - 18)  SpO2: 95% (19 May 2023 08:35) (94% - 95%)    Parameters below as of 19 May 2023 08:35  Patient On (Oxygen Delivery Method): room air          PHYSICAL EXAM:  GENERAL: NAD, well-developed  HEAD:  Atraumatic, Normocephalic  EYES: EOMI, PERRLA, conjunctiva and sclera icteric   NECK: Supple, No JVD  CHEST/LUNG: Clear to auscultation bilaterally; No wheeze  HEART: S1, S2; No murmurs, rubs, or gallops  ABDOMEN: Soft, Nontender, Nondistended; Bowel sounds present  EXTREMITIES:  2+ Peripheral Pulses, No clubbing, cyanosis, or edema. +RUE picc  PSYCH: Normal affect  NEUROLOGY: AAOX3; non-focal  SKIN: No rashes or lesions  +jaundice     Consultant(s) Notes Reviewed:  [x ] YES  [ ] NO  Care Discussed with Consultants/Other Providers [ x] YES  [ ] NO    MEDS:   MEDICATIONS  (STANDING):  chlorhexidine 4% Liquid 1 Application(s) Topical <User Schedule>  enoxaparin Injectable 70 milliGRAM(s) SubCutaneous every 12 hours  levothyroxine 25 MICROGram(s) Oral daily  ondansetron Injectable 4 milliGRAM(s) IV Push three times a day  pantoprazole  Injectable 40 milliGRAM(s) IV Push daily  polyethylene glycol 3350 17 Gram(s) Oral once  polyethylene glycol 3350 17 Gram(s) Oral two times a day  sodium chloride 0.9%. 1000 milliLiter(s) (100 mL/Hr) IV Continuous <Continuous>  ursodiol Capsule 300 milliGRAM(s) Oral two times a day    MEDICATIONS  (PRN):  sodium chloride 0.9% lock flush 10 milliLiter(s) IV Push every 1 hour PRN Pre/post blood products, medications, blood draw, and to maintain line patency      ALLERGIES:  No Known Allergies      LABS:                                            See note  See Note )-----------( Clotted    ( 19 May 2023 06:47 )             See note      135  |  103  |  7   ----------------------------<  115<H>  3.9   |  19<L>  |  0.66    Ca    8.7      19 May 2023 06:48    TPro  6.4  /  Alb  3.1<L>  /  TBili  8.0<H>  /  DBili  x   /  AST  156<H>  /  ALT  144<H>  /  AlkPhos  559<H>          LIVER, RIGHT, US GUIDED CORE BIOPSY AND FNA   Final Diagnosis   LIVER, RIGHT, US GUIDED CORE BIOPSY AND FNA   POSITIVE FOR MALIGNANT CELLS.   Metastatic adenocarcinoma with mucinous features   Core biopsies Â reveal infiltrating malignant process of adenocarcinoma   with mucinous features. Cytology slides show hypercellular   specimen composed of crowded groups and single-lying malignant cells   displaying anisonucleosis , irregular chromatin, and prominent nucleoli   in   a background of benign hepatocytes.        < from: MR MRCP w/wo IV Cont (05.15.23 @ 23:34) >  IMPRESSION:  1.  Findings suspicious for pancreatic adenocarcinoma with numerous   hepatic metastases.  2.  Intrahepatic biliary strictures with mild-moderate segmental   intrahepatic ductal dilatation, likely due to infiltrative hepatic   metastatic disease.  3.  Main portal vein and right portal vein thrombus, unclear if bland or   tumor thrombus.      --- End of Report ---    < end of copied text >  < from: [facility] Duplex Lower Ext Vein Scan, Bilat (05.15.23 @ 16:25) >  IMPRESSION:    Acute right gastrocnemius vein thrombus. Findings discussed with Dr. Shah by the ultrasound tech at 4:24 PM.  No left lower extremity DVT.    --- End of Report ---    < end of copied text >  < from: CT Head w/wo IV Cont (05.15.23 @ 02:08) >  IMPRESSION:  No acute intra-cranial hemorrhage, mass effect, or midline shift.    --- End of Report ---    < end of copied text >  < from: CT Angio Chest PE Protocol w/ IV Cont (05.15.23 @ 02:08) >  IMPRESSION:    Additional acute left upper lobe segmental pulmonary embolism is   visualized. No convincing evidence of right heart strain at this time.    Redemonstration of acute subsegmental right lower lobe pulmonary   embolism, similar to prior exam from same day.    No evidence of malignancy in the thorax.    --- End of Report ---    < end of copied text >  
CC: f/u for leukocytosis    Patient reports no complaints     REVIEW OF SYSTEMS:  All other review of systems negative (Comprehensive ROS)    Antimicrobials Day #  :    Other Medications Reviewed    T(F): 98.4 (05-21-23 @ 08:55), Max: 99.1 (05-20-23 @ 17:11)  HR: 100 (05-21-23 @ 08:55)  BP: 148/77 (05-21-23 @ 08:55)  RR: 17 (05-21-23 @ 08:55)  SpO2: 95% (05-21-23 @ 08:55)  Wt(kg): --    PHYSICAL EXAM:  General: alert, no acute distress  Eyes:  anicteric, no conjunctival injection, no discharge  Neck: supple  Lungs: clear to auscultation  Heart: regular rate and rhythm; no murmurs  Abdomen: softly distended, nontender. Bowel sounds +  Skin: no lesions  Extremities: non pitting edema. No clubbing.                    Left antecubital very small area of phlebitis < 1 cm. No fluctuance, no warm or TTP & appears to be resolving                   Right arm PICC +  Neurologic: awake & oriented x3, moves all extremities.       LAB RESULTS:                        9.9    9.29  )-----------( 195      ( 21 May 2023 07:09 )             29.9     05-21    130<L>  |  96  |  7   ----------------------------<  145<H>  3.9   |  21<L>  |  0.51    Ca    8.8      21 May 2023 07:10    TPro  6.0  /  Alb  3.1<L>  /  TBili  8.0<H>  /  DBili  x   /  AST  113<H>  /  ALT  115<H>  /  AlkPhos  489<H>  05-21    LIVER FUNCTIONS - ( 21 May 2023 07:10 )  Alb: 3.1 g/dL / Pro: 6.0 g/dL / ALK PHOS: 489 U/L / ALT: 115 U/L / AST: 113 U/L / GGT: x             MICROBIOLOGY:  RECENT CULTURES:            RADIOLOGY REVIEWED:    
CHIEF COMPLAINT  Jaundice    HISTORY OF PRESENT ILLNESS  AMY JAIMES is a 73y Female who presents with a chief complaint of jaundice    No acute events. She endorses slight fatigue.    REVIEW OF SYSTEMS  A complete review of systems was performed; negative except per HPI    PHYSICAL EXAM  Vital Signs Last 24 Hrs  T(C): 36.9 (21 May 2023 08:55), Max: 37.3 (20 May 2023 17:11)  T(F): 98.4 (21 May 2023 08:55), Max: 99.1 (20 May 2023 17:11)  HR: 100 (21 May 2023 08:55) (94 - 101)  BP: 148/77 (21 May 2023 08:55) (139/66 - 148/77)  BP(mean): --  RR: 17 (21 May 2023 08:55) (16 - 18)  SpO2: 95% (21 May 2023 08:55) (94% - 96%)    Parameters below as of 21 May 2023 08:55  Patient On (Oxygen Delivery Method): room air    Constitutional: alert, awake, in no acute distress  Eyes: PERRL, EOMI  HEENT: normocephalic, atraumatic  Neck: supple, non-tender  Cardiovascular: normal perfusion, tachycardic  Respiratory: normal respiratory efforts; no increased use of accessory muscles  Gastrointestinal: soft, non-tender  Musculoskeletal: normal range of motion, no deformities noted  Neurological: alert, CN II to XI grossly intact  Skin: warm, dry, jaundiced    LABORATORY DATA                                   9.9    9.29  )-----------( 195      ( 21 May 2023 07:09 )             29.9     05-21    130<L>  |  96  |  7   ----------------------------<  145<H>  3.9   |  21<L>  |  0.51    Ca    8.8      21 May 2023 07:10    TPro  6.0  /  Alb  3.1<L>  /  TBili  8.0<H>  /  DBili  x   /  AST  113<H>  /  ALT  115<H>  /  AlkPhos  489<H>  05-21  
Date of Service: 23 @ 23:46           CARDIOLOGY     PROGRESS  NOTE   ________________________________________________    CHIEF COMPLAINT:Patient is a 73y old  Female who presents with a chief complaint of Per chart, "73 female h/o htn, chol, presenting with c/o jaundice  Family noticed increased jaundice in the last 3 days.  Patient had elevated ALT and AST in February.  Ultrasound at that time was unremarkable.  Patient reports increasing belching increasing abdominal distention and 10 to 12 pound weight loss in the last 1 to 2 months.  She denies any fever, respiratory difficulty.  She does report darker urine in the last few days.  She has past surgical history of  x3, 40 years ago.  She is non-smoker.  She does not drink alcohol.  She has an appointment with GI in 2 weeks time."  constipation  	  REVIEW OF SYSTEMS:  CONSTITUTIONAL: No fever, weight loss, or fatigue  EYES: No eye pain, visual disturbances, or discharge  ENT:  No difficulty hearing, tinnitus, vertigo; No sinus or throat pain  NECK: No pain or stiffness  RESPIRATORY: No cough, wheezing, chills or hemoptysis; No Shortness of Breath  CARDIOVASCULAR: No chest pain, palpitations, passing out, dizziness, or leg swelling  GASTROINTESTINAL: + abdominal or epigastric pain. No nausea, vomiting, or hematemesis; No diarrhea or constipation. No melena or hematochezia.  GENITOURINARY: No dysuria, frequency, hematuria, or incontinence  NEUROLOGICAL: No headaches, memory loss, loss of strength, numbness, or tremors  SKIN: No itching, burning, rashes, or lesions   LYMPH Nodes: No enlarged glands  ENDOCRINE: No heat or cold intolerance; No hair loss  MUSCULOSKELETAL: No joint pain or swelling; No muscle, back, or extremity pain  PSYCHIATRIC: No depression, anxiety, mood swings, or difficulty sleeping  HEME/LYMPH: No easy bruising, or bleeding gums  ALLERGY AND IMMUNOLOGIC: No hives or eczema	    [ x] All others negative	  [ ] Unable to obtain    PHYSICAL EXAM:  T(C): 36.8 (23 @ 15:24), Max: 36.9 (23 @ 08:55)  HR: 86 (23 @ 15:24) (86 - 100)  BP: 102/51 (23 @ 15:24) (102/51 - 148/77)  RR: 17 (23 @ 15:24) (16 - 17)  SpO2: 95% (23 @ 15:24) (94% - 95%)  Wt(kg): --  I&O's Summary    20 May 2023 07:  -  21 May 2023 07:00  --------------------------------------------------------  IN: 1861 mL / OUT: 1450 mL / NET: 411 mL    21 May 2023 07:01  -  21 May 2023 23:46  --------------------------------------------------------  IN: 1250 mL / OUT: 1600 mL / NET: -350 mL        Appearance: Normal	  HEENT:   Normal oral mucosa, PERRL, EOMI	  Lymphatic: No lymphadenopathy  Cardiovascular: Normal S1 S2, No JVD, + murmurs, No edema  Respiratory: rhonchi  Psychiatry: A & O x 3, Mood & affect appropriate  Gastrointestinal:  Soft, Non-tender, + BS	  Skin: No rashes, No ecchymoses, No cyanosis	  Neurologic: Non-focal  Extremities: Normal range of motion, No clubbing, cyanosis or edema  Vascular: Peripheral pulses palpable 2+ bilaterally    MEDICATIONS  (STANDING):  bacitracin   Ointment 1 Application(s) Topical two times a day  chlorhexidine 4% Liquid 1 Application(s) Topical <User Schedule>  enoxaparin Injectable 70 milliGRAM(s) SubCutaneous every 12 hours  levothyroxine 25 MICROGram(s) Oral daily  pantoprazole  Injectable 40 milliGRAM(s) IV Push daily  polyethylene glycol 3350 17 Gram(s) Oral two times a day  senna 2 Tablet(s) Oral at bedtime  sodium chloride 0.9%. 1000 milliLiter(s) (50 mL/Hr) IV Continuous <Continuous>  ursodiol Capsule 300 milliGRAM(s) Oral two times a day      TELEMETRY: 	    ECG:  	  RADIOLOGY:  OTHER: 	  	  LABS:	 	    CARDIAC MARKERS:                                9.9    9.29  )-----------( 195      ( 21 May 2023 07:09 )             29.9         130<L>  |  96  |  7   ----------------------------<  145<H>  3.9   |  21<L>  |  0.51    Ca    8.8      21 May 2023 07:10    TPro  6.0  /  Alb  3.1<L>  /  TBili  8.0<H>  /  DBili  x   /  AST  113<H>  /  ALT  115<H>  /  AlkPhos  489<H>      proBNP:   Lipid Profile:   HgA1c:   TSH: Thyroid Stimulating Hormone, Serum: 8.46 uIU/mL ( @ 06:48)  Thyroid Stimulating Hormone, Serum: 10.50 uIU/mL ( @ 06:14)          Assessment and plan  ---------------------------  73 female h/o htn, chol, presenting with c/o jaundice  Family noticed increased jaundice in the last 3 days.  Patient had elevated ALT and AST in February.  Ultrasound at that time was unremarkable.  Patient reports increasing belching increasing abdominal distention and 10 to 12 pound weight loss in the last 1 to 2 months.  She denies any fever, respiratory difficulty.  She does report darker urine in the last few days.  She has past surgical history of  x3, 40 years ago.  She is non-smoker.  She does not drink alcohol.  She has an appointment with GI in 2 weeks time.   pt with acute PE and thrombosis on iv heparin  no r heart strain on ct, check echo today  MRCP noted will discuss with family  onc eval called awaiting call back  GI called, will call the family  re start on ac as clear by IR, will discuss with onc regarding switching to Lovenox  pt wants to be transferred to [facility] is getting chemo   increaase bili  oob to chair  PE continue Lovenox  laxative    	        
AMY JAIMES  73y Female  MRN:[id number]      HPI:  73 female h/o htn, chol, presenting with c/o jaundice  Family noticed increased jaundice in the last 3 days.  Patient had elevated ALT and AST in February.  Ultrasound at that time was unremarkable.  Patient reports increasing belching increasing abdominal distention and 10 to 12 pound weight loss in the last 1 to 2 months.  She denies any fever, respiratory difficulty.  She does report darker urine in the last few days.  She has past surgical history of  x3, 40 years ago.  She is non-smoker.  She does not drink alcohol.  She has an appointment with GI in 2 weeks time. (15 May 2023 07:45)      Patient seen and evaluated at bedside. No acute events overnight except as noted.    Interval HPI: started chemo today. tolerating well    PAST MEDICAL & SURGICAL HISTORY:  Essential hypertension      Hyperlipidemia          REVIEW OF SYSTEMS:  as per hpi     VITALS:   Vital Signs Last 24 Hrs  T(C): 37.3 (20 May 2023 17:11), Max: 37.4 (20 May 2023 00:30)  T(F): 99.1 (20 May 2023 17:11), Max: 99.4 (20 May 2023 00:30)  HR: 101 (20 May 2023 17:11) (90 - 107)  BP: 147/68 (20 May 2023 17:11) (113/70 - 147/68)  BP(mean): --  RR: 18 (20 May 2023 17:11) (16 - 18)  SpO2: 96% (20 May 2023 17:11) (96% - 99%)    Parameters below as of 20 May 2023 17:11  Patient On (Oxygen Delivery Method): room air            PHYSICAL EXAM:  GENERAL: NAD, well-developed  HEAD:  Atraumatic, Normocephalic  EYES: EOMI, PERRLA, conjunctiva and sclera icteric   NECK: Supple, No JVD  CHEST/LUNG: Clear to auscultation bilaterally; No wheeze  HEART: S1, S2; No murmurs, rubs, or gallops  ABDOMEN: Soft, Nontender, Nondistended; Bowel sounds present  EXTREMITIES:  2+ Peripheral Pulses, No clubbing, cyanosis. ++edema. +RUE picc  PSYCH: Normal affect  NEUROLOGY: AAOX3; non-focal  SKIN: No rashes or lesions  +jaundice     Consultant(s) Notes Reviewed:  [x ] YES  [ ] NO  Care Discussed with Consultants/Other Providers [ x] YES  [ ] NO    MEDS:   MEDICATIONS  (STANDING):  bacitracin   Ointment 1 Application(s) Topical two times a day  chlorhexidine 4% Liquid 1 Application(s) Topical <User Schedule>  enoxaparin Injectable 70 milliGRAM(s) SubCutaneous every 12 hours  fluorouracil IVPB (eMAR) 2100 milliGRAM(s) IV Intermittent daily  levothyroxine 25 MICROGram(s) Oral daily  pantoprazole  Injectable 40 milliGRAM(s) IV Push daily  polyethylene glycol 3350 17 Gram(s) Oral two times a day  senna 2 Tablet(s) Oral at bedtime  sodium chloride 0.9%. 1000 milliLiter(s) (50 mL/Hr) IV Continuous <Continuous>  ursodiol Capsule 300 milliGRAM(s) Oral two times a day    MEDICATIONS  (PRN):  ondansetron Injectable 4 milliGRAM(s) IV Push every 4 hours PRN Nausea  sodium chloride 0.9% lock flush 10 milliLiter(s) IV Push every 1 hour PRN Pre/post blood products, medications, blood draw, and to maintain line patency        ALLERGIES:  No Known Allergies      LABS:                                    9.7    10.89 )-----------( 216      ( 20 May 2023 06:47 )             29.7       135  |  101  |  5<L>  ----------------------------<  97  3.7   |  21<L>  |  0.55    Ca    8.5      20 May 2023 06:46    TPro  5.8<L>  /  Alb  2.7<L>  /  TBili  7.6<H>  /  DBili  x   /  AST  136<H>  /  ALT  118<H>  /  AlkPhos  493<H>          LIVER, RIGHT, US GUIDED CORE BIOPSY AND FNA   Final Diagnosis   LIVER, RIGHT, US GUIDED CORE BIOPSY AND FNA   POSITIVE FOR MALIGNANT CELLS.   Metastatic adenocarcinoma with mucinous features   Core biopsies Â reveal infiltrating malignant process of adenocarcinoma   with mucinous features. Cytology slides show hypercellular   specimen composed of crowded groups and single-lying malignant cells   displaying anisonucleosis , irregular chromatin, and prominent nucleoli   in   a background of benign hepatocytes.        < from: MR MRCP w/wo IV Cont (05.15.23 @ 23:34) >  IMPRESSION:  1.  Findings suspicious for pancreatic adenocarcinoma with numerous   hepatic metastases.  2.  Intrahepatic biliary strictures with mild-moderate segmental   intrahepatic ductal dilatation, likely due to infiltrative hepatic   metastatic disease.  3.  Main portal vein and right portal vein thrombus, unclear if bland or   tumor thrombus.      --- End of Report ---    < end of copied text >  < from: [facility] Duplex Lower Ext Vein Scan, Bilat (05.15.23 @ 16:25) >  IMPRESSION:    Acute right gastrocnemius vein thrombus. Findings discussed with Dr. Shah by the ultrasound tech at 4:24 PM.  No left lower extremity DVT.    --- End of Report ---    < end of copied text >  < from: CT Head w/wo IV Cont (05.15.23 @ 02:08) >  IMPRESSION:  No acute intra-cranial hemorrhage, mass effect, or midline shift.    --- End of Report ---    < end of copied text >  < from: CT Angio Chest PE Protocol w/ IV Cont (05.15.23 @ 02:08) >  IMPRESSION:    Additional acute left upper lobe segmental pulmonary embolism is   visualized. No convincing evidence of right heart strain at this time.    Redemonstration of acute subsegmental right lower lobe pulmonary   embolism, similar to prior exam from same day.    No evidence of malignancy in the thorax.    --- End of Report ---    < end of copied text >  
CHIEF COMPLAINT  Jaundice    HISTORY OF PRESENT ILLNESS  AMY JAIMES is a 73y Female who presents with a chief complaint of jaundice    No acute events. No complaints.    REVIEW OF SYSTEMS  A complete review of systems was performed; negative except per HPI    PHYSICAL EXAM  Vital Signs Last 24 Hrs  T(C): 37.3 (22 May 2023 08:54), Max: 37.3 (22 May 2023 08:54)  T(F): 99.1 (22 May 2023 08:54), Max: 99.1 (22 May 2023 08:54)  HR: 102 (22 May 2023 08:54) (86 - 102)  BP: 117/61 (22 May 2023 08:54) (102/51 - 117/61)  BP(mean): --  RR: 18 (22 May 2023 08:54) (17 - 18)  SpO2: 95% (22 May 2023 00:11) (95% - 95%)    Parameters below as of 22 May 2023 08:54  Patient On (Oxygen Delivery Method): room air    Constitutional: alert, awake, in no acute distress  Eyes: PERRL, EOMI  HEENT: normocephalic, atraumatic  Neck: supple, non-tender  Cardiovascular: normal perfusion, tachycardic  Respiratory: normal respiratory efforts; no increased use of accessory muscles  Gastrointestinal: soft, non-tender  Musculoskeletal: normal range of motion, no deformities noted  Neurological: alert, CN II to XI grossly intact  Skin: warm, dry, jaundiced    LABORATORY DATA                          10.4   12.17 )-----------( 236      ( 22 May 2023 07:05 )             30.3     05-22    132<L>  |  94<L>  |  12  ----------------------------<  103<H>  3.7   |  25  |  0.61    Ca    8.5      22 May 2023 07:05    TPro  5.8<L>  /  Alb  2.8<L>  /  TBili  8.9<H>  /  DBili  x   /  AST  186<H>  /  ALT  139<H>  /  AlkPhos  482<H>  05-22  
Interventional Radiology Follow-Up Note.    Patient seen and examined @ bedside around 6:30am.    This is a 73y Female s/p liver lesion biopsy on 5/16/23 in Interventional Radiology with Dr. Ramachandran.    Patient denies any pain at site of biopsy but states she has overall abdominal pain, stable from prior to biopsy.      Medications:  heparin  Infusion.: (05-15)    Vitals:  T(F): 99, Max: 99 (00:00)  HR: 86  BP: 116/64  RR: 18  SpO2: 95%    Physical Exam:  General: Nontoxic, in NAD.  Abdomen: Soft, ND, nontender. Dressing c/d/i.      Aspartate Aminotransferase (AST/SGOT): 193 U/L (05-17-23 @ 06:24)  Alanine Aminotransferase (ALT/SGPT): 134 U/L (05-17-23 @ 06:24)  Aspartate Aminotransferase (AST/SGOT): 143 U/L (05-16-23 @ 07:12)  Alanine Aminotransferase (ALT/SGPT): 102 U/L (05-16-23 @ 07:12)  Aspartate Aminotransferase (AST/SGOT): 124 U/L (05-15-23 @ 11:55)  Alanine Aminotransferase (ALT/SGPT): 102 U/L (05-15-23 @ 11:55)        LABS:  Na: 133 (05-17 @ 06:24), 134 (05-16 @ 07:12), 135 (05-15 @ 11:55), 132 (05-14 @ 19:59)  K: 3.9 (05-17 @ 06:24), 3.6 (05-16 @ 07:12), 3.5 (05-15 @ 11:55), 3.8 (05-14 @ 19:59)  Cl: 98 (05-17 @ 06:24), 97 (05-16 @ 07:12), 97 (05-15 @ 11:55), 94 (05-14 @ 19:59)  CO2: 22 (05-17 @ 06:24), 21 (05-16 @ 07:12), 24 (05-15 @ 11:55), 23 (05-14 @ 19:59)  BUN: 6 (05-17 @ 06:24), 6 (05-16 @ 07:12), 7 (05-15 @ 11:55), 10 (05-14 @ 19:59)  Cr: 0.66 (05-17 @ 06:24), 0.63 (05-16 @ 07:12), 0.56 (05-15 @ 11:55), 0.68 (05-14 @ 19:59)  Glu: 114(05-17 @ 06:24), 109(05-16 @ 07:12), 143(05-15 @ 11:55), 124(05-14 @ 19:59)    Hgb: 11.4 (05-17 @ 06:24), 10.3 (05-16 @ 07:12), 10.9 (05-15 @ 11:55), 11.5 (05-14 @ 19:59)  Hct: 34.4 (05-17 @ 06:24), 32.8 (05-16 @ 07:12), 34.2 (05-15 @ 11:55), 35.7 (05-14 @ 19:59)  WBC: 11.72 (05-17 @ 06:24), 7.57 (05-16 @ 07:12), 9.16 (05-15 @ 11:55), 13.44 (05-14 @ 19:59)  Plt: 229 (05-17 @ 06:24), 185 (05-16 @ 07:12), 190 (05-15 @ 11:55), 232 (05-14 @ 19:59)    INR: 1.00 05-16-23 @ 07:12, 1.07 05-14-23 @ 19:59  PTT: 26.7 05-16-23 @ 07:12, 27.6 05-15-23 @ 21:51, 174.5 05-15-23 @ 11:55, 27.1 05-14-23 @ 19:59          LIVER FUNCTIONS - ( 17 May 2023 06:24 )  Alb: 3.3 g/dL / Pro: 6.7 g/dL / ALK PHOS: 614 U/L / ALT: 134 U/L / AST: 193 U/L / GGT: x                    Assessment/Plan:  72yo F with PMH of HTN, HLD who presents with jaundice, found to have pancreatic tail mass.  Pt most recently s/p liver lesion biopsy on 5/16/23.   Successful core biopsy and fine needle aspiration of right hepatic lobe liver lesion.    - From IR procedure standpoint: OK to resume hep gtt 24 hours post procedure  - f/u cytopathology  - OK to remove dressing 24hrs post procedure  - IR will sign off    Please call IR at [phone] with any questions, concerns, or issues regarding above.    Also available on Teams.  
Patient is a 73y old  Female who presents with a chief complaint of Per chart, "73 female h/o htn, chol, presenting with c/o jaundice  Family noticed increased jaundice in the last 3 days.  Patient had elevated ALT and AST in February.  Ultrasound at that time was unremarkable.  Patient reports increasing belching increasing abdominal distention and 10 to 12 pound weight loss in the last 1 to 2 months.  She denies any fever, respiratory difficulty.  She does report darker urine in the last few days.  She has past surgical history of  x3, 40 years ago.  She is non-smoker.  She does not drink alcohol.  She has an appointment with GI in 2 weeks time."(17 May 2023 14:46)    No acute overnight events.  Patient appears comfortable in bed  No new complaints offered    MEDICATIONS  (STANDING):  enoxaparin Injectable 70 milliGRAM(s) SubCutaneous every 12 hours  levothyroxine 25 MICROGram(s) Oral daily  ondansetron Injectable 4 milliGRAM(s) IV Push three times a day  pantoprazole  Injectable 40 milliGRAM(s) IV Push daily  sodium chloride 0.9%. 1000 milliLiter(s) (100 mL/Hr) IV Continuous <Continuous>  ursodiol Capsule 300 milliGRAM(s) Oral two times a day    MEDICATIONS  (PRN):    Vital Signs Last 24 Hrs  T(C): 37 (18 May 2023 08:49), Max: 37.1 (17 May 2023 23:41)  T(F): 98.6 (18 May 2023 08:49), Max: 98.7 (17 May 2023 23:41)  HR: 77 (18 May 2023 08:49) (77 - 82)  BP: 128/73 (18 May 2023 08:49) (115/70 - 135/68)  BP(mean): --  RR: 18 (18 May 2023 08:49) (18 - 18)  SpO2: 95% (18 May 2023 08:49) (95% - 97%)    Parameters below as of 18 May 2023 08:49  Patient On (Oxygen Delivery Method): room air        PE  NAD  Awake, alert  Anicteric, MMM  RRR  CTAB  Abd soft, NT, ND  No c/c/e  No rash grossly  FROM                          11.0   13.88 )-----------( 241      ( 18 May 2023 06:14 )             33.7           136  |  101  |  4<L>  ----------------------------<  103<H>  4.0   |  23  |  0.66    Ca    9.0      18 May 2023 06:14    TPro  6.4  /  Alb  3.2<L>  /  TBili  7.7<H>  /  DBili  x   /  AST  189<H>  /  ALT  154<H>  /  AlkPhos  592<H>          ACC: [id number] EXAM:  MR MRCP WAW IC   ORDERED BY: FARZANEH BACON     PROCEDURE DATE:  05/15/2023          INTERPRETATION:  CLINICAL INFORMATION: Abnormal CT. Numerous liver   lesions.    COMPARISON: CT 2023    CONTRAST/COMPLICATIONS:  IV Contrast: Gadavist  7.5 cc administered   0 cc discarded  Oral Contrast: NONE  Complications: None reported at time of study completion    PROCEDURE:  MRI of the abdomen was performed.  MRCP was performed.    FINDINGS:  LOWER CHEST: Within normal limits.    LIVER: Numerous liver lesions which demonstrate mild T2 hyperintense   signal, diffusion restriction and hyperenhancement; findings consistent   with metastases. A reference left hepatic lobe mass measures 2.5 x 1.8 cm   (23, 22).  BILE DUCTS: No extrahepatic biliary ductal dilatation. Numerous   intrahepatic biliary strictures with mild to moderate intrahepatic ductal   dilatation, suspected to be due to infiltrative hepatic metastatic   disease.  GALLBLADDER: Contracted. Gallbladder wall edema.  SPLEEN: Within normal limits.  PANCREAS: Signal and enhancement abnormality of the distal pancreatic   body and tail is suspicious for infiltrative tumor. A suspected distal   pancreatic body mass measures at least 3.5 cm on DWI (26, 63). Pancreas   duct is not well-visualized within the distal body/tail. A 1.5 cm   loculated fluid collection adjacent to pancreatic tail.  ADRENALS: Within normal limits.  KIDNEYS/URETERS: Within normal limits.    VISUALIZED PORTIONS:  BOWEL: Within normal limits.  PERITONEUM: Small volume ascites.  VESSELS: Splenic vein not visualized. Partially occlusive thrombus   extending from the portal confluence into the main portal vein. Occlusive   thrombus of the right portal vein.  RETROPERITONEUM/LYMPH NODES: No lymphadenopathy.  ABDOMINAL WALL: Within normal limits.  BONES: Within normal limits.    IMPRESSION:  1.  Findings suspicious for pancreatic adenocarcinoma with numerous   hepatic metastases.  2.  Intrahepatic biliary strictures with mild-moderate segmental   intrahepatic ductal dilatation, likely due to infiltrative hepatic   metastatic disease.  3.  Main portal vein and right portal vein thrombus, unclear if bland or   tumor thrombus.      --- End of Report --
AMY JAIMES  73y Female  MRN:[id number]      HPI:  73 female h/o htn, chol, presenting with c/o jaundice  Family noticed increased jaundice in the last 3 days.  Patient had elevated ALT and AST in February.  Ultrasound at that time was unremarkable.  Patient reports increasing belching increasing abdominal distention and 10 to 12 pound weight loss in the last 1 to 2 months.  She denies any fever, respiratory difficulty.  She does report darker urine in the last few days.  She has past surgical history of  x3, 40 years ago.  She is non-smoker.  She does not drink alcohol.  She has an appointment with GI in 2 weeks time. (15 May 2023 07:45)      Patient seen and evaluated at bedside. No acute events overnight except as noted.    Interval HPI: c/o slight fatigue     PAST MEDICAL & SURGICAL HISTORY:  Essential hypertension      Hyperlipidemia          REVIEW OF SYSTEMS:  as per hpi     VITALS:   Vital Signs Last 24 Hrs  T(C): 36.9 (21 May 2023 08:55), Max: 37.3 (20 May 2023 17:11)  T(F): 98.4 (21 May 2023 08:55), Max: 99.1 (20 May 2023 17:11)  HR: 100 (21 May 2023 08:55) (94 - 101)  BP: 148/77 (21 May 2023 08:55) (139/66 - 148/77)  BP(mean): --  RR: 17 (21 May 2023 08:55) (16 - 18)  SpO2: 95% (21 May 2023 08:55) (94% - 96%)    Parameters below as of 21 May 2023 08:55  Patient On (Oxygen Delivery Method): room air          PHYSICAL EXAM:  GENERAL: NAD, well-developed  HEAD:  Atraumatic, Normocephalic  EYES: EOMI, PERRLA, conjunctiva and sclera icteric   NECK: Supple, No JVD  CHEST/LUNG: Clear to auscultation bilaterally; No wheeze  HEART: S1, S2; No murmurs, rubs, or gallops  ABDOMEN: Soft, Nontender, Nondistended; Bowel sounds present  EXTREMITIES:  2+ Peripheral Pulses, No clubbing, cyanosis. ++edema. +RUE picc  PSYCH: Normal affect  NEUROLOGY: AAOX3; non-focal  SKIN: No rashes or lesions  +jaundice     Consultant(s) Notes Reviewed:  [x ] YES  [ ] NO  Care Discussed with Consultants/Other Providers [ x] YES  [ ] NO    MEDS:   MEDICATIONS  (STANDING):  bacitracin   Ointment 1 Application(s) Topical two times a day  chlorhexidine 4% Liquid 1 Application(s) Topical <User Schedule>  enoxaparin Injectable 70 milliGRAM(s) SubCutaneous every 12 hours  fluorouracil IVPB (eMAR) 2100 milliGRAM(s) IV Intermittent daily  levothyroxine 25 MICROGram(s) Oral daily  pantoprazole  Injectable 40 milliGRAM(s) IV Push daily  polyethylene glycol 3350 17 Gram(s) Oral two times a day  senna 2 Tablet(s) Oral at bedtime  sodium chloride 0.9%. 1000 milliLiter(s) (50 mL/Hr) IV Continuous <Continuous>  ursodiol Capsule 300 milliGRAM(s) Oral two times a day    MEDICATIONS  (PRN):  ondansetron Injectable 4 milliGRAM(s) IV Push every 4 hours PRN Nausea  sodium chloride 0.9% lock flush 10 milliLiter(s) IV Push every 1 hour PRN Pre/post blood products, medications, blood draw, and to maintain line patency        ALLERGIES:  No Known Allergies      LABS:                                        9.9    9.29  )-----------( 195      ( 21 May 2023 07:09 )             29.9       130<L>  |  96  |  7   ----------------------------<  145<H>  3.9   |  21<L>  |  0.51    Ca    8.8      21 May 2023 07:10    TPro  6.0  /  Alb  3.1<L>  /  TBili  8.0<H>  /  DBili  x   /  AST  113<H>  /  ALT  115<H>  /  AlkPhos  489<H>        LIVER, RIGHT, US GUIDED CORE BIOPSY AND FNA   Final Diagnosis   LIVER, RIGHT, US GUIDED CORE BIOPSY AND FNA   POSITIVE FOR MALIGNANT CELLS.   Metastatic adenocarcinoma with mucinous features   Core biopsies Â reveal infiltrating malignant process of adenocarcinoma   with mucinous features. Cytology slides show hypercellular   specimen composed of crowded groups and single-lying malignant cells   displaying anisonucleosis , irregular chromatin, and prominent nucleoli   in   a background of benign hepatocytes.        < from: MR MRCP w/wo IV Cont (05.15.23 @ 23:34) >  IMPRESSION:  1.  Findings suspicious for pancreatic adenocarcinoma with numerous   hepatic metastases.  2.  Intrahepatic biliary strictures with mild-moderate segmental   intrahepatic ductal dilatation, likely due to infiltrative hepatic   metastatic disease.  3.  Main portal vein and right portal vein thrombus, unclear if bland or   tumor thrombus.      --- End of Report ---    < end of copied text >  < from: [facility] Duplex Lower Ext Vein Scan, Bilat (05.15.23 @ 16:25) >  IMPRESSION:    Acute right gastrocnemius vein thrombus. Findings discussed with Dr. Shah by the ultrasound tech at 4:24 PM.  No left lower extremity DVT.    --- End of Report ---    < end of copied text >  < from: CT Head w/wo IV Cont (05.15.23 @ 02:08) >  IMPRESSION:  No acute intra-cranial hemorrhage, mass effect, or midline shift.    --- End of Report ---    < end of copied text >  < from: CT Angio Chest PE Protocol w/ IV Cont (05.15.23 @ 02:08) >  IMPRESSION:    Additional acute left upper lobe segmental pulmonary embolism is   visualized. No convincing evidence of right heart strain at this time.    Redemonstration of acute subsegmental right lower lobe pulmonary   embolism, similar to prior exam from same day.    No evidence of malignancy in the thorax.    --- End of Report ---    < end of copied text >  
AMY JAIMES  73y Female  MRN:[id number]      HPI:  73 female h/o htn, chol, presenting with c/o jaundice  Family noticed increased jaundice in the last 3 days.  Patient had elevated ALT and AST in February.  Ultrasound at that time was unremarkable.  Patient reports increasing belching increasing abdominal distention and 10 to 12 pound weight loss in the last 1 to 2 months.  She denies any fever, respiratory difficulty.  She does report darker urine in the last few days.  She has past surgical history of  x3, 40 years ago.  She is non-smoker.  She does not drink alcohol.  She has an appointment with GI in 2 weeks time. (15 May 2023 07:45)      Patient seen and evaluated at bedside. No acute events overnight except as noted.    Interval HPI: no acute events o/n    PAST MEDICAL & SURGICAL HISTORY:  Essential hypertension      Hyperlipidemia          REVIEW OF SYSTEMS:  as per hpi     VITALS:   Vital Signs Last 24 Hrs  T(C): 37.3 (22 May 2023 08:54), Max: 37.3 (22 May 2023 08:54)  T(F): 99.1 (22 May 2023 08:54), Max: 99.1 (22 May 2023 08:54)  HR: 102 (22 May 2023 08:54) (86 - 102)  BP: 117/61 (22 May 2023 08:54) (102/51 - 117/61)  BP(mean): --  RR: 18 (22 May 2023 08:54) (17 - 18)  SpO2: 95% (22 May 2023 00:11) (95% - 95%)    Parameters below as of 22 May 2023 08:54  Patient On (Oxygen Delivery Method): room air       PHYSICAL EXAM:  GENERAL: NAD, well-developed  HEAD:  Atraumatic, Normocephalic  EYES: EOMI, PERRLA, conjunctiva and sclera icteric   NECK: Supple, No JVD  CHEST/LUNG: Clear to auscultation bilaterally; No wheeze  HEART: S1, S2; No murmurs, rubs, or gallops  ABDOMEN: Soft, Nontender, Nondistended; Bowel sounds present  EXTREMITIES:  2+ Peripheral Pulses, No clubbing, cyanosis. ++edema. +RUE picc  PSYCH: Normal affect  NEUROLOGY: AAOX3; non-focal  SKIN: No rashes or lesions  +jaundice     Consultant(s) Notes Reviewed:  [x ] YES  [ ] NO  Care Discussed with Consultants/Other Providers [ x] YES  [ ] NO    MEDS:   MEDICATIONS  (STANDING):  bacitracin   Ointment 1 Application(s) Topical two times a day  chlorhexidine 4% Liquid 1 Application(s) Topical <User Schedule>  enoxaparin Injectable 70 milliGRAM(s) SubCutaneous every 12 hours  furosemide   Injectable 40 milliGRAM(s) IV Push daily  levothyroxine 25 MICROGram(s) Oral daily  pantoprazole  Injectable 40 milliGRAM(s) IV Push daily  polyethylene glycol 3350 17 Gram(s) Oral two times a day  senna 2 Tablet(s) Oral at bedtime  sodium chloride 0.9%. 1000 milliLiter(s) (50 mL/Hr) IV Continuous <Continuous>  ursodiol Capsule 300 milliGRAM(s) Oral two times a day    MEDICATIONS  (PRN):  ondansetron Injectable 4 milliGRAM(s) IV Push every 4 hours PRN Nausea  sodium chloride 0.9% lock flush 10 milliLiter(s) IV Push every 1 hour PRN Pre/post blood products, medications, blood draw, and to maintain line patency      ALLERGIES:  No Known Allergies      LABS:                                                          10.4   12.17 )-----------( 236      ( 22 May 2023 07:05 )             30.3       132<L>  |  94<L>  |  12  ----------------------------<  103<H>  3.7   |  25  |  0.61    Ca    8.5      22 May 2023 07:05    TPro  5.8<L>  /  Alb  2.8<L>  /  TBili  8.9<H>  /  DBili  x   /  AST  186<H>  /  ALT  139<H>  /  AlkPhos  482<H>      LIVER, RIGHT, US GUIDED CORE BIOPSY AND FNA   Final Diagnosis   LIVER, RIGHT, US GUIDED CORE BIOPSY AND FNA   POSITIVE FOR MALIGNANT CELLS.   Metastatic adenocarcinoma with mucinous features   Core biopsies Â reveal infiltrating malignant process of adenocarcinoma   with mucinous features. Cytology slides show hypercellular   specimen composed of crowded groups and single-lying malignant cells   displaying anisonucleosis , irregular chromatin, and prominent nucleoli   in   a background of benign hepatocytes.        < from: MR MRCP w/wo IV Cont (05.15.23 @ 23:34) >  IMPRESSION:  1.  Findings suspicious for pancreatic adenocarcinoma with numerous   hepatic metastases.  2.  Intrahepatic biliary strictures with mild-moderate segmental   intrahepatic ductal dilatation, likely due to infiltrative hepatic   metastatic disease.  3.  Main portal vein and right portal vein thrombus, unclear if bland or   tumor thrombus.      --- End of Report ---    < end of copied text >  < from: [facility] Duplex Lower Ext Vein Scan, Bilat (05.15.23 @ 16:25) >  IMPRESSION:    Acute right gastrocnemius vein thrombus. Findings discussed with Dr. Shah by the ultrasound tech at 4:24 PM.  No left lower extremity DVT.    --- End of Report ---    < end of copied text >  < from: CT Head w/wo IV Cont (05.15.23 @ 02:08) >  IMPRESSION:  No acute intra-cranial hemorrhage, mass effect, or midline shift.    --- End of Report ---    < end of copied text >  < from: CT Angio Chest PE Protocol w/ IV Cont (05.15.23 @ 02:08) >  IMPRESSION:    Additional acute left upper lobe segmental pulmonary embolism is   visualized. No convincing evidence of right heart strain at this time.    Redemonstration of acute subsegmental right lower lobe pulmonary   embolism, similar to prior exam from same day.    No evidence of malignancy in the thorax.    --- End of Report ---    < end of copied text >  
CC: f/u for leukocytosis             for left antecubital phlebitis - resolved    Patient reports no complaints. Abd bloating better.     REVIEW OF SYSTEMS:  All other review of systems negative (Comprehensive ROS)    Antimicrobials Day #  :    Other Medications Reviewed    T(F): 99.1 (05-22-23 @ 08:54), Max: 99.1 (05-22-23 @ 08:54)  HR: 102 (05-22-23 @ 08:54)  BP: 117/61 (05-22-23 @ 08:54)  RR: 18 (05-22-23 @ 08:54)  SpO2: 95% (05-22-23 @ 00:11)  Wt(kg): --    PHYSICAL EXAM:  General: alert, no acute distress  Eyes:  anicteric, no conjunctival injection, no discharge  Neck: supple  Lungs: clear to auscultation  Heart: regular rate and rhythm; no murmurs  Abdomen: softly distended, nontender. Bowel sounds +  Skin: no lesions  Extremities: non pitting edema. No clubbing.                    Left antecubital very small area of phlebitis - now resolved.                    Right arm PICC +  Neurologic: awake & oriented x3, moves all extremities.     LAB RESULTS:                        10.4   12.17 )-----------( 236      ( 22 May 2023 07:05 )             30.3     05-22    132<L>  |  94<L>  |  12  ----------------------------<  103<H>  3.7   |  25  |  0.61    Ca    8.5      22 May 2023 07:05    TPro  5.8<L>  /  Alb  2.8<L>  /  TBili  8.9<H>  /  DBili  x   /  AST  186<H>  /  ALT  139<H>  /  AlkPhos  482<H>  05-22    LIVER FUNCTIONS - ( 22 May 2023 07:05 )  Alb: 2.8 g/dL / Pro: 5.8 g/dL / ALK PHOS: 482 U/L / ALT: 139 U/L / AST: 186 U/L / GGT: x             MICROBIOLOGY:  RECENT CULTURES:            RADIOLOGY REVIEWED:

## 2023-05-22 NOTE — DISCHARGE NOTE PROVIDER - HOSPITAL COURSE
73 female h/o htn, chol, presenting with c/o jaundice  Family noticed increased jaundice in the last 3 days.  Patient had elevated ALT and AST in February.  Ultrasound at that time was unremarkable.  Patient reports increasing belching increasing abdominal distention and 10 to 12 pound weight loss in the last 1 to 2 months. Pt found to have liver lesions, PE, portal v thrombosis on workup including mri and mrcp found to have pancreatic mass with liver mets. Pt underwent liver lesion biopsy on 5/16 that was positive for metastatic adenocarcinoma with mucinous features. IR was consulted for possible stent placement in which they stated no role for stent or drain due to tumor infiltration. The pt had PICC line placed and received 73 female h/o htn, chol, presenting with c/o jaundice  Family noticed increased jaundice in the last 3 days.  Patient had elevated ALT and AST in February.  Ultrasound at that time was unremarkable.  Patient reports increasing belching increasing abdominal distention and 10 to 12 pound weight loss in the last 1 to 2 months. Pt found to have liver lesions, PE, portal v thrombosis on workup including mri and mrcp found to have pancreatic mass with liver mets. Pt underwent liver lesion biopsy on 5/16 that was positive for metastatic adenocarcinoma with mucinous features. IR was consulted for possible stent placement in which they stated no role for stent or drain due to tumor infiltration. The pt had PICC line placed and received fluorouracil x 2 and oxaliplatin x1. The pt it set to have f/u with [facility] on Wednesday 5/25. Pt also was c/o SOB overnight into 5/21 so CXr was performed and pt received lasix which helped to resolve her sx which were suspected to be due to pulmonary congestion. Pt to go home on Lovenox for VTE prophylaxis, has a family member who is pharmacist who can perform the injections for pt. 73 female h/o htn, chol, presenting with c/o jaundice  Family noticed increased jaundice in the last 3 days.  Patient had elevated ALT and AST in February.  Ultrasound at that time was unremarkable.  Patient reports increasing belching increasing abdominal distention and 10 to 12 pound weight loss in the last 1 to 2 months. Pt found to have liver lesions, PE, portal v thrombosis on workup including mri and mrcp found to have pancreatic mass with liver mets. Pt underwent liver lesion biopsy on 5/16 that was positive for metastatic adenocarcinoma with mucinous features. IR was consulted for possible stent placement in which they stated no role for stent or drain due to tumor infiltration. The pt had PICC line placed and received fluorouracil x 2 and oxaliplatin x1. The pt it set to have f/u with [facility] on Wednesday 5/25. Pt also was c/o SOB overnight into 5/21 so CXr was performed and pt received lasix which helped to resolve her sx which were suspected to be due to pulmonary congestion. Pt to go home on Lovenox for VTE prophylaxis, has a family member who is pharmacist who can perform the injections for pt. Pt is cleared for discharge as per attending. Pt will have PICC care at home. 73 female h/o htn, chol, presenting with c/o jaundice  Family noticed increased jaundice in the last 3 days.  Patient had elevated ALT and AST in February.  Ultrasound at that time was unremarkable.  Patient reports increasing belching increasing abdominal distention and 10 to 12 pound weight loss in the last 1 to 2 months. Pt found to have liver lesions, PE, portal v thrombosis on workup including mri and mrcp found to have pancreatic mass with liver mets. Ct chest angiogram revealed LETITIA segmental PE and RLL subsegmental PE, pt was started on heparin drip at this point. Pt underwent liver lesion biopsy on 5/16 that was positive for metastatic adenocarcinoma with mucinous features. IR was consulted for possible stent placement in which they stated no role for stent or drain due to tumor infiltration. The pt had PICC line placed and received fluorouracil x 2 and oxaliplatin x1. The pt it set to have f/u with [facility] on Wednesday 5/25. Pt also was c/o SOB overnight into 5/21 so CXr was performed and pt received lasix which helped to resolve her sx which were suspected to be due to pulmonary congestion. Pt to go home on Lovenox for VTE prophylaxis, has a family member who is pharmacist who can perform the injections for pt. Pt is cleared for discharge as per attending. Pt will have PICC care at home. 73 female h/o htn, chol, presenting with c/o jaundice  Family noticed increased jaundice in the last 3 days.  Patient had elevated ALT and AST in February.  Ultrasound at that time was unremarkable.  Patient reports increasing belching increasing abdominal distention and 10 to 12 pound weight loss in the last 1 to 2 months. Pt found to have liver lesions, PE, portal v thrombosis on workup including mri and mrcp found to have pancreatic mass with liver mets. Ct chest angiogram revealed LETITIA segmental PE and RLL subsegmental PE, pt was started on heparin drip at this point. Pt underwent liver lesion biopsy on 5/16 that was positive for metastatic adenocarcinoma with mucinous features. IR was consulted for possible stent placement in which they stated no role for stent or drain due to tumor infiltration. The pt had PICC line placed and received fluorouracil x 2 and oxaliplatin x1. The pt it set to have f/u with [facility] on Wednesday 5/25. Pt also was c/o SOB overnight into 5/21 so CXr was performed and pt received lasix which helped to resolve her sx which were suspected to be due to pulmonary congestion. Pt to go home on Lovenox for VTE prophylaxis, has a family member who is pharmacist who can perform the injections for pt. Discussed with Dr. Alen landon and medical reconcilliation, pt is cleared for discharge. Pt to f/u with PCP and outpt at [facility] on 5/24.

## 2023-05-22 NOTE — DISCHARGE NOTE PROVIDER - NSDCMRMEDTOKEN_GEN_ALL_CORE_FT
furosemide 20 mg oral tablet: 1 tab(s) orally  levothyroxine 25 mcg (0.025 mg) oral tablet: 1 tab(s) orally once a day  Lovenox 80 mg/0.8 mL injectable solution: 80 milligram(s) subcutaneously  ondansetron 4 mg oral tablet, disintegratin tab(s) orally every 8 hours  Protonix 40 mg oral delayed release tablet: 1 tab(s) orally once a day   levothyroxine 25 mcg (0.025 mg) oral tablet: 1 tab(s) orally once a day  ondansetron 4 mg oral tablet, disintegratin tab(s) orally every 8 hours  Protonix 40 mg oral delayed release tablet: 1 tab(s) orally once a day   furosemide 20 mg oral tablet: 1 tab(s) orally once a day  levothyroxine 25 mcg (0.025 mg) oral tablet: 1 tab(s) orally once a day  Lovenox 80 mg/0.8 mL injectable solution: 70 milligram(s) subcutaneously 2 times a day  ondansetron 4 mg oral tablet, disintegratin tab(s) orally every 8 hours  Protonix 40 mg oral delayed release tablet: 1 tab(s) orally once a day  ursodiol 300 mg oral capsule: 1 cap(s) orally 2 times a day   furosemide 20 mg oral tablet: 1 tab(s) orally once a day  levothyroxine 25 mcg (0.025 mg) oral tablet: 1 tab(s) orally once a day  Lovenox 80 mg/0.8 mL injectable solution: 70 milligram(s) subcutaneously 2 times a day  Normal Saline for PICC line: Normal Saline 10ml in each lumen weekly, qty 80  Power Picc heparin: 10units per ml(3ml) daily to each lumen once daily, qty 80  ondansetron 4 mg oral tablet, disintegratin tab(s) orally every 8 hours  Protonix 40 mg oral delayed release tablet: 1 tab(s) orally once a day  ursodiol 300 mg oral capsule: 1 cap(s) orally 2 times a day   enoxaparin 80 mg/0.8 mL injectable solution: 80 milligram(s) subcutaneously 2 times a day DISCARD 1ML AND ADMINSTER 70MG  furosemide 20 mg oral tablet: 1 tab(s) orally once a day  levothyroxine 25 mcg (0.025 mg) oral tablet: 1 tab(s) orally once a day  ondansetron 4 mg oral tablet, disintegratin tab(s) orally every 8 hours  Protonix 40 mg oral delayed release tablet: 1 tab(s) orally once a day  ursodiol 300 mg oral capsule: 1 cap(s) orally 2 times a day   enoxaparin 80 mg/0.8 mL injectable solution: 80 milligram(s) subcutaneously 2 times a day DISCARD 1ML AND ADMINSTER 70MG  furosemide 20 mg oral tablet: 1 tab(s) orally once a day  levothyroxine 25 mcg (0.025 mg) oral tablet: 1 tab(s) orally once a day  ondansetron 4 mg oral tablet, disintegratin tab(s) orally every 8 hours  ondansetron 4 mg oral tablet, disintegratin tab(s) orally every 8 hours  ondansetron 4 mg oral tablet, disintegratin tab(s) orally every 8 hours  Protonix 40 mg oral delayed release tablet: 1 tab(s) orally once a day  ursodiol 300 mg oral capsule: 1 cap(s) orally 2 times a day

## 2023-05-22 NOTE — CHART NOTE - NSCHARTNOTEFT_GEN_A_CORE
Nutrition Follow Up Note  Patient seen for: Consult for nutrition services assessment and education     Chart reviewed, events noted. Per chart, "73 female h/o htn, chol, not on any meds, presenting with painless jaundice. found to have liver lesions, PE, portal v thrombosis on workup including mri and mrcp found to have pancreatic mass with liver mets s/p liver lesion biopsy."    Source: [x] Patient       [x] Medical Record        [] RN        [x] Family at bedside       [] Other:    -If unable to interview patient: [] Trach/Vent/BiPAP  [] Disoriented/confused/inappropriate to interview    Diet Order:   Diet, Regular:   Kosher  Supplement Feeding Modality:  Oral  Ensure Clear Cans or Servings Per Day:  1       Frequency:  Daily  Ensure Enlive Cans or Servings Per Day:  1       Frequency:  Daily (23)    - Is current order appropriate/adequate? [x] Yes  []  No:     - PO intake :   [] >75%  Adequate    [x] 50-75%  Fair       [] <50%  Poor    - Nutrition-related concerns:      - Intake: Pt reports fair appetite/PO intake, reports consuming 50% of meals. Pt states family brings food from home. Pt endorses drinking Ensure HP 1x/day and Ensure Clear 1x/day, would like to trial other flavors, RD to notify kitchen and will send up two flavors today to trial. Pt endorses drinking protein-fortified smoothie 1x/day. would like to continue to receive.       - Noted hyponatremic (132). IVF: NS @ 50 ml/hr.       - Ordered for Lasix in house.       -GI: Pt confirms nausea, is ordered for Zofran. Pt denies vomiting and diarrhea/constipation. Last BM: .   Bowel Regimen? [x] Yes  Miralax and Senna    [] No    Weights:   Dosing weight: 69.2 kg (5-18)  Daily Weight in k.2 (-18), Weight in k.9 (-17)  ** Weight fluctuating - Weight changes likely secondary to fluid shifts, pt being diuresed in house. RD to continue to monitor weight trends as able.     Nutritionally Pertinent MEDICATIONS  (STANDING):  furosemide   Injectable  levothyroxine  pantoprazole  Injectable  polyethylene glycol 3350  senna  sodium chloride 0.9%.  ursodiol Capsule    Pertinent Labs:  @ 07:05: Na 132<L>, BUN 12, Cr 0.61, <H>, K+ 3.7, Phos --, Mg --, Alk Phos 482<H>, ALT/SGPT 139<H>, AST/SGOT 186<H>, HbA1c --    A1C with Estimated Average Glucose Result: 5.3 % (23 @ 06:14)      Skin per nursing documentation: No pressure injuries noted.  Edema per nursing documentation: 4+ L/R leg       Estimated Needs: based on IBW 56.6 kg  Estimated Energy Needs: 5128-0340 kcal/day (30-35 kcal/kg)  Estimated Protein Needs: 67.92-79.24 g Pro/day (1.2-1.4 g Pro/kg)  Estimated Fluid Needs: 5007-8617 mL/day (30-35 mL/kg)  [x] no change since previous assessment  [] recalculated:     Previous Nutrition Diagnosis: Severe malnutrition of chronic disease - addressed with diet/supplements/food preferences  Nutrition Diagnosis is: [x] ongoing  [] resolved [] not applicable     Nutrition Care Plan:  [x] In Progress  [] Achieved  [] Not applicable    New Nutrition Diagnosis: [x] Not applicable    Nutrition Interventions:     Education Provided   [x] Yes:  [] No: Emphasized the importance of adequate kcal and protein intake, provided recommendations to optimize nutritional intake in case of decreased appetite, recommended small frequent meals by consuming nutrient-dense snacks between meals, to start with protein, and sips of supplement throughout the day.        Recommendations:         [x] Continue current diet order: Regular, Kosher            [x] Continue oral nutrition supplement: Ensure Plus High Protein 1x/day 1 strawberry (350 kcal, 20 g Pro/8oz) + Ensure Clear 1x/day berry      [x] Add micronutrient supplementation: MVI daily if not contraindicated     [x] Continue smoothie of the day to maximize calorie/protein intake    Monitoring and Evaluation:   Continue to monitor nutritional intake, tolerance to diet prescription, weights, labs, skin integrity    RD remains available upon request and will follow up per protocol  Kinjal Del Rio RD CDN pager # [phone] or TEAMS

## 2023-05-22 NOTE — DISCHARGE NOTE NURSING/CASE MANAGEMENT/SOCIAL WORK - NSDCFUADDAPPT_GEN_ALL_CORE_FT
APPTS ARE READY TO BE MADE: [x] YES    Best Family or Patient Contact (if needed):    Additional Information about above appointments (if needed):    1:   2:   3:     Other comments or requests:   Pt has appt at [facility] Wednesday 5/24

## 2023-05-22 NOTE — PROGRESS NOTE ADULT - ASSESSMENT
73y female with PMH of HTN & HLD, but was otherwise in good health until a few months ago. She developed increased belching, abdominal distention with 10 to 12 pound weight loss in the last 1 to 2 months. Outpatient labs in Feb 2023 had revealed an elevated ALT and AST. And now with above complaints & increasing jaundice & a dark colored urine brought to ER on 5/15/23.    On admission, she was afebrile with leukocytosis of 13.44 with a normal differential, elevated LFTs & T ridge of 7.  CT chest angio, revealed acute LETITIA segmental PE & RLL subsegmental PE.   CT Abd & Pelvis revealed right portal vein and splenic vein thrombosis and liver lesions plus mesenteric lymphadenopathy, concerning for metastatic disease.   MRCP revealed pancreatic adenocarcinoma with numerous hepatic metastases.  Main portal vein and right portal vein thrombus, unclear if bland or tumor thrombus.   S/p IR guided bx of right hepatic lobe liver lesion on 5/16/23.   S/p right arm PICC placement on 5/18/23.    Hem/onc input noted - "Tumor markers noted for elevated CEA and CA-125; suspect GI vs  origin. Liver biopsy positive for metastatic adenocarcinoma with mucinous features. Plan is start chemo on 5/20/23.  ID called given leukocytosis of 11K. Her WBC was 13 K on admission, it had normalized & then increased to 10.89K.   Leukocytosis is unreliable as a marker for any infection in the setting of untreated malignancy & thrombosis   Exam had revealed a small area of left antecubital phlebitis, now resolved. Residual scarring at the site will also self resolve  Chemo started on 5/20/23 & tolerating well  Fluctuating leukocytosis likely reactive     PLAN:  Follow clinically off of antibiotics  In case she develops fevers, we should draw blood cx x 2

## 2023-05-22 NOTE — DISCHARGE NOTE PROVIDER - CARE PROVIDER_API CALL
Wesley Daniels)  Medicine  [address], [address] [place] [address]  Phone: [phone]  Fax: [phone]  Follow Up Time:

## 2023-05-22 NOTE — PROGRESS NOTE ADULT - ASSESSMENT
AMY JAIMES is a 73y Female who presents with a chief complaint of jaundice    Metastatic Pancreatic Cancer  ·	Patient presents with imaging showing multiple liver lesion, pancreatic tail lesion, mesenteric lymphadenopathy. Liver biopsy positive for adenocarcinoma, likely pancreatic vs biliary origin.  ·	Tumor markers elevated for CEA and CA-125.  ·	Patient with elevated bilirubin due to intrahepatic stenosis and multiple liver tumors. No interventions via ERCP per gastroenterology. Bilirubin still rising slightly.  ·	Patient is currently on FOLFOX, C1D3, for impending visceral crisis. To complete pump today.  ·	No contraindication from oncological perspective for discharge after FOLFOX is finished.   ·	Continue to monitor for side effects from FOLFOX.  ·	On discharge, patient to follow-up at [facility] as soon as possible.    Venous Thromboembolism  ·	Patient is currently on enoxaparin.  ·	Monitor for any bleeding.     Anemia  ·	In the setting of malignancy  ·	Maintain HGB > 7    Will continue to follow.    Malik Haynes MD  Hematology/Oncology  O: [phone]

## 2023-05-22 NOTE — PROGRESS NOTE ADULT - NUTRITIONAL ASSESSMENT
This patient has been assessed with a concern for Malnutrition and has been determined to have a diagnosis/diagnoses of Severe protein-calorie malnutrition.    This patient is being managed with:   Diet Regular-  Kosher  Supplement Feeding Modality:  Oral  Ensure Clear Cans or Servings Per Day:  1       Frequency:  Daily  Ensure Enlive Cans or Servings Per Day:  1       Frequency:  Daily  Entered: May 17 2023  3:54PM  
This patient has been assessed with a concern for Malnutrition and has been determined to have a diagnosis/diagnoses of Severe protein-calorie malnutrition.    This patient is being managed with:   Diet Regular-  Kosher  Supplement Feeding Modality:  Oral  Ensure Clear Cans or Servings Per Day:  1       Frequency:  Daily  Ensure Enlive Cans or Servings Per Day:  1       Frequency:  Daily  Entered: May 17 2023  3:54PM  
This patient has been assessed with a concern for Malnutrition and has been determined to have a diagnosis/diagnoses of Severe protein-calorie malnutrition.    This patient is being managed with:   Diet Regular-  Kosher  Supplement Feeding Modality:  Oral  Ensure Clear Cans or Servings Per Day:  1       Frequency:  Daily  Ensure Enlive Cans or Servings Per Day:  1       Frequency:  Daily  Entered: May 17 2023  3:54PM    Diet Regular-  Kosher  Entered: May 15 2023 10:02AM    The following pending diet order is being considered for treatment of Severe protein-calorie malnutrition:null
This patient has been assessed with a concern for Malnutrition and has been determined to have a diagnosis/diagnoses of Severe protein-calorie malnutrition.    This patient is being managed with:   Diet Regular-  Kosher  Supplement Feeding Modality:  Oral  Ensure Clear Cans or Servings Per Day:  1       Frequency:  Daily  Ensure Enlive Cans or Servings Per Day:  1       Frequency:  Daily  Entered: May 17 2023  3:54PM    Diet Regular-  Kosher  Entered: May 15 2023 10:02AM    The following pending diet order is being considered for treatment of Severe protein-calorie malnutrition:null
This patient has been assessed with a concern for Malnutrition and has been determined to have a diagnosis/diagnoses of Severe protein-calorie malnutrition.    This patient is being managed with:   Diet Regular-  Kosher  Supplement Feeding Modality:  Oral  Ensure Clear Cans or Servings Per Day:  1       Frequency:  Daily  Ensure Plus High Protein Cans or Servings Per Day:  2       Frequency:  Daily  Entered: May 19 2023 11:58AM    Diet Regular-  Kosher  Supplement Feeding Modality:  Oral  Ensure Clear Cans or Servings Per Day:  1       Frequency:  Daily  Ensure Enlive Cans or Servings Per Day:  1       Frequency:  Daily  Entered: May 17 2023  3:54PM    The following pending diet order is being considered for treatment of Severe protein-calorie malnutrition:null
This patient has been assessed with a concern for Malnutrition and has been determined to have a diagnosis/diagnoses of Severe protein-calorie malnutrition.    This patient is being managed with:   Diet Regular-  Kosher  Supplement Feeding Modality:  Oral  Ensure Clear Cans or Servings Per Day:  1       Frequency:  Daily  Ensure Plus High Protein Cans or Servings Per Day:  2       Frequency:  Daily  Entered: May 19 2023 11:58AM    Diet Regular-  Kosher  Supplement Feeding Modality:  Oral  Ensure Clear Cans or Servings Per Day:  1       Frequency:  Daily  Ensure Enlive Cans or Servings Per Day:  1       Frequency:  Daily  Entered: May 17 2023  3:54PM    The following pending diet order is being considered for treatment of Severe protein-calorie malnutrition:null
76
This patient has been assessed with a concern for Malnutrition and has been determined to have a diagnosis/diagnoses of Severe protein-calorie malnutrition.    This patient is being managed with:   Diet Regular-  Kosher  Supplement Feeding Modality:  Oral  Ensure Clear Cans or Servings Per Day:  1       Frequency:  Daily  Ensure Enlive Cans or Servings Per Day:  1       Frequency:  Daily  Entered: May 17 2023  3:54PM    Diet Regular-  Kosher  Entered: May 15 2023 10:02AM    The following pending diet order is being considered for treatment of Severe protein-calorie malnutrition:null

## 2023-05-22 NOTE — DISCHARGE NOTE NURSING/CASE MANAGEMENT/SOCIAL WORK - NSDPDISTO_GEN_ALL_CORE
"  VS: /72 (BP Location: Left arm)   Pulse 84   Temp 97.8  F (36.6  C) (Oral)   Resp 20   Ht 1.803 m (5' 11\")   Wt 90.6 kg (199 lb 11.2 oz)   SpO2 98%   BMI 27.85 kg/m     O2: O2 sat 98% on RA, denies SOB or respiratory distress. Lung sounds clear. Trach cares completed, suctioned x3 for clear phlegm.    Output: Voiding adequate amounts with use of urinal    Last BM: 5/7 per pt report    Activity: SBA with walker    Up for meals? Sat on side of bed for meals   Skin: Trach stoma, sternal incision and old chest tube sites    Pain: Denies pain or discomfort    CMS: Alert & oriented, denies numbness or tingling, uses call light appropriately to make needs known    Dressing: Trach drsg CDI, old chest tube sites drsg changed. Right arm old PICC site with pressure drsg intact    Diet: Regular with thin, poor appetite    LDA: None    Equipment: Walker and personal belongings    Plan: Will continue plan of care    Additional Info:        " Home

## 2023-05-22 NOTE — DISCHARGE NOTE NURSING/CASE MANAGEMENT/SOCIAL WORK - NSDCPEFALRISK_GEN_ALL_CORE
For information on Fall & Injury Prevention, visit: [email or web address] OR  [email or web address] OR  https://www.cdc.gov/steadi/patient.html

## 2023-05-22 NOTE — PROGRESS NOTE ADULT - PROVIDER SPECIALTY LIST ADULT
Cardiology
Heme/Onc
Infectious Disease
Cardiology
Heme/Onc
Heme/Onc
Infectious Disease
Infectious Disease
Internal Medicine
Internal Medicine
Intervent Radiology
Heme/Onc
Heme/Onc
Internal Medicine

## 2023-05-22 NOTE — PROGRESS NOTE ADULT - ASSESSMENT
73 female h/o htn, chol, not on any meds, presenting with painless jaundice. found to have liver lesions, PE, portal v thrombosis  on workup including mri and mrcp found to have pancreatic mass with liver mets  s/p liver lesion biopsy    metastatic pancreatic ca  pathology from liver biopsy noted  onc f/u apprec  pt started on chemo 5/20 via picc line. tolerating well. to finish 1st round this afternoon  monitor cbc, cmp   d/w oncology and [facility] onc DR. WALTER     hyperbilirubinemia  secondary to liver mets  d/w gi no role for ercp  d/w IR no role for stenting or drain given extensive tumor infiltration     cont iv hydration - lower rate given edema   cont ester    PE/DVT  portal v thrombosis  now on lovenox  monitor for bleeding     edema  decrease ivf rate  lasix iv as ordered. will change to 20mg po daily at discharge  gentle diuresis     anemia  2/2 malignancy  monitor  transfuse goal hgb >7    hypothyroid  resume home synthroid dose    htn  not on meds  monitor    leukocytosis  low grade fevers  suspect 2/2 tumor  watch off abx  ID eval apprec  if spikes fever, will check cultures and start empiric abx     c/o sob  f/u cxr  suspect pulm congestion  gentle diuresis  improved    d/w family bedside  d/w consultants     dc planning home today after completion of chemo  to f/u with [facility] wednesday      Advanced care planning was discussed with patient and family.  Advanced care planning forms were reviewed and discussed as appropriate.  Differential diagnosis and plan of care discussed with patient after the evaluation.   Pain assessed and judicious use of narcotics when appropriate was discussed.  Importance of Fall prevention discussed.  Counseling on Smoking and Alcohol cessation was offered when appropriate.  Counseling on Diet, exercise, and medication compliance was done.       Approx 60 minutes spent.

## 2023-05-22 NOTE — DISCHARGE NOTE PROVIDER - DETAILS OF MALNUTRITION DIAGNOSIS/DIAGNOSES
This patient has been assessed with a concern for Malnutrition and was treated during this hospitalization for the following Nutrition diagnosis/diagnoses:     -  05/17/2023: Severe protein-calorie malnutrition

## 2023-07-19 PROBLEM — I10 ESSENTIAL (PRIMARY) HYPERTENSION: Chronic | Status: ACTIVE | Noted: 2023-01-01

## 2023-07-19 PROBLEM — E78.5 HYPERLIPIDEMIA, UNSPECIFIED: Chronic | Status: ACTIVE | Noted: 2023-01-01

## 2023-07-19 NOTE — ED ADULT NURSE NOTE - OBJECTIVE STATEMENT
Patient presented to ED by EMS and patient's son at bedside with low blood pressure. Pt is aox3, pt denies any chest pain, sob or dizziness. Jaundice of the skin and eyes noted.

## 2023-07-19 NOTE — H&P ADULT - PROBLEM SELECTOR PLAN 1
Unclear etiology   No source on CXR  UTI? Blood stream?  F/U UTI  F/U Blood Cx  F/U Urine Cx  Lactate 11.9>10.9  F/U Repeat Lactate at midnight   F/U CBC at midnight   F/U CMP at midnight   Will start Cefepime and Vancomycin as per guideline for neutropenic fever   ID Dr Smart   AM team to contact outpatient Heme-Onc   Will keep monitoring for now Patient evaluated by ICU in the ED   Recommendations noted  Unclear etiology   No source on CXR  UTI? Blood stream?  F/U UTI  F/U Blood Cx  F/U Urine Cx  Lactate 11.9>10.9  F/U Repeat Lactate at midnight   F/U CBC at midnight   F/U CMP at midnight   Will start Cefepime and Vancomycin as per guideline for neutropenic fever   ID Dr Smart   AM team to contact outpatient Heme-Onc   Will keep monitoring for now

## 2023-07-19 NOTE — CONSULT NOTE ADULT - SUBJECTIVE AND OBJECTIVE BOX
[facility] NEPHROLOGY- CONSULTATION NOTE    Patient is a 74yo Female with Stage IV Pancreatic CA with liver mets on chemo q 2wks (last dose yesterday), HTN, PE/ portal thrombosis presents with fever, LLE rash/ pain. Pt a/w Sepsis, neutropenia with severe lactic acidosis (lactate 11.9). Nephrology consulted Elevated serum creatinine.    Pt denies any h/o kidney disease. Pt hit her Left knee in the uber car yesterday on the way to receive chemo. She c/o LLE pain and used a hot water bag. She then developed a fever O/N. Pt denies any SOB, chest pain, n/v/d, or abd pain or urinary complaints.   Pt with LE edema- improving since starting Lasix 20mg PO daily.       PAST MEDICAL & SURGICAL HISTORY:  Essential hypertension      Hyperlipidemia      Pancreatic cancer      No pertinent past surgical history        No Known Allergies    Home Medications Reviewed  Hospital Medications:   MEDICATIONS  (STANDING):  cefepime   IVPB 2000 milliGRAM(s) IV Intermittent every 8 hours  levothyroxine 25 MICROGram(s) Oral daily  pantoprazole    Tablet 40 milliGRAM(s) Oral before breakfast  sodium chloride 0.9% Bolus 1000 milliLiter(s) IV Bolus once  ursodiol Capsule 300 milliGRAM(s) Oral two times a day  vancomycin  IVPB 1000 milliGRAM(s) IV Intermittent every 24 hours    SOCIAL HISTORY:  Denies ETOh, Smoking, or drug use  FAMILY HISTORY:  Family history unknown (Father, Mother)        REVIEW OF SYSTEMS:  Gen: no changes in weight +fever  HEENT: no rhinorrhea  Neck: no sore throat  Cards: no chest pain  Resp: no dyspnea  GI: no nausea or vomiting or diarrhea  : no dysuria or hematuria  Vascular: +LE edema  Derm: +LLE pain  Neuro: no numbness/tingling  All other review of systems is negative unless indicated above.    VITALS:  T(F): 98.5 (23 @ 19:36), Max: 98.8 (23 @ 13:10)  HR: 115 (23 @ 19:36)  BP: 111/46 (23 @ 19:36)  RR: 21 (23 @ 19:36)  SpO2: 95% (23 @ 19:36)  Wt(kg): --    Height (cm): 165.1 ( @ 12:50)  Weight (kg): 66.7 ( @ 12:50)  BMI (kg/m2): 24.5 ( @ 12:50)  BSA (m2): 1.74 ( @ 12:50)    PHYSICAL EXAM:  Gen: NAD, calm  HEENT: icteric  Neck: no JVD  Cards: RRR, +S1/S2, no M/G/R  Resp: CTA B/L  GI: soft, abd distention NT  : no CVA tenderness  Extremities: +2 pitting LE edema B/L  Derm: +jaundice  LLE ecchymosis/ tenderness  Neuro: non-focal    LABS:      135  |  102  |  18  ----------------------------<  73  3.5   |  16<L>  |  1.54<H>    Ca    7.5<L>      2023 13:00    TPro  5.5<L>  /  Alb  1.7<L>  /  TBili  6.8<H>  /  DBili      /  AST  82<H>  /  ALT  55  /  AlkPhos  176<H>      Creatinine Trend: 1.54 <--                        7.2    0.90  )-----------( 193      ( 2023 13:00 )             24.4     Urine Studies:  Urinalysis Basic - ( 2023 20:00 )    Color: Yellow / Appearance: Clear / S.010 / pH:   Gluc:  / Ketone: Negative  / Bili: Moderate / Urobili: 1 mg/dL   Blood:  / Protein: 30 mg/dL / Nitrite: Negative   Leuk Esterase: Trace / RBC:  / WBC    Sq Epi:  / Non Sq Epi:  / Bacteria:         RADIOLOGY & ADDITIONAL STUDIES:    < from: CT Chest No Cont (23 @ 19:56) >    ACC: [id number] EXAM:  CT CHEST   ORDERED BY: FARZANEH BACON     PROCEDURE DATE:  2023      < end of copied text >  < from: CT Chest No Cont (23 @ 19:56) >  IMPRESSION:  Elevation right hemidiaphragm. Scattered compressive and linear   atelectasis.    --- End of Report ---        < end of copied text >  < from: Xray Chest 1 View- PORTABLE-Urgent (23 @ 13:51) >    ACC: [id number] EXAM:  XR CHEST PORTABLE URGENT 1V   ORDERED BY:   ROBERT DIA     PROCEDURE DATE:  2023          INTERPRETATION:  AP semierect chest on 2023 at 1:40 PM. Patient   has sepsis.    Elevated diaphragms again noted. Heart magnified by technique. Right PICC     < end of copied text >

## 2023-07-19 NOTE — H&P ADULT - PROBLEM SELECTOR PLAN 5
F/U Left lower ext CT scan for possible hematoma s/p trauma to the leg  Hold Anticoagulation   If hematoma, consult surgery

## 2023-07-19 NOTE — ED ADULT NURSE NOTE - NSFALLRISKINTERV_ED_ALL_ED
Assistance OOB with selected safe patient handling equipment if applicable/Assistance with ambulation/Communicate fall risk and risk factors to all staff, patient, and family/Provide patient with walking aids/Provide visual cue: yellow wristband, yellow gown, etc/Reinforce activity limits and safety measures with patient and family/Call bell, personal items and telephone in reach/Instruct patient to call for assistance before getting out of bed/chair/stretcher/Non-slip footwear applied when patient is off stretcher/[place] to call system/Physically safe environment - no spills, clutter or unnecessary equipment/Purposeful Proactive Rounding/Room/bathroom lighting operational, light cord in reach

## 2023-07-19 NOTE — CHART NOTE - NSCHARTNOTEFT_GEN_A_CORE
EVENT:     HPI:  73 year old female, from home, ambulates with walker, with Metastatic pancreatic cancer (dx 5/24, on active chemo via R axillary PIC, last session yesterday), VTE with portal vein thrombosis as well as PE (on Lovenox), hypothyroidism presents with dyspnea and chills. Admitted to medicine for sepsis, neutropenic fever, and lactic acidosis.     SUBJECTIVE:    OBJECTIVE:  Vital Signs Last 24 Hrs  T(C): 36.8 (19 Jul 2023 22:29), Max: 37.1 (19 Jul 2023 13:10)  T(F): 98.3 (19 Jul 2023 22:29), Max: 98.8 (19 Jul 2023 13:10)  HR: 112 (19 Jul 2023 22:29) (105 - 120)  BP: 100/52 (19 Jul 2023 22:29) (97/57 - 140/52)  BP(mean): 68 (19 Jul 2023 22:29) (56 - 80)  RR: 22 (19 Jul 2023 22:29) (18 - 26)  SpO2: 98% (19 Jul 2023 22:29) (95% - 100%)    Parameters below as of 19 Jul 2023 22:29  Patient On (Oxygen Delivery Method): nasal cannula  O2 Flow (L/min): 2      PHYSICAL EXAM:  Neuro: Awake and alert, oriented to person, place, and time  Cardiovascular: + S1, S2, no murmurs, rubs, or bruits  Respiratory: clear to auscultation bilaterally with good air entry   GI: Abdomen soft, non-tender, bowel sounds present   : Non distended;   Skin: warm and dry; no rash      LABS:                        7.2    0.90  )-----------( 193      ( 19 Jul 2023 13:00 )             24.4     07-19    135  |  102  |  18  ----------------------------<  73  3.5   |  16<L>  |  1.54<H>    Ca    7.5<L>      19 Jul 2023 13:00    TPro  5.5<L>  /  Alb  1.7<L>  /  TBili  6.8<H>  /  DBili  x   /  AST  82<H>  /  ALT  55  /  AlkPhos  176<H>  07-19        EKG:   IMAGING:    ASSESSMENT/PROBLEM:    PLAN:     FOLLOW UP / RESULT: EVENT: VBG results reviewed, significant for    HPI:  73 year old female, from home, ambulates with walker, with Metastatic pancreatic cancer (dx 5/24, on active chemo via R axillary PIC, last session yesterday), VTE with portal vein thrombosis as well as PE (on Lovenox), hypothyroidism presents with dyspnea and chills. Admitted to medicine for sepsis, neutropenic fever, and lactic acidosis.     SUBJECTIVE:    OBJECTIVE:  Vital Signs Last 24 Hrs  T(C): 36.8 (19 Jul 2023 22:29), Max: 37.1 (19 Jul 2023 13:10)  T(F): 98.3 (19 Jul 2023 22:29), Max: 98.8 (19 Jul 2023 13:10)  HR: 112 (19 Jul 2023 22:29) (105 - 120)  BP: 100/52 (19 Jul 2023 22:29) (97/57 - 140/52)  BP(mean): 68 (19 Jul 2023 22:29) (56 - 80)  RR: 22 (19 Jul 2023 22:29) (18 - 26)  SpO2: 98% (19 Jul 2023 22:29) (95% - 100%)    Parameters below as of 19 Jul 2023 22:29  Patient On (Oxygen Delivery Method): nasal cannula  O2 Flow (L/min): 2      PHYSICAL EXAM:  Neuro: Awake and alert, oriented to person, place, and time  Cardiovascular: + S1, S2, no murmurs, rubs, or bruits  Respiratory: clear to auscultation bilaterally with good air entry   GI: Abdomen soft, non-tender, bowel sounds present   : Non distended;   Skin: warm and dry; no rash      LABS:                        7.2    0.90  )-----------( 193      ( 19 Jul 2023 13:00 )             24.4     07-19    135  |  102  |  18  ----------------------------<  73  3.5   |  16<L>  |  1.54<H>    Ca    7.5<L>      19 Jul 2023 13:00    TPro  5.5<L>  /  Alb  1.7<L>  /  TBili  6.8<H>  /  DBili  x   /  AST  82<H>  /  ALT  55  /  AlkPhos  176<H>  07-19        EKG:   IMAGING:    ASSESSMENT/PROBLEM:    PLAN:     FOLLOW UP / RESULT: EVENT: VBG results reviewed, significant for severe lactic acidosis (10.9)   *Of note ICU was previously consulted, was determined not to be an ICU candidate at the time (see ICU note)     HPI:  73 year old female, from home, ambulates with walker, with Metastatic pancreatic cancer (dx 5/24, on active chemo via R axillary PIC, last session yesterday), VTE with portal vein thrombosis as well as PE (on Lovenox), hypothyroidism presents with dyspnea and chills. Admitted to medicine for sepsis, neutropenic fever, and lactic acidosis.     SUBJECTIVE: Pt seen and examined at bedside, A&Ox3--chronically ill appearing, reports feeling tired. 1 unit PRBC being transfused. Son at bedside. All questions and concerns addressed.     OBJECTIVE:  Vital Signs Last 24 Hrs  T(C): 36.8 (19 Jul 2023 22:29), Max: 37.1 (19 Jul 2023 13:10)  T(F): 98.3 (19 Jul 2023 22:29), Max: 98.8 (19 Jul 2023 13:10)  HR: 112 (19 Jul 2023 22:29) (105 - 120)  BP: 100/52 (19 Jul 2023 22:29) (97/57 - 140/52)  BP(mean): 68 (19 Jul 2023 22:29) (56 - 80)  RR: 22 (19 Jul 2023 22:29) (18 - 26)  SpO2: 98% (19 Jul 2023 22:29) (95% - 100%)    Parameters below as of 19 Jul 2023 22:29  Patient On (Oxygen Delivery Method): nasal cannula  O2 Flow (L/min): 2      PHYSICAL EXAM:  General: chronically-ill appearing female with temporal wasting   Neuro: Awake and alert, oriented to person, place, and time  Cardiovascular: + S1, S2, no murmurs, rubs, or bruits  Respiratory: clear to auscultation bilaterally with good air entry   GI: Abdomen obese/ +ascites, non-tender, bowel sounds present   : Non distended;   Extremities: 2+ pitting LE edema B/L  Skin: jaundice. +left thigh hematoma      LABS:                        7.2    0.90  )-----------( 193      ( 19 Jul 2023 13:00 )             24.4     07-19    135  |  102  |  18  ----------------------------<  73  3.5   |  16<L>  |  1.54<H>    Ca    7.5<L>      19 Jul 2023 13:00    TPro  5.5<L>  /  Alb  1.7<L>  /  TBili  6.8<H>  /  DBili  x   /  AST  82<H>  /  ALT  55  /  AlkPhos  176<H>  07-19        EKG:   IMAGING:      < from: CT Lower Extremity w/ IV Cont, Bilateral (07.19.23 @ 17:34) >    IMPRESSION:    Nonspecific subcutaneous edema throughout both lower extremities, which   may be related to lower extremity edema and/or cellulitis. No drainable   soft tissue fluid collection seen. No soft tissue gas. No large hematoma   seen.    Partially evaluated large volume ascites.      < from: CT Chest No Cont (07.19.23 @ 19:56) >    IMPRESSION:  Elevation right hemidiaphragm. Scattered compressive and linear   atelectasis.    ASSESSMENT/PROBLEM: 1. Lactic acidosis likely multifactorial in the setting of metastatic disease (metastatic pancreatic CA with liver mets on chemo)  and sepsis/neutropenic fever 2. Anemia likely in the setting of malignancy     PLAN:     -C/w NS at 75 ml/hr, caution with administration of fluid given ascites and 2+ BLE pitting edema on physical exam  -f/u repeat CBC, CMP, and lactate. Will reconsult ICU if necessary  -C/w Cefepime and Vanco for neutropenic fever   -Blood and urine cx, testing   -Continue current/supportive measures     FOLLOW UP / RESULT:    -Monitor effectiveness of antibiotic therapy and IVF   -Repeat labs EVENT: VBG results reviewed, significant for severe lactic acidosis (10.9)   *Of note ICU was previously consulted, was determined not to be an ICU candidate at the time (see ICU note)     HPI:  73 year old female, from home, ambulates with walker, with Metastatic pancreatic cancer (dx 5/24, on active chemo via R axillary PIC, last session yesterday), VTE with portal vein thrombosis as well as PE (on Lovenox), hypothyroidism presents with dyspnea and chills. Admitted to medicine for sepsis, neutropenic fever, and lactic acidosis.     SUBJECTIVE: Pt seen and examined at bedside, A&Ox3--chronically ill appearing, reports feeling tired. 1 unit PRBC being transfused. Son at bedside. All questions and concerns addressed.     OBJECTIVE:  Vital Signs Last 24 Hrs  T(C): 36.8 (19 Jul 2023 22:29), Max: 37.1 (19 Jul 2023 13:10)  T(F): 98.3 (19 Jul 2023 22:29), Max: 98.8 (19 Jul 2023 13:10)  HR: 112 (19 Jul 2023 22:29) (105 - 120)  BP: 100/52 (19 Jul 2023 22:29) (97/57 - 140/52)  BP(mean): 68 (19 Jul 2023 22:29) (56 - 80)  RR: 22 (19 Jul 2023 22:29) (18 - 26)  SpO2: 98% (19 Jul 2023 22:29) (95% - 100%)    Parameters below as of 19 Jul 2023 22:29  Patient On (Oxygen Delivery Method): nasal cannula  O2 Flow (L/min): 2      PHYSICAL EXAM:  General: chronically-ill appearing female with temporal wasting   Neuro: Awake and alert, oriented to person, place, and time  Cardiovascular: + S1, S2, no murmurs, rubs, or bruits  Respiratory: clear to auscultation bilaterally with good air entry   GI: Abdomen obese/ +ascites, non-tender, bowel sounds present   : Non distended;   Extremities: 2+ pitting LE edema B/L  Skin: jaundice. +left thigh hematoma      LABS:                        7.2    0.90  )-----------( 193      ( 19 Jul 2023 13:00 )             24.4     07-19    135  |  102  |  18  ----------------------------<  73  3.5   |  16<L>  |  1.54<H>    Ca    7.5<L>      19 Jul 2023 13:00    TPro  5.5<L>  /  Alb  1.7<L>  /  TBili  6.8<H>  /  DBili  x   /  AST  82<H>  /  ALT  55  /  AlkPhos  176<H>  07-19        EKG:   IMAGING:      < from: CT Lower Extremity w/ IV Cont, Bilateral (07.19.23 @ 17:34) >    IMPRESSION:    Nonspecific subcutaneous edema throughout both lower extremities, which   may be related to lower extremity edema and/or cellulitis. No drainable   soft tissue fluid collection seen. No soft tissue gas. No large hematoma   seen.    Partially evaluated large volume ascites.      < from: CT Chest No Cont (07.19.23 @ 19:56) >    IMPRESSION:  Elevation right hemidiaphragm. Scattered compressive and linear   atelectasis.    ASSESSMENT/PROBLEM: 1. Lactic acidosis likely multifactorial in the setting of metastatic disease (metastatic pancreatic CA with liver mets on chemo)  and sepsis/neutropenic fever 2. Anemia likely in the setting of malignancy     PLAN:     -C/w NS at 75 ml/hr, caution with administration of fluid given ascites and 2+ BLE pitting edema on physical exam  -f/u repeat CBC, CMP, and lactate. Will reconsult ICU if necessary  -C/w Cefepime and Vanco for neutropenic fever   -Blood and urine cx, testing   -Continue current/supportive measures   -Primary team to consider palliative consult in AM to further discuss GOC    FOLLOW UP / RESULT:    -Monitor effectiveness of antibiotic therapy and IVF   -Repeat labs

## 2023-07-19 NOTE — ED PROVIDER NOTE - CLINICAL SUMMARY MEDICAL DECISION MAKING FREE TEXT BOX
ATTG: : Assessment/plan: Known cancer on anticoagulation with known PE here with worsening shortness of breath concerns include but not limited to increased clot burden, pneumonia, fluid, infection or inflammation we will check labs we will check x-ray we will check cardiac work-up and reeval for dispo

## 2023-07-19 NOTE — H&P ADULT - PROBLEM SELECTOR PLAN 4
Hb 7.2    Possible hematoma on left lower ext   Type and Screen   1u PRBC  Repeat CBC post transfusion   HOLD anticoagulation

## 2023-07-19 NOTE — H&P ADULT - ASSESSMENT
73 year old female, from home, ambulates with walker, with Metastatic pancreatic cancer (dx 5/24, on active chemo via R axillary PIC, last session yesterday), VTE with portal vein thrombosis as well as PE (on Lovenox), hypothyroidism presents with dyspnea and chills. Admitted to medicine for sepsis, neutropenic fever, and lactic acidosis.

## 2023-07-19 NOTE — ED PROVIDER NOTE - OBJECTIVE STATEMENT
73-year-old female with known history of pancreatic CA currently on chemotherapy, PE on Eliquis presents by hospital EMS for evaluation of worsening shortness of breath.  Began approximately 1 day ago getting worse.  There is associated lower extremity edema.  Patient denies any chest pain.  Currently on supplemental oxygen for her breathing.  There is no associated vomiting or diarrhea.  There was fever at home temperature unknown at this time.  There is no urinary symptoms.  There is no back pain.

## 2023-07-19 NOTE — H&P ADULT - NSHPPHYSICALEXAM_GEN_ALL_CORE
PHYSICAL EXAM:  GENERAL: ill-appearing female, jaundiced, NAD, on 2L NC  HEAD: normocephalic, atraumatic  EYES: +icteric sclera, no exudates   ENMT: +icteric tongue, no exudates, moist mucous membranes  NECK: supple, soft, no thyromegaly noted  LUNGS: clear to auscultation bilaterally, no wheezing, rhonchi or rales appreciated  HEART: S1/S2, regular rate and rhythm, no murmurs noted, +b/l lower extremity edema  GASTROINTESTINAL: abdomen is distended, nontender, +jaundiced, normoactive bowel sounds, no palpable masses  INTEGUMENT: +diffuse jaundice  MUSCULOSKELETAL: no clubbing or cyanosis, +L thigh hematoma  NEUROLOGIC: AAO x3, CNII-XII intact, no obvious sensorimotor deficits noted  PSYCHIATRIC: mood is good, affect is congruent, linear and logical thought process     Vital Signs Last 24 Hrs  T(C): 36.9 (19 Jul 2023 19:36), Max: 37.1 (19 Jul 2023 13:10)  T(F): 98.5 (19 Jul 2023 19:36), Max: 98.8 (19 Jul 2023 13:10)  HR: 115 (19 Jul 2023 19:36) (105 - 120)  BP: 111/46 (19 Jul 2023 19:36) (97/57 - 140/52)  BP(mean): 80 (19 Jul 2023 14:23) (56 - 80)  RR: 21 (19 Jul 2023 19:36) (18 - 26)  SpO2: 95% (19 Jul 2023 19:36) (95% - 100%)    Parameters below as of 19 Jul 2023 19:36  Patient On (Oxygen Delivery Method): nasal cannula  O2 Flow (L/min): 2

## 2023-07-19 NOTE — H&P ADULT - PROBLEM SELECTOR PLAN 9
On active chemo (last yesterday) via R axillary PICC line  Bilirubin 6.8,    improved from prior  Now with possible neutropenic sepsis
Statement Selected

## 2023-07-19 NOTE — CONSULT NOTE ADULT - SUBJECTIVE AND OBJECTIVE BOX
Patient is a 73y old  Female who presents with a chief complaint of     HPI:      PAST MEDICAL & SURGICAL HISTORY:  Essential hypertension  Hyperlipidemia  Pancreatic cancer    SOCIAL HX:   Smoking                         ETOH                            Other    FAMILY HISTORY:  :  No known cardiovacular family hisotry     ROS:  See HPI     Allergies:    No Known Allergies    Intolerances:      MEDICATIONS:    PHYSICAL EXAM    T(C): 37 (07-19-23 @ 14:23), Max: 37.1 (07-19-23 @ 13:10)  HR: 111 (07-19-23 @ 14:23) (105 - 120)  BP: 117/64 (07-19-23 @ 14:23) (97/57 - 140/52)  RR: 18 (07-19-23 @ 14:23) (18 - 26)  SpO2: 96% (07-19-23 @ 14:23) (95% - 100%)    GENERAL: patient appears well, NAD, pleasant  HEAD: normocephalic, atraumatic  EYES: sclera clear, no exudates  ENMT: oropharynx clear without erythema, no exudates, moist mucous membranes  NECK: supple, soft, no thyromegaly noted  LUNGS: clear to auscultation bilaterally, no wheezing, rhonchi or rales appreciated  HEART: S1/S2, regular rate and rhythm, no murmurs noted, no lower extremity edema  GASTROINTESTINAL: abdomen is soft, nondistended, nontender, normoactive bowel sounds, no palpable masses  INTEGUMENT: good skin turgor, no lesions noted  MUSCULOSKELETAL: no clubbing or cyanosis, no obvious deformity  NEUROLOGIC: AAO x3, CNII-XII intact, no obvious sensorimotor deficits noted  PSYCHIATRIC: mood is good, affect is congruent, linear and logical thought process  HEME/LYMPH: no palpable supraclavicular nodules, no obvious ecchymosis or petechiae       LABS:                          7.2    0.90  )-----------( 193      ( 19 Jul 2023 13:00 )             24.4                                               07-19    135  |  102  |  18  ----------------------------<  73  3.5   |  16<L>  |  1.54<H>    Ca    7.5<L>      19 Jul 2023 13:00    TPro  5.5<L>  /  Alb  1.7<L>  /  TBili  6.8<H>  /  DBili  x   /  AST  82<H>  /  ALT  55  /  AlkPhos  176<H>  07-19      PT/INR - ( 19 Jul 2023 13:00 )   PT: 37.2 sec;   INR: 3.09 ratio         PTT - ( 19 Jul 2023 13:00 )  PTT:35.2 sec                                       Urinalysis Basic - ( 19 Jul 2023 13:00 )    Color: x / Appearance: x / SG: x / pH: x  Gluc: 73 mg/dL / Ketone: x  / Bili: x / Urobili: x   Blood: x / Protein: x / Nitrite: x   Leuk Esterase: x / RBC: x / WBC x   Sq Epi: x / Non Sq Epi: x / Bacteria: x                                                  LIVER FUNCTIONS - ( 19 Jul 2023 13:00 )  Alb: 1.7 g/dL / Pro: 5.5 g/dL / ALK PHOS: 176 U/L / ALT: 55 U/L DA / AST: 82 U/L / GGT: x                                              IMAGING:             Patient is a 73y old  Female who presents with a chief complaint of     HPI:  73 year old female, from home, ambulates with walker, with Metastatic pancreatic cancer (dx 5/24, on active chemo via R axillary PIC, last session yesterday), VTE with portal vein thrombosis as well as PE (on Lovenox), hypothyroidism presens with dyspnea and chills. As per son at bedside, pt felt weak following her chemo session and had fallen in the UBER with a large left thigh hematoma. Pt also noted to have a fever yesterday to 101, son called oncologist to who recommended to talk an oral antibiotic whic hpt had at home for prev dx of cellulitis as well as 2 tylenol. Fever initially resolved but returned this morning associated with mild shortness of breath. Pt also endorses pain in the left thigh that has resolved at this time. Denies headache, dizziness, chest pain, palpitations, cough, vomiting diarrhea, constipation or dysuria. ICU Consulted as patient with lactic acidosis and concern for sepsis.       PAST MEDICAL & SURGICAL HISTORY:  Essential hypertension  Hyperlipidemia  Pancreatic cancer  Hypothyroidism     SOCIAL HX: pt from home, never smoker, denies hx of EtOH or recreational drug use     FAMILY HISTORY:  :  No known cardiovascular family history     ROS:  See HPI     ALLERGIES:  No Known Allergies    Intolerances:      MEDICATIONS:  ursodiol 300mg bid  lasix 20mg qd  synthroid 25mg qd  lovenox 80mg   zofran 4mg q8  protonix 40mg qd      PHYSICAL EXAM:    T(C): 37 (07-19-23 @ 14:23), Max: 37.1 (07-19-23 @ 13:10)  HR: 111 (07-19-23 @ 14:23) (105 - 120)  BP: 117/64 (07-19-23 @ 14:23) (97/57 - 140/52)  RR: 18 (07-19-23 @ 14:23) (18 - 26)  SpO2: 96% (07-19-23 @ 14:23) (95% - 100%)    GENERAL: ill-appearing female, jaundiced, NAD, on 2L NC  HEAD: normocephalic, atraumatic  EYES: +icteric sclera, no exudates   ENMT: +icteric tongue, no exudates, moist mucous membranes  NECK: supple, soft, no thyromegaly noted  LUNGS: clear to auscultation bilaterally, no wheezing, rhonchi or rales appreciated  HEART: S1/S2, regular rate and rhythm, no murmurs noted, +b/l lower extremity edema  GASTROINTESTINAL: abdomen is distended, nontender, +jaundiced, normoactive bowel sounds, no palpable masses  INTEGUMENT: +diffuse jaundice  MUSCULOSKELETAL: no clubbing or cyanosis, +L thigh hematoma  NEUROLOGIC: AAO x3, CNII-XII intact, no obvious sensorimotor deficits noted  PSYCHIATRIC: mood is good, affect is congruent, linear and logical thought process     LABS:                          7.2    0.90  )-----------( 193      ( 19 Jul 2023 13:00 )             24.4                                               07-19    135  |  102  |  18  ----------------------------<  73  3.5   |  16<L>  |  1.54<H>    Ca    7.5<L>      19 Jul 2023 13:00    TPro  5.5<L>  /  Alb  1.7<L>  /  TBili  6.8<H>  /  DBili  x   /  AST  82<H>  /  ALT  55  /  AlkPhos  176<H>  07-19      PT/INR - ( 19 Jul 2023 13:00 )   PT: 37.2 sec;   INR: 3.09 ratio         PTT - ( 19 Jul 2023 13:00 )  PTT:35.2 sec                                       Urinalysis Basic - ( 19 Jul 2023 13:00 )    Color: x / Appearance: x / SG: x / pH: x  Gluc: 73 mg/dL / Ketone: x  / Bili: x / Urobili: x   Blood: x / Protein: x / Nitrite: x   Leuk Esterase: x / RBC: x / WBC x   Sq Epi: x / Non Sq Epi: x / Bacteria: x                                                  LIVER FUNCTIONS - ( 19 Jul 2023 13:00 )  Alb: 1.7 g/dL / Pro: 5.5 g/dL / ALK PHOS: 176 U/L / ALT: 55 U/L DA / AST: 82 U/L / GGT: x                                              IMAGING:    CXR:     ACC: [id number] EXAM:  XR CHEST PORTABLE URGENT 1V   ORDERED BY:   ROBERT DIA     PROCEDURE DATE:  07/19/2023          INTERPRETATION:  AP semierect chest on July 19, 2023 at 1:40 PM. Patient   has sepsis.    Elevated diaphragms again noted. Heart magnified by technique. Right PICC   line remains.    On May 21 this year there was slight bibasilar atelectasis.    Present film now shows a small left base effusion.    IMPRESSION: Small left base effusion at this time. Right PICC line   remains.    --- End of Report ---      MARCIAL DEJESUS MD; Attending Radiologist  This document has been electronically signed. Jul 19 2023  4:16PM

## 2023-07-19 NOTE — ED ADULT TRIAGE NOTE - CCCP TRG CHIEF CMPLNT
seen.   Diastolic filling parameters suggest normal diastolic filing pressure. The mitral valve is normal in structure. Mild mitral regurgitation. Aortic valve appears sclerotic but opens adequately. Systolic pulmonary artery pressure (SPAP) is normal and estimated at 23 mmHg   (RA pressure 3 mmHg). Past Medical History:   has a past medical history of Actinic keratosis; Allergic rhinitis; CAD (coronary artery disease); [place] (hard of hearing); Hyperlipidemia; Hypertension; MI (myocardial infarction) ([address] and Osteoarthritis. Surgical History:   has a past surgical history that includes Diagnostic Cardiac Cath Lab Procedure; Appendectomy; fracture surgery; Coronary artery bypass graft (8/06); Colonoscopy (2005); Tonsillectomy; and Coronary angioplasty (07/27/2018). Social History:   reports that he has quit smoking. His smoking use included Cigars. He has never used smokeless tobacco. He reports that he drinks alcohol. He reports that he does not use drugs. Family History:  No evidence for sudden cardiac death or premature CAD    Home Medications:  Reviewed and are listed in nursing record. and/or listed below  Current Outpatient Prescriptions   Medication Sig Dispense Refill    levothyroxine (SYNTHROID) 75 MCG tablet TAKE 1 TABLET BY MOUTH EVERY DAY 90 tablet 1    metoprolol succinate (TOPROL XL) 25 MG extended release tablet Take 1 tablet by mouth daily 90 tablet 3    atorvastatin (LIPITOR) 20 MG tablet Take 1 tablet by mouth daily 90 tablet 3    amLODIPine (NORVASC) 2.5 MG tablet Take 1 tablet by mouth daily 90 tablet 3    isosorbide mononitrate (IMDUR) 30 MG extended release tablet Take 1 tablet by mouth daily 90 tablet 3    Naproxen Sodium (ALEVE) 220 MG CAPS Take by mouth as needed for Pain      Multiple Vitamins-Minerals (MULTIVITAMIN PO) Take by mouth      aspirin 81 MG EC tablet Take 81 mg by mouth 2 times daily.         fish oil-omega-3 fatty acids 1000 MG capsule Take 2 g
shortness of breath

## 2023-07-19 NOTE — ED PROVIDER NOTE - PHYSICAL EXAMINATION
Gen. mild respiratory distress  HEENT: Positive scleral icterus, pharynx dry mucous membranes  Lungs: Bilateral rhonchi O2 sat 90% on room air  CVS: S1S2   Abd; soft nontender  Ext: Bilateral pitting edema 4+ lower extremity  Neuro: AAOx3  MSK: Strength 5/5 bilateral extremities and upper

## 2023-07-19 NOTE — ED PROVIDER NOTE - NS ED ROS FT
Constitutional: no fever no chills  Eyes: no conjunctivitis  Ears: no ear pain no tinnitus  Nose: no nasal congestion, Mouth/Throat: no throat pain, Neck: no stiffness  Cardiovascular: no chest pain  Respiratory: see hpi  Gastrointestinal: no abdominal pain, no vomiting no diarrhea  MSK: no joint pain  : no  dysuria  Skin: no rash  Neuro: no LOC no seizures

## 2023-07-19 NOTE — CONSULT NOTE ADULT - ATTENDING COMMENTS
74 y/o female with Metastatic pancreatic cancer, VTE with portal vein thrombosis as well as PE presenting with dyspnea. As per son last chemo session yesterday, had fell in the UBER with a large left thigh hematoma. ICU Consulted as patient with lactic acidosis and concern for sepsis.     Assessment:  1. Lactic acidosis  2. Leukopenia  3. Suspected sepsis   4. Acute kidney injury possibly ATN  5. Left thigh hematoma   6. Metastatic pancreatic cancer   7. VTE   8. Anemia    Plan:  - IV fluid hydration  - Trend lactate   - Obtain cultures and follow results  - Broad spectrum antibiotics  - repeat BMP  - Consider imaging of leg  - Nephrology consult   - Hold chemotherapy while in patient  - Monitor for signs of compartment syndrome of the leg   - Discussed with son at bedside

## 2023-07-19 NOTE — CONSULT NOTE ADULT - ASSESSMENT
Patient is a 74yo Female with Stage IV Pancreatic CA with liver mets on chemo q 2wks (last dose yesterday), HTN, PE/ portal thrombosis presents with fever, LLE rash/ pain. Pt a/w Sepsis, neutropenia with severe lactic acidosis (lactate 11.9). Nephrology consulted Elevated serum creatinine.    1. TESFAYE- baseline SCr 0.5-0.6 (last SCr 0.61 on 5/22/23)  2. Metabolic acidosis  3. Sepsis  4. HTN Patient is a 72yo Female with Stage IV Pancreatic CA with liver mets on chemo q 2wks (last dose yesterday), HTN, PE/ portal thrombosis presents with fever, LLE rash/ pain. Pt a/w Sepsis, neutropenia with severe lactic acidosis (lactate 11.9). Nephrology consulted Elevated serum creatinine.    1. TESFAYE- baseline SCr 0.5-0.6 (last SCr 0.61 on 5/22/23). TESFAYE likely hemodynamically mediated in the setting of sepsis. Continue to hold Lasix for now. UA with 30 protein and trace blood. Check FeUrea. Recc NS @ 75ml/hr. Check Renal US. Strict I/Os. Avoid nephrotoxins/ NSAIDs/ RCA. Monitor BMP.    2. Metabolic acidosis- AG MA in the setting of severe lactic acidosis. Check VBG ph. s/p LR 2.1 L bolus. Recc to avoid LR IVF due to elevated lactate. Will consider bicarb gtt if ph <7.1-7.2. Monitor ph/CO2    3. Sepsis- pt on Cefepime/ Vanco. Monitor Vanco trough. Check UCx and BCx. ID following    4. LE edema- hold Lasix for now due to lactic acidosis/ TESFAYE. Pt with LLE ecchymosis with anemia; recc CT without contrast.     [facility] NEPHROLOGY  Luis Alvarado M.D.  Fernando Garcia D.O.  Fay Shankar M.D.  Amy Núñez, MSN, ANP-C  [phone]  Fax: [phone]    [facility] [place] [address], [address]  [place] [address]

## 2023-07-19 NOTE — ED PROVIDER NOTE - PROGRESS NOTE DETAILS
ATTG: : bp mildly better but with new renal failure, low wbc, and very high lactate, started treatment for presumed infection, possible pna as source, pending urinalysis, continue ivf and discussed case with pmd, admit to [facility] for further care.

## 2023-07-19 NOTE — H&P ADULT - ATTENDING COMMENTS
metastatic pancreatic ca with liver mets on chemo  anemia  zita  elevated lactate  neutopenic fever  -empiric abx. f/u cult. f/u id consult  -zita- likely pre-renal. ivf. renal us. renal consult  -CT neg for pna or bleed  anemia- likely 2/2 chemo. transfuse prbc    Advanced care planning was discussed with patient and family.  Advanced care planning forms were reviewed and discussed as appropriate.  Differential diagnosis and plan of care discussed with patient after the evaluation.   Pain assessed and judicious use of narcotics when appropriate was discussed.  Importance of Fall prevention discussed.  Counseling on Smoking and Alcohol cessation was offered when appropriate.  Counseling on Diet, exercise, and medication compliance was done.       Approx 60 minutes spent.

## 2023-07-19 NOTE — H&P ADULT - HISTORY OF PRESENT ILLNESS
73 year old female, from home, ambulates with walker, with Metastatic pancreatic cancer (dx 5/24, on active chemo via R axillary PIC, last session yesterday), VTE with portal vein thrombosis as well as PE (on Lovenox), hypothyroidism presens with dyspnea and chills. As per son at bedside, pt felt weak following her chemo session and had fallen in the UBER with a large left thigh hematoma. Pt also noted to have a fever yesterday to 101, son called oncologist to who [facility] dx of [facility] as well as 2 tylenol. Fever initially resolved but returned this morning associated with mild shortness of breath. Pt also endorses pain in the left thigh that has resolved at this time. Denies headache, dizziness, chest pain, palpitations, cough, vomiting diarrhea, constipation or dysuria. ICU Consulted as patient with lactic acidosis and concern for sepsis.

## 2023-07-19 NOTE — CONSULT NOTE ADULT - ASSESSMENT
ASSESSMENT:        PLAN:      _________CNS___________    _________CVS___________    _________ RESP__________    __________GI____________    ________ RENAL__________    _________MSK___________    __________ID____________    _________ENDO__________    _______HEME/ONC_______    _________SKIN____________    ________Prophylaxis_______  #DVT  hold AC in setting of possible bleeding  SCDs  #GI   PPI   ________GOC/ DISPO_______  Medicine floor  ASSESSMENT:  73 year old female, from home, ambulates with walker, with Metastatic pancreatic cancer (dx 5/24, on active chemo via R axillary PIC, last session yesterday), VTE with portal vein thrombosis as well as PE (on Lovenox), hypothyroidism presents with dyspnea and chills. Admitted for lactic acidosis and possible neutropenic sepsis. ICU consulted for lactic acidosis and hypovolemia.       #Lactic acidosis  #Leukopenia  #anemia   #Suspected sepsis   #TESFAYE (possible ATN)   #Left thigh hematoma   #Metastatic pancreatic cancer   #VTE   #Anemia      PLAN:    _________CNS___________  pt at baseline mental status   no acute issues     _________CVS___________  pt no longer on medications for HTN and HLD     _________ RESP__________  #dyspnea  CXR as above  f/u sepsis w/u as below     #hx of PE/DVT  on lovenox  hold in setting of anemia, possible bleeding  pt hemodynamically stable at this time    __________GI____________  #metastatic pancreatic cancer  on active chemo (last yesterday) via R axillary PICC line  Bilirubin 6.8,    improved from prior  pt now with possible neutropenic sepsis, plan as below   recommend heme/onc consult      ________ RENAL__________  #lactic acidosis  lactate 11.9 in ED   pt hemodynamically stable at this time  possibly 2/2 sepsis vs chronic liver disease   trend lactate and c/w IVF as tolerated  caution not to overload pt   f/u VBG   c/w sepsis w/u as below     #TESFAYE  likely in setting of sepsis, hypovelemia, lactic acidosis   recommend sending UA and urine lytes   c/w IVF as tolerated     _________MSK___________  #R thigh hematoma  HGB 7.2  acute blood loss anemia vs. pancytopenia in setting of chemotherapy  recommend CT of LLE to r/o bleed    __________ID____________  #suspected neutropenic sepsis   pt with reported fever at home  afebrile in ED, hypotensive and tachycardic, responsive to fluids   pt neutropenic  CXR as above   reccomend broad spectrum abx including vanc to cover MRSA  collect UA  f/u blood and urine cx   Recommend ID consult  Consider removing PICC Line as nidus for infaction    _________ENDO__________  #hypothyroidism   c/w synthroid     _______HEME/ONC_______  #anemia   #neutropenia   plan as above     _________SKIN____________  plan as above     ________Prophylaxis_______  #DVT  hold AC in setting of possible bleeding  SCDs  #GI  PPI   ________GOC/ DISPO_______  Medicine floor   reconsult as needed

## 2023-07-20 NOTE — CONSULT NOTE ADULT - SUBJECTIVE AND OBJECTIVE BOX
Time of visit:    CHIEF COMPLAINT: Patient is a 73y old  Female who presents with a chief complaint of Sepsis (19 Jul 2023 20:42)      HPI:  73 year old female, from home, ambulates with walker, with Metastatic pancreatic cancer (dx 5/24, on active chemo via R axillary PIC, last session yesterday), VTE with portal vein thrombosis as well as PE (on Lovenox), hypothyroidism presens with dyspnea and chills. As per son at bedside, pt felt weak following her chemo session and had fallen in the UBER with a large left thigh hematoma. Pt also noted to have a fever yesterday to 101, son called oncologist to who recommended to take an oral antibiotic whic hpt had at home for prev dx of cellulitis as well as 2 tylenol. Fever initially resolved but returned this morning associated with mild shortness of breath. Pt also endorses pain in the left thigh that has resolved at this time. Denies headache, dizziness, chest pain, palpitations, cough, vomiting diarrhea, constipation or dysuria. ICU Consulted as patient with lactic acidosis and concern for sepsis.  (19 Jul 2023 20:14)   Patient seen and examined.     PAST MEDICAL & SURGICAL HISTORY:  Essential hypertension      Hyperlipidemia      Pancreatic cancer      No pertinent past surgical history          Allergies    No Known Allergies    Intolerances        MEDICATIONS  (STANDING):  cefepime   IVPB 2000 milliGRAM(s) IV Intermittent every 8 hours  filgrastim-sndz (ZARXIO) Injectable 300 MICROGram(s) SubCutaneous daily  levothyroxine 25 MICROGram(s) Oral daily  pantoprazole    Tablet 40 milliGRAM(s) Oral before breakfast  sodium chloride 0.9%. 1000 milliLiter(s) (75 mL/Hr) IV Continuous <Continuous>  ursodiol Capsule 300 milliGRAM(s) Oral two times a day  vancomycin  IVPB 1000 milliGRAM(s) IV Intermittent every 24 hours      MEDICATIONS  (PRN):  acetaminophen     Tablet .. 650 milliGRAM(s) Oral every 6 hours PRN Temp greater or equal to 38C (100.4F), Mild Pain (1 - 3)   Medications up to date at time of exam.    Medications up to date at time of exam.    FAMILY HISTORY:  Family history unknown (Father, Mother)        SOCIAL HISTORY  Smoking History: Denies smoking/smoke exposure.   Living Condition: [   ] apartment, [   ] private house  Work History:   Travel History: denies recent travel  Illicit Substance Use: denies  Alcohol Use: denies    REVIEW OF SYSTEMS:    CONSTITUTIONAL: Presented to ED with reported Temp 101. Afebrile on exam. Denies night sweats and no chills on exam.     HEENT:  Denies blurred vision. No sore throat nor runny nose.    CARDIOVASCULAR:  Denies chest discomfort on exam.     RESPIRATORY:  Presented to ED with episode of  SOB, dyspnea on exertion . No cough, nor wheezing on exam.     GASTROINTESTINAL:  denies abdominal pain, nausea, vomiting or diarrhea.    GENITOURINARY: denies dysuria, frequency or urgency.    NEUROLOGIC:  denies numbness, tingling, seizures or weakness.    PSYCHIATRIC:  denies disorder of thought or mood.    MSK: denies swelling, redness      PHYSICAL EXAMINATION:    Vital Signs Last 24 Hrs  T(C): 36.7 (20 Jul 2023 11:44), Max: 36.9 (19 Jul 2023 19:36)  T(F): 98.1 (20 Jul 2023 11:44), Max: 98.5 (19 Jul 2023 19:36)  HR: 106 (20 Jul 2023 11:44) (97 - 115)  BP: 89/55 (20 Jul 2023 11:44) (89/55 - 111/63)  BP(mean): 77 (19 Jul 2023 22:49) (68 - 77)  RR: 18 (20 Jul 2023 11:44) (18 - 22)  SpO2: 95% (20 Jul 2023 11:44) (95% - 98%)    Parameters below as of 20 Jul 2023 11:44  Patient On (Oxygen Delivery Method): room air       (if applicable)    General : Alert and oriented. Able to answer question at rest with no SOB. No acute distress.     HEENT: Atraumatic. No nasal tenderness. Extraocular muscles are intact. Mucous membranes are moist.     NECK: Supple, no palpable adenopathy.    LUNGS: Clear to auscultation bilaterally with no wheezing, rales, or rhonchi. No use of accessory muscle.     HEART: S1 S2 Regular rate and no click/ rub.     ABDOMEN: No abdominal guarding. Active bowel sounds.     EXTREMITIES: Left thigh hematoma. B/L LE edema.    NEUROLOGIC: Awake, alert, oriented.     SKIN: Warm and moist . Non diaphoretic.       LABS:                        7.8    1.25  )-----------( 63       ( 20 Jul 2023 07:01 )             25.3     07-20    132<L>  |  102  |  16  ----------------------------<  123<H>  3.8   |  22  |  0.97    Ca    7.7<L>      20 Jul 2023 07:01  Phos  2.5     07-20  Mg     1.8     07-20    TPro  5.2<L>  /  Alb  1.6<L>  /  TBili  7.3<H>  /  DBili  x   /  AST  74<H>  /  ALT  51  /  AlkPhos  133<H>  07-20    PT/INR - ( 19 Jul 2023 13:00 )   PT: 37.2 sec;   INR: 3.09 ratio         PTT - ( 19 Jul 2023 13:00 )  PTT:35.2 sec  Urinalysis Basic - ( 20 Jul 2023 07:01 )    Color: x / Appearance: x / SG: x / pH: x  Gluc: 123 mg/dL / Ketone: x  / Bili: x / Urobili: x   Blood: x / Protein: x / Nitrite: x   Leuk Esterase: x / RBC: x / WBC x   Sq Epi: x / Non Sq Epi: x / Bacteria: x                Lactate, Blood: 7.9 mmol/L (07-20-23 @ 00:10)  Lactate, Blood: 10.9 mmol/L (07-19-23 @ 16:15)        MICROBIOLOGY: (if applicable)    RADIOLOGY & ADDITIONAL STUDIES:  EKG:   CXR:  ECHO:    IMPRESSION: 73y Female PAST MEDICAL & SURGICAL HISTORY:  Essential hypertension      Hyperlipidemia      Pancreatic cancer      No pertinent past surgical history    Impression: This is a 72 Y/O Female presented to ED with Dyspnea , chills . Has a Large Left Thigh Hematoma due to outpatient reported to have a Fall in the Uber following chemo session. Had a fever yesterday with Temp 101. Fever initially resolved but had a fever that morning associated with Mild shortness of breath .   Has a Metastatic pancreatic cancer with Liver METS , on active chemo via R axillary PICC ), VTE with portal vein thrombosis and PE (on Lovenox). In Medicine Unit . ICU was initially consulted for Lactic Acidosis and Hypovolemia. Negative RVP, Covid 19. 07-19-23 CT Chest no enlarged mediastinal , Hilar , Axillary Lymp Nodes. Small Left Pleural Effusion . Scattered compressive , linear Atelectasis .        Suggestion:  O2 saturation 95% room air. So far saturating good room air. Monitor Oxygen saturation trend room air. Can have Oxygen supplementation 2L NC if needed.   Can have incentive spirometer .  On Zarxio 300 mcg SQ Daily. WBC from 1.12 to 1.25.      On Cefepime 2 Gm IVPB Q 8 Hours. On vancomycin 1 Gm IVPB daily x 7 Days with monitoring of Vanco trough.   Oncology follow up , has Metastatic pancreatic cancer with Liver METS , on active chemo.  Time of visit:    CHIEF COMPLAINT: Patient is a 73y old  Female who presents with a chief complaint of Sepsis (19 Jul 2023 20:42)      HPI:  73 year old female, from home, ambulates with walker, with Metastatic pancreatic cancer (dx 5/24, on active chemo via R axillary PIC, last session yesterday), VTE with portal vein thrombosis as well as PE (on Lovenox), hypothyroidism presented  with dyspnea and chills. As per son at bedside, pt felt weak following her chemo session and had fallen in the UBER with a large left thigh hematoma. Pt also noted to have a fever yesterday to 101, son called oncologist to who recommended to take an oral antibiotic which pat  had at home for prev dx of cellulitis as well as 2 Tylenol Fever initially resolved but returned this morning associated with mild shortness of breath. Pt also endorses pain in the left thigh that has resolved at this time. Denies headache, dizziness, chest pain, palpitations, cough, vomiting diarrhea, constipation or dysuria. ICU Consulted as patient with lactic acidosis and concern for sepsis.  (19 Jul 2023 20:14)   Patient seen and examined.     PAST MEDICAL & SURGICAL HISTORY:  Essential hypertension      Hyperlipidemia      Pancreatic cancer      No pertinent past surgical history          Allergies    No Known Allergies    Intolerances        MEDICATIONS  (STANDING):  cefepime   IVPB 2000 milliGRAM(s) IV Intermittent every 8 hours  filgrastim-sndz (ZARXIO) Injectable 300 MICROGram(s) SubCutaneous daily  levothyroxine 25 MICROGram(s) Oral daily  pantoprazole    Tablet 40 milliGRAM(s) Oral before breakfast  sodium chloride 0.9%. 1000 milliLiter(s) (75 mL/Hr) IV Continuous <Continuous>  ursodiol Capsule 300 milliGRAM(s) Oral two times a day  vancomycin  IVPB 1000 milliGRAM(s) IV Intermittent every 24 hours      MEDICATIONS  (PRN):  acetaminophen     Tablet .. 650 milliGRAM(s) Oral every 6 hours PRN Temp greater or equal to 38C (100.4F), Mild Pain (1 - 3)   Medications up to date at time of exam.    Medications up to date at time of exam.    FAMILY HISTORY:  Family history unknown (Father, Mother)        SOCIAL HISTORY  Smoking History: Denies smoking/smoke exposure.   Living Condition: [   ] apartment, [   ] private house  Work History:   Travel History: denies recent travel  Illicit Substance Use: denies  Alcohol Use: denies    REVIEW OF SYSTEMS:    CONSTITUTIONAL: Presented to ED with reported Temp 101. Afebrile on exam. Denies night sweats and no chills on exam.     HEENT:  Denies blurred vision. No sore throat nor runny nose.    CARDIOVASCULAR:  Denies chest discomfort on exam.     RESPIRATORY:  Presented to ED with episode of  SOB, dyspnea on exertion . No cough, nor wheezing on exam.     GASTROINTESTINAL:  denies abdominal pain, nausea, vomiting or diarrhea.    GENITOURINARY: denies dysuria, frequency or urgency.    NEUROLOGIC:  denies numbness, tingling, seizures or weakness.    PSYCHIATRIC:  denies disorder of thought or mood.    MSK: denies swelling, redness      PHYSICAL EXAMINATION:    Vital Signs Last 24 Hrs  T(C): 36.7 (20 Jul 2023 11:44), Max: 36.9 (19 Jul 2023 19:36)  T(F): 98.1 (20 Jul 2023 11:44), Max: 98.5 (19 Jul 2023 19:36)  HR: 106 (20 Jul 2023 11:44) (97 - 115)  BP: 89/55 (20 Jul 2023 11:44) (89/55 - 111/63)  BP(mean): 77 (19 Jul 2023 22:49) (68 - 77)  RR: 18 (20 Jul 2023 11:44) (18 - 22)  SpO2: 95% (20 Jul 2023 11:44) (95% - 98%)    Parameters below as of 20 Jul 2023 11:44  Patient On (Oxygen Delivery Method): room air       (if applicable)    General : Alert and oriented. Able to answer question at rest with no SOB. No acute distress.     HEENT: Atraumatic. No nasal tenderness. Extraocular muscles are intact. Mucous membranes are moist.     NECK: Supple, no palpable adenopathy.    LUNGS: Clear to auscultation bilaterally with no wheezing, rales, or rhonchi. No use of accessory muscle.     HEART: S1 S2 Regular rate and no click/ rub.     ABDOMEN: No abdominal guarding. Active bowel sounds.     EXTREMITIES: Left thigh hematoma. B/L LE edema.    NEUROLOGIC: Awake, alert, oriented.     SKIN: Warm and moist . Non diaphoretic.       LABS:                        7.8    1.25  )-----------( 63       ( 20 Jul 2023 07:01 )             25.3     07-20    132<L>  |  102  |  16  ----------------------------<  123<H>  3.8   |  22  |  0.97    Ca    7.7<L>      20 Jul 2023 07:01  Phos  2.5     07-20  Mg     1.8     07-20    TPro  5.2<L>  /  Alb  1.6<L>  /  TBili  7.3<H>  /  DBili  x   /  AST  74<H>  /  ALT  51  /  AlkPhos  133<H>  07-20    PT/INR - ( 19 Jul 2023 13:00 )   PT: 37.2 sec;   INR: 3.09 ratio         PTT - ( 19 Jul 2023 13:00 )  PTT:35.2 sec  Urinalysis Basic - ( 20 Jul 2023 07:01 )    Color: x / Appearance: x / SG: x / pH: x  Gluc: 123 mg/dL / Ketone: x  / Bili: x / Urobili: x   Blood: x / Protein: x / Nitrite: x   Leuk Esterase: x / RBC: x / WBC x   Sq Epi: x / Non Sq Epi: x / Bacteria: x                Lactate, Blood: 7.9 mmol/L (07-20-23 @ 00:10)  Lactate, Blood: 10.9 mmol/L (07-19-23 @ 16:15)        MICROBIOLOGY: (if applicable)    RADIOLOGY & ADDITIONAL STUDIES:  EKG:   CXR:< from: CT Angio Chest PE Protocol w/ IV Cont (07.22.23 @ 16:44) >    ACC: [id number] EXAM:  CT ABDOMEN AND PELVIS IC   ORDERED BY: ZENOBIA ANTONIO     ACC: [id number] EXAM:  CT ANGIO CHEST PULM ART WAWIC   ORDERED BY: ZENOBIA ANTONIO     PROCEDURE DATE:  07/22/2023          INTERPRETATION:  CLINICAL INFORMATION: 73-year-old female with history   metastatic pancreatic cancer on chemotherapy, pulmonary embolus on   anticoagulation presenting with melena and altered mental status.   Increased oxygen requirement.    COMPARISON: Chest CT 07/19/2023. MRI 05/15/2023    CONTRAST/COMPLICATIONS:  IV Contrast: Omnipaque 350 (accession [id number]), IV contrast documented   in unlinked concurrent exam (accession [id number])  90 cc administered   10   cc discarded  Oral Contrast: NONE  Complications: None reported at time of study completion    PROCEDURE:  CT Angiography of the Chest was performed followed by portal venous phase   imaging of the Abdomen and Pelvis.  Sagittal and coronal reformats were performed as well as 3D (MIP)   reconstructions.    FINDINGS:  CHEST:  LUNGS AND LARGE AIRWAYS: Patent central airways. Right middle and lower   lobe partial atelectasis. Left basilar atelectasis.  PLEURA: Small bilateral pleural effusions.  VESSELS: No pulmonary embolus. Right subclavian line ending at the   cavoatrial junction.  HEART: Heart size is normal. No pericardial effusion.  MEDIASTINUM AND MARIO: No lymphadenopathy.  CHEST WALL AND LOWER NECK: Within normal limits.  Elevated right hemidiaphragm which appears to displace heart to the left.    ABDOMEN AND PELVIS:  LIVER: Heterogeneous with scattered focal lesions. Poorly defined mass   left hepatic lobe 2.1 cm.  BILE DUCTS: Normal caliber.  GALLBLADDER: Within normal limits.  SPLEEN: Within normal limits.  PANCREAS: Previously demonstrated pancreatic body mass poorly visualized.  ADRENALS: Within normal limits.  KIDNEYS/URETERS: Within normal limits.    BLADDER: Distended.  REPRODUCTIVE ORGANS: Uterus and adnexa within normal limits.    BOWEL: No bowel obstruction. Mural edema ascending colon which may be   secondary to portal colopathy versus infectious colitis. Appendix not   visualized. No evidence appendicitis.  PERITONEUM: Large ascites which appears to elevate right hemidiaphragm.  VESSELS: Apparent portal vein thrombosis.  RETROPERITONEUM/LYMPH NODES: No lymphadenopathy.  ABDOMINAL WALL: Within normal limits.  BONES: Degenerative change.    IMPRESSION:  Small bilateral pleural effusions.  Metastatic disease of the liver as previously demonstrated.  Large ascites.  Portal vein thrombosis.  Ascending portal colopathy versus infectious colitis.  No pulmonary embolus.        --- End of Report ---            CRUZ SANDERS MD; Attending Radiologist  This document has been electronically signed. Jul 22 2023  5:49PM    < end of copied text >    ECHO:    IMPRESSION: 73y Female PAST MEDICAL & SURGICAL HISTORY:  Essential hypertension      Hyperlipidemia      Pancreatic cancer      No pertinent past surgical history    Impression: This is a 73 yr old woman  presented to ED with Dyspnea , chills . Has a Large Left Thigh Hematoma due to outpatient reported to have a Fall in the [place] following chemo session. Had a fever yesterday with Temp 101. Fever initially resolved but had a fever that morning associated with Mild shortness of breath .   Has a Metastatic pancreatic cancer with Liver METS , on active chemo via R axillary PICC ), VTE with portal vein thrombosis and PE (on Lovenox). In Medicine Unit . ICU was initially consulted for Lactic Acidosis and Hypovolemia. Negative RVP, Covid 19. 07-19-23 CT Chest no enlarged mediastinal , Hilar , Axillary Lymp Nodes. Small Left Pleural Effusion . Scattered compressive , linear Atelectasis .        Suggestion:  O2 saturation 95% room air. So far saturating good room air. Monitor Oxygen saturation trend room air. Can have Oxygen supplementation 2L NC if needed.   Can have incentive spirometer .  On Zarxio 300 mcg SQ Daily. WBC from 1.12 to 1.25.      On Cefepime 2 Gm IVPB Q 8 Hours. On vancomycin 1 Gm IVPB daily x 7 Days with monitoring of Vanco trough.   Oncology follow up , has Metastatic pancreatic cancer with Liver METS , on active chemo.

## 2023-07-20 NOTE — PROGRESS NOTE ADULT - ASSESSMENT
Patient is a 74yo Female with Stage IV Pancreatic CA with liver mets on chemo q 2wks (last dose yesterday), HTN, PE/ portal thrombosis presents with fever, LLE rash/ pain. Pt a/w Sepsis, neutropenia with severe lactic acidosis (lactate 11.9). Nephrology consulted Elevated serum creatinine.    1. TESFAYE- baseline SCr 0.5-0.6 (last SCr 0.61 on 5/22/23). TESFAYE likely hemodynamically mediated in the setting of sepsis. Continue to hold Lasix for now. Renal function improving on IVF. UA with 30 protein and trace blood. Check FeUrea. Will d/c NS IVF and start albumin gtt. Pt with hyponatremia; recc to switch antibiotics to NS base instead of D5. Check urine sodium, urine osmolality and serum osmolality. Renal US with no hydro. Strict I/Os. Avoid nephrotoxins/ NSAIDs/ RCA. Monitor BMP.    2. Metabolic acidosis- AG MA in the setting of severe lactic acidosis- resolved. Serum CO2 wnl.  Monitor ph/CO2    3. Sepsis- pt on Cefepime/ Vanco. Monitor Vanco trough. f/u pending UCx and BCx. ID following    4. LE edema- hold Lasix for now due to lactic acidosis/ TESFAYE. Pt with LLE ecchymosis with anemia; s/p CT LE with no large hematoma    [facility] NEPHROLOGY  Luis Alvarado M.D.  Fernando Garcia D.O.  Fay Shankar M.D.  Amy Núñez, WALDEMAR, ANP-C  [phone]  Fax: [phone] [place] [address], [id number]  [place] [address]

## 2023-07-20 NOTE — CONSULT NOTE ADULT - SKIN COMMENTS
left inner thigh erythema and warmth consistent with cellulitis of inner thigh and has bruising of the left lateral thigh

## 2023-07-20 NOTE — PROGRESS NOTE ADULT - ASSESSMENT
73 year old female, from home, ambulates with walker, with Metastatic pancreatic cancer (dx 5/24, on active chemo via R axillary PIC, last session yesterday), VTE with portal vein thrombosis as well as PE (on Lovenox), hypothyroidism presens with dyspnea, fever and chills  Also co  left thigh hematoma, pain, states hit her leg while getting on the uber   Admitted to medicine for sepsis, neutropenic fever, and lactic acidosis.   Unclear etiology   CT left leg: Nonspecific subcutaneous edema throughout both lower extremities, which may be related to lower extremity edema and/or cellulitis. No drainable soft tissue fluid collection seen. No soft tissue gas. No large hematoma   hem/onc consulted   CXR no infection   urinalysis negative   Patient was  evaluated by ICU   Started on  Cefepime and Vancomycin as per guideline for neutropenic fever   ID Dr Smart consulted   Lactate 11.9--> 10.9   Received unit of blood for Hg of 7.2   Nephro consulted for elevated creatinine     Pt seen at bedside, NAd, alert, awake, oriented, states is feeling weak but netter than yesterday  As per son at bedside, area of left thigh hematoma is not expending. Noted with surrounding erythema. Antibiotics continued      73 year old female, from home, ambulates with walker, with Metastatic pancreatic cancer (dx 5/24, on active chemo via R axillary PIC, last session yesterday), VTE with portal vein thrombosis as well as PE (on Lovenox), hypothyroidism presens with dyspnea, fever and chills  Also co  left thigh hematoma, pain, states hit her leg while getting on the uber   Admitted to medicine for sepsis, neutropenic fever, and lactic acidosis.   Unclear etiology   Lactate 11.9--> 10.9   CT left leg: Nonspecific subcutaneous edema throughout both lower extremities, which may be related to lower extremity edema and/or cellulitis. No drainable soft tissue fluid collection seen. No soft tissue gas. No large hematoma   hem/onc consulted   CXR no infection, urinalysis negative   Patient was  evaluated by ICU   Started on  Cefepime and Vancomycin as per guideline for neutropenic fever   ID Dr Smart on board   Received unit of blood for Hg of 7.2   Nephro consulted for elevated creatinine       Pt seen at bedside, NAd, alert, awake, oriented, states is feeling weak but netter than yesterday  As per son at bedside, area of left thigh hematoma is not expending. Hemoglobin remains stable 8.4, lactate trending down 3.3 creatinine normalized

## 2023-07-20 NOTE — PATIENT PROFILE ADULT - ARE SIGNIFICANT INDICATORS COMPLETE.
Refill request for XHANCE.    Last visit 12/26/2019. Patient to continue on Exhance.    Next appointment as needed / in four months.     Refilled per protocol.    
Yes

## 2023-07-20 NOTE — PROGRESS NOTE ADULT - SUBJECTIVE AND OBJECTIVE BOX
[facility] NEPHROLOGY- PROGRESS NOTE    Patient is a 72yo Female with Stage IV Pancreatic CA with liver mets on chemo q 2wks (last dose yesterday), HTN, PE/ portal thrombosis presents with fever, LLE rash/ pain. Pt a/w Sepsis, neutropenia with severe lactic acidosis (lactate 11.9). Nephrology consulted Elevated serum creatinine.      Hospital Medications: Medications reviewed.  REVIEW OF SYSTEMS:  CONSTITUTIONAL: No fevers or chills  RESPIRATORY: No shortness of breath  CARDIOVASCULAR: No chest pain.  GASTROINTESTINAL: No nausea, vomiting, diarrhea or abdominal pain.   VASCULAR: + bilateral lower extremity edema. +Left thigh pain    VITALS:  T(F): 98.2 (07-20-23 @ 17:57), Max: 98.5 (07-19-23 @ 19:36)  HR: 110 (07-20-23 @ 17:57)  BP: 113/68 (07-20-23 @ 17:57)  RR: 18 (07-20-23 @ 17:57)  SpO2: 91% (07-20-23 @ 17:57)  Wt(kg): --  Height (cm): 165.1 (07-19 @ 12:50)  Weight (kg): 66.7 (07-19 @ 12:50)  BMI (kg/m2): 24.5 (07-19 @ 12:50)  BSA (m2): 1.74 (07-19 @ 12:50)    PHYSICAL EXAM:  Gen: NAD, calm  HEENT: icteric  Cards: RRR, +S1/S2, no M/G/R  Resp: CTA B/L  GI: soft, abd distention NT  Extremities: +2 pitting LE edema B/L  Derm: +jaundice  LLE ecchymosis/ tenderness      LABS:  07-20  132 <--, 135 <--, 135 <--  132<L>  |  102  |  16  ----------------------------<  123<H>  3.8   |  22  |  0.97    Ca    7.7<L>      20 Jul 2023 07:01  Phos  2.5     07-20  Mg     1.8     07-20    TPro  5.2<L>  /  Alb  1.6<L>  /  TBili  7.3<H>  /  DBili      /  AST  74<H>  /  ALT  51  /  AlkPhos  133<H>  07-20    Creatinine Trend: 0.97 <--, 1.03 <--, 1.54 <--                        8.4    1.72  )-----------( 42       ( 20 Jul 2023 16:00 )             25.5     Urine Studies:  Urinalysis Basic - ( 20 Jul 2023 07:01 )    Color:  / Appearance:  / SG:  / pH:   Gluc: 123 mg/dL / Ketone:   / Bili:  / Urobili:    Blood:  / Protein:  / Nitrite:    Leuk Esterase:  / RBC:  / WBC    Sq Epi:  / Non Sq Epi:  / Bacteria:         RADIOLOGY & ADDITIONAL STUDIES:    < from: US Renal (07.20.23 @ 10:27) >    ACC: [id number] EXAM:  US KIDNEY(S)   ORDERED BY: FARZANEH BACON     PROCEDURE DATE:  07/20/2023      < end of copied text >  < from: US Renal (07.20.23 @ 10:27) >  FINDINGS:  RIGHT KIDNEY: 9.5 cm. Normal cortical medullary differentiation. No renal   mass, hydronephrosis or calculi.  LEFT KIDNEY: 10.0 cm. Normal cortical medullary differentiation. No renal   mass, hydronephrosis or calculi.  URINARY BLADDER: Within normal limits.    Ascites present throughout abdomen.  Nodular hepatic cirrhosis.    IMPRESSION:  No hydronephrosis.  Ascites. Hepatic cirrhosis.    --- End of Report ---          RACHEL MAS DO; Resident Radiologist  This document has been electronically signed.  ELIANA ZAMUDIO MD; Attending Radiologist      < from: CT Lower Extremity w/ IV Cont, Bilateral (07.19.23 @ 17:34) >    ACC: [id number] EXAM:  CT LWR EXT IC BI   ORDERED BY: ROBERT DIA     PROCEDURE DATE:  07/19/2023        < end of copied text >  < from: CT Lower Extremity w/ IV Cont, Bilateral (07.19.23 @ 17:34) >  IMPRESSION:    Nonspecific subcutaneous edema throughout both lower extremities, which   may be related to lower extremity edema and/or cellulitis. No drainable   soft tissue fluid collection seen. No soft tissue gas. No large hematoma   seen.    Partially evaluated large volume ascites.    --- End of Report ---    < end of copied text >    < end of copied text >

## 2023-07-20 NOTE — PROGRESS NOTE ADULT - SUBJECTIVE AND OBJECTIVE BOX
NP Note discussed with  primary attending    Patient is a 73y old  Female who presents with a chief complaint of Sepsis (19 Jul 2023 20:42)      INTERVAL HPI/OVERNIGHT EVENTS: no new complaints    MEDICATIONS  (STANDING):  cefepime   IVPB 2000 milliGRAM(s) IV Intermittent every 8 hours  filgrastim-sndz (ZARXIO) Injectable 300 MICROGram(s) SubCutaneous daily  levothyroxine 25 MICROGram(s) Oral daily  pantoprazole    Tablet 40 milliGRAM(s) Oral before breakfast  sodium chloride 0.9%. 1000 milliLiter(s) (75 mL/Hr) IV Continuous <Continuous>  ursodiol Capsule 300 milliGRAM(s) Oral two times a day  vancomycin  IVPB 1000 milliGRAM(s) IV Intermittent every 24 hours    MEDICATIONS  (PRN):  acetaminophen     Tablet .. 650 milliGRAM(s) Oral every 6 hours PRN Temp greater or equal to 38C (100.4F), Mild Pain (1 - 3)      __________________________________________________  REVIEW OF SYSTEMS:    CONSTITUTIONAL: No fever,   RESPIRATORY: No cough; No shortness of breath  CARDIOVASCULAR: No chest pain, no palpitations  GASTROINTESTINAL: No pain. No nausea or vomiting; No diarrhea   NEUROLOGICAL: No headache or numbness, no tremors  MUSCULOSKELETAL: left thigh mild pain   GENITOURINARY: no dysuria, no frequency, no hesitancy  PSYCHIATRY: no depression , no anxiety  ALL OTHER  ROS negative        Vital Signs Last 24 Hrs  T(C): 36.7 (20 Jul 2023 11:44), Max: 36.9 (19 Jul 2023 19:36)  T(F): 98.1 (20 Jul 2023 11:44), Max: 98.5 (19 Jul 2023 19:36)  HR: 106 (20 Jul 2023 11:44) (97 - 115)  BP: 89/55 (20 Jul 2023 11:44) (89/55 - 111/63)  BP(mean): 77 (19 Jul 2023 22:49) (68 - 77)  RR: 18 (20 Jul 2023 11:44) (18 - 22)  SpO2: 95% (20 Jul 2023 11:44) (95% - 98%)    Parameters below as of 20 Jul 2023 11:44  Patient On (Oxygen Delivery Method): room air        ________________________________________________  PHYSICAL EXAM:  GENERAL: chronically ill appearing, cachectic   HEENT: Normocephalic;  conjunctivae and sclerae clear; moist mucous membranes;   NECK : supple  CHEST/LUNG: Clear to ausculitation bilaterally   HEART: S1 S2  regular; no murmurs, gallops or rubs  ABDOMEN: Soft, Nontender, mildly distended; Bowel sounds present  EXTREMITIES: left thigh hematoma, with some tenderness to posterior thigh, surrounding errhythema   SKIN: warm and dry; no rash  NERVOUS SYSTEM:  Awake and alert; Oriented  to place, person and time ; no new deficits    _________________________________________________  LABS:                        7.8    1.25  )-----------( 63       ( 20 Jul 2023 07:01 )             25.3     07-20    132<L>  |  102  |  16  ----------------------------<  123<H>  3.8   |  22  |  0.97    Ca    7.7<L>      20 Jul 2023 07:01  Phos  2.5     07-20  Mg     1.8     07-20    TPro  5.2<L>  /  Alb  1.6<L>  /  TBili  7.3<H>  /  DBili  x   /  AST  74<H>  /  ALT  51  /  AlkPhos  133<H>  07-20    PT/INR - ( 19 Jul 2023 13:00 )   PT: 37.2 sec;   INR: 3.09 ratio         PTT - ( 19 Jul 2023 13:00 )  PTT:35.2 sec  Urinalysis Basic - ( 20 Jul 2023 07:01 )    Color: x / Appearance: x / SG: x / pH: x  Gluc: 123 mg/dL / Ketone: x  / Bili: x / Urobili: x   Blood: x / Protein: x / Nitrite: x   Leuk Esterase: x / RBC: x / WBC x   Sq Epi: x / Non Sq Epi: x / Bacteria: x      CAPILLARY BLOOD GLUCOSE      POCT Blood Glucose.: 87 mg/dL (20 Jul 2023 11:21)  POCT Blood Glucose.: 117 mg/dL (20 Jul 2023 07:44)  POCT Blood Glucose.: 141 mg/dL (20 Jul 2023 03:36)  POCT Blood Glucose.: 56 mg/dL (20 Jul 2023 02:48)        RADIOLOGY & ADDITIONAL TESTS:    Imaging Personally Reviewed:  YES/NO    Consultant(s) Notes Reviewed:   YES/ No    Care Discussed with Consultants :     Plan of care was discussed with patient and /or primary care giver; all questions and concerns were addressed and care was aligned with patient's wishes.     NP Note discussed with  primary attending    Patient is a 73y old  Female who presents with a chief complaint of Sepsis (19 Jul 2023 20:42)      INTERVAL HPI/OVERNIGHT EVENTS: no new complaints    MEDICATIONS  (STANDING):  cefepime   IVPB 2000 milliGRAM(s) IV Intermittent every 8 hours  filgrastim-sndz (ZARXIO) Injectable 300 MICROGram(s) SubCutaneous daily  levothyroxine 25 MICROGram(s) Oral daily  pantoprazole    Tablet 40 milliGRAM(s) Oral before breakfast  sodium chloride 0.9%. 1000 milliLiter(s) (75 mL/Hr) IV Continuous <Continuous>  ursodiol Capsule 300 milliGRAM(s) Oral two times a day  vancomycin  IVPB 1000 milliGRAM(s) IV Intermittent every 24 hours    MEDICATIONS  (PRN):  acetaminophen     Tablet .. 650 milliGRAM(s) Oral every 6 hours PRN Temp greater or equal to 38C (100.4F), Mild Pain (1 - 3)      __________________________________________________  REVIEW OF SYSTEMS:    CONSTITUTIONAL: No fever,   RESPIRATORY: No cough; No shortness of breath  CARDIOVASCULAR: No chest pain, no palpitations  GASTROINTESTINAL: No pain. No nausea or vomiting; No diarrhea   NEUROLOGICAL: No headache or numbness, no tremors  MUSCULOSKELETAL: left thigh mild pain   GENITOURINARY: no dysuria, no frequency, no hesitancy  PSYCHIATRY: no depression , no anxiety  ALL OTHER  ROS negative        Vital Signs Last 24 Hrs  T(C): 36.7 (20 Jul 2023 11:44), Max: 36.9 (19 Jul 2023 19:36)  T(F): 98.1 (20 Jul 2023 11:44), Max: 98.5 (19 Jul 2023 19:36)  HR: 106 (20 Jul 2023 11:44) (97 - 115)  BP: 89/55 (20 Jul 2023 11:44) (89/55 - 111/63)  BP(mean): 77 (19 Jul 2023 22:49) (68 - 77)  RR: 18 (20 Jul 2023 11:44) (18 - 22)  SpO2: 95% (20 Jul 2023 11:44) (95% - 98%)    Parameters below as of 20 Jul 2023 11:44  Patient On (Oxygen Delivery Method): room air        ________________________________________________  PHYSICAL EXAM:  GENERAL: chronically ill appearing, cachectic   HEENT: Normocephalic;  conjunctivae and sclerae clear; moist mucous membranes;   NECK : supple  CHEST/LUNG: Clear to ausculitation bilaterally   HEART: S1 S2  regular; no murmurs, gallops or rubs  ABDOMEN: Soft, Nontender, mildly distended; Bowel sounds present  EXTREMITIES: left thigh hematoma, with some tenderness to posterior thigh, not extending from yesterday   right arm picc line in place patent, inserted 3 days ago, no signs of infection at site   SKIN: warm and dry; no rash  NERVOUS SYSTEM:  Awake and alert; Oriented  to place, person and time ; no new deficits    _________________________________________________  LABS:                        7.8    1.25  )-----------( 63       ( 20 Jul 2023 07:01 )             25.3     07-20    132<L>  |  102  |  16  ----------------------------<  123<H>  3.8   |  22  |  0.97    Ca    7.7<L>      20 Jul 2023 07:01  Phos  2.5     07-20  Mg     1.8     07-20    TPro  5.2<L>  /  Alb  1.6<L>  /  TBili  7.3<H>  /  DBili  x   /  AST  74<H>  /  ALT  51  /  AlkPhos  133<H>  07-20    PT/INR - ( 19 Jul 2023 13:00 )   PT: 37.2 sec;   INR: 3.09 ratio         PTT - ( 19 Jul 2023 13:00 )  PTT:35.2 sec  Urinalysis Basic - ( 20 Jul 2023 07:01 )    Color: x / Appearance: x / SG: x / pH: x  Gluc: 123 mg/dL / Ketone: x  / Bili: x / Urobili: x   Blood: x / Protein: x / Nitrite: x   Leuk Esterase: x / RBC: x / WBC x   Sq Epi: x / Non Sq Epi: x / Bacteria: x      CAPILLARY BLOOD GLUCOSE      POCT Blood Glucose.: 87 mg/dL (20 Jul 2023 11:21)  POCT Blood Glucose.: 117 mg/dL (20 Jul 2023 07:44)  POCT Blood Glucose.: 141 mg/dL (20 Jul 2023 03:36)  POCT Blood Glucose.: 56 mg/dL (20 Jul 2023 02:48)        RADIOLOGY & ADDITIONAL TESTS:    Imaging Personally Reviewed:  YES/NO    Consultant(s) Notes Reviewed:   YES/ No    Care Discussed with Consultants :     Plan of care was discussed with patient and /or primary care giver; all questions and concerns were addressed and care was aligned with patient's wishes.

## 2023-07-20 NOTE — CONSULT NOTE ADULT - SUBJECTIVE AND OBJECTIVE BOX
HPI:  73 year old female, from home, ambulates with walker, with Metastatic pancreatic cancer (dx 5/24, on active chemo via R axillary PIC, last session yesterday), VTE with portal vein thrombosis as well as PE (on Lovenox), hypothyroidism presens with dyspnea and chills. As per son at bedside, pt felt weak following her chemo session and had fallen in the UBER with a large left thigh hematoma. Pt also noted to have a fever yesterday to 101, son called oncologist to who recommended to take an oral antibiotic whic hpt had at home for prev dx of cellulitis as well as 2 tylenol. Fever initially resolved but returned this morning associated with mild shortness of breath. Pt also endorses pain in the left thigh that has resolved at this time. Denies headache, dizziness, chest pain, palpitations, cough, vomiting diarrhea, constipation or dysuria. ICU Consulted as patient with lactic acidosis and concern for sepsis.  (19 Jul 2023 20:14)      PAST MEDICAL & SURGICAL HISTORY:  Essential hypertension      Hyperlipidemia      Pancreatic cancer      No pertinent past surgical history          No Known Allergies      Meds:  acetaminophen     Tablet .. 650 milliGRAM(s) Oral every 6 hours PRN  cefepime   IVPB 2000 milliGRAM(s) IV Intermittent every 8 hours  filgrastim-sndz (ZARXIO) Injectable 300 MICROGram(s) SubCutaneous daily  levothyroxine 25 MICROGram(s) Oral daily  pantoprazole    Tablet 40 milliGRAM(s) Oral before breakfast  sodium chloride 0.9%. 1000 milliLiter(s) IV Continuous <Continuous>  ursodiol Capsule 300 milliGRAM(s) Oral two times a day  vancomycin  IVPB 1000 milliGRAM(s) IV Intermittent every 24 hours      SOCIAL HISTORY:  Smoker:  YES / NO        PACK YEARS:                         WHEN QUIT?  ETOH use:  YES / NO               FREQUENCY / QUANTITY:  Ilicit Drug use:  YES / NO  Occupation:  Assisted device use (Cane / Walker):  Live with:    FAMILY HISTORY:  Family history unknown (Father, Mother)        VITALS:  Vital Signs Last 24 Hrs  T(C): 36.7 (20 Jul 2023 11:44), Max: 36.9 (19 Jul 2023 19:36)  T(F): 98.1 (20 Jul 2023 11:44), Max: 98.5 (19 Jul 2023 19:36)  HR: 106 (20 Jul 2023 11:44) (97 - 115)  BP: 89/55 (20 Jul 2023 11:44) (89/55 - 111/63)  BP(mean): 77 (19 Jul 2023 22:49) (68 - 77)  RR: 18 (20 Jul 2023 11:44) (18 - 22)  SpO2: 95% (20 Jul 2023 11:44) (95% - 98%)    Parameters below as of 20 Jul 2023 11:44  Patient On (Oxygen Delivery Method): room air        LABS/DIAGNOSTIC TESTS:                          8.4    x     )-----------( 42       ( 20 Jul 2023 16:00 )             25.5     WBC Count: 1.25 K/uL (07-20 @ 07:01)  WBC Count: 1.12 K/uL (07-20 @ 00:10)  WBC Count: 0.90 K/uL (07-19 @ 13:00)      07-20    132<L>  |  102  |  16  ----------------------------<  123<H>  3.8   |  22  |  0.97    Ca    7.7<L>      20 Jul 2023 07:01  Phos  2.5     07-20  Mg     1.8     07-20    TPro  5.2<L>  /  Alb  1.6<L>  /  TBili  7.3<H>  /  DBili  x   /  AST  74<H>  /  ALT  51  /  AlkPhos  133<H>  07-20      Urinalysis Basic - ( 20 Jul 2023 07:01 )    Color: x / Appearance: x / SG: x / pH: x  Gluc: 123 mg/dL / Ketone: x  / Bili: x / Urobili: x   Blood: x / Protein: x / Nitrite: x   Leuk Esterase: x / RBC: x / WBC x   Sq Epi: x / Non Sq Epi: x / Bacteria: x        LIVER FUNCTIONS - ( 20 Jul 2023 07:01 )  Alb: 1.6 g/dL / Pro: 5.2 g/dL / ALK PHOS: 133 U/L / ALT: 51 U/L DA / AST: 74 U/L / GGT: x             PT/INR - ( 19 Jul 2023 13:00 )   PT: 37.2 sec;   INR: 3.09 ratio         PTT - ( 19 Jul 2023 13:00 )  PTT:35.2 sec    LACTATE:Lactate, Blood: 7.9 mmol/L (07-20 @ 00:10)      ABG -     CULTURES:       RADIOLOGY:< from: CT Chest No Cont (07.19.23 @ 19:56) >    ACC: [id number] EXAM:  CT CHEST   ORDERED BY: FARZANEH BACON     PROCEDURE DATE:  07/19/2023          INTERPRETATION:  EXAMINATION: CT CHEST    CLINICAL INDICATION: Dyspnea.    TECHNIQUE: Noncontrast CT of the chest was obtained.    COMPARISON: None.    FINDINGS:    AIRWAYS AND LUNGS: The central tracheobronchial tree is patent.    Elevation right hemidiaphragm. Scattered compressive and linear   atelectasis.    MEDIASTINUM AND PLEURA: There are no enlarged mediastinal, hilar or   axillarylymph nodes. The visualized portion of the thyroid gland is   heterogeneous. There is a small left pleural effusion.  There is no   pneumothorax.    HEART AND VESSELS: The heart is normal in size.  There are   atherosclerotic calcifications of the aorta.  There is trace pericardial   fluid.    UPPER ABDOMEN: Images of the upper abdomen demonstrate ascites and   cirrhosis.. Hepatic hypodensities better evaluated on prior MRI.    BONES AND SOFT TISSUES: There are mild degenerative changes of the spine.    The soft tissues are unremarkable.    IMPRESSION:  Elevation right hemidiaphragm. Scattered compressive and linear   atelectasis.    --- End of Report ---            SOPHIE HODGES MD; Attending Radiologist  This document has been electronicallysigned. Jul 19 2023  8:21PM    < end of copied text >  --------------------------------------------------------------------------------------------------------------------------------------------------------------------------------------  ACC: [id number] EXAM:  CT LWR EXT IC BI   ORDERED BY: ROBERT DIA     PROCEDURE DATE:  07/19/2023          INTERPRETATION:  INDICATION: Rule out hematoma and infection. History of   metastatic pancreatic cancer.    TECHNIQUE: CT of the bilateral lower extremities with intravenous   contrast with axial, sagittal, and coronal multiplanar reformats.   Approximately 125 cc intravenous Omnipaque 350 was administered.    Comparison: None.    FINDINGS:    Nonspecific subcutaneous edema throughout both lower extremities, which   may be related to lower extremity edema and/or cellulitis. No drainable   soft tissue fluid collection seen. No soft tissue gas. No large hematoma   seen.    Partially evaluated large volume ascites.    Evaluation for fracture is limited due to motion artifact and large   field-of-view images. No definite acute displaced fracture or dislocation.    IMPRESSION:    Nonspecific subcutaneous edema throughout both lower extremities, which   may be related to lower extremity edema and/or cellulitis. No drainable   soft tissue fluid collection seen. No soft tissue gas. No large hematoma   seen.    Partially evaluated large volume ascites.    --- End of Report ---            GIORGI CARRION M.D., ATTENDING RADIOLOGIST  This document has been electronically signed. Jul 19 2023  8:54PM    < end of copied text >        ROS  [  ] UNABLE TO ELICIT               HPI:  73 year old female, from home, ambulates with walker, with Metastatic pancreatic cancer (dx 5/24, on active chemo via R axillary PIC, last session yesterday), VTE with portal vein thrombosis as well as PE (on Lovenox), hypothyroidism presents and injur with dyspnea and chills. As per son at bedside, pt felt weak following her chemo session and had fallen in the UBER with a large left thigh hematoma. Pt also noted to have a fever yesterday to 101, son called oncologist to who recommended to take an oral antibiotic which   pt had at home for prev dx of cellulitis as well as 2 tylenol. Fever initially resolved but returned this morning associated with mild shortness of breath. Pt also endorses pain in the left thigh that has resolved at this time. Denies headache, dizziness, chest pain, palpitations, cough, vomiting diarrhea, constipation or dysuria. ICU Consulted as patient with lactic acidosis and concern for sepsis.  (19 Jul 2023 20:14)      History as above, asked to see this patient who presented with fevers at home along with weakness and sustaining a fall and injured her left leg. She had chemo 2 days ago and was supposed to be on neupogen but did not take it. She had developed redness of her left inner thigh in addition to bruising her outer left leg and had called her oncologist who advised her to take an antibiotic for it which she had at home. She has no fevers here and is feeling better, her son is at the bedside giving me history as well. She denies having any coughing , SOB, chest pain, no nausea, vomiting or diarrhea, no urinary symptoms, no sore throat or dysphagia. She is on Vancomycin / Maxipime here.           PAST MEDICAL & SURGICAL HISTORY:  Essential hypertension      Hyperlipidemia      Pancreatic cancer      No pertinent past surgical history          No Known Allergies      Meds:  acetaminophen     Tablet .. 650 milliGRAM(s) Oral every 6 hours PRN  cefepime   IVPB 2000 milliGRAM(s) IV Intermittent every 8 hours  filgrastim-sndz (ZARXIO) Injectable 300 MICROGram(s) SubCutaneous daily  levothyroxine 25 MICROGram(s) Oral daily  pantoprazole    Tablet 40 milliGRAM(s) Oral before breakfast  sodium chloride 0.9%. 1000 milliLiter(s) IV Continuous <Continuous>  ursodiol Capsule 300 milliGRAM(s) Oral two times a day  vancomycin  IVPB 1000 milliGRAM(s) IV Intermittent every 24 hours      SOCIAL HISTORY:  Smoker:  no  ETOH use:  no      FAMILY HISTORY:  Family history unknown (Father, Mother)        VITALS:  Vital Signs Last 24 Hrs  T(C): 36.7 (20 Jul 2023 11:44), Max: 36.9 (19 Jul 2023 19:36)  T(F): 98.1 (20 Jul 2023 11:44), Max: 98.5 (19 Jul 2023 19:36)  HR: 106 (20 Jul 2023 11:44) (97 - 115)  BP: 89/55 (20 Jul 2023 11:44) (89/55 - 111/63)  BP(mean): 77 (19 Jul 2023 22:49) (68 - 77)  RR: 18 (20 Jul 2023 11:44) (18 - 22)  SpO2: 95% (20 Jul 2023 11:44) (95% - 98%)    Parameters below as of 20 Jul 2023 11:44  Patient On (Oxygen Delivery Method): room air        LABS/DIAGNOSTIC TESTS:                          8.4    x     )-----------( 42       ( 20 Jul 2023 16:00 )             25.5     WBC Count: 1.25 K/uL (07-20 @ 07:01)  WBC Count: 1.12 K/uL (07-20 @ 00:10)  WBC Count: 0.90 K/uL (07-19 @ 13:00)      07-20    132<L>  |  102  |  16  ----------------------------<  123<H>  3.8   |  22  |  0.97    Ca    7.7<L>      20 Jul 2023 07:01  Phos  2.5     07-20  Mg     1.8     07-20    TPro  5.2<L>  /  Alb  1.6<L>  /  TBili  7.3<H>  /  DBili  x   /  AST  74<H>  /  ALT  51  /  AlkPhos  133<H>  07-20      Urinalysis (07.19.23 @ 20:00)   Blood, Urine: Trace  Glucose Qualitative, Urine: Negative  pH Urine: 5.0  Color: Yellow  Urine Appearance: Clear  Bilirubin: Moderate  Ketone - Urine: Negative  Specific Gravity: 1.010  Protein, Urine: 30 mg/dL  Urobilinogen: 1 mg/dL  Nitrite: Negative  Leukocyte Esterase Concentration: Trace      LIVER FUNCTIONS - ( 20 Jul 2023 07:01 )  Alb: 1.6 g/dL / Pro: 5.2 g/dL / ALK PHOS: 133 U/L / ALT: 51 U/L DA / AST: 74 U/L / GGT: x             PT/INR - ( 19 Jul 2023 13:00 )   PT: 37.2 sec;   INR: 3.09 ratio         PTT - ( 19 Jul 2023 13:00 )  PTT:35.2 sec    LACTATE:Lactate, Blood: 7.9 mmol/L (07-20 @ 00:10)      ABG -     CULTURES:       RADIOLOGY:< from: CT Chest No Cont (07.19.23 @ 19:56) >    ACC: [id number] EXAM:  CT CHEST   ORDERED BY: FARZANEH BCAON     PROCEDURE DATE:  07/19/2023          INTERPRETATION:  EXAMINATION: CT CHEST    CLINICAL INDICATION: Dyspnea.    TECHNIQUE: Noncontrast CT of the chest was obtained.    COMPARISON: None.    FINDINGS:    AIRWAYS AND LUNGS: The central tracheobronchial tree is patent.    Elevation right hemidiaphragm. Scattered compressive and linear   atelectasis.    MEDIASTINUM AND PLEURA: There are no enlarged mediastinal, hilar or   axillarylymph nodes. The visualized portion of the thyroid gland is   heterogeneous. There is a small left pleural effusion.  There is no   pneumothorax.    HEART AND VESSELS: The heart is normal in size.  There are   atherosclerotic calcifications of the aorta.  There is trace pericardial   fluid.    UPPER ABDOMEN: Images of the upper abdomen demonstrate ascites and   cirrhosis.. Hepatic hypodensities better evaluated on prior MRI.    BONES AND SOFT TISSUES: There are mild degenerative changes of the spine.    The soft tissues are unremarkable.    IMPRESSION:  Elevation right hemidiaphragm. Scattered compressive and linear   atelectasis.    --- End of Report ---            SOPHEI HODGES MD; Attending Radiologist  This document has been electronicallysigned. Jul 19 2023  8:21PM    < end of copied text >  --------------------------------------------------------------------------------------------------------------------------------------------------------------------------------------  ACC: [id number] EXAM:  CT LWR EXT IC BI   ORDERED BY: ROBERT DIA     PROCEDURE DATE:  07/19/2023          INTERPRETATION:  INDICATION: Rule out hematoma and infection. History of   metastatic pancreatic cancer.    TECHNIQUE: CT of the bilateral lower extremities with intravenous   contrast with axial, sagittal, and coronal multiplanar reformats.   Approximately 125 cc intravenous Omnipaque 350 was administered.    Comparison: None.    FINDINGS:    Nonspecific subcutaneous edema throughout both lower extremities, which   may be related to lower extremity edema and/or cellulitis. No drainable   soft tissue fluid collection seen. No soft tissue gas. No large hematoma   seen.    Partially evaluated large volume ascites.    Evaluation for fracture is limited due to motion artifact and large   field-of-view images. No definite acute displaced fracture or dislocation.    IMPRESSION:    Nonspecific subcutaneous edema throughout both lower extremities, which   may be related to lower extremity edema and/or cellulitis. No drainable   soft tissue fluid collection seen. No soft tissue gas. No large hematoma   seen.    Partially evaluated large volume ascites.    --- End of Report ---            GIORGI CARRION M.D., ATTENDING RADIOLOGIST  This document has been electronically signed. Jul 19 2023  8:54PM    < end of copied text >        ROS  [  ] UNABLE TO ELICIT

## 2023-07-20 NOTE — CONSULT NOTE ADULT - NS ATTEND AMEND GEN_ALL_CORE FT
Impression: This is a 73 yr old woman  presented to ED with Dyspnea , chills . Has a Large Left Thigh Hematoma due to outpatient reported to have a Fall in the [place] following chemo session. Had a fever yesterday with Temp 101. Fever initially resolved but had a fever that morning associated with Mild shortness of breath .   Has a Metastatic pancreatic cancer with Liver METS , on active chemo via R axillary PICC ), VTE with portal vein thrombosis and PE (on Lovenox). In Medicine Unit . ICU was initially consulted for Lactic Acidosis and Hypovolemia. Negative RVP, Covid 19. 07-19-23 CT Chest no enlarged mediastinal , Hilar , Axillary Lymp Nodes. Small Left Pleural Effusion . Scattered compressive , linear Atelectasis .    . Leg cellulitis     Suggestion:  O2 saturation 95% room air. So far saturating good room air. Monitor Oxygen saturation trend room air. Can have Oxygen supplementation 2L NC if needed.   Can have incentive spirometer .  On Zarxio 300 mcg SQ Daily. WBC from 1.12 to 1.25.      On Cefepime 2 Gm IVPB Q 8 Hours. On vancomycin 1 Gm IVPB daily x 7 Days with monitoring of Vanco trough.   Oncology follow up , has Metastatic pancreatic cancer with Liver METS , on active chemo.

## 2023-07-20 NOTE — PROGRESS NOTE ADULT - NS ATTEND AMEND GEN_ALL_CORE FT
metastatic pancreatic ca  sepsis  neutropenic fever  pancytopenia  cellulitis  lactic acidosis  -pt clinically improved  -cont broad spectrum ab  -f/u cult  -cont ivf  -s/p prbc  -on neuopogen  trend labs    Advanced care planning was discussed with patient and family.  Advanced care planning forms were reviewed and discussed as appropriate.  Differential diagnosis and plan of care discussed with patient after the evaluation.   Pain assessed and judicious use of narcotics when appropriate was discussed.  Importance of Fall prevention discussed.  Counseling on Smoking and Alcohol cessation was offered when appropriate.  Counseling on Diet, exercise, and medication compliance was done.       Approx 60 minutes spent.

## 2023-07-20 NOTE — PROGRESS NOTE ADULT - PROBLEM SELECTOR PLAN 1
Unclear etiology   ? cellulitis left thigh   follow up urine culture and blood culture   trend lactate  11.9>10.9>7.9  c/w  Cefepime and Vancomycin as per guideline for neutropenic fever   ID Dr Berry Poole Heme-Onc consulted

## 2023-07-20 NOTE — CONSULT NOTE ADULT - ASSESSMENT
Fevers  Neutropenia  Left leg Cellulitis      Plan - Cont Vancomycin 1 gm iv q24hrs   Cont Maxipime 2gms iv q8hrs  will place on PO Doxycycline 100mgs po BID x 5-6 days when ready for DC home( if she remains afebrile and WBC count and ANC increasing)

## 2023-07-20 NOTE — CHART NOTE - NSCHARTNOTEFT_GEN_A_CORE
EVENT: Repeat labs reviewed. Lactic acid is improving (11.9-->7.9). Serum glucose is 51. POCT 56.     HPI:  73 year old female, from home, ambulates with walker, with Metastatic pancreatic cancer (dx 5/24, on active chemo via R axillary PIC, last session yesterday), VTE with portal vein thrombosis as well as PE (on Lovenox), hypothyroidism presents with dyspnea and chills. Admitted to medicine for sepsis, neutropenic fever, and lactic acidosis.     SUBJECTIVE: Pt is A&Ox3, still remains asymptomatic     OBJECTIVE:  Vital Signs Last 24 Hrs  T(C): 36.6 (20 Jul 2023 01:54), Max: 37.1 (19 Jul 2023 13:10)  T(F): 97.8 (20 Jul 2023 01:54), Max: 98.8 (19 Jul 2023 13:10)  HR: 110 (20 Jul 2023 01:54) (105 - 120)  BP: 106/62 (20 Jul 2023 01:54) (97/57 - 140/52)  BP(mean): 77 (19 Jul 2023 22:49) (56 - 80)  RR: 19 (20 Jul 2023 01:54) (18 - 26)  SpO2: 96% (20 Jul 2023 01:54) (95% - 100%)    Parameters below as of 20 Jul 2023 01:54  Patient On (Oxygen Delivery Method): nasal cannula  O2 Flow (L/min): 2      PHYSICAL EXAM:  General: chronically-ill appearing female with temporal wasting   Neuro: Awake and alert, oriented to person, place, and time  Cardiovascular: + S1, S2, no murmurs, rubs, or bruits  Respiratory: clear to auscultation bilaterally with good air entry   GI: Abdomen obese/ +ascites, non-tender, bowel sounds present   : Non distended;   Extremities: 2+ pitting LE edema B/L  Skin: jaundice. +left thigh hematoma          LABS:                        8.2    1.12  )-----------( x        ( 20 Jul 2023 00:10 )             25.9     07-20    135  |  104  |  15  ----------------------------<  51<LL>  3.4<L>   |  19<L>  |  1.03    Ca    7.7<L>      20 Jul 2023 00:10    TPro  5.3<L>  /  Alb  1.6<L>  /  TBili  7.1<H>  /  DBili  x   /  AST  81<H>  /  ALT  53  /  AlkPhos  142<H>  07-20        EKG:   IMAGING:      < from: CT Lower Extremity w/ IV Cont, Bilateral (07.19.23 @ 17:34) >    IMPRESSION:    Nonspecific subcutaneous edema throughout both lower extremities, which   may be related to lower extremity edema and/or cellulitis. No drainable   soft tissue fluid collection seen. No soft tissue gas. No large hematoma   seen.    Partially evaluated large volume ascites.      < from: CT Chest No Cont (07.19.23 @ 19:56) >    IMPRESSION:  Elevation right hemidiaphragm. Scattered compressive and linear   atelectasis.    ASSESSMENT/PROBLEM: 1. Lactic acidosis likely multifactorial in the setting of metastatic disease (metastatic pancreatic CA with liver mets on chemo)  and sepsis/neutropenic fever 2. Hypoglycemia likely due to poor PO intake     PLAN:     -500 ml NS bolus x 1  -1 amp of d50  -Repeat lactate post bolus  -Continue current/supportive measures    FOLLOW UP / RESULT:     -Repeat lactate  -Reassess blood glucose after 1 amp is given

## 2023-07-21 NOTE — DISCHARGE NOTE NURSING/CASE MANAGEMENT/SOCIAL WORK - PATIENT PORTAL LINK FT
You can access the FollowMyHealth Patient Portal offered by [facility] by registering at the following website: [email or web address]. By joining [facility]’s FollowMyHealth portal, you will also be able to view your health information using other applications (apps) compatible with our system.

## 2023-07-21 NOTE — CONSULT NOTE ADULT - ASSESSMENT
AMY JAIMES is a 73y Female who presents with a chief complaint of sepsis    Pancreatic Cancer  ·	Patient on active outpatient chemotherapy; will obtain further details.  ·	No systemic therapy while inpatient or during rehabilitation.  ·	Continue to monitor liver functions while admitted  ·	Drain potential ascites as needed for comfort.    Pancytopenia  ·	In the setting of recent chemotherapy, active malignancy.  ·	Monitor and maintain hemoglobin > 7, platelet > 10    Neutropenic Fever  ·	Cultures no growth to date thus far.  ·	Follow-up infectious disease recommendations; patient remains on broad-spectrum antibiotics.  ·	Monitor CBC with differential while on filgrastim.  ·	Afebrile over the last 24 hours.    Will continue to follow.    Malik Haynes MD  Hematology/Oncology  O: [phone] AMY JAIMES is a 73y Female who presents with a chief complaint of sepsis    Pancreatic Cancer  ·	Patient on active outpatient chemotherapy; follows with Dr. Eduardo Martinez in [place].  ·	No systemic therapy while inpatient or during rehabilitation.  ·	Continue to monitor liver functions while admitted  ·	Drain potential ascites as needed for comfort.    Pancytopenia  ·	In the setting of recent chemotherapy, active malignancy.  ·	Monitor and maintain hemoglobin > 7, platelet > 10.  ·	Anticipate another few days of severe cytopenia given recent chemotherapy that was last administered three days ago.    Neutropenic Fever  ·	Cultures no growth to date thus far.  ·	Follow-up infectious disease recommendations; patient remains on broad-spectrum antibiotics.  ·	Monitor CBC with differential while on filgrastim.  ·	Afebrile over the last 24 hours.    Will continue to follow.    Malik Haynes MD  Hematology/Oncology  O: [phone]

## 2023-07-21 NOTE — PROGRESS NOTE ADULT - PROBLEM SELECTOR PLAN 4
Hb 7.2--> 8.2>7.8     Possible hematoma on left lower ext   Type and Screen   s/p 1u PRBC  Repeat CBC post transfusion   HOLD anticoagulation
Hb 7.2--> 8.2>7.8 ---> 6.6 (7/21)    Possible hematoma on left lower ext   Type and Screen   s/p 1u PRBC  Repeat CBC post transfusion   HOLD anticoagulation on DC  Give one unit PRBC  If post CBC stable, DC home today. If plt < 10K, transfuse.

## 2023-07-21 NOTE — DISCHARGE NOTE PROVIDER - HOSPITAL COURSE
73 year old female, from home, ambulates with walker, with Metastatic pancreatic cancer (dx 5/24, on active chemo via R axillary PIC), VTE with portal vein thrombosis as well as PE (on Lovenox), hypothyroidism presents with dyspnea, fever and chills.  Also co  left thigh hematoma states hit her leg while getting on the uber   Admitted to medicine for sepsis, neutropenic fever, and lactic acidosis. ID consulted and abx started. CXR no infection, urinalysis negative .  CT left leg: Nonspecific subcutaneous edema throughout both lower extremities, which may be related to lower extremity edema and/or cellulitis. No drainable soft tissue fluid collection seen. No soft tissue gas. No large hematoma   hem/onc consulted   Received unit of blood for Hg of 7.2   Nephro consulted for elevated creatinine     incomplete 7/21--->>>     73 year old female, from home, ambulates with walker, with Metastatic pancreatic cancer (dx 5/24, on active chemo via R axillary PIC), VTE with portal vein thrombosis as well as PE (on Lovenox), hypothyroidism presents with dyspnea, fever and chills.  Also complains of  left thigh hematoma as pt states hit her leg while getting on the uber taxi.    Patient is admitted to medicine for sepsis, neutropenic fever, and severe lactic acidosis.   ID consulted and IV vancomycin and Cefepime started.   CXR no infection, urinalysis negative. Blood culture negative.   CT chest shows Elevation right hemidiaphragm. Scattered compressive and linear atelectasis. Pulmonary followed. Pt tolerates well on room air.   CT left leg: Nonspecific subcutaneous edema throughout both lower extremities, which may be related to lower extremity edema and/or cellulitis. No drainable soft tissue fluid collection seen. No soft tissue gas. No large hematoma. Heme/onc consulted. Pt received total 2 units PRBC for drop H/H. Post transfusion CBC improving.   Nephrology consulted for TESFAYE with Lactic acidosis/AG MA. Renal US shows No hydronephrosis. + Ascites. Hepatic cirrhosis.  s/p Albumin due to edema and low blood pressure per nephrology. Kidney functions remain stable.   Patient is medically optimized for discharge home with outpatient follow up with total 7 days oral abt with doxycycline.   Pt family instructed for discharge plan and advised to hold Lasix and Eliquis until PMD/heme/onc follow up.   Please refer to medical records for in depth hospital course as this is a brief summary.

## 2023-07-21 NOTE — PROGRESS NOTE ADULT - PROBLEM SELECTOR PLAN 5
F/U Left lower ext CT scan for possible hematoma s/p trauma to the leg  Hold Anticoagulation
F/U Left lower ext CT scan for possible hematoma s/p trauma to the leg  Hold Anticoagulation

## 2023-07-21 NOTE — DISCHARGE NOTE PROVIDER - NSDCMRMEDTOKEN_GEN_ALL_CORE_FT
enoxaparin 80 mg/0.8 mL injectable solution: 80 milligram(s) subcutaneously 2 times a day DISCARD 1ML AND ADMINSTER 70MG  furosemide 20 mg oral tablet: 1 tab(s) orally once a day  levothyroxine 25 mcg (0.025 mg) oral tablet: 1 tab(s) orally once a day  ondansetron 4 mg oral tablet, disintegratin tab(s) orally every 8 hours  Protonix 40 mg oral delayed release tablet: 1 tab(s) orally once a day  ursodiol 300 mg oral capsule: 1 cap(s) orally 2 times a day   doxycycline hyclate 100 mg oral tablet: 1 tab(s) orally every 12 hours  furosemide 20 mg oral tablet: 1 tab(s) orally once a day  levothyroxine 25 mcg (0.025 mg) oral tablet: 1 tab(s) orally once a day  ondansetron 4 mg oral tablet, disintegratin tab(s) orally every 8 hours  Protonix 40 mg oral delayed release tablet: 1 tab(s) orally once a day  ursodiol 300 mg oral capsule: 1 cap(s) orally 2 times a day   doxycycline hyclate 100 mg oral tablet: 1 tab(s) orally every 12 hours  levothyroxine 25 mcg (0.025 mg) oral tablet: 1 tab(s) orally once a day  ondansetron 4 mg oral tablet, disintegratin tab(s) orally every 8 hours  Protonix 40 mg oral delayed release tablet: 1 tab(s) orally once a day  ursodiol 300 mg oral capsule: 1 cap(s) orally 2 times a day

## 2023-07-21 NOTE — PROGRESS NOTE ADULT - ASSESSMENT
Patient is a 72yo Female with Stage IV Pancreatic CA with liver mets on chemo q 2wks (last dose yesterday), HTN, PE/ portal thrombosis presents with fever, LLE rash/ pain. Pt a/w Sepsis, neutropenia with severe lactic acidosis (lactate 11.9). Nephrology consulted Elevated serum creatinine.    1. TESFAYE- baseline SCr 0.5-0.6 (last SCr 0.61 on 5/22/23). TESFAYE likely hemodynamically mediated in the setting of sepsis. Continue to hold Lasix for now. TESFAYE resolved s/p  IVF. UA with 30 protein and trace blood. s/p albumin gtt. Pt with hyponatremia; resolved.  Renal US with no hydro. Strict I/Os. Avoid nephrotoxins/ NSAIDs/ RCA. Monitor BMP.    2. Metabolic acidosis- AG MA in the setting of severe lactic acidosis- resolved. Serum CO2 wnl.  Monitor ph/CO2    3. Sepsis- pt on Cefepime/ Vanco. Monitor Vanco trough.  UCx and BCx NG. ID following    4. LE edema- hold Lasix for now due to lactic acidosis/ TESFAYE. Pt with LLE ecchymosis with anemia; s/p CT LE with no large hematoma  Pt give KCl 40meq PO x1 for hypokalemia    Discussed with LAYA Dubois and pt's son at bedside; will hold Lasix and pt will f/u Oncologist on Monday and will discuss when to resume Lasix    [facility] NEPHROLOGY  Luis Alvarado M.D.  Fernando Garcia D.O.  Fay Shankar M.D.  Amy Núñez, WALDEMAR, ANP-C  [phone]  Fax: [phone] [place] [address], [address]  [place] [address]

## 2023-07-21 NOTE — DISCHARGE NOTE PROVIDER - PROVIDER TOKENS
PROVIDER:[TOKEN:[[id number]],FOLLOWUP:[1 week]],FREE:[LAST:[Heme/Onc in [place]],PHONE:[(   )    -],FAX:[(   )    -],ADDRESS:[as scheduled next week],FOLLOWUP:[1 week]]

## 2023-07-21 NOTE — PROGRESS NOTE ADULT - PROBLEM SELECTOR PLAN 9
On active chemo (last yesterday) via R axillary PICC line  Bilirubin 6.8,    improved from prior  Now with possible neutropenic sepsis  dr Poole hem/onc
On active chemo (last yesterday) via R axillary PICC line  Bilirubin 6.8,    improved from prior  Now with possible neutropenic sepsis  dr Poole hem/onc

## 2023-07-21 NOTE — PROGRESS NOTE ADULT - ASSESSMENT
73 year old female, from home, ambulates with walker, with Metastatic pancreatic cancer (dx 5/24, on active chemo via R axillary PIC, last session yesterday), VTE with portal vein thrombosis as well as PE (on Lovenox), hypothyroidism presens with dyspnea, fever and chills  Also co  left thigh hematoma, pain, states hit her leg while getting on the uber   Admitted to medicine for sepsis, neutropenic fever, and lactic acidosis.   Unclear etiology   Lactate 11.9--> 10.9 --> 2.1  CT left leg: Nonspecific subcutaneous edema throughout both lower extremities, which may be related to lower extremity edema and/or cellulitis. No drainable soft tissue fluid collection seen. No soft tissue gas. No large hematoma   hem/onc consulted   CXR no infection, urinalysis negative   Patient was  evaluated by ICU, not a candidate.   Started on  Cefepime and Vancomycin as per guideline for neutropenic fever   ID Dr Smart on board   Received unit of blood for Hg of 7.2 7/18  Nephro consulted for elevated creatinine.   Pt Hgb dropped 6.6, ordered 1 unit PRBC. plan d/c after transfusion if post tx CBC stable.       Pt seen at bedside, NAd, alert, awake, oriented, states is feeling weak but netter than yesterday  As per son at bedside, area of left thigh hematoma is not expending. Hemoglobin remains stable 8.4, lactate trending down 3.3 creatinine normalized

## 2023-07-21 NOTE — CONSULT NOTE ADULT - SUBJECTIVE AND OBJECTIVE BOX
CHIEF COMPLAINT  Sepsis    HISTORY OF PRESENT ILLNESS  AMY JAIMES is a 73y Female who presents with a chief complaint of sepsis    Patient was admitted on July 19th after presenting to the Emergency Department with weakness, dyspnea, and chills after recent chemotherapy administration. Admitted for elevated lactate, neutropenic fever.    PAST MEDICAL AND SURGICAL HISTORY  Pancreatic Cancer  Hypertension  Hyperlipidemia    FAMILY HISTORY  Non-contributory    SOCIAL HISTORY  Denies tobacco use    REVIEW OF SYSTEMS  A complete review of systems was performed; negative except per HPI    PHYSICAL EXAM  T(C): 36.6 (07-21-23 @ 05:14), Max: 36.8 (07-20-23 @ 17:57)  HR: 106 (07-21-23 @ 05:14) (106 - 110)  BP: 105/64 (07-21-23 @ 05:14) (89/55 - 113/68)  RR: 19 (07-21-23 @ 05:14) (18 - 19)  SpO2: 95% (07-21-23 @ 05:14) (91% - 95%)  Constitutional: alert, awake, in no acute distress  Eyes: PERRL, EOMI  HEENT: normocephalic, atraumatic  Neck: supple, non-tender  Cardiovascular: normal perfusion, tachycardic  Respiratory: normal respiratory efforts; no increased use of accessory muscles  Gastrointestinal: soft, non-tender  Musculoskeletal: normal range of motion, no deformities noted  Neurological: alert, CN II to XI grossly intact  Skin: warm, dry    LABORATORY DATA                        6.9    2.71  )-----------( 29       ( 21 Jul 2023 07:56 )             21.4     07-20    132<L>  |  102  |  16  ----------------------------<  123<H>  3.8   |  22  |  0.97    Ca    7.7<L>      20 Jul 2023 07:01  Phos  2.5     07-20  Mg     1.8     07-20    TPro  5.2<L>  /  Alb  1.6<L>  /  TBili  7.3<H>  /  DBili  x   /  AST  74<H>  /  ALT  51  /  AlkPhos  133<H>  07-20    RADIOLOGY REVIEW  IMPRESSION:    Nonspecific subcutaneous edema throughout both lower extremities, which   may be related to lower extremity edema and/or cellulitis. No drainable   soft tissue fluid collection seen. No soft tissue gas. No large hematoma   seen.    Partially evaluated large volume ascites.

## 2023-07-21 NOTE — PROGRESS NOTE ADULT - PROBLEM SELECTOR PLAN 7
Likely prerenal   F/U Urine lytes   s/p 2.1L LR bolus   F/U repeat CMP  Nephro Dr Shankar
Likely prerenal   F/U Urine lytes   s/p 2.1L LR bolus   F/U repeat CMP  Nephro Dr Shankar

## 2023-07-21 NOTE — PROGRESS NOTE ADULT - SUBJECTIVE AND OBJECTIVE BOX
[facility] NEPHROLOGY- PROGRESS NOTE    Patient is a 74yo Female with Stage IV Pancreatic CA with liver mets on chemo q 2wks (last dose yesterday), HTN, PE/ portal thrombosis presents with fever, LLE rash/ pain. Pt a/w Sepsis, neutropenia with severe lactic acidosis (lactate 11.9). Nephrology consulted Elevated serum creatinine.      Hospital Medications: Medications reviewed.  REVIEW OF SYSTEMS:  CONSTITUTIONAL: No fevers or chills  RESPIRATORY: No shortness of breath  CARDIOVASCULAR: No chest pain.  GASTROINTESTINAL: No nausea, vomiting, diarrhea or abdominal pain.   VASCULAR: + bilateral lower extremity edema. +Left thigh pain    VITALS:  T(F): 98.4 (07-21-23 @ 15:55), Max: 98.4 (07-21-23 @ 15:55)  HR: 100 (07-21-23 @ 15:55)  BP: 104/55 (07-21-23 @ 15:55)  RR: 18 (07-21-23 @ 15:55)  SpO2: 93% (07-21-23 @ 15:55)  Wt(kg): --    07-20 @ 07:01  -  07-21 @ 07:00  --------------------------------------------------------  IN: 500 mL / OUT: 200 mL / NET: 300 mL        PHYSICAL EXAM:  Gen: NAD, calm  HEENT: icteric  Cards: RRR, +S1/S2, no M/G/R  Resp: CTA B/L  GI: soft, abd distention NT  Extremities: +2 pitting LE edema B/L  Derm: +jaundice  LLE ecchymosis/ tenderness      LABS:  07-21    135  |  103  |  17  ----------------------------<  91  3.0<L>   |  23  |  0.58    Ca    7.9<L>      21 Jul 2023 07:56  Phos  2.5     07-20  Mg     1.8     07-20    TPro  5.2<L>  /  Alb  2.2<L>  /  TBili  6.6<H>  /  DBili      /  AST  48<H>  /  ALT  40  /  AlkPhos  108  07-21    Creatinine Trend: 0.58 <--, 0.97 <--, 1.03 <--, 1.54 <--                        8.0    3.38  )-----------( 21       ( 21 Jul 2023 15:55 )             23.9     Urine Studies:  Urinalysis Basic - ( 21 Jul 2023 07:56 )    Color:  / Appearance:  / SG:  / pH:   Gluc: 91 mg/dL / Ketone:   / Bili:  / Urobili:    Blood:  / Protein:  / Nitrite:    Leuk Esterase:  / RBC:  / WBC    Sq Epi:  / Non Sq Epi:  / Bacteria:

## 2023-07-21 NOTE — CONSULT NOTE ADULT - CONSULT REASON
Cancer
Febrile Neutropenia
Elevated serum creatinine.
shortness of breath , Small Left Pleural Effusion.  Hx of VTE with portal vein thrombosis and PE (on Lovenox),
lactic acidosis
Mildly to Moderately Impaired: difficulty hearing in some environments or speaker may need to increase volume or speak distinctly

## 2023-07-21 NOTE — DISCHARGE NOTE PROVIDER - NSDCCPCAREPLAN_GEN_ALL_CORE_FT
PRINCIPAL DISCHARGE DIAGNOSIS  Diagnosis: Sepsis  Assessment and Plan of Treatment: Take all antibiotics as ordered.  Call you Health care provider upon arrival home to make a one week follow up appointment.  If you develop fever, chills, malaise, or change in mental status call your Health Care Provider or go to the Emergency Department.  Nutrition is important, eat small frequent meals to help ensure you get adequate calories.  Do not stay in bed all day!  Increase your activity daily as tolerated.        SECONDARY DISCHARGE DIAGNOSES  Diagnosis: Neutropenic fever  Assessment and Plan of Treatment:     Diagnosis: TESFAYE (acute kidney injury)  Assessment and Plan of Treatment:     Diagnosis: Anemia  Assessment and Plan of Treatment:     Diagnosis: Hematoma  Assessment and Plan of Treatment:     Diagnosis: Hypothyroidism  Assessment and Plan of Treatment:     Diagnosis: Pancreatic cancer  Assessment and Plan of Treatment:      PRINCIPAL DISCHARGE DIAGNOSIS  Diagnosis: Sepsis  Assessment and Plan of Treatment: Take all antibiotics as ordered.  Call you Health care provider upon arrival home to make a one week follow up appointment.  If you develop fever, chills, malaise, or change in mental status call your Health Care Provider or go to the Emergency Department.  Nutrition is important, eat small frequent meals to help ensure you get adequate calories.  Do not stay in bed all day!  Increase your activity daily as tolerated.        SECONDARY DISCHARGE DIAGNOSES  Diagnosis: Neutropenic fever  Assessment and Plan of Treatment: Neutropenia is a condition that causes you to have a low number of neutrophils in your blood. Neutrophils are a type of white blood cell made in the bone marrow. They help your body fight infection and bacteria.  Call your local emergency number (911 in the ) if:  You have a fever of 100.4°F (38°C) for more than 1 hour.  You have a fever of 101°F (38.3°C) or higher once.  Medicines:   Antibiotics help treat or prevent an infection caused by bacteria.  Take your medicine as directed.   Prevent infections:  Wash your hands before you prepare or eat food, and after you use the bathroom.  Avoid crowds and anyone who may be sick. Wash raw fruits and vegetables thoroughly. Cook meats and eggs thoroughly.  Ask about vaccines. The flu or pneumonia vaccines may help prevent infection and illness.  Follow up with your doctor       Diagnosis: TESFAYE (acute kidney injury)  Assessment and Plan of Treatment: TESFAYE happens when your kidneys suddenly stop working correctly. When you have TESFAYE, your kidneys do not remove the waste, chemicals, or extra fluid from your body. TESFAYE is usually temporary, it can take days to months to recover. TESFAYE can also become a chronic kidney condition.  Prevent acute kidney injury:  Manage other health conditions such as diabetes, high blood pressure, or heart disease.   Talk to your healthcare provider before you take over-the-counter-medicine. NSAIDs, stomach medicine, or laxatives may harm your kidneys and increase your risk for acute kidney injury.   Tell healthcare providers you have had acute kidney injury before you get contrast liquid for an x-ray or CT scan. Your healthcare provider may give you medicine to prevent kidney problems caused by the liquid.  Follow up with your healthcare provider    Diagnosis: Anemia  Assessment and Plan of Treatment: Symptoms to report, bleeding, palpitations, fatigue, pale skin, cold skin, dizziness. Take medications as ordered by PCP      Diagnosis: Hematoma  Assessment and Plan of Treatment:     Diagnosis: Hypothyroidism  Assessment and Plan of Treatment: You do not make enough thyroid hormone  signs & symptoms of low levels of thyroid hormone - tired, getting cold easily, coarse or thin hair, constipation, shortness of breath, swelling, irregular periods  your doctor will do thyroid hormone blood tests at least once a year to monitor if medication dose is adequate  take your thyroid medicine as directed by your doctor & on empty stomach      Diagnosis: Pancreatic cancer  Assessment and Plan of Treatment: on discharge please follow with Dr. Eduardo Martinez in [place].     PRINCIPAL DISCHARGE DIAGNOSIS  Diagnosis: Sepsis  Assessment and Plan of Treatment: Take all antibiotics as ordered.  Call you Health care provider upon arrival home to make a one week follow up appointment.  If you develop fever, chills, malaise, or change in mental status call your Health Care Provider or go to the Emergency Department.  Nutrition is important, eat small frequent meals to help ensure you get adequate calories.  Do not stay in bed all day!  Increase your activity daily as tolerated.        SECONDARY DISCHARGE DIAGNOSES  Diagnosis: Neutropenic fever  Assessment and Plan of Treatment: Neutropenia is a condition that causes you to have a low number of neutrophils in your blood. Neutrophils are a type of white blood cell made in the bone marrow. They help your body fight infection and bacteria.  Call your local emergency number (911 in the ) if:  You have a fever of 100.4°F (38°C) for more than 1 hour.  You have a fever of 101°F (38.3°C) or higher once.  Medicines:   Antibiotics help treat or prevent an infection caused by bacteria.  Take your medicine as directed.   Prevent infections:  Wash your hands before you prepare or eat food, and after you use the bathroom.  Avoid crowds and anyone who may be sick. Wash raw fruits and vegetables thoroughly. Cook meats and eggs thoroughly.  Ask about vaccines. The flu or pneumonia vaccines may help prevent infection and illness.  Follow up with your doctor       Diagnosis: Hematoma  Assessment and Plan of Treatment:     Diagnosis: Pancreatic cancer  Assessment and Plan of Treatment: on discharge please follow with Dr. Eduardo Martinez in [place].    Diagnosis: Hypothyroidism  Assessment and Plan of Treatment: You do not make enough thyroid hormone  signs & symptoms of low levels of thyroid hormone - tired, getting cold easily, coarse or thin hair, constipation, shortness of breath, swelling, irregular periods  your doctor will do thyroid hormone blood tests at least once a year to monitor if medication dose is adequate  take your thyroid medicine as directed by your doctor & on empty stomach      Diagnosis: Anemia  Assessment and Plan of Treatment: You received total 2 units blood transfusion for anemia, Symptoms to report, bleeding, palpitations, fatigue, pale skin, cold skin, dizziness. Take medications as ordered by PCP and follow up with your heme/oncology as well.   Do not take Eliquis until you follow with your pcp and monitor blood test.       Diagnosis: TESFAYE (acute kidney injury)  Assessment and Plan of Treatment: TESFAYE happens when your kidneys suddenly stop working correctly. When you have TESFAYE, your kidneys do not remove the waste, chemicals, or extra fluid from your body. TESAFYE is usually temporary, it can take days to months to recover. TESFAYE can also become a chronic kidney condition.  Please do not take Lasix as per nephrology recommendation.   Prevent acute kidney injury:  Manage other health conditions such as diabetes, high blood pressure, or heart disease.   Talk to your healthcare provider before you take over-the-counter-medicine. NSAIDs, stomach medicine, or laxatives may harm your kidneys and increase your risk for acute kidney injury.   Tell healthcare providers you have had acute kidney injury before you get contrast liquid for an x-ray or CT scan. Your healthcare provider may give you medicine to prevent kidney problems caused by the liquid.  Follow up with your healthcare provider     PRINCIPAL DISCHARGE DIAGNOSIS  Diagnosis: Sepsis  Assessment and Plan of Treatment: You were admitted for sepsis with lactic acidosis and neutropenic fever. Followed by infectious disease team and heme/oncology and nephrologist. Treated with IV antibiotics. Now lactate normal trended down.   Take all antibiotics as ordered.  Doxycycline 100mg twice per day x 7 days.   Call you Health care provider upon arrival home to make a one week follow up appointment.  If you develop fever, chills, malaise, or change in mental status call your Health Care Provider or go to the Emergency Department.  Nutrition is important, eat small frequent meals to help ensure you get adequate calories.  Do not stay in bed all day!  Increase your activity daily as tolerated.        SECONDARY DISCHARGE DIAGNOSES  Diagnosis: Neutropenic fever  Assessment and Plan of Treatment: Neutropenia is a condition that causes you to have a low number of neutrophils in your blood. Neutrophils are a type of white blood cell made in the bone marrow. They help your body fight infection and bacteria.  ---CONTINUE Antibitoic-DOXYCYCLINE 100mg TWICE per day for 7 DAYS.   Call your local emergency number (911 in the ) if:  You have a fever of 100.4°F (38°C) for more than 1 hour.  You have a fever of 101°F (38.3°C) or higher once.  Medicines:   Antibiotics help treat or prevent an infection caused by bacteria.  Take your medicine as directed.   Prevent infections:  Wash your hands before you prepare or eat food, and after you use the bathroom.  Avoid crowds and anyone who may be sick. Wash raw fruits and vegetables thoroughly. Cook meats and eggs thoroughly.  Ask about vaccines. The flu or pneumonia vaccines may help prevent infection and illness.  Follow up with your doctor in one week.       Diagnosis: Hematoma  Assessment and Plan of Treatment: You had fall at home with hematoma with edema lower legs. CT lower leg shows Nonspecific subcutaneous edema throughout both lower extremities, which may be related to lower extremity edema and/or cellulitis. No drainable soft tissue fluid collection seen. No soft tissue gas. No large hematoma seen.  No acute intervention is needed this time. Follow up with your heme/oncology and PMD after discharge.   Hold blood thinners and Lasix until you follow up with your doctor.    Diagnosis: Anemia  Assessment and Plan of Treatment: You received total 2 units blood transfusion for anemia, Repeat resulted improving. Symptoms to report, bleeding, palpitations, fatigue, pale skin, cold skin, dizziness. Take medications if ordered by PCP and follow up with your heme/oncology as well.   Do not take Eliquis until you follow with your pcp and monitor blood test.       Diagnosis: Pancreatic cancer  Assessment and Plan of Treatment: on discharge please follow with Your heme/oncology Dr. Eduardo Martinez in [place].    Diagnosis: Hypothyroidism  Assessment and Plan of Treatment: You do not make enough thyroid hormone  signs & symptoms of low levels of thyroid hormone - tired, getting cold easily, coarse or thin hair, constipation, shortness of breath, swelling, irregular periods  your doctor will do thyroid hormone blood tests at least once a year to monitor if medication dose is adequate  take your thyroid medicine as directed by your doctor & on empty stomach      Diagnosis: Portal vein thrombosis  Assessment and Plan of Treatment: Please do not take any blood thinners, Eliquis or lovenox injection.   Follow up with your PMD and/or heme/oncology Dr. Eduardo Martinez after discharge.    Diagnosis: TESFAYE (acute kidney injury)  Assessment and Plan of Treatment: TESFAYE happens when your kidneys suddenly stop working correctly. When you have TESFAYE, your kidneys do not remove the waste, chemicals, or extra fluid from your body. TESFAYE is usually temporary, it can take days to months to recover. TESFAYE can also become a chronic kidney condition.  Please do not take LASIX as per nephrology recommendation.   Prevent acute kidney injury:  Manage other health conditions such as diabetes, high blood pressure, or heart disease.   Talk to your healthcare provider before you take over-the-counter-medicine. NSAIDs, stomach medicine, or laxatives may harm your kidneys and increase your risk for acute kidney injury.   Tell healthcare providers you have had acute kidney injury before you get contrast liquid for an x-ray or CT scan. Your healthcare provider may give you medicine to prevent kidney problems caused by the liquid.  Follow up with your healthcare provider.    Diagnosis: Metabolic acidosis  Assessment and Plan of Treatment: Your lactate level was high on admission likely in the setting with pancreatic cancer. You were followed by nephrologist. received IV fluid, antibiotic and albumin. Now Lactate level is improving.   Follow up with your primary MD after discharge.    Diagnosis: Cirrhosis of liver with ascites  Assessment and Plan of Treatment: [place] in the setting with pancreatic cancer. Renal US shows No hydronephrosis. Ascites. Hepatic cirrhosis. Nephrology followed and received albumin IV.   Follow up with your PMD /heme-oncology regularly.   Do not take lasix until you follow up with your primary doctor.       Diagnosis: Hypokalemia  Assessment and Plan of Treatment: You were treated for low potassium in your blood, likely in the setting with decreased food intake due to cancer and lasix use.   Eat a balanced diet.   Follow up with your primary MD after discharge to monitor blood test result.

## 2023-07-21 NOTE — PROGRESS NOTE ADULT - PROBLEM SELECTOR PLAN 1
Unclear etiology   ? cellulitis left thigh   follow up urine culture and blood culture   trend lactate  11.9>10.9>7.9--2.1  c/w  Cefepime and Vancomycin as per guideline for neutropenic fever   ID Dr Berry Poole Heme-Onc consulted

## 2023-07-21 NOTE — PROGRESS NOTE ADULT - PROBLEM SELECTOR PLAN 10
hold anticoagulation in the setting of possible hematoma   SCDs
hold anticoagulation in the setting of possible hematoma   SCDs

## 2023-07-21 NOTE — PROGRESS NOTE ADULT - ATTENDING COMMENTS
clinically stable  cult neg  can change abx to po doxy  pancytopenia - 2/2 chemo.   s/p prbc transfusion.  plts low. cont hold eliquis  onc f/u monday    Advanced care planning was discussed with patient and family.  Advanced care planning forms were reviewed and discussed as appropriate.  Differential diagnosis and plan of care discussed with patient after the evaluation.   Pain assessed and judicious use of narcotics when appropriate was discussed.  Importance of Fall prevention discussed.  Counseling on Smoking and Alcohol cessation was offered when appropriate.  Counseling on Diet, exercise, and medication compliance was done.       Approx 60 minutes spent.

## 2023-07-21 NOTE — DISCHARGE NOTE NURSING/CASE MANAGEMENT/SOCIAL WORK - NSDCPEFALRISK_GEN_ALL_CORE
For information on Fall & Injury Prevention, visit: [email or web address] OR  [email or web address] OR  https://www.cdc.gov/steadi/patient.html

## 2023-07-21 NOTE — PROGRESS NOTE ADULT - PROBLEM SELECTOR PLAN 3
Likely in the setting sepsis vs malignancy   As above
Likely in the setting sepsis vs malignancy   As above  trending down to 2.1

## 2023-07-21 NOTE — PROGRESS NOTE ADULT - SUBJECTIVE AND OBJECTIVE BOX
NP Note discussed with  Primary Attending    INTERVAL HPI/OVERNIGHT EVENTS: seen at bedside, pt states feeling tired, not in pain.     MEDICATIONS  (STANDING):  albumin human 25% IVPB 100 milliLiter(s) IV Intermittent every 8 hours  cefepime   IVPB 2000 milliGRAM(s) IV Intermittent every 8 hours  levothyroxine 25 MICROGram(s) Oral daily  pantoprazole    Tablet 40 milliGRAM(s) Oral before breakfast  potassium chloride    Tablet ER 40 milliEquivalent(s) Oral every 4 hours  ursodiol Capsule 300 milliGRAM(s) Oral two times a day  vancomycin  IVPB 1000 milliGRAM(s) IV Intermittent every 24 hours    MEDICATIONS  (PRN):  acetaminophen     Tablet .. 650 milliGRAM(s) Oral every 6 hours PRN Temp greater or equal to 38C (100.4F), Mild Pain (1 - 3)      __________________________________________________  REVIEW OF SYSTEMS:    CONSTITUTIONAL: No fever,   EYES: no acute visual disturbances  NECK: No pain or stiffness  RESPIRATORY: No cough; No shortness of breath  CARDIOVASCULAR: No chest pain, no palpitations  GASTROINTESTINAL: No pain. No nausea or vomiting; No diarrhea   NEUROLOGICAL: No headache or numbness, no tremors  MUSCULOSKELETAL: No joint pain, no muscle pain  GENITOURINARY: no dysuria, no frequency, no hesitancy  PSYCHIATRY: no depression , no anxiety  ALL OTHER  ROS negative        Vital Signs Last 24 Hrs  T(C): 36.8 (21 Jul 2023 13:10), Max: 36.8 (20 Jul 2023 17:57)  T(F): 98.3 (21 Jul 2023 13:10), Max: 98.3 (21 Jul 2023 13:10)  HR: 102 (21 Jul 2023 13:10) (102 - 110)  BP: 119/68 (21 Jul 2023 13:10) (105/64 - 119/68)  BP(mean): --  RR: 18 (21 Jul 2023 13:10) (18 - 19)  SpO2: 93% (21 Jul 2023 13:10) (91% - 95%)    Parameters below as of 21 Jul 2023 13:10  Patient On (Oxygen Delivery Method): room air        ________________________________________________  PHYSICAL EXAM:  GENERAL: NAD, cachectic  HEENT: Normocephalic;  conjunctivae and sclerae clear; moist mucous membranes;   NECK : supple  CHEST/LUNG: Clear to auscultation bilaterally with good air entry   HEART: S1 S2  regular; no murmurs, gallops or rubs  ABDOMEN: Soft, Nontender, Nondistended; Bowel sounds present  EXTREMITIES: no cyanosis; no edema; no calf tenderness Right arm PICC  SKIN: warm and dry; no rash  NERVOUS SYSTEM:  Awake and alert; Oriented  to place, person and forgetful with time ; no new deficits    _________________________________________________  LABS:                        6.6    2.72  )-----------( 23       ( 21 Jul 2023 09:40 )             20.5     07-21    135  |  103  |  17  ----------------------------<  91  3.0<L>   |  23  |  0.58    Ca    7.9<L>      21 Jul 2023 07:56  Phos  2.5     07-20  Mg     1.8     07-20    TPro  5.2<L>  /  Alb  2.2<L>  /  TBili  6.6<H>  /  DBili  x   /  AST  48<H>  /  ALT  40  /  AlkPhos  108  07-21    PT/INR - ( 21 Jul 2023 07:56 )   PT: 31.8 sec;   INR: 2.65 ratio         PTT - ( 21 Jul 2023 07:56 )  PTT:51.6 sec  Urinalysis Basic - ( 21 Jul 2023 07:56 )    Color: x / Appearance: x / SG: x / pH: x  Gluc: 91 mg/dL / Ketone: x  / Bili: x / Urobili: x   Blood: x / Protein: x / Nitrite: x   Leuk Esterase: x / RBC: x / WBC x   Sq Epi: x / Non Sq Epi: x / Bacteria: x      CAPILLARY BLOOD GLUCOSE      POCT Blood Glucose.: 83 mg/dL (21 Jul 2023 11:16)  POCT Blood Glucose.: 97 mg/dL (21 Jul 2023 08:01)  POCT Blood Glucose.: 76 mg/dL (20 Jul 2023 21:20)  POCT Blood Glucose.: 74 mg/dL (20 Jul 2023 17:00)        RADIOLOGY & ADDITIONAL TESTS:    Imaging  Reviewed:  YES  < from: Xray Chest 1 View- PORTABLE-Urgent (07.19.23 @ 13:51) >    ACC: [id number] EXAM:  XR CHEST PORTABLE URGENT 1V   ORDERED BY:   ROBERT DIA     PROCEDURE DATE:  07/19/2023          INTERPRETATION:  AP semierect chest on July 19, 2023 at 1:40 PM. Patient   has sepsis.    Elevated diaphragms again noted. Heart magnified by technique. Right PICC   line remains.    On May 21 this year there was slight bibasilar atelectasis.    Present film now shows a small left base effusion.    IMPRESSION: Small left base effusion at this time. Right PICC line   remains.    --- End of Report ---            MARCIAL DEJESUS MD; Attending Radiologist  This document has been electronically signed. Jul 19 2023  4:16PM    < end of copied text >  < from: CT Chest No Cont (07.19.23 @ 19:56) >    ACC: [id number] EXAM:  CT CHEST   ORDERED BY: FARZANEH BACON     PROCEDURE DATE:  07/19/2023          INTERPRETATION:  EXAMINATION: CT CHEST    CLINICAL INDICATION: Dyspnea.    TECHNIQUE: Noncontrast CT of the chest was obtained.    COMPARISON: None.    FINDINGS:    AIRWAYS AND LUNGS: The central tracheobronchial tree is patent.    Elevation right hemidiaphragm. Scattered compressive and linear   atelectasis.    MEDIASTINUM AND PLEURA: There are no enlarged mediastinal, hilar or   axillarylymph nodes. The visualized portion of the thyroid gland is   heterogeneous. There is a small left pleural effusion.  There is no   pneumothorax.    HEART AND VESSELS: The heart is normal in size.  There are   atherosclerotic calcifications of the aorta.  There is trace pericardial   fluid.    UPPER ABDOMEN: Images of the upper abdomen demonstrate ascites and   cirrhosis.. Hepatic hypodensities better evaluated on prior MRI.    BONES AND SOFT TISSUES: There are mild degenerative changes of the spine.    The soft tissues are unremarkable.    IMPRESSION:  Elevation right hemidiaphragm. Scattered compressive and linear   atelectasis.    --- End of Report ---            SOPHIE HODGES MD; Attending Radiologist  This document has been electronicallysigned. Jul 19 2023  8:21PM    < end of copied text >  < from: CT Lower Extremity w/ IV Cont, Bilateral (07.19.23 @ 17:34) >    ACC: [id number] EXAM:  CT LWR EXT IC BI   ORDERED BY: ROBERT DIA     PROCEDURE DATE:  07/19/2023          INTERPRETATION:  INDICATION: Rule out hematoma and infection. History of   metastatic pancreatic cancer.    TECHNIQUE: CT of the bilateral lower extremities with intravenous   contrast with axial, sagittal, and coronal multiplanar reformats.   Approximately 125 cc intravenous Omnipaque 350 was administered.    Comparison: None.    FINDINGS:    Nonspecific subcutaneous edema throughout both lower extremities, which   may be related to lower extremity edema and/or cellulitis. No drainable   soft tissue fluid collection seen. No soft tissue gas. No large hematoma   seen.    Partially evaluated large volume ascites.    Evaluation for fracture is limited due to motion artifact and large   field-of-view images. No definite acute displaced fracture or dislocation.    IMPRESSION:    Nonspecific subcutaneous edema throughout both lower extremities, which   may be related to lower extremity edema and/or cellulitis. No drainable   soft tissue fluid collection seen. No soft tissue gas. No large hematoma   seen.    Partially evaluated large volume ascites.    --- End of Report ---            GIORGI CARRION M.D., ATTENDING RADIOLOGIST  This document has been electronically signed. Jul 19 2023  8:54PM    < end of copied text >    Consultant(s) Notes Reviewed:   YES      Plan of care was discussed with patient and /or primary care giver; all questions and concerns were addressed

## 2023-07-21 NOTE — PROGRESS NOTE ADULT - PROBLEM SELECTOR PLAN 11
from home  f/u PT reccs  give 1 unit PRBC, if post tx CBC stable, dc home with outpt f/u heme/onc  will send Doxy 100mg BID x 7 days on DC.   Hold Eliquis on DC

## 2023-07-21 NOTE — DISCHARGE NOTE PROVIDER - CARE PROVIDER_API CALL
Pablo Vee  Internal Medicine  [address], [address]  [place] [address]  Phone: [phone]  Fax: [phone]  Follow Up Time: 1 week    Heme/Onc in [place],   as scheduled next week  Phone: (   )    -  Fax: (   )    -  Follow Up Time: 1 week

## 2023-07-22 NOTE — ED ADULT NURSE REASSESSMENT NOTE - NS ED NURSE REASSESS COMMENT FT1
Blood transfusion initiated. Consent in chart. Risks and benefits of administering product explained to patient. Patient verbalized understanding of risks and benefits. Patient educated on side effects to look out for, call bell within reach. Second RN at beside for confirmation of product and pt identification. Safety and comfort measures provided, bed locked and in lowest position, side rails up for safety.

## 2023-07-22 NOTE — ED ADULT NURSE NOTE - OBJECTIVE STATEMENT
73y F BIBEMS from home p/w melena. Pt is axo2, primarily Farsi speaking, new onset of AMS within the last 24hrs, confused on time/person/place. PMH of stage 4 pancreatic cancer (on chemo via PICC line R arm, last session 7/18). Ambulates independently as per son at bedside, but has been needing assistance within the last few days. Son mentions that pt was recently discharged from [facility] yesterday for anemia, received 2 blood transfusions within the admitting time (last was yesterday morning), d/c home and noticed the dark coffee stools last night. Pt has been having a decrease in P.O. intake, not really maintaining fluids as well. Abdominal distention noted on exam, but has been present for quite some time, was not worked up nor tapped. Patient undressed and placed into gown, call bell in hand and side rails up with bed in lowest position for safety. blanket provided. Comfort and safety provided. Placed on cardiac montior, sinus tachy. Continuous bp and pulse ox. Placed on 4L NC due to labored breathing, pt not on home o2 at baseline.

## 2023-07-22 NOTE — H&P ADULT - ASSESSMENT
73 female h/o metastatic pancreatic ca on chemo, dvt/pe on eliquis, hypothyroid, here with diarrhea, melena, metabolic encephalopathy and thrombocytopenia    thrombocytopenia  likely 2/2 chemo  transfuse plts  monitor for bleeding  serial cbc  transfuse prn    diarrhea  melena  check stool guaiac, cdiff, pcr   gi consulted   ppi    metabolic encephalopathy  f/u CT head  supportive care    left thigh cellulitis  cont iv abx  f/u cult  id consult    metastatic pancreatic ca  onc consulted  f/u CT   chemo currently on hold    h/o pe/dvt  AC currently on hold given low plts and elevated inr  supp o2 prn    malignant ascites  monitor for now  will need drainage if affects resp status    d/w family bedside      Advanced care planning was discussed with patient and family.  Advanced care planning forms were reviewed and discussed as appropriate.  Differential diagnosis and plan of care discussed with patient after the evaluation.   Pain assessed and judicious use of narcotics when appropriate was discussed.  Importance of Fall prevention discussed.  Counseling on Smoking and Alcohol cessation was offered when appropriate.  Counseling on Diet, exercise, and medication compliance was done.       Approx 60 minutes spent. 73 female h/o metastatic pancreatic ca on chemo, dvt/pe on eliquis, hypothyroid, here with diarrhea, melena, metabolic encephalopathy and thrombocytopenia    thrombocytopenia  likely 2/2 chemo  transfuse plts  monitor for bleeding  serial cbc  transfuse prn    diarrhea  melena  check stool guaiac, cdiff, pcr   gi consulted   ppi    metabolic encephalopathy  f/u CT head  supportive care    left thigh cellulitis  cont iv abx  f/u cult  id consult    metastatic pancreatic ca  onc consulted  f/u CT   chemo currently on hold    h/o pe/dvt  AC currently on hold given low plts and elevated inr  supp o2 prn    malignant ascites  monitor for now  will need drainage if affects resp status    hypothyroid  cont synthroid      d/w family bedside      Advanced care planning was discussed with patient and family.  Advanced care planning forms were reviewed and discussed as appropriate.  Differential diagnosis and plan of care discussed with patient after the evaluation.   Pain assessed and judicious use of narcotics when appropriate was discussed.  Importance of Fall prevention discussed.  Counseling on Smoking and Alcohol cessation was offered when appropriate.  Counseling on Diet, exercise, and medication compliance was done.       Approx 60 minutes spent. 73 female h/o metastatic pancreatic ca on chemo, dvt/pe on eliquis, hypothyroid, here with diarrhea, melena, metabolic encephalopathy and thrombocytopenia    thrombocytopenia  likely 2/2 chemo  transfuse plts  monitor for bleeding  serial cbc  transfuse prn    diarrhea  melena  check stool guaiac, cdiff, pcr   gi consulted   ppi    metabolic encephalopathy  f/u CT head  supportive care    left thigh cellulitis  cont iv abx (was on doxy from previous admission)  f/u cult  id consult    metastatic pancreatic ca  onc consulted  f/u CT   chemo currently on hold    h/o pe/dvt  AC currently on hold given low plts and elevated inr  supp o2 prn    malignant ascites  monitor for now  will need drainage if affects resp status    hypothyroid  cont synthroid      d/w family bedside      Advanced care planning was discussed with patient and family.  Advanced care planning forms were reviewed and discussed as appropriate.  Differential diagnosis and plan of care discussed with patient after the evaluation.   Pain assessed and judicious use of narcotics when appropriate was discussed.  Importance of Fall prevention discussed.  Counseling on Smoking and Alcohol cessation was offered when appropriate.  Counseling on Diet, exercise, and medication compliance was done.       Approx 60 minutes spent.

## 2023-07-22 NOTE — H&P ADULT - HISTORY OF PRESENT ILLNESS
73yF PMHx metastatic pancreatic ca, on chemo, dvt/pe on eliquis at home (held since wednesday)  and hypothyroidism presenting with complaint of melena and AMS. Pt was recently discharged from [place] after being admitted for cellulitis of the L thigh and neutropenic fever/sepsis. She was found to be pancytopenic and received 2 units of PRBCs while inpatient. Her Eliquis has been held since Wednesday. She was discharged yesterday 07/21, however, pt's sons noticed dark stools after she had fecal incontinence x2. Pt sons also reported the patient was altered at home. Pt has new oxygen requirement, satting 95% on 2L, with somewhat labored breathing.    	Pt has had PICC line in since May and has ascites, of which she does not c/o any pain. Pt sons deny at home fevers.

## 2023-07-22 NOTE — H&P ADULT - NSHPPHYSICALEXAM_GEN_ALL_CORE
Vital Signs Last 24 Hrs  T(C): 36.7 (22 Jul 2023 11:04), Max: 36.9 (21 Jul 2023 15:55)  T(F): 98 (22 Jul 2023 11:04), Max: 98.4 (21 Jul 2023 15:55)  HR: 99 (22 Jul 2023 11:04) (99 - 101)  BP: 100/61 (22 Jul 2023 11:04) (100/61 - 115/70)  BP(mean): 73 (22 Jul 2023 11:04) (73 - 73)  RR: 20 (22 Jul 2023 11:04) (18 - 22)  SpO2: 98% (22 Jul 2023 11:04) (93% - 98%)    Parameters below as of 22 Jul 2023 11:04  Patient On (Oxygen Delivery Method): nasal cannula  O2 Flow (L/min): 4    PHYSICAL EXAM:  GENERAL: NAD, well-developed, ill appearing, +jaundice   HEAD:  Atraumatic, Normocephalic  EYES: EOMI, PERRLA, icteric sclera  NECK: Supple, No JVD  CHEST/LUNG: Clear to auscultation bilaterally; No wheeze  HEART: Regular rate and rhythm; No murmurs, rubs, or gallops  ABDOMEN: Soft, Nontender, ++distended; Bowel sounds present  EXTREMITIES:  2+ Peripheral Pulses, No clubbing, cyanosis. ++edema  PSYCH: AAOx3  NEUROLOGY: non-focal  SKIN: left thigh cellulitis

## 2023-07-22 NOTE — ED PROVIDER NOTE - OBJECTIVE STATEMENT
73yF PMHx metastatic pancreatic ca, VTE with portal vein thrombosis and PE, and hypothyroidism presenting with complaint of melena and AMS. Pt was recently discharged from [place] after being admitted for cellulitis of the L thigh/sepsis. She was found to be thrombocytopenic and received 2 units of PRBCs while inpatient. Her Eliquis has been held since Wednesday. She was discharged yesterday 07/21, however, pt's sons noticed dark stools after she had fecal incontinence x2. Pt sons also reported the patient was altered at home. Pt has new oxygen requirement, satting 95% on 2L, with somewhat labored breathing.    Pt has had PICC line in since May and has ascites, of which she does not c/o any pain. Pt sons deny at home fevers.

## 2023-07-22 NOTE — ED PROVIDER NOTE - NSICDXPASTMEDICALHX_GEN_ALL_CORE_FT
PAST MEDICAL HISTORY:  Essential hypertension     Hyperlipidemia     Hypothyroidism     Pancreatic cancer

## 2023-07-22 NOTE — ED PROVIDER NOTE - ATTENDING CONTRIBUTION TO CARE
Attending MD Padmini Vargas:  I personally have seen and examined this patient.  Resident note reviewed and agree on plan of care and except where noted.  See HPI, PE, and MDM for details.

## 2023-07-22 NOTE — CHART NOTE - NSCHARTNOTEFT_GEN_A_CORE
CT abd/pel noted with colitis  pt c/o dark diarrhea  f/u stool studies  transfuse prbc  broaden abx to zosyn/vanco  f/u gi and id consult

## 2023-07-22 NOTE — ED PROVIDER NOTE - CLINICAL SUMMARY MEDICAL DECISION MAKING FREE TEXT BOX
73yF PMHx metastatic pancreatic ca, VTE with portal vein thrombosis and PE, and hypothyroidism presenting with complaint of melena and AMS c/f occult infection vs line infection. PE remarkable for mildly distended abd, nontender. Labs, CT, admit. 73yF PMHx metastatic pancreatic ca, VTE with portal vein thrombosis and PE, and hypothyroidism presenting with complaint of melena and AMS c/f occult infection vs line infection. PE remarkable for mildly distended abd, nontender. Labs, CT, admit.  Attending Padmini Vargas: 73-year-old female with multiple medical issues including history of diagnosis of metastatic pancreatic cancer, prior pulmonary embolism presenting with shortness of breath and altered mental status.  Per family has not really been eating and drinking.  Is becoming more confused.  And reports some dark stools that appeared black.  Upon arrival patient frail-appearing and tachycardic as well as hypoxic requiring supplemental oxygen.  Point-of-care ultrasound performed showing large amount of ascites and preserved ejection fraction.  No fevers or chills and no productive cough making pneumonia as cause of hypoxia less likely.  Concern for possible worsening of known metastatic disease and worsening ascites.  Abdomen is soft and nontender and patient with significant history of thrombocytopenia therefore we will hold off on diagnostic paracentesis at this time.  With dark stools and history of thrombocytopenia and anemia concern for worsening anemia and GI bleed.  Patient started on PPI.  Will monitor for any further evidence of any GI bleeds while she is in the emergency department.  With shortness of breath and history of prior PE will obtain CTA of the chest to further evaluate for PE and worsening disease.  Discussed with patient's primary care physician upon arrival who is aware of the patient is in the hospital and agrees that patient will definitely need admission.  No falls or any trauma however with history of thrombocytopenia and confusion will obtain CT head however suspect likely secondary to electrolyte imbalance.  We will also check ammonia level.

## 2023-07-22 NOTE — ED PROVIDER NOTE - PHYSICAL EXAMINATION
Attending Padmini Vargas: Gen: frail appearing heent: atrauamtic, eomi, perrla, mmm, conjunctiva pale, dry mucous membranes uvula midline, neck; nttp, no nuchal rigidity, chest: nttp, no crepitus, cv: tacbycardic lungs: ctab, abd: soft, ndistended, nontedner, no guarding, ext: wwp, moidline to right arm, skin: no rash, neuro: awake and alert, following commands, moving extremities

## 2023-07-23 PROBLEM — C25.9 MALIGNANT NEOPLASM OF PANCREAS, UNSPECIFIED: Chronic | Status: ACTIVE | Noted: 2023-01-01

## 2023-07-23 NOTE — PATIENT PROFILE ADULT - SAFE PLACE TO LIVE
The patient was discharged home by provider in stable condition. The patient is alert and oriented, in no respiratory distress and discharge vital signs obtained. The patient's diagnosis, condition and treatment were explained. The patient expressed understanding. No prescriptions given. No work/school note given. A discharge plan has been developed. A  was not involved in the process. Aftercare instructions were given. Pt ambulatory out of the ED. no

## 2023-07-23 NOTE — PROGRESS NOTE ADULT - SUBJECTIVE AND OBJECTIVE BOX
Date of Service: 07-23-23 @ 08:11           CARDIOLOGY     PROGRESS  NOTE   ________________________________________________    CHIEF COMPLAINT:Patient is a 73y old  Female who presents with a chief complaint of pancreatic ca (22 Jul 2023 23:22)  comfortable  	  REVIEW OF SYSTEMS:  CONSTITUTIONAL: No fever, weight loss, or fatigue  EYES: No eye pain, visual disturbances, or discharge  ENT:  No difficulty hearing, tinnitus, vertigo; No sinus or throat pain  NECK: No pain or stiffness  RESPIRATORY: No cough, wheezing, chills or hemoptysis; + Shortness of Breath  CARDIOVASCULAR: No chest pain, palpitations, passing out, dizziness, + leg swelling  GASTROINTESTINAL: No abdominal or epigastric pain. No nausea, vomiting, or hematemesis; No diarrhea or constipation. No melena or hematochezia.  GENITOURINARY: No dysuria, frequency, hematuria, or incontinence  NEUROLOGICAL: No headaches, memory loss, loss of strength, numbness, or tremors  SKIN: No itching, burning, rashes, or lesions   LYMPH Nodes: No enlarged glands  ENDOCRINE: No heat or cold intolerance; No hair loss  MUSCULOSKELETAL: No joint pain or swelling; No muscle, back, or extremity pain  PSYCHIATRIC: No depression, anxiety, mood swings, or difficulty sleeping  HEME/LYMPH: No easy bruising, or bleeding gums  ALLERGY AND IMMUNOLOGIC: No hives or eczema	    [x ] All others negative	  [ ] Unable to obtain    PHYSICAL EXAM:  T(C): 36.4 (07-23-23 @ 07:50), Max: 36.7 (07-22-23 @ 11:04)  HR: 92 (07-23-23 @ 07:50) (88 - 101)  BP: 120/62 (07-23-23 @ 07:50) (100/61 - 129/84)  RR: 12 (07-23-23 @ 07:50) (12 - 22)  SpO2: 97% (07-23-23 @ 07:50) (94% - 100%)  Wt(kg): --  I&O's Summary      Appearance: Normal	  HEENT:   Normal oral mucosa, PERRL, EOMI, + jaundice	  Lymphatic: No lymphadenopathy  Cardiovascular: Normal S1 S2, No JVD, No murmurs, + edema  Respiratory: Lungs clear to auscultation	  Psychiatry: A & O x 3, Mood & affect appropriate  Gastrointestinal:  Soft, Non-tender, + BS	  Skin: No rashes, No ecchymoses, No cyanosis	  Neurologic: Non-focal  Extremities: Normal range of motion, No clubbing, cyanosis or edema  Vascular: Peripheral pulses palpable 2+ bilaterally    MEDICATIONS  (STANDING):  levothyroxine 25 MICROGram(s) Oral daily  pantoprazole  Injectable 40 milliGRAM(s) IV Push daily  piperacillin/tazobactam IVPB.- 3.375 Gram(s) IV Intermittent once  piperacillin/tazobactam IVPB.. 3.375 Gram(s) IV Intermittent every 8 hours  ursodiol Capsule 300 milliGRAM(s) Oral two times a day  vancomycin  IVPB 1000 milliGRAM(s) IV Intermittent every 12 hours      TELEMETRY: 	    ECG:  	  RADIOLOGY:  OTHER: 	  	  LABS:	 	    CARDIAC MARKERS:                                8.2    7.91  )-----------( 17       ( 23 Jul 2023 05:38 )             24.5     07-23    136  |  104  |  22  ----------------------------<  128<H>  3.4<L>   |  24  |  0.58    Ca    8.3<L>      23 Jul 2023 05:38  Mg     1.8     07-22    TPro  4.9<L>  /  Alb  2.4<L>  /  TBili  8.0<H>  /  DBili  x   /  AST  43<H>  /  ALT  29  /  AlkPhos  144<H>  07-23    proBNP:   Lipid Profile:   HgA1c:   TSH:   PT/INR - ( 23 Jul 2023 05:38 )   PT: 21.5 sec;   INR: 1.86 ratio         PTT - ( 23 Jul 2023 05:38 )  PTT:38.5 sec      Assessment and plan  ---------------------------  73yF PMHx metastatic pancreatic ca, on chemo, dvt/pe on eliquis at home (held since wednesday)  and hypothyroidism presenting with complaint of melena and AMS. Pt was recently discharged from [place] after being admitted for cellulitis of the L thigh and neutropenic fever/sepsis. She was found to be pancytopenic and received 2 units of PRBCs while inpatient. Her Eliquis has been held since Wednesday. She was discharged yesterday 07/21, however, pt's sons noticed dark stools after she had fecal incontinence x2. Pt sons also reported the patient was altered at home. Pt has new oxygen requirement, satting 95% on 2L, with somewhat labored breathing.  pt with metastatic pancreatic ca/ dvt on elkiquis was recently admitted with ?sepsis to [facility] presented with increasing sob, presented with melena  cta negative for PE  sob ?sec top large ascites needs paracentesis  ac held sec to bleeding   gi consult ?colitis continue Zosyn and vanco  check cultures  dvt prophylaxis , check dopplers if + contraindication to Ac needs IVC filter  nutrition eval  check va dopplers  GI eval r/o cholangitis ?need repeat MRCP  ID eval  fu blood cultures  replete bryn

## 2023-07-23 NOTE — PROVIDER CONTACT NOTE (CHANGE IN STATUS NOTIFICATION) - SITUATION
Pt here with pancreatic ca, melana and loose stool per family.  PT hasn't had bowel movement in 25 hours here in ED.

## 2023-07-23 NOTE — CONSULT NOTE ADULT - ATTENDING COMMENTS
Patient seen/examined. Patient's sons at bedside. As indicated, had 2 episodes of loose melanotic stool. PE/labs/imaging as noted. Patient is frail, debilitated and chronically ill-appearing. Abdomen-protuberant consistent with ascites. Possible causes of likely upper GI bleed discussed with sons-they do not wish to pursue endoscopy or invasive testing at current time in view of significant medical comorbidity and advanced pancreatic cancer. Recommendations as noted-pantoprazole 40 mg IV b.i.d. in conjunction with antireflux precautions (keep head of bed elevated at 30 degrees). Monitor serial hemoglobin/hematocrit and transfuse PRBCs and platelets as indicated. Supportive care.

## 2023-07-23 NOTE — PROGRESS NOTE ADULT - ASSESSMENT
73 female h/o metastatic pancreatic ca on chemo, dvt/pe on eliquis, hypothyroid, here with diarrhea, melena, metabolic encephalopathy and thrombocytopenia  pt with sepsis present on admission with tachypnea, hypoxia, hypotension, tachycardia    sepsis  id consult apprec  ?resolving left thigh cellulitis  colitis on CT  cont iv abx   f/u cult    thrombocytopenia  likely 2/2 chemo  transfuse plts  monitor for bleeding  serial cbc  transfuse additional plts today    diarrhea  melena  check stool guaiac, cdiff, pcr, stool cult  gi consult noted  ppi    metabolic encephalopathy  CT head neg  supportive care    metastatic pancreatic ca  onc consult followng  chemo currently on hold    h/o pe/dvt  AC currently on hold given low plts and elevated inr  supp o2 prn  CTA showing PE now resolved  f/u lower ext dopplers to eval dvt  may need ivc filter if DVT present still    malignant ascites  monitor for now  no paracentesis at this time given bleeding risk with low plts  may need paracentesis if worsens    fluid overload  recent echo  nl  will repeat echo to eval for new chf given recent chemo  gentle diuresis with lasix iv    hypothyroid  cont synthroid      d/w family bedside  d/w consultants    Advanced care planning was discussed with patient and family.  Advanced care planning forms were reviewed and discussed as appropriate.  Differential diagnosis and plan of care discussed with patient after the evaluation.   Pain assessed and judicious use of narcotics when appropriate was discussed.  Importance of Fall prevention discussed.  Counseling on Smoking and Alcohol cessation was offered when appropriate.  Counseling on Diet, exercise, and medication compliance was done.       Approx 60 minutes spent.

## 2023-07-23 NOTE — PATIENT PROFILE ADULT - FALL HARM RISK - HARM RISK INTERVENTIONS

## 2023-07-23 NOTE — PROGRESS NOTE ADULT - SUBJECTIVE AND OBJECTIVE BOX
AMY JAIMES  73y Female  MRN:[id number]    Patient is a 73y old  Female who presents with a chief complaint of pancreatic ca (23 Jul 2023 10:23)    HPI:  73yF PMHx metastatic pancreatic ca, on chemo, dvt/pe on eliquis at home (held since wednesday)  and hypothyroidism presenting with complaint of melena and AMS. Pt was recently discharged from [place] after being admitted for cellulitis of the L thigh and neutropenic fever/sepsis. She was found to be pancytopenic and received 2 units of PRBCs while inpatient. Her Eliquis has been held since Wednesday. She was discharged yesterday 07/21, however, pt's sons noticed dark stools after she had fecal incontinence x2. Pt sons also reported the patient was altered at home. Pt has new oxygen requirement, satting 95% on 2L, with somewhat labored breathing.    	Pt has had PICC line in since May and has ascites, of which she does not c/o any pain. Pt sons deny at home fevers. (22 Jul 2023 15:26)      Patient seen and evaluated at bedside. No acute events overnight except as noted.    Interval HPI: no acute events o/n     PAST MEDICAL & SURGICAL HISTORY:  Essential hypertension      Hyperlipidemia      Pancreatic cancer      Hypothyroidism      No pertinent past surgical history          REVIEW OF SYSTEMS:  as per hpi    VITALS:  Vital Signs Last 24 Hrs  T(C): 36.4 (23 Jul 2023 07:50), Max: 36.5 (23 Jul 2023 01:43)  T(F): 97.6 (23 Jul 2023 07:50), Max: 97.7 (23 Jul 2023 01:43)  HR: 92 (23 Jul 2023 07:50) (88 - 98)  BP: 120/62 (23 Jul 2023 07:50) (105/63 - 129/84)  BP(mean): 75 (23 Jul 2023 07:50) (75 - 86)  RR: 12 (23 Jul 2023 07:50) (12 - 18)  SpO2: 97% (23 Jul 2023 07:50) (94% - 100%)    Parameters below as of 23 Jul 2023 07:50  Patient On (Oxygen Delivery Method): nasal cannula  O2 Flow (L/min): 2    CAPILLARY BLOOD GLUCOSE        I&O's Summary      PHYSICAL EXAM:  GENERAL: NAD, ill appearing. lethargic  HEAD:  Atraumatic, Normocephalic  EYES: EOMI, PERRLA, conjunctiva and sclera clear  NECK: Supple, No JVD  CHEST/LUNG: Clear to auscultation bilaterally; No wheeze  HEART: S1, S2; No murmurs, rubs, or gallops  ABDOMEN: Soft, Nontender, +distended; Bowel sounds present  EXTREMITIES:  2+ Peripheral Pulses, No clubbing, cyanosis.++ edema  PSYCH: Normal affect  NEUROLOGY: AAOX1-2; non-focal  SKIN: lleft thigh ecchymosis, resolving cellulitis, LUE hamtoma    Consultant(s) Notes Reviewed:  [x ] YES  [ ] NO  Care Discussed with Consultants/Other Providers [ x] YES  [ ] NO    MEDS:  MEDICATIONS  (STANDING):  furosemide   Injectable 20 milliGRAM(s) IV Push daily  levothyroxine 25 MICROGram(s) Oral daily  pantoprazole  Injectable 40 milliGRAM(s) IV Push daily  phytonadione   Solution 2.5 milliGRAM(s) Oral once  piperacillin/tazobactam IVPB.- 3.375 Gram(s) IV Intermittent once  piperacillin/tazobactam IVPB.. 3.375 Gram(s) IV Intermittent every 8 hours  potassium chloride    Tablet ER 40 milliEquivalent(s) Oral once  ursodiol Capsule 300 milliGRAM(s) Oral two times a day  vancomycin  IVPB 1000 milliGRAM(s) IV Intermittent every 12 hours    MEDICATIONS  (PRN):  ondansetron Injectable 4 milliGRAM(s) IV Push every 8 hours PRN Nausea and/or Vomiting    ALLERGIES:  No Known Allergies      LABS:                        8.6    8.18  )-----------( 15       ( 23 Jul 2023 09:53 )             25.3     07-23    136  |  104  |  22  ----------------------------<  128<H>  3.4<L>   |  24  |  0.58    Ca    8.3<L>      23 Jul 2023 05:38  Mg     1.8     07-22    TPro  4.9<L>  /  Alb  2.4<L>  /  TBili  8.0<H>  /  DBili  x   /  AST  43<H>  /  ALT  29  /  AlkPhos  144<H>  07-23    PT/INR - ( 23 Jul 2023 05:38 )   PT: 21.5 sec;   INR: 1.86 ratio         PTT - ( 23 Jul 2023 05:38 )  PTT:38.5 sec      LIVER FUNCTIONS - ( 23 Jul 2023 05:38 )  Alb: 2.4 g/dL / Pro: 4.9 g/dL / ALK PHOS: 144 U/L / ALT: 29 U/L / AST: 43 U/L / GGT: x           Urinalysis Basic - ( 23 Jul 2023 05:38 )    Color: x / Appearance: x / SG: x / pH: x  Gluc: 128 mg/dL / Ketone: x  / Bili: x / Urobili: x   Blood: x / Protein: x / Nitrite: x   Leuk Esterase: x / RBC: x / WBC x   Sq Epi: x / Non Sq Epi: x / Bacteria: x     < from: CT Abdomen and Pelvis w/ IV Cont (07.22.23 @ 16:44) >  IMPRESSION:  Small bilateral pleural effusions.  Metastatic disease of the liver as previously demonstrated.  Large ascites.  Portal vein thrombosis.  Ascending portal colopathy versus infectious colitis.  No pulmonary embolus.        --- End of Report ---        < end of copied text >  < from: CT Head No Cont (07.22.23 @ 16:42) >    IMPRESSION:    No acute intracranial hemorrhage, hydrocephalus or extra-axial fluid   collection.  Mild parenchymal volume loss and mild chronic microvascular ischemic   changes.  No CT evidence for acute transcortical infarction. Please note that MR   imaging is a more sensitive imaging modality for detection of acute   infarction and may be obtained as clinically warranted.    If clinicians have any questions/need for clarification regarding this   report, Dr. Dionisio Clark can be best reached via the patient secure   hospital email system    --- End of Report ---    < end of copied text >

## 2023-07-23 NOTE — ED ADULT NURSE REASSESSMENT NOTE - NS ED NURSE REASSESS COMMENT FT1
PT repositioned for comfort, cleaned and prima fit secured.  Skin intact.  Family updated on POC PT repositioned for comfort, cleaned and prima fit secured.  No bowel movement in diaper for RN to send stool sample.  Skin intact.  Family updated on POC

## 2023-07-24 NOTE — PROGRESS NOTE ADULT - SUBJECTIVE AND OBJECTIVE BOX
Patient is a 73y old  Female who presents with a chief complaint of jaundice (24 Jul 2023 08:49)    Pt seen and examined at bedside, resting comfortably.    MEDICATIONS  (STANDING):  chlorhexidine 2% Cloths 1 Application(s) Topical daily  levothyroxine 25 MICROGram(s) Oral daily  pantoprazole  Injectable 40 milliGRAM(s) IV Push daily  piperacillin/tazobactam IVPB.. 3.375 Gram(s) IV Intermittent every 8 hours  potassium chloride    Tablet ER 40 milliEquivalent(s) Oral once  potassium chloride  10 mEq/100 mL IVPB 10 milliEquivalent(s) IV Intermittent every 1 hour  ursodiol Capsule 300 milliGRAM(s) Oral two times a day  vancomycin  IVPB 1000 milliGRAM(s) IV Intermittent every 12 hours    MEDICATIONS  (PRN):  ondansetron Injectable 4 milliGRAM(s) IV Push every 8 hours PRN Nausea and/or Vomiting    Vital Signs Last 24 Hrs  T(C): 36.6 (24 Jul 2023 08:11), Max: 37 (23 Jul 2023 16:43)  T(F): 97.8 (24 Jul 2023 08:11), Max: 98.6 (23 Jul 2023 16:43)  HR: 83 (24 Jul 2023 08:11) (82 - 90)  BP: 101/64 (24 Jul 2023 08:11) (101/64 - 127/83)  BP(mean): 77 (23 Jul 2023 15:25) (77 - 93)  RR: 18 (24 Jul 2023 08:11) (16 - 19)  SpO2: 96% (24 Jul 2023 08:11) (95% - 98%)    Parameters below as of 24 Jul 2023 08:11  Patient On (Oxygen Delivery Method): nasal cannula        PE  NAD  Awake, alert  Anicteric, MMM  No c/c/e  No rash grossly  FROM                          8.2    5.82  )-----------( 20       ( 24 Jul 2023 05:40 )             24.5       07-24    135  |  101  |  16  ----------------------------<  103<H>  2.9<LL>   |  25  |  0.55    Ca    8.4      24 Jul 2023 05:40  Mg     1.8     07-22    TPro  5.0<L>  /  Alb  2.4<L>  /  TBili  7.7<H>  /  DBili  x   /  AST  45<H>  /  ALT  31  /  AlkPhos  178<H>  07-24

## 2023-07-24 NOTE — PROGRESS NOTE ADULT - ASSESSMENT
73 female h/o metastatic pancreatic ca on chemo, dvt/pe on eliquis, hypothyroid, here with diarrhea, melena, metabolic encephalopathy and thrombocytopenia  pt with sepsis present on admission with tachypnea, hypoxia, hypotension, tachycardia    sepsis  id consult apprec  ?resolving left thigh cellulitis  colitis on CT  cont iv abx as per id  f/u cult and stool studies  low suspicious for SBP    thrombocytopenia  likely 2/2 chemo  transfuse plts  monitor for bleeding  serial cbc    diarrhea  melena  check stool guaiac, cdiff, pcr, stool cult  gi consult noted  ppi    metabolic encephalopathy  CT head neg  supportive care    metastatic pancreatic ca  onc consult followng  chemo currently on hold  f/u MRCP    h/o pe/dvt  AC currently on hold given low plts and elevated inr  supp o2 prn  CTA showing PE now resolved  f/u lower ext dopplers to eval dvt  may need ivc filter if DVT present still    malignant ascites  monitor for now  low suspicious for SBP  f/u US to eval ascites  no paracentesis at this time given bleeding risk with low plts  may need paracentesis if worsens    fluid overload  recent echo  nl  will repeat echo to eval for new chf given recent chemo    hypothyroid  cont synthroid      d/w family bedside  d/w consultants    PT      Advanced care planning was discussed with patient and family.  Advanced care planning forms were reviewed and discussed as appropriate.  Differential diagnosis and plan of care discussed with patient after the evaluation.   Pain assessed and judicious use of narcotics when appropriate was discussed.  Importance of Fall prevention discussed.  Counseling on Smoking and Alcohol cessation was offered when appropriate.  Counseling on Diet, exercise, and medication compliance was done.       Approx 60 minutes spent.

## 2023-07-24 NOTE — PROGRESS NOTE ADULT - SUBJECTIVE AND OBJECTIVE BOX
Date of Service: 07-24-23 @ 08:28           CARDIOLOGY     PROGRESS  NOTE   ________________________________________________    CHIEF COMPLAINT:Patient is a 73y old  Female who presents with a chief complaint of pancreatic ca (23 Jul 2023 10:23)  no complain  	  REVIEW OF SYSTEMS:  CONSTITUTIONAL: No fever, weight loss, or fatigue  EYES: No eye pain, visual disturbances, or discharge  ENT:  No difficulty hearing, tinnitus, vertigo; No sinus or throat pain  NECK: No pain or stiffness  RESPIRATORY: No cough, wheezing, chills or hemoptysis; + Shortness of Breath  CARDIOVASCULAR: No chest pain, palpitations, passing out, dizziness, or leg swelling  GASTROINTESTINAL: No abdominal or epigastric pain. No nausea, vomiting, or hematemesis; No diarrhea or constipation. No melena or hematochezia.  GENITOURINARY: No dysuria, frequency, hematuria, or incontinence  NEUROLOGICAL: No headaches, memory loss, loss of strength, numbness, or tremors  SKIN: No itching, burning, rashes, or lesions   LYMPH Nodes: No enlarged glands  ENDOCRINE: No heat or cold intolerance; No hair loss  MUSCULOSKELETAL: No joint pain or swelling; No muscle, back, or extremity pain  PSYCHIATRIC: No depression, anxiety, mood swings, or difficulty sleeping  HEME/LYMPH: No easy bruising, or bleeding gums  ALLERGY AND IMMUNOLOGIC: No hives or eczema	    [x ] All others negative	  [ ] Unable to obtain    PHYSICAL EXAM:  T(C): 36.6 (07-24-23 @ 08:11), Max: 37 (07-23-23 @ 16:43)  HR: 83 (07-24-23 @ 08:11) (82 - 90)  BP: 101/64 (07-24-23 @ 08:11) (101/64 - 127/83)  RR: 18 (07-24-23 @ 08:11) (16 - 19)  SpO2: 96% (07-24-23 @ 08:11) (95% - 98%)  Wt(kg): --  I&O's Summary    23 Jul 2023 07:01  -  24 Jul 2023 07:00  --------------------------------------------------------  IN: 1170 mL / OUT: 1600 mL / NET: -430 mL        Appearance: Jaundice  HEENT:   Normal oral mucosa, PERRL, EOMI	  Lymphatic: No lymphadenopathy  Cardiovascular: Normal S1 S2, No JVD, + murmurs, + edema  Respiratory:rhonchi	  Psychiatry: A & O x 3, Mood & affect appropriate  Gastrointestinal:  Soft, Non-tender, + BS	  Skin: No rashes, No ecchymoses, No cyanosis	  Neurologic: Non-focal  Extremities: Normal range of motion, No clubbing, cyanosis + edema  Vascular: Peripheral pulses palpable 2+ bilaterally    MEDICATIONS  (STANDING):  furosemide   Injectable 20 milliGRAM(s) IV Push daily  levothyroxine 25 MICROGram(s) Oral daily  pantoprazole  Injectable 40 milliGRAM(s) IV Push daily  piperacillin/tazobactam IVPB.. 3.375 Gram(s) IV Intermittent every 8 hours  potassium chloride    Tablet ER 40 milliEquivalent(s) Oral once  potassium chloride  10 mEq/100 mL IVPB 10 milliEquivalent(s) IV Intermittent every 1 hour  ursodiol Capsule 300 milliGRAM(s) Oral two times a day  vancomycin  IVPB 1000 milliGRAM(s) IV Intermittent every 12 hours      TELEMETRY: 	    ECG:  	  RADIOLOGY:  OTHER: 	  	  LABS:	 	    CARDIAC MARKERS:                                8.2    5.82  )-----------( 20       ( 24 Jul 2023 05:40 )             24.5     07-24    135  |  101  |  16  ----------------------------<  103<H>  2.9<LL>   |  25  |  0.55    Ca    8.4      24 Jul 2023 05:40  Mg     1.8     07-22    TPro  5.0<L>  /  Alb  2.4<L>  /  TBili  7.7<H>  /  DBili  x   /  AST  45<H>  /  ALT  31  /  AlkPhos  178<H>  07-24    proBNP:   Lipid Profile:   HgA1c:   TSH:   PT/INR - ( 23 Jul 2023 05:38 )   PT: 21.5 sec;   INR: 1.86 ratio         PTT - ( 23 Jul 2023 05:38 )  PTT:38.5 sec  - Urgent endoscopic intervention for melena is not indicated and patient is also not medically optimized  - Will hold off of on potential EGD and pursue medical management in alignment with the family/patient's wishes  - Extensive biliary disease not amenable to endoscopic intervention   - PPI 40 IV BID  - Daily CBC, CMP, coags  - Transfuse if Hgb < 7  - Avoid NSAIDs  - Hold AC if able, hep gtt if needed while monitoring  - Cards following, appreciate recs: recommending IVC filter if contraindication to AC  - Diagnostic/therapeutic para when able  - C diff/GI pcr  - Ensure adequate hydration  - Advance diet if no additional bleeding and hgb remains stable  - Rest of care per primary      1. Impressive hematoma on left extremity  due to blood draw  no active cellulitis on thigh  2. Agree with empiric zosyn  Not clear if she is infected  continue paracentesis     PE/dvt   - hold ac for now   - manisha lneed plt >50k   - no pulm embolus noted on CTA   - does have PVT     < from: CT Angio Chest PE Protocol w/ IV Cont (07.22.23 @ 16:44) >  Small bilateral pleural effusions.  Metastatic disease of the liver as previously demonstrated.  Large ascites.  Portal vein thrombosis.  Ascending portal colopathy versus infectious colitis.  No pulmonary embolus.        Assessment and plan  ---------------------------  73yF PMHx metastatic pancreatic ca, on chemo, dvt/pe on eliquis at home (held since wednesday)  and hypothyroidism presenting with complaint of melena and AMS. Pt was recently discharged from [facility] after being admitted for cellulitis of the L thigh and neutropenic fever/sepsis. She was found to be pancytopenic and received 2 units of PRBCs while inpatient. Her Eliquis has been held since Wednesday. She was discharged yesterday 07/21, however, pt's sons noticed dark stools after she had fecal incontinence x2. Pt sons also reported the patient was altered at home. Pt has new oxygen requirement, satting 95% on 2L, with somewhat labored breathing.  pt with metastatic pancreatic ca/ dvt on elkiquis was recently admitted with ?sepsis to [facility] presented with increasing sob, presented with melena  cta negative for PE  sob ?sec top large ascites needs paracentesis  ac held sec to bleeding   gi consult ?colitis continue Zosyn and vanco  check cultures  + hx of PE/DVT , check dopplers if + contraindication to Ac needs IVC filter  nutrition eval  check va dopplers  fu blood cultures/ OID noted  replete lytes, keep K >4  hold AC sec to low platelets, awaiting doppler  dc lasix

## 2023-07-24 NOTE — PROGRESS NOTE ADULT - ASSESSMENT
73yF PMHx metastatic pancreatic ca, on chemo, dvt/pe on eliquis at home (held since wednesday)  and hypothyroidism presenting with complaint of melena and AMS    Pancreatic Cancer  - Patient on active outpatient chemotherapy; follows with Dr. Eduardo Martinez in [place].  - No systemic therapy while inpatient or during rehabilitation.  - Continue to monitor liver functions while admitted  - Drain potential ascites as needed for comfort.  - Had Folfoxiri and gem with Dr Martinez last given 7/17/23    Pancytopenia  - In the setting of recent chemotherapy, active malignancy.  - Monitor and maintain hemoglobin > 7, platelet > 10.    Melena   - GI eval, holding off in     PE/dvt   - Holding AC, will need platelets above 50k to resume  - no pulm embolus noted on CTA   - does have PVT     Alexandre Najera PA-C  Hematology/Oncology  [facility]  [phone] (office) 73yF PMHx metastatic pancreatic ca, on chemo, dvt/pe on eliquis at home (held since wednesday)  and hypothyroidism presenting with complaint of melena and AMS    Pancreatic Cancer  - Patient on active outpatient chemotherapy; follows with Dr. Eduardo Martinez in [place].  - No systemic therapy while inpatient or during rehabilitation.  - Continue to monitor liver functions while admitted  - Drain potential ascites as needed for comfort.  - Had Folfoxiri and gem with Dr Martinez last given 7/17/23    Pancytopenia  - In the setting of recent chemotherapy, active malignancy.  - Monitor and maintain hemoglobin > 7, platelet > 10.    Melena   - GI eval, holding off on EGD, pursuing medical management  - On empiric abx, ID following    PE/dvt   - Holding AC, will need platelets above 50k to resume  - no pulm embolus noted on CTA   - does have PVT     Alexandre Najera PA-C  Hematology/Oncology  [facility]  [phone] (office)

## 2023-07-24 NOTE — CONSULT NOTE ADULT - ASSESSMENT
73 year old with metastatic  pancreatic cancer and thrombosis of portal vein . Being treated with chemo and was recently in [facility] for neutropenia.  No longer neutropenic but admitted with weakness.  CT with hepatic mets and  PVT,  abd distended and ascites presert.    1. Impressive hematoma on left extremity  due to blood draw  no active cellulitis on thigh  2. Agree with empiric zosyn  Not clear if she is infected  continue paracentesis   discussed with family         
73yF PMHx metastatic pancreatic ca, on chemo, dvt/pe on eliquis at home (held since wednesday)  and hypothyroidism presenting with complaint of melena and AMS    Pancreatic Cancer  Patient on active outpatient chemotherapy; follows with Dr. Eduardo Martinez in [place].  No systemic therapy while inpatient or during rehabilitation.  Continue to monitor liver functions while admitted  Drain potential ascites as needed for comfort.  Had Folfoxiri and gem with Dr Martinez last given 7/17/23    Pancytopenia  In the setting of recent chemotherapy, active malignancy.  Monitor and maintain hemoglobin > 7, platelet > 10.  Anticipate another few days of severe cytopenia given recent chemotherapy that was last administered three days ago.    melena   - gi called   - possible gi bleed       PE/dvt   - hold ac for now   - manisha lneed plt >50k   - no pulm embolus noted on CTA   - does have PVT       Ramiro Gilmore MD  HematologyOncology   O: [phone]   
73y Female with history of metastatic pancreatic CA presents with melena. Nephrology consulted for hypokalemia.     1) Hypokalemia: Suspect due to renal potassium wasting from chemotherapy (Oxaliplatin) and exacerbated by loop diuretics (discontinued). Will give KCL 40 meQ PO X 2 doses in addition to KCL 10 meQ IV X 3 doses today. Repeat serum Mg and potassium in the AM and check urine potassium/creatinine. Will consider aldactone pending results. Monitor serum potassium.    2) HTN: BP controlled. Monitor off medications.    3) LE edema: Due to poor oncotic pressure. Can restart lasix as needed by cardiology once hypokalemia improved. TTE with normal LVSF. Monitor UO.    4) Pancreatic CA: As per Heme/Onc.      [facility] NEPHROLOGY  Luis Alvarado M.D.  Fernando Garcia D.O.  Fay Shankar M.D.  Amy Núñez, WALDEMAR, ANP-C    Telephone: [phone]  Facsimile: [phone]    [address] [place] [address]  
73yF PMHx metastatic pancreatic ca, on chemo, dvt/pe on eliquis at home (held since wednesday)  and hypothyroidism presenting with complaint of melena and AMS. Pt was recently discharged from [facility] after being admitted for cellulitis of the L thigh and neutropenic fever/sepsis. She was found to be pancytopenic and received 2 units of PRBCs while inpatient. Her Eliquis has been held since Wednesday. She was discharged yesterday 07/21, however, pt's sons noticed dark stools after she had fecal incontinence x2. Pt sons also reported the patient was altered at home. Pt has new oxygen requirement, satting 95% on 2L, with somewhat labored breathing.  pt with metastatic pancreatic ca/ dvt on elkiquis was recently admitted with ?sepsis to [facility] presented with increasing sob, presented with melena  cta negative for PE  sob ?sec top large ascites needs paracentesis  ac held sec to bleeding   gi consult ?colitis continue Zosyn and vanco  check cultures  dvt prophylaxis , check dopplers if + contraindication to Ac needs IVC filter  nutrition eval
# Melena, possibly 2/2 PUD vs erosive esophagitis/gastritis vs angioectasias, vs malignancy  # GERD  # Pancreatic cancer with liver mets and invasive hepatic disease c/b intrahepatic ductal stenosis  # Ascites, likely malignant   # Loose stool  # Ascending colon edema likely portal colopathy vs infectious colitis  # h/o DVT/PE - PVT on recent imaging, negative PE  - CTAP revealed metastatic liver disease as previously mentioned, large ascites, PVT, ascending colon edema    Recommendations:  - Urgent endoscopic intervention for melena is not indicated and patient is also not medically optimized  - Will hold off of on potential EGD and pursue medical management in alignment with the family/patient's wishes  - Extensive biliary disease not amenable to endoscopic intervention   - PPI 40 IV BID  - Daily CBC, CMP, coags  - Transfuse if Hgb < 7  - Avoid NSAIDs  - Hold AC if able, hep gtt if needed while monitoring  - Cards following, appreciate recs: recommending IVC filter if contraindication to AC  - Diagnostic/therapeutic para when able  - C diff/GI pcr  - Ensure adequate hydration  - CLD today while monitoring  - Rest of care per primary    Preliminary note until signed by Attending.    Thank you for involving us in this patient's care.     Ashley Miller MD  Gastroenterology/Hepatology Fellow, PGY-7  NON-URGENT CONSULTS:  Please email [email or web address]  [email or web address]  AT NIGHT AND ON WEEKENDS:  Contact on-call GI fellow via answering service ([phone]) from 5pm-8am and on weekends/holidays  MONDAY-FRIDAY 8AM-5PM:  Pager# [phone] ([facility])

## 2023-07-24 NOTE — PROGRESS NOTE ADULT - ASSESSMENT
# Melena, possibly 2/2 PUD vs erosive esophagitis/gastritis vs angioectasias, vs malignancy  # GERD  # Pancreatic cancer with liver mets and invasive hepatic disease c/b intrahepatic ductal stenosis  # Ascites, likely malignant   # Loose stool  # Ascending colon edema likely portal colopathy vs infectious colitis  # h/o DVT/PE - PVT on recent imaging, negative PE  - CTAP revealed metastatic liver disease as previously mentioned, large ascites, PVT, ascending colon edema    Recommendations:  - Urgent endoscopic intervention for melena is not indicated and patient is also not medically optimized  - Will hold off of on potential EGD and pursue medical management in alignment with the family/patient's wishes  - Extensive biliary disease not amenable to endoscopic intervention   - PPI 40 IV BID  - Daily CBC, CMP, coags  - Transfuse if Hgb < 7  - Avoid NSAIDs  - Hold AC if able, hep gtt if needed while monitoring  - Cards following, appreciate recs: recommending IVC filter if contraindication to AC  - Diagnostic/therapeutic para when able  - C diff/GI pcr  - Ensure adequate hydration  - Advance diet if no additional bleeding and hgb remains stable  - Rest of care per primary    Preliminary note until signed by Attending.    Thank you for involving us in this patient's care. Please reach out if any further questions or concerns.    Ashley Miller MD  Gastroenterology/Hepatology Fellow, PGY-7  NON-URGENT CONSULTS:  Please email [email or web address]  [email or web address]  AT NIGHT AND ON WEEKENDS:  Contact on-call GI fellow via answering service ([phone]) from 5pm-8am and on weekends/holidays  MONDAY-FRIDAY 8AM-5PM:  Pager# [phone] ([facility])           # Melena, possibly 2/2 PUD vs erosive esophagitis/gastritis vs angioectasias, vs malignancy  # GERD  # Pancreatic cancer with liver mets and invasive hepatic disease c/b intrahepatic ductal stenosis  # Ascites, likely malignant   # Loose stool  # Ascending colon edema likely portal colopathy vs infectious colitis  # h/o DVT/PE - PVT on recent imaging, negative PE  - CTAP revealed metastatic liver disease as previously mentioned, large ascites, PVT, ascending colon edema    Recommendations:  - Urgent endoscopic intervention for melena is not indicated and patient is also not medically optimized  - Will hold off of on potential EGD and pursue medical management in alignment with the family/patient's wishes  - Extensive biliary disease not amenable to endoscopic intervention   - PPI 40 IV BID  - Daily CBC, CMP, coags  - Transfuse if Hgb < 7  - Avoid NSAIDs  - Hold AC if able, hep gtt if needed while monitoring  - Cards following, appreciate recs: recommending IVC filter if contraindication to AC  - Diagnostic/therapeutic para when able  - C diff/GI pcr  - Ensure adequate hydration  - Advance diet if no additional bleeding and hgb remains stable  - Rest of care per primary    Preliminary note until signed by Attending.    No further recommendations from Gastroenterology at this point. Gastroenterology will sign off at this time. Please re-consult for any other further questions.   Thank you for involving us in this patient's care. Please reach out if any further questions or concerns.    Ashley Miller MD  Gastroenterology/Hepatology Fellow, PGY-7  NON-URGENT CONSULTS:  Please email [email or web address]  [email or web address]  AT NIGHT AND ON WEEKENDS:  Contact on-call GI fellow via answering service ([phone]) from 5pm-8am and on weekends/holidays  MONDAY-FRIDAY 8AM-5PM:  Pager# [phone] ([facility])           # Melena, possibly 2/2 PUD vs erosive esophagitis/gastritis vs angioectasias, vs malignancy  # GERD  # Pancreatic cancer with liver mets and invasive hepatic disease c/b intrahepatic ductal stenosis  # Ascites, likely malignant   # Loose stool  # Ascending colon edema likely portal colopathy vs infectious colitis  # h/o DVT/PE - PVT on recent imaging, negative PE  - CTAP revealed metastatic liver disease as previously mentioned, large ascites, PVT, ascending colon edema    Recommendations:  - Will hold off of on potential EGD and pursue medical management in alignment with the family/patient's wishes  - Extensive biliary disease not amenable to endoscopic intervention   - PPI 40 IV BID  - Daily CBC, CMP, coags  - Transfuse if Hgb < 7  - Avoid NSAIDs  - Hold AC if able, hep gtt if needed while monitoring  - Cards following, appreciate recs: recommending IVC filter if contraindication to AC  - Diagnostic/therapeutic para when able  - C diff/GI pcr  - Ensure adequate hydration  - Advance diet if no additional bleeding and hgb remains stable  - Rest of care per primary    Preliminary note until signed by Attending.    No further recommendations from Gastroenterology at this point. Gastroenterology will sign off at this time. Please re-consult for any other further questions.   Thank you for involving us in this patient's care. Please reach out if any further questions or concerns.    Ashley Miller MD  Gastroenterology/Hepatology Fellow, PGY-7  NON-URGENT CONSULTS:  Please email [email or web address]  [email or web address]  AT NIGHT AND ON WEEKENDS:  Contact on-call GI fellow via answering service ([phone]) from 5pm-8am and on weekends/holidays  MONDAY-FRIDAY 8AM-5PM:  Pager# [phone] ([facility])

## 2023-07-24 NOTE — PROGRESS NOTE ADULT - SUBJECTIVE AND OBJECTIVE BOX
[FreeTextEntry1] : Pap done\par Self breast exam stressed\par Prescribed yearly bilateral screening mammogram\par Recommend OTC Replens vaginal moisturizer two times a week as an alterative to hormonal treatment.\par \par Follow-up yearly or as needed AMY JAIMES  73y Female  MRN:[id number]    Patient is a 73y old  Female who presents with a chief complaint of pancreatic ca (23 Jul 2023 10:23)    HPI:  73yF PMHx metastatic pancreatic ca, on chemo, dvt/pe on eliquis at home (held since wednesday)  and hypothyroidism presenting with complaint of melena and AMS. Pt was recently discharged from [facility] after being admitted for cellulitis of the L thigh and neutropenic fever/sepsis. She was found to be pancytopenic and received 2 units of PRBCs while inpatient. Her Eliquis has been held since Wednesday. She was discharged yesterday 07/21, however, pt's sons noticed dark stools after she had fecal incontinence x2. Pt sons also reported the patient was altered at home. Pt has new oxygen requirement, satting 95% on 2L, with somewhat labored breathing.    	Pt has had PICC line in since May and has ascites, of which she does not c/o any pain. Pt sons deny at home fevers. (22 Jul 2023 15:26)      Patient seen and evaluated at bedside. No acute events overnight except as noted.    Interval HPI: no acute events o/n     PAST MEDICAL & SURGICAL HISTORY:  Essential hypertension      Hyperlipidemia      Pancreatic cancer      Hypothyroidism      No pertinent past surgical history          REVIEW OF SYSTEMS:  as per hpi    VITALS:   Vital Signs Last 24 Hrs  T(C): 36.6 (24 Jul 2023 08:11), Max: 37 (23 Jul 2023 16:43)  T(F): 97.8 (24 Jul 2023 08:11), Max: 98.6 (23 Jul 2023 16:43)  HR: 83 (24 Jul 2023 08:11) (82 - 90)  BP: 101/64 (24 Jul 2023 08:11) (101/64 - 127/83)  BP(mean): 77 (23 Jul 2023 15:25) (77 - 93)  RR: 18 (24 Jul 2023 08:11) (16 - 19)  SpO2: 96% (24 Jul 2023 08:11) (95% - 98%)    Parameters below as of 24 Jul 2023 08:11  Patient On (Oxygen Delivery Method): nasal cannula          PHYSICAL EXAM:  GENERAL: NAD, ill appearing. lethargic  HEAD:  Atraumatic, Normocephalic  EYES: EOMI, PERRLA, conjunctiva and sclera clear  NECK: Supple, No JVD  CHEST/LUNG: Clear to auscultation bilaterally; No wheeze  HEART: S1, S2; No murmurs, rubs, or gallops  ABDOMEN: Soft, Nontender, +distended; Bowel sounds present  EXTREMITIES:  2+ Peripheral Pulses, No clubbing, cyanosis.++ edema  PSYCH: Normal affect  NEUROLOGY: AAOX1-2; non-focal  SKIN: left thigh ecchymosis, resolving cellulitis, LUE hematoma    Consultant(s) Notes Reviewed:  [x ] YES  [ ] NO  Care Discussed with Consultants/Other Providers [ x] YES  [ ] NO    MEDS:   MEDICATIONS  (STANDING):  chlorhexidine 2% Cloths 1 Application(s) Topical daily  levothyroxine 25 MICROGram(s) Oral daily  pantoprazole  Injectable 40 milliGRAM(s) IV Push daily  piperacillin/tazobactam IVPB.. 3.375 Gram(s) IV Intermittent every 8 hours  potassium chloride    Tablet ER 40 milliEquivalent(s) Oral once  potassium chloride  10 mEq/100 mL IVPB 10 milliEquivalent(s) IV Intermittent every 1 hour  ursodiol Capsule 300 milliGRAM(s) Oral two times a day  vancomycin  IVPB 1000 milliGRAM(s) IV Intermittent every 12 hours    MEDICATIONS  (PRN):  ondansetron Injectable 4 milliGRAM(s) IV Push every 8 hours PRN Nausea and/or Vomiting      ALLERGIES:  No Known Allergies      LABS:                                              8.2    5.82  )-----------( 20       ( 24 Jul 2023 05:40 )             24.5   07-24    135  |  101  |  16  ----------------------------<  103<H>  2.9<LL>   |  25  |  0.55    Ca    8.4      24 Jul 2023 05:40  Mg     1.8     07-22    TPro  5.0<L>  /  Alb  2.4<L>  /  TBili  7.7<H>  /  DBili  x   /  AST  45<H>  /  ALT  31  /  AlkPhos  178<H>  07-24       < from: CT Abdomen and Pelvis w/ IV Cont (07.22.23 @ 16:44) >  IMPRESSION:  Small bilateral pleural effusions.  Metastatic disease of the liver as previously demonstrated.  Large ascites.  Portal vein thrombosis.  Ascending portal colopathy versus infectious colitis.  No pulmonary embolus.        --- End of Report ---        < end of copied text >  < from: CT Head No Cont (07.22.23 @ 16:42) >    IMPRESSION:    No acute intracranial hemorrhage, hydrocephalus or extra-axial fluid   collection.  Mild parenchymal volume loss and mild chronic microvascular ischemic   changes.  No CT evidence for acute transcortical infarction. Please note that MR   imaging is a more sensitive imaging modality for detection of acute   infarction and may be obtained as clinically warranted.    If clinicians have any questions/need for clarification regarding this   report, Dr. Dionisio Clark can be best reached via the patient [facility]   hospital email system    --- End of Report ---    < end of copied text >

## 2023-07-24 NOTE — PROGRESS NOTE ADULT - ATTENDING COMMENTS
Agree with above. pt with no melena or BM today. Patient's family does not want to pursue endoscopic evaluation given patient's comorbidities. Would monitor H/H, transfuse as needed. Please let us know if we can be of further service.
negative...

## 2023-07-24 NOTE — PROGRESS NOTE ADULT - SUBJECTIVE AND OBJECTIVE BOX
infectious diseases progress note:    Patient is a 73y old  Female who presents with a chief complaint of pancreatic ca (23 Jul 2023 10:23)        Altered mental status           Allergies    No Known Allergies    Intolerances        ANTIBIOTICS/RELEVANT:  antimicrobials  piperacillin/tazobactam IVPB.. 3.375 Gram(s) IV Intermittent every 8 hours  vancomycin  IVPB 1000 milliGRAM(s) IV Intermittent every 12 hours    immunologic:    OTHER:  levothyroxine 25 MICROGram(s) Oral daily  ondansetron Injectable 4 milliGRAM(s) IV Push every 8 hours PRN  pantoprazole  Injectable 40 milliGRAM(s) IV Push daily  potassium chloride    Tablet ER 40 milliEquivalent(s) Oral once  potassium chloride  10 mEq/100 mL IVPB 10 milliEquivalent(s) IV Intermittent every 1 hour  ursodiol Capsule 300 milliGRAM(s) Oral two times a day      Objective:  Vital Signs Last 24 Hrs  T(C): 36.6 (24 Jul 2023 08:11), Max: 37 (23 Jul 2023 16:43)  T(F): 97.8 (24 Jul 2023 08:11), Max: 98.6 (23 Jul 2023 16:43)  HR: 83 (24 Jul 2023 08:11) (82 - 90)  BP: 101/64 (24 Jul 2023 08:11) (101/64 - 127/83)  BP(mean): 77 (23 Jul 2023 15:25) (77 - 93)  RR: 18 (24 Jul 2023 08:11) (16 - 19)  SpO2: 96% (24 Jul 2023 08:11) (95% - 98%)    Parameters below as of 24 Jul 2023 08:11  Patient On (Oxygen Delivery Method): nasal cannula           Eyes:KINZA, EOMI  Ear/Nose/Throat: no oral lesion, no sinus tenderness on percussion	  Neck:no JVD, no lymphadenopathy, supple  Respiratory: CTA ridge  Cardiovascular: S1S2 RRR, no murmurs  Gastrointestinal:soft, (+) BS, no HSM  Extremities:no e/e/c        LABS:                        8.2    5.82  )-----------( 20       ( 24 Jul 2023 05:40 )             24.5     07-24    135  |  101  |  16  ----------------------------<  103<H>  2.9<LL>   |  25  |  0.55    Ca    8.4      24 Jul 2023 05:40  Mg     1.8     07-22    TPro  5.0<L>  /  Alb  2.4<L>  /  TBili  7.7<H>  /  DBili  x   /  AST  45<H>  /  ALT  31  /  AlkPhos  178<H>  07-24    PT/INR - ( 23 Jul 2023 05:38 )   PT: 21.5 sec;   INR: 1.86 ratio         PTT - ( 23 Jul 2023 05:38 )  PTT:38.5 sec  Urinalysis Basic - ( 24 Jul 2023 05:40 )    Color: x / Appearance: x / SG: x / pH: x  Gluc: 103 mg/dL / Ketone: x  / Bili: x / Urobili: x   Blood: x / Protein: x / Nitrite: x   Leuk Esterase: x / RBC: x / WBC x   Sq Epi: x / Non Sq Epi: x / Bacteria: x          MICROBIOLOGY:    RECENT CULTURES:  07-22 @ 17:00 Clean Catch Clean Catch (Midstream)                No growth    07-22 @ 12:20 .Blood Blood-Peripheral                No growth at 24 hours    07-22 @ 12:00 .Blood Blood-Peripheral                No growth at 24 hours    07-19 @ 20:00 Clean Catch Clean Catch (Midstream)                <10,000 CFU/mL Normal Urogenital Maryjane    07-19 @ 13:05 .Blood Blood-Peripheral                No growth at 4 days    07-19 @ 12:55 .Blood Blood-Peripheral                No growth at 4 days          RESPIRATORY CULTURES:              RADIOLOGY & ADDITIONAL STUDIES:        Pager [phone]  After 5 pm/weekends or if no response :[phone]

## 2023-07-24 NOTE — PROGRESS NOTE ADULT - SUBJECTIVE AND OBJECTIVE BOX
Chief Complaint:  Patient is a 73y old  Female who presents with a chief complaint of pancreatic ca (23 Jul 2023 10:23)      Reason for consult:    Interval Events:     Hospital Medications:  furosemide   Injectable 20 milliGRAM(s) IV Push daily  levothyroxine 25 MICROGram(s) Oral daily  ondansetron Injectable 4 milliGRAM(s) IV Push every 8 hours PRN  pantoprazole  Injectable 40 milliGRAM(s) IV Push daily  piperacillin/tazobactam IVPB.. 3.375 Gram(s) IV Intermittent every 8 hours  potassium chloride    Tablet ER 40 milliEquivalent(s) Oral once  potassium chloride  10 mEq/100 mL IVPB 10 milliEquivalent(s) IV Intermittent every 1 hour  ursodiol Capsule 300 milliGRAM(s) Oral two times a day  vancomycin  IVPB 1000 milliGRAM(s) IV Intermittent every 12 hours      ROS:   General:  No  fevers, chills, night sweats, fatigue  Eyes:  Good vision, no reported pain  ENT:  No sore throat, pain, runny nose  CV:  No pain, palpitations  Pulm:  No dyspnea, cough  GI:  See HPI, otherwise negative  :  No  incontinence, nocturia  Muscle:  No pain, weakness  Neuro:  No memory problems  Psych:  No insomnia, mood problems, depression  Endocrine:  No polyuria, polydipsia, cold/heat intolerance  Heme:  No petechiae, ecchymosis, easy bruisability  Skin:  No rash    PHYSICAL EXAM:   Vital Signs:  Vital Signs Last 24 Hrs  T(C): 36.3 (24 Jul 2023 05:01), Max: 37 (23 Jul 2023 16:43)  T(F): 97.4 (24 Jul 2023 05:01), Max: 98.6 (23 Jul 2023 16:43)  HR: 82 (24 Jul 2023 05:01) (82 - 92)  BP: 110/61 (24 Jul 2023 05:01) (105/63 - 127/83)  BP(mean): 77 (23 Jul 2023 15:25) (75 - 93)  RR: 18 (24 Jul 2023 05:01) (12 - 19)  SpO2: 96% (24 Jul 2023 05:01) (95% - 98%)    Parameters below as of 24 Jul 2023 05:01  Patient On (Oxygen Delivery Method): nasal cannula  O2 Flow (L/min): 2    Daily     Daily     GENERAL: no acute distress  NEURO: alert  HEENT: anicteric sclera, no conjunctival pallor appreciated  CHEST: no respiratory distress, no accessory muscle use  CARDIAC: regular rate, rhythm  ABDOMEN: soft, non-tender, non-distended, no rebound or guarding  EXTREMITIES: warm, well perfused, no edema  SKIN: no lesions noted    LABS: reviewed                        8.2    5.82  )-----------( 20       ( 24 Jul 2023 05:40 )             24.5     07-24    135  |  101  |  16  ----------------------------<  103<H>  2.9<LL>   |  25  |  0.55    Ca    8.4      24 Jul 2023 05:40  Mg     1.8     07-22    TPro  5.0<L>  /  Alb  2.4<L>  /  TBili  7.7<H>  /  DBili  x   /  AST  45<H>  /  ALT  31  /  AlkPhos  178<H>  07-24    LIVER FUNCTIONS - ( 24 Jul 2023 05:40 )  Alb: 2.4 g/dL / Pro: 5.0 g/dL / ALK PHOS: 178 U/L / ALT: 31 U/L / AST: 45 U/L / GGT: x             Interval Diagnostic Studies: see sunrise for full report   Chief Complaint:  Patient is a 73y old  Female who presents with a chief complaint of pancreatic ca (23 Jul 2023 10:23)      Interval Events:   No BMs  HDS, hgb stable.  No n/v/abdominal pain    Hospital Medications:  furosemide   Injectable 20 milliGRAM(s) IV Push daily  levothyroxine 25 MICROGram(s) Oral daily  ondansetron Injectable 4 milliGRAM(s) IV Push every 8 hours PRN  pantoprazole  Injectable 40 milliGRAM(s) IV Push daily  piperacillin/tazobactam IVPB.. 3.375 Gram(s) IV Intermittent every 8 hours  potassium chloride    Tablet ER 40 milliEquivalent(s) Oral once  potassium chloride  10 mEq/100 mL IVPB 10 milliEquivalent(s) IV Intermittent every 1 hour  ursodiol Capsule 300 milliGRAM(s) Oral two times a day  vancomycin  IVPB 1000 milliGRAM(s) IV Intermittent every 12 hours      ROS:   Complete and normal except as mentioned above.    PHYSICAL EXAM:   Vital Signs:  Vital Signs Last 24 Hrs  T(C): 36.3 (24 Jul 2023 05:01), Max: 37 (23 Jul 2023 16:43)  T(F): 97.4 (24 Jul 2023 05:01), Max: 98.6 (23 Jul 2023 16:43)  HR: 82 (24 Jul 2023 05:01) (82 - 92)  BP: 110/61 (24 Jul 2023 05:01) (105/63 - 127/83)  BP(mean): 77 (23 Jul 2023 15:25) (75 - 93)  RR: 18 (24 Jul 2023 05:01) (12 - 19)  SpO2: 96% (24 Jul 2023 05:01) (95% - 98%)    Parameters below as of 24 Jul 2023 05:01  Patient On (Oxygen Delivery Method): nasal cannula  O2 Flow (L/min): 2    Daily     Daily     GENERAL: no acute distress, though uncomfortable appearing  NEURO: aox3  HEENT: scleral icterus  CHEST: no respiratory distress, no accessory muscle use, on NC  CARDIAC: regular rate   ABDOMEN: soft, non-tender, distended, no rebound or guarding  EXTREMITIES: warm, well perfused, +edema  SKIN: jaundice    LABS: reviewed                        8.2    5.82  )-----------( 20       ( 24 Jul 2023 05:40 )             24.5     07-24    135  |  101  |  16  ----------------------------<  103<H>  2.9<LL>   |  25  |  0.55    Ca    8.4      24 Jul 2023 05:40  Mg     1.8     07-22    TPro  5.0<L>  /  Alb  2.4<L>  /  TBili  7.7<H>  /  DBili  x   /  AST  45<H>  /  ALT  31  /  AlkPhos  178<H>  07-24    LIVER FUNCTIONS - ( 24 Jul 2023 05:40 )  Alb: 2.4 g/dL / Pro: 5.0 g/dL / ALK PHOS: 178 U/L / ALT: 31 U/L / AST: 45 U/L / GGT: x             Interval Diagnostic Studies: see sunrise for full report

## 2023-07-24 NOTE — PROGRESS NOTE ADULT - ASSESSMENT
73 year old with metastatic  pancreatic cancer and thrombosis of portal vein . Being treated with chemo and was recently in  for neutropenia.  No longer neutropenic but admitted with weakness.  CT with hepatic mets and  PVT,  abd distended and ascites presert.    1. Impressive hematoma on left extremity  due to blood draw  no active cellulitis on thigh  2. Agree with empiric zosyn  Not clear if she is infected  continue paracentesis     atthis point no documented infection  advanced malignant disease   discussed with family

## 2023-07-25 NOTE — PROGRESS NOTE ADULT - SUBJECTIVE AND OBJECTIVE BOX
Patient is a 73y old  Female who presents with a chief complaint of weakness (25 Jul 2023 08:31)    Pt seen and examined at bedside. Resting in chair, family at bedside.    MEDICATIONS  (STANDING):  chlorhexidine 2% Cloths 1 Application(s) Topical daily  levothyroxine 25 MICROGram(s) Oral daily  pantoprazole  Injectable 40 milliGRAM(s) IV Push daily  piperacillin/tazobactam IVPB.. 3.375 Gram(s) IV Intermittent every 8 hours  ursodiol Capsule 300 milliGRAM(s) Oral two times a day    MEDICATIONS  (PRN):  ondansetron Injectable 4 milliGRAM(s) IV Push every 8 hours PRN Nausea and/or Vomiting      Vital Signs Last 24 Hrs  T(C): 36.6 (25 Jul 2023 08:54), Max: 36.6 (25 Jul 2023 08:54)  T(F): 97.9 (25 Jul 2023 08:54), Max: 97.9 (25 Jul 2023 08:54)  HR: 88 (25 Jul 2023 10:08) (79 - 90)  BP: 122/80 (25 Jul 2023 10:08) (112/66 - 122/80)  BP(mean): --  RR: 18 (25 Jul 2023 08:54) (18 - 18)  SpO2: 93% (25 Jul 2023 10:08) (92% - 99%)    Parameters below as of 25 Jul 2023 10:08  Patient On (Oxygen Delivery Method): nasal cannula  O2 Flow (L/min): 2      PE  NAD  Resting  MMM  No c/c/e  No rash grossly  FROM                          7.5    3.59  )-----------( 27       ( 25 Jul 2023 06:52 )             22.9       07-25    134<L>  |  101  |  11  ----------------------------<  98  3.7   |  25  |  0.54    Ca    8.1<L>      25 Jul 2023 06:52  Mg     1.6     07-25    TPro  4.8<L>  /  Alb  2.4<L>  /  TBili  7.0<H>  /  DBili  x   /  AST  39  /  ALT  28  /  AlkPhos  178<H>  07-25

## 2023-07-25 NOTE — PROVIDER CONTACT NOTE (CRITICAL VALUE NOTIFICATION) - ACTION/TREATMENT ORDERED:
Platelet ordered
No new action/treatment ordered at present.
awaiting provider orders
No new action/treatment ordered at this time

## 2023-07-25 NOTE — PROGRESS NOTE ADULT - SUBJECTIVE AND OBJECTIVE BOX
Date of Service: 07-25-23 @ 07:49           CARDIOLOGY     PROGRESS  NOTE   ________________________________________________    CHIEF COMPLAINT:Patient is a 73y old  Female who presents with a chief complaint of jaundice (24 Jul 2023 08:49)  no complain/  sleeping  	  REVIEW OF SYSTEMS:  CONSTITUTIONAL: No fever, weight loss, or fatigue  EYES: No eye pain, visual disturbances, or discharge  ENT:  No difficulty hearing, tinnitus, vertigo; No sinus or throat pain  NECK: No pain or stiffness  RESPIRATORY: No cough, wheezing, chills or hemoptysis; No Shortness of Breath  CARDIOVASCULAR: No chest pain, palpitations, passing out, dizziness, or leg swelling  GASTROINTESTINAL: No abdominal or epigastric pain. No nausea, vomiting, or hematemesis; No diarrhea or constipation. No melena or hematochezia.  GENITOURINARY: No dysuria, frequency, hematuria, or incontinence  NEUROLOGICAL: No headaches, memory loss, loss of strength, numbness, or tremors  SKIN: No itching, burning, rashes, or lesions   LYMPH Nodes: No enlarged glands  ENDOCRINE: No heat or cold intolerance; No hair loss  MUSCULOSKELETAL: No joint pain or swelling; No muscle, back, or extremity pain  PSYCHIATRIC: No depression, anxiety, mood swings, or difficulty sleeping  HEME/LYMPH: No easy bruising, or bleeding gums  ALLERGY AND IMMUNOLOGIC: No hives or eczema	    [ ] All others negative	  [ x] Unable to obtain    PHYSICAL EXAM:  T(C): 36.5 (07-25-23 @ 05:17), Max: 36.6 (07-24-23 @ 08:11)  HR: 86 (07-25-23 @ 05:17) (79 - 90)  BP: 122/65 (07-25-23 @ 05:17) (101/64 - 122/65)  RR: 18 (07-25-23 @ 05:17) (18 - 18)  SpO2: 96% (07-25-23 @ 05:17) (92% - 99%)  Wt(kg): --  I&O's Summary    24 Jul 2023 07:01  -  25 Jul 2023 07:00  --------------------------------------------------------  IN: 0 mL / OUT: 150 mL / NET: -150 mL        Appearance: Normal/ jaundice	  HEENT:   Normal oral mucosa, PERRL, EOMI	  Lymphatic: No lymphadenopathy  Cardiovascular: Normal S1 S2, No JVD,+ murmurs, No edema  Respiratory: Lungs clear to auscultation	  Psychiatry: A & O x 3, Mood & affect appropriate  Gastrointestinal:  Soft, Non-tender, + BS, ascites	  Skin: No rashes, No ecchymoses, No cyanosis	  Neurologic: Non-focal  Extremities: Normal range of motion, No clubbing, cyanosis or edema  Vascular: Peripheral pulses palpable 2+ bilaterally    MEDICATIONS  (STANDING):  chlorhexidine 2% Cloths 1 Application(s) Topical daily  levothyroxine 25 MICROGram(s) Oral daily  pantoprazole  Injectable 40 milliGRAM(s) IV Push daily  piperacillin/tazobactam IVPB.. 3.375 Gram(s) IV Intermittent every 8 hours  ursodiol Capsule 300 milliGRAM(s) Oral two times a day  vancomycin  IVPB 1000 milliGRAM(s) IV Intermittent every 12 hours      TELEMETRY: 	    ECG:  	  RADIOLOGY:  OTHER: 	  	  LABS:	 	    CARDIAC MARKERS:                                8.6    5.49  )-----------( 15       ( 24 Jul 2023 20:01 )             25.9     07-24    133<L>  |  100  |  13  ----------------------------<  107<H>  4.0   |  24  |  0.54    Ca    8.2<L>      24 Jul 2023 20:01    TPro  5.0<L>  /  Alb  2.4<L>  /  TBili  7.7<H>  /  DBili  x   /  AST  45<H>  /  ALT  31  /  AlkPhos  178<H>  07-24    proBNP:   Lipid Profile:   HgA1c:   TSH:   PT/INR - ( 25 Jul 2023 06:52 )   PT: 14.8 sec;   INR: 1.27 ratio         PTT - ( 25 Jul 2023 06:52 )  PTT:30.1 sec  < from: TTE W or WO Ultrasound Enhancing Agent (07.24.23 @ 12:11) >   1. Normal left ventricular cavity size. The left ventricular wall thickness is normal. The left ventricular systolic function is normal with an ejection fraction of 61% by Key's method of disks. There are no regional wall motion abnormalities seen.   2. Normal right ventricular cavity size, normal right ventricular wall thickness and normal right ventricular systolic function. The tricuspid annular plane systolic excursion (TAPSE) is 1.9 cm (normal >=1.7 cm).   3. The left atrium is normal in size.   4. The right atrium is normal in size.   5. No significant valvular disease.   6. No pericardial effusion seen.   7. Compared to the transthoracic echocardiogram performed on 5/15/2023 there have been no significant interval changes.      < from: VA Duplex Lower Ext Vein Scan, Bilat (07.24.23 @ 13:22) >    IMPRESSION: There is persistent right gastrocnemius vein DVT without   proximal propagation.    1. Impressive hematoma on left extremity  due to blood draw  no active cellulitis on thigh  2. Agree with empiric zosyn  Not clear if she is infected  continue paracentesis     Assessment and plan  ---------------------------  73yF PMHx metastatic pancreatic ca, on chemo, dvt/pe on eliquis at home (held since wednesday)  and hypothyroidism presenting with complaint of melena and AMS. Pt was recently discharged from [facility] after being admitted for cellulitis of the L thigh and neutropenic fever/sepsis. She was found to be pancytopenic and received 2 units of PRBCs while inpatient. Her Eliquis has been held since Wednesday. She was discharged yesterday 07/21, however, pt's sons noticed dark stools after she had fecal incontinence x2. Pt sons also reported the patient was altered at home. Pt has new oxygen requirement, satting 95% on 2L, with somewhat labored breathing.  pt with metastatic pancreatic ca/ dvt on elkiquis was recently admitted with ?sepsis to [facility] presented with increasing sob, presented with melena  cta negative for PE  sob ?sec top large ascites needs paracentesis  ac held sec to bleeding   gi consult ?colitis continue Zosyn and vanco  check cultures  nutrition eval  fu blood cultures/ OID noted  replete lytes, keep K >4  hold AC sec to low platelets,  doppler noted no propagation  dc lasix  may consider paracentesis after platelets transfusion  awaiting MRI results  discussed with family regarding palliative care

## 2023-07-25 NOTE — DIETITIAN INITIAL EVALUATION ADULT - ENERGY INTAKE
per pt family report pt consuming ~75% meals (report pt dislikes institutional food, they bring food from home)/Adequate (%)

## 2023-07-25 NOTE — DIETITIAN INITIAL EVALUATION ADULT - ADD RECOMMEND
- monitor for malnutrition risk; NFPE upon follow up   - Will continue to monitor PO intake, weight, labs, skin, GI status, diet.   - Nutrition care plan to remain consistent with pt goals of care  - RD remains available to review diet education and adjust diet recommendations as needed.

## 2023-07-25 NOTE — PROGRESS NOTE ADULT - ASSESSMENT
73 year old with metastatic  pancreatic cancer and thrombosis of portal vein . Being treated with chemo and was recently in  for neutropenia.  No longer neutropenic but admitted with weakness.  CT with hepatic mets and  PVT,  abd distended and ascites presert.    1. Impressive hematoma on left extremity  due to blood draw  no active cellulitis on thigh  2. Agree with empiric zosyn  Not clear if she is infected  consider  paracentesis   discussed with family    to complete  short course of zosyn  dc vanco

## 2023-07-25 NOTE — PHYSICAL THERAPY INITIAL EVALUATION ADULT - GENERAL OBSERVATIONS, REHAB EVAL
Received supine in bed in NAD, agreeable to PT eval; LUE precautions, +2L supp 02 via NC; family at bedside.

## 2023-07-25 NOTE — PROVIDER CONTACT NOTE (CRITICAL VALUE NOTIFICATION) - SITUATION
metastatic pacreatic ca, presents for AMS, melena
Platelet- 15
RN saw critical value result, Potassium: 2.9 from AM labs that were collected and called lab to confirm critical value result. Potassium: 2.9 confirmed by Dinesh Crandall from lab.
Lab called to report critical value result, Platelet count: 20

## 2023-07-25 NOTE — PROVIDER CONTACT NOTE (CRITICAL VALUE NOTIFICATION) - BACKGROUND
Low platelet, pancreatic CA
Patient admit Dx: altered mental status.
Patient admit Dx: altered mental status. Pt also w/ Dx: "Thrombocytopenia: likely 2/2 chemo and active malignancy,  s/p 2 unit PLT." Chemo currently on hold.
pt given PRBC Hgb 8.2

## 2023-07-25 NOTE — DIETITIAN INITIAL EVALUATION ADULT - ORAL INTAKE PTA/DIET HISTORY
Pt's family reports pt followed kosher diet PTA; reports pt appetite was lower recently now improving ; confirmed no known food allergies/ food intolerances

## 2023-07-25 NOTE — PROGRESS NOTE ADULT - SUBJECTIVE AND OBJECTIVE BOX
AMY JAIMES  73y Female  MRN:[id number]    Patient is a 73y old  Female who presents with a chief complaint of pancreatic ca (23 Jul 2023 10:23)    HPI:  73yF PMHx metastatic pancreatic ca, on chemo, dvt/pe on eliquis at home (held since wednesday)  and hypothyroidism presenting with complaint of melena and AMS. Pt was recently discharged from [facility] after being admitted for cellulitis of the L thigh and neutropenic fever/sepsis. She was found to be pancytopenic and received 2 units of PRBCs while inpatient. Her Eliquis has been held since Wednesday. She was discharged yesterday 07/21, however, pt's sons noticed dark stools after she had fecal incontinence x2. Pt sons also reported the patient was altered at home. Pt has new oxygen requirement, satting 95% on 2L, with somewhat labored breathing.    	Pt has had PICC line in since May and has ascites, of which she does not c/o any pain. Pt sons deny at home fevers. (22 Jul 2023 15:26)      Patient seen and evaluated at bedside. No acute events overnight except as noted.    Interval HPI: received plts o/n  guaiac positive     PAST MEDICAL & SURGICAL HISTORY:  Essential hypertension      Hyperlipidemia      Pancreatic cancer      Hypothyroidism      No pertinent past surgical history          REVIEW OF SYSTEMS:  as per hpi    VITALS:   Vital Signs Last 24 Hrs  T(C): 36.6 (25 Jul 2023 08:54), Max: 36.6 (25 Jul 2023 08:54)  T(F): 97.9 (25 Jul 2023 08:54), Max: 97.9 (25 Jul 2023 08:54)  HR: 88 (25 Jul 2023 10:08) (79 - 90)  BP: 122/80 (25 Jul 2023 10:08) (112/66 - 122/80)  BP(mean): --  RR: 18 (25 Jul 2023 08:54) (18 - 18)  SpO2: 93% (25 Jul 2023 10:08) (92% - 99%)    Parameters below as of 25 Jul 2023 10:08  Patient On (Oxygen Delivery Method): nasal cannula  O2 Flow (L/min): 2          PHYSICAL EXAM:  GENERAL: NAD, ill appearing. lethargic  HEAD:  Atraumatic, Normocephalic  EYES: EOMI, PERRLA, conjunctiva and sclera clear  NECK: Supple, No JVD  CHEST/LUNG: Clear to auscultation bilaterally; No wheeze  HEART: S1, S2; No murmurs, rubs, or gallops  ABDOMEN: Soft, Nontender, +distended; Bowel sounds present  EXTREMITIES:  2+ Peripheral Pulses, No clubbing, cyanosis.++ edema  PSYCH: Normal affect  NEUROLOGY: AAOX1-2; non-focal  SKIN: left thigh ecchymosis, resolving cellulitis, LUE hematoma    Consultant(s) Notes Reviewed:  [x ] YES  [ ] NO  Care Discussed with Consultants/Other Providers [ x] YES  [ ] NO    MEDS:   MEDICATIONS  (STANDING):  chlorhexidine 2% Cloths 1 Application(s) Topical daily  levothyroxine 25 MICROGram(s) Oral daily  pantoprazole  Injectable 40 milliGRAM(s) IV Push daily  piperacillin/tazobactam IVPB.. 3.375 Gram(s) IV Intermittent every 8 hours  ursodiol Capsule 300 milliGRAM(s) Oral two times a day    MEDICATIONS  (PRN):  ondansetron Injectable 4 milliGRAM(s) IV Push every 8 hours PRN Nausea and/or Vomiting      ALLERGIES:  No Known Allergies      LABS:                                                 7.5    3.59  )-----------( 27       ( 25 Jul 2023 06:52 )             22.9   07-25    134<L>  |  101  |  11  ----------------------------<  98  3.7   |  25  |  0.54    Ca    8.1<L>      25 Jul 2023 06:52  Mg     1.6     07-25    TPro  4.8<L>  /  Alb  2.4<L>  /  TBili  7.0<H>  /  DBili  x   /  AST  39  /  ALT  28  /  AlkPhos  178<H>  07-25       < from: CT Abdomen and Pelvis w/ IV Cont (07.22.23 @ 16:44) >  IMPRESSION:  Small bilateral pleural effusions.  Metastatic disease of the liver as previously demonstrated.  Large ascites.  Portal vein thrombosis.  Ascending portal colopathy versus infectious colitis.  No pulmonary embolus.        --- End of Report ---        < end of copied text >  < from: CT Head No Cont (07.22.23 @ 16:42) >    IMPRESSION:    No acute intracranial hemorrhage, hydrocephalus or extra-axial fluid   collection.  Mild parenchymal volume loss and mild chronic microvascular ischemic   changes.  No CT evidence for acute transcortical infarction. Please note that MR   imaging is a more sensitive imaging modality for detection of acute   infarction and may be obtained as clinically warranted.    If clinicians have any questions/need for clarification regarding this   report, Dr. Dionisio Clark can be best reached via the patient secure   hospital email system    --- End of Report ---    < end of copied text >

## 2023-07-25 NOTE — DIETITIAN INITIAL EVALUATION ADULT - PERSON TAUGHT/METHOD
- Discussed the importance of including adequate calories and proteins throughout the day; reviewed protein calorie dense food sources/ meal and snacks ideas./verbal instruction/spouse instructed/son instructed

## 2023-07-25 NOTE — PROVIDER CONTACT NOTE (CRITICAL VALUE NOTIFICATION) - ASSESSMENT
Potassium: 2.9. Pt AOx4, VSS, afebrile, denies pain, no acute distress or active acute bleeding noted at this time.
Platelet count: 20. Pt AOx4, VSS, afebrile, denies pain, no acute distress or active acute bleeding noted at this time.
no untoward bleeding noted

## 2023-07-25 NOTE — PROGRESS NOTE ADULT - ASSESSMENT
73yF PMHx metastatic pancreatic ca, on chemo, dvt/pe on eliquis at home (held since Wednesday) and hypothyroidism presenting with complaint of melena and AMS.    Metastatic Pancreatic Cancer  - Patient on active outpatient chemotherapy; follows with Dr. Eduardo Martinez in [place].  - No systemic therapy while inpatient or during rehabilitation.  - Continue to monitor liver functions while admitted  - Drain potential ascites as needed for comfort.  - Had Folfoxiri and gem with Dr Martinez last given 7/17/23  - PT recommends home PT    Pancytopenia  - In the setting of recent chemotherapy, active malignancy.  - Monitor and maintain hemoglobin > 7, platelet > 10.    Melena   - GI eval, holding off on EGD, pursuing medical management  - On empiric abx, ID following    PE/dvt   - Holding AC, will need platelets above 50k to resume  - no pulm embolus noted on CTA   -  Noted ongoing thrombosis on duplex    Alexandre Najera PA-C  Hematology/Oncology  [facility]  [phone] (office)

## 2023-07-25 NOTE — PHYSICAL THERAPY INITIAL EVALUATION ADULT - STANDING BALANCE: DYNAMIC, REHAB EVAL
[facility] Emergency Department  [address], [place] [address]  [phone]     Clinical Summary     PERSON INFORMATION   Name DESIREE RICHMOND Age  70 Years  1946 12:00 AM   Acct# [id number] Sex Male Phone [phone]   Dispo Type Home - (i.e. Home on oxygen, DME)-  Arrival 2017 5:22 PM Checkout 2017 6:59 PM    Address: [facility] [place] [address]      Visit Reason RT KNEE PAIN     ED Physician Note     ED Time Seen By Provider Entered On:  2017 17:34     Performed On:  2017 17:34  by Kaelyn Wadsworth NP      Time Seen By Provider   Time Seen by Provider :   2017 17:34    Kaelyn Wadsworth NP - 2017 17:34           VITALS INFORMATION  Vitals/Ht/Wt  Temperature Oral:  98.0 F  Peripheral Pulse Rate:  70 bpm  Respiratory Rate:  18 br/min  Oxygen Saturation:  96 %  Oxygen Therapy:  Room air  Systolic Blood Pressure:  143 mmHg  Diastolic Blood Pressure:  80 mmHg  Mean Arterial Pressure:  101 mmHg  Height:  170 cm  Weight:  90.72 kg  Weight Type:  Estimated       MEDICAL INFORMATION   Allergy Info:          NKA             Prescriptions:                Prescription Display   cephalexin (Keflex 500 mg oral capsule) 500 mg = 1 cap(s), PO, Cap, QID, Right index finger laceration, X 7 day(s), # 28 cap(s), 0 Refill(s)   lidocaine topical (Lidoderm 5% topical film) 1 patch, Topical, qDay, PRN Pain, Right knee pain  remove patches after 12 hours, # 10 patch, 0 Refill(s)          DISCHARGE INFORMATION   Discharge Disposition: Home - (i.e. Home on oxygen, DME)-      PATIENT EDUCATION INFORMATION   Instructions:       Wound Care, Easy-to-Read(Cymraes); Arthritis, Nonspecific, Easy-to-Read(Cymraes)   Follow up:                With: Address: When:   Mendel Rodriguez, [address] [place], [place] [id number] [phone] Business (1)    Comments:   Call for follow up appointment   Return if symptoms worsen             DIAGNOSIS           fair minus

## 2023-07-25 NOTE — PHYSICAL THERAPY INITIAL EVALUATION ADULT - ADDITIONAL COMMENTS
lives with spouse in elevator apartment, IND no device at baseline, sometimes needs assist but generally IND with mobility

## 2023-07-25 NOTE — DIETITIAN INITIAL EVALUATION ADULT - ORAL NUTRITION SUPPLEMENTS
[facility] Patient Status:  Outpatient in a Bed   Age/Gender 80year old male MRN [id number]   Location [address] [facility] Attending Lavonia Denver, MD   Hosp Day # 0 PCP Reagan Jay (Inactive)       Anesthesia Post-op Note    RIGHT TOTAL KNEE ARTHROPLASTY    Procedure Summary     Date: 07/05/22 Room / Location: [facility] MAIN OR 12 / [facility] MAIN OR    Anesthesia Start: 1043 Anesthesia Stop: 8323    Procedure: RIGHT TOTAL KNEE ARTHROPLASTY (Right Knee) Diagnosis: (OSTEOARTHRITIS RIGHT KNEE)    Surgeons: Lavonia Denver, MD Anesthesiologist: Dario Gregg MD    Anesthesia Type: spinal ASA Status: 3          Anesthesia Type: spinal    Vitals Value Taken Time   /64 07/05/22 1101   Temp 98 07/05/22 1104   Pulse 74 07/05/22 1104   Resp 18 07/05/22 1104   SpO2 95 % 07/05/22 1104   Vitals shown include unvalidated device data. Patient Location: PACU    Anesthesia Type: spinal    Airway Patency: patent    Postop Pain Control: adequate    Mental Status: mildly sedated but able to meaningfully participate in the post-anesthesia evaluation    Nausea/Vomiting: none    Cardiopulmonary/Hydration status: stable euvolemic    Complications: no apparent anesthesia related complications    Postop vital signs: stable    Dental Exam: Unchanged from Preop    Patient to be discharged home when criteria met.
- per family pt dislikes ensure shakes; recommend [employer] shake to try, standard BID

## 2023-07-25 NOTE — DIETITIAN INITIAL EVALUATION ADULT - NSFNSPHYEXAMSKINFT_GEN_A_CORE
Dosing wt N/A  per family ~147 pounds   118% IBW ( pounds)  Skin: no noted pressure injuries as per flowsheets ; cellulitis Left:; thigh as per flowsheets

## 2023-07-25 NOTE — PROVIDER CONTACT NOTE (CRITICAL VALUE NOTIFICATION) - RECOMMENDATIONS
[Mother] : mother
Platelet transfusion
Monitor patient status & lab values for acute changes, pt has hx of low platelets & per pt chart notes pt's transfusion guidelines are monitor and maintain hemoglobin > 7, platelet > 10.
Replete potassium with supplementation

## 2023-07-25 NOTE — PROGRESS NOTE ADULT - SUBJECTIVE AND OBJECTIVE BOX
[facility] NEPHROLOGY- PROGRESS NOTE    73y Female with history of metastatic pancreatic CA presents with melena. Nephrology consulted for hypokalemia.     REVIEW OF SYSTEMS: Unable to obtain due to lethargy.    No Known Allergies      Hospital Medications: Medications reviewed    VITALS:  T(F): 97.9 (07-25-23 @ 12:21), Max: 97.9 (07-25-23 @ 08:54)  HR: 87 (07-25-23 @ 12:21)  BP: 114/73 (07-25-23 @ 12:21)  RR: 18 (07-25-23 @ 12:21)  SpO2: 99% (07-25-23 @ 12:21)  Wt(kg): --  Height (cm): 165.1 (07-22 @ 10:13), 165.1 (07-19 @ 12:50)  Weight (kg): 66.7 (07-19 @ 12:50)  BMI (kg/m2): 24.5 (07-22 @ 10:13), 24.5 (07-19 @ 12:50)  BSA (m2): 1.74 (07-22 @ 10:13), 1.74 (07-19 @ 12:50)    07-24 @ 07:01  -  07-25 @ 07:00  --------------------------------------------------------  IN: 150 mL / OUT: 150 mL / NET: 0 mL        PHYSICAL EXAM:    Gen: NAD, calm  Cards: RRR, +S1/S2, no M/G/R  Resp: course BS B/L  GI: soft, NT, + ab distention, NABS  Vascular:  + LE edema B/L, RUE PICC, LUE hematoma    LABS:  07-25    134<L>  |  101  |  11  ----------------------------<  98  3.7   |  25  |  0.54    Ca    8.1<L>      25 Jul 2023 06:52  Mg     1.6     07-25    TPro  4.8<L>  /  Alb  2.4<L>  /  TBili  7.0<H>  /  DBili      /  AST  39  /  ALT  28  /  AlkPhos  178<H>  07-25    Creatinine Trend: 0.54 <--, 0.54 <--, 0.55 <--, 0.58 <--, 0.54 <--, 0.52 <--, 0.58 <--, 0.97 <--, 1.03 <--, 1.54 <--                        7.5    3.59  )-----------( 27 ( 25 Jul 2023 06:52 )             22.9     Urine Studies:  Urinalysis Basic - ( 25 Jul 2023 06:52 )    Color:  / Appearance:  / SG:  / pH:   Gluc: 98 mg/dL / Ketone:   / Bili:  / Urobili:    Blood:  / Protein:  / Nitrite:    Leuk Esterase:  / RBC:  / WBC    Sq Epi:  / Non Sq Epi:  / Bacteria:       Potassium, Random Urine: 31 mmol/L (07-24 @ 17:29)  Creatinine, Random Urine: 45 mg/dL (07-24 @ 17:29)      RADIOLOGY & ADDITIONAL STUDIES:

## 2023-07-25 NOTE — DIETITIAN INITIAL EVALUATION ADULT - REASON INDICATOR FOR ASSESSMENT
Consult received for nutrition assessment and education, MST score 2 or >   Information obtained from pt's family at bedside (speaks English), electronic medical record  Chart reviewed, events noted

## 2023-07-25 NOTE — DIETITIAN INITIAL EVALUATION ADULT - PERTINENT MEDS FT
MEDICATIONS  (STANDING):  chlorhexidine 2% Cloths 1 Application(s) Topical daily  levothyroxine 25 MICROGram(s) Oral daily  pantoprazole  Injectable 40 milliGRAM(s) IV Push daily  piperacillin/tazobactam IVPB.. 3.375 Gram(s) IV Intermittent every 8 hours  ursodiol Capsule 300 milliGRAM(s) Oral two times a day    MEDICATIONS  (PRN):  ondansetron Injectable 4 milliGRAM(s) IV Push every 8 hours PRN Nausea and/or Vomiting

## 2023-07-25 NOTE — PROGRESS NOTE ADULT - SUBJECTIVE AND OBJECTIVE BOX
infectious diseases progress note:    Patient is a 73y old  Female who presents with a chief complaint of jaundice (24 Jul 2023 08:49)        Altered mental status           Allergies    No Known Allergies    Intolerances        ANTIBIOTICS/RELEVANT:  antimicrobials  piperacillin/tazobactam IVPB.. 3.375 Gram(s) IV Intermittent every 8 hours  vancomycin  IVPB 1000 milliGRAM(s) IV Intermittent every 12 hours    immunologic:    OTHER:  chlorhexidine 2% Cloths 1 Application(s) Topical daily  levothyroxine 25 MICROGram(s) Oral daily  ondansetron Injectable 4 milliGRAM(s) IV Push every 8 hours PRN  pantoprazole  Injectable 40 milliGRAM(s) IV Push daily  ursodiol Capsule 300 milliGRAM(s) Oral two times a day      Objective:  Vital Signs Last 24 Hrs  T(C): 36.5 (25 Jul 2023 05:17), Max: 36.5 (24 Jul 2023 20:50)  T(F): 97.7 (25 Jul 2023 05:17), Max: 97.7 (24 Jul 2023 20:50)  HR: 86 (25 Jul 2023 05:17) (79 - 90)  BP: 122/65 (25 Jul 2023 05:17) (113/64 - 122/65)  BP(mean): --  RR: 18 (25 Jul 2023 05:17) (18 - 18)  SpO2: 96% (25 Jul 2023 05:17) (92% - 99%)    Parameters below as of 25 Jul 2023 05:17  Patient On (Oxygen Delivery Method): nasal cannula         Eyes:KINZA, EOMI  Ear/Nose/Throat: no oral lesion, no sinus tenderness on percussion	  Neck:no JVD, no lymphadenopathy, supple  Respiratory: CTA ridge  Cardiovascular: S1S2 RRR, no murmurs  Gastrointestinal:soft, (+) BS, no HSM  Extremities:no e/e/c        LABS:                        7.5    3.59  )-----------( 27       ( 25 Jul 2023 06:52 )             22.9     07-25    134<L>  |  101  |  11  ----------------------------<  98  3.7   |  25  |  0.54    Ca    8.1<L>      25 Jul 2023 06:52  Mg     1.6     07-25    TPro  4.8<L>  /  Alb  2.4<L>  /  TBili  7.0<H>  /  DBili  x   /  AST  39  /  ALT  28  /  AlkPhos  178<H>  07-25    PT/INR - ( 25 Jul 2023 06:52 )   PT: 14.8 sec;   INR: 1.27 ratio         PTT - ( 25 Jul 2023 06:52 )  PTT:30.1 sec  Urinalysis Basic - ( 25 Jul 2023 06:52 )    Color: x / Appearance: x / SG: x / pH: x  Gluc: 98 mg/dL / Ketone: x  / Bili: x / Urobili: x   Blood: x / Protein: x / Nitrite: x   Leuk Esterase: x / RBC: x / WBC x   Sq Epi: x / Non Sq Epi: x / Bacteria: x          MICROBIOLOGY:    RECENT CULTURES:  07-22 @ 17:00 Clean Catch Clean Catch (Midstream)                No growth    07-22 @ 12:20 .Blood Blood-Peripheral                No growth at 48 Hours    07-22 @ 12:00 .Blood Blood-Peripheral                No growth at 48 Hours    07-19 @ 20:00 Clean Catch Clean Catch (Midstream)                <10,000 CFU/mL Normal Urogenital Maryjane    07-19 @ 13:05 .Blood Blood-Peripheral                No growth at 5 days    07-19 @ 12:55 .Blood Blood-Peripheral                No growth at 5 days          RESPIRATORY CULTURES:              RADIOLOGY & ADDITIONAL STUDIES:        Pager [phone]  After 5 pm/weekends or if no response :[phone]

## 2023-07-25 NOTE — DIETITIAN INITIAL EVALUATION ADULT - PERTINENT LABORATORY DATA
07-25    134<L>  |  101  |  11  ----------------------------<  98  3.7   |  25  |  0.54    Ca    8.1<L>      25 Jul 2023 06:52  Mg     1.6     07-25    TPro  4.8<L>  /  Alb  2.4<L>  /  TBili  7.0<H>  /  DBili  x   /  AST  39  /  ALT  28  /  AlkPhos  178<H>  07-25  A1C with Estimated Average Glucose Result: 5.3 % (05-18-23 @ 06:14)   07-25    134<L>  |  101  |  11  ----------------------------<  98  3.7   |  25  |  0.54    Ca    8.1<L>      25 Jul 2023 06:52  Mg     1.6     07-25    TPro  4.8<L>  /  Alb  2.4<L>  /  TBili  7.0<H>  /  DBili  x   /  AST  39  /  ALT  28  /  AlkPhos  178<H>  07-25 07-25 @ 06:52: Na 134<L>, BUN 11, Cr 0.54, BG 98, K+ 3.7, Phos --, Mg 1.6, Alk Phos 178<H>, ALT/SGPT 28, AST/SGOT 39, HbA1c --  07-24 @ 20:01: Na 133<L>, BUN 13, Cr 0.54, <H>, K+ 4.0, Phos --, Mg --, Alk Phos --, ALT/SGPT --, AST/SGOT --, HbA1c --    A1C with Estimated Average Glucose Result: 5.3 % (05-18-23 @ 06:14)

## 2023-07-25 NOTE — PHYSICAL THERAPY INITIAL EVALUATION ADULT - PERTINENT HX OF CURRENT PROBLEM, REHAB EVAL
73 female h/o metastatic pancreatic ca on chemo, dvt/pe on eliquis, hypothyroid, here with diarrhea, melena, metabolic encephalopathy and thrombocytopenia  pt with sepsis present on admission with tachypnea, hypoxia, hypotension, tachycardia

## 2023-07-25 NOTE — PROGRESS NOTE ADULT - ASSESSMENT
73y Female with history of metastatic pancreatic CA presents with melena. Nephrology consulted for hypokalemia.     1) Hypokalemia: Resolved with repletion and with spot urine K/Cr low suggestive of non-renal potassium wasting. Monitor serum potassium.    2) HTN: BP controlled. Monitor off medications.    3) LE edema: Due to poor oncotic pressure. Can restart lasix as needed by cardiology. TTE with normal LVSF. Monitor UO.    4) Pancreatic CA: As per Heme/Onc.      [facility] NEPHROLOGY  Luis Alvarado M.D.  Fernando Garcia D.O.  Fay Shankar M.D.  Amy Núñez, MSN, ANP-C    Telephone: [phone]  Facsimile: [phone]    [address] [place] [address]

## 2023-07-25 NOTE — PROGRESS NOTE ADULT - ASSESSMENT
73 female h/o metastatic pancreatic ca on chemo, dvt/pe on eliquis, hypothyroid, here with diarrhea, melena, metabolic encephalopathy and thrombocytopenia  pt with sepsis present on admission with tachypnea, hypoxia, hypotension, tachycardia    sepsis  id consult apprec  ?resolving left thigh cellulitis  colitis on CT  cont iv abx as per id  f/u cult and stool studies - ngtd  low suspicious for SBP    thrombocytopenia  likely 2/2 chemo  transfuse plts as needed. goal >20  serial cbc    diarrhea  melena  guaiac positive  gi eval noted  no plan for intervention at this time  serial cbc  transfuse prbc and plts as needed  ppi    metabolic encephalopathy  CT head neg  supportive care  improved    metastatic pancreatic ca  onc consult followng  chemo currently on hold  MRCP noted    h/o pe/dvt  AC currently on hold given low plts and elevated inr  supp o2 prn  CTA showing PE now resolved  lower ext dopplers with gastrocnemius dvt - below knee  will repeat in 1 week  cont hold eliquis for now       malignant ascites  monitor for now  low suspicious for SBP  US noted  no paracentesis at this time given bleeding risk with low plts  may need paracentesis if worsens    fluid overload  gentle diuresis as able  echo noted    hypothyroid  cont synthroid      d/w family bedside  d/w consultants    PT      Advanced care planning was discussed with patient and family.  Advanced care planning forms were reviewed and discussed as appropriate.  Differential diagnosis and plan of care discussed with patient after the evaluation.   Pain assessed and judicious use of narcotics when appropriate was discussed.  Importance of Fall prevention discussed.  Counseling on Smoking and Alcohol cessation was offered when appropriate.  Counseling on Diet, exercise, and medication compliance was done.       Approx 60 minutes spent.

## 2023-07-25 NOTE — DIETITIAN INITIAL EVALUATION ADULT - NSFNSGIIOFT_GEN_A_CORE
- Pt's family denies pt with vomiting, diarrhea, or constipation at this time   - Last BM:7/24 per family; not currently ordered for bowel regimen

## 2023-07-26 NOTE — PROGRESS NOTE ADULT - ASSESSMENT
73 female h/o metastatic pancreatic ca on chemo, dvt/pe on eliquis, hypothyroid, here with diarrhea, melena, metabolic encephalopathy and thrombocytopenia  pt with sepsis present on admission with tachypnea, hypoxia, hypotension, tachycardia    sepsis  id consult apprec  ?resolving left thigh cellulitis  colitis on CT  cont iv abx as per id  f/u cult and stool studies - ngtd  low suspicious for SBP  id f/u apprec. to finish zosyn tomorrow     thrombocytopenia  likely 2/2 chemo  transfuse plts as needed. goal >20  serial cbc  d/w pts outpt oncologist.   recommended steroids for 3 days  cont solumedrol as ordered    diarrhea  melena  guaiac positive  gi eval noted  no plan for intervention at this time  serial cbc  transfuse prbc and plts as needed  ppi    metabolic encephalopathy  CT head neg  supportive care  improved    metastatic pancreatic ca  onc consult followng  chemo currently on hold  MRCP noted    h/o pe/dvt  AC currently on hold given low plts and elevated inr  supp o2 prn  CTA showing PE now resolved  lower ext dopplers with gastrocnemius dvt - below knee  will repeat in 1 week  cont hold eliquis for now     malignant ascites  monitor for now  low suspicious for SBP  US noted  no paracentesis at this time given bleeding risk with low plts  may need paracentesis if worsens    fluid overload  gentle diuresis as able  echo noted    hypothyroid  cont synthroid      d/w family bedside  d/w consultants    PT      Advanced care planning was discussed with patient and family.  Advanced care planning forms were reviewed and discussed as appropriate.  Differential diagnosis and plan of care discussed with patient after the evaluation.   Pain assessed and judicious use of narcotics when appropriate was discussed.  Importance of Fall prevention discussed.  Counseling on Smoking and Alcohol cessation was offered when appropriate.  Counseling on Diet, exercise, and medication compliance was done.       Approx 60 minutes spent.

## 2023-07-26 NOTE — PROGRESS NOTE ADULT - SUBJECTIVE AND OBJECTIVE BOX
AMY JAIMES  73y Female  MRN:[id number]    Patient is a 73y old  Female who presents with a chief complaint of pancreatic ca (23 Jul 2023 10:23)    HPI:  73yF PMHx metastatic pancreatic ca, on chemo, dvt/pe on eliquis at home (held since wednesday)  and hypothyroidism presenting with complaint of melena and AMS. Pt was recently discharged from [facility] after being admitted for cellulitis of the L thigh and neutropenic fever/sepsis. She was found to be pancytopenic and received 2 units of PRBCs while inpatient. Her Eliquis has been held since Wednesday. She was discharged yesterday 07/21, however, pt's sons noticed dark stools after she had fecal incontinence x2. Pt sons also reported the patient was altered at home. Pt has new oxygen requirement, satting 95% on 2L, with somewhat labored breathing.    	Pt has had PICC line in since May and has ascites, of which she does not c/o any pain. Pt sons deny at home fevers. (22 Jul 2023 15:26)      Patient seen and evaluated at bedside. No acute events overnight except as noted.    Interval HPI: no acute events o/n     PAST MEDICAL & SURGICAL HISTORY:  Essential hypertension      Hyperlipidemia      Pancreatic cancer      Hypothyroidism      No pertinent past surgical history          REVIEW OF SYSTEMS:  as per hpi    VITALS:   Vital Signs Last 24 Hrs  T(C): 36.3 (26 Jul 2023 05:02), Max: 36.7 (25 Jul 2023 16:23)  T(F): 97.4 (26 Jul 2023 05:02), Max: 98.1 (25 Jul 2023 16:23)  HR: 82 (26 Jul 2023 05:02) (82 - 90)  BP: 103/63 (26 Jul 2023 05:02) (103/63 - 116/67)  BP(mean): --  RR: 17 (26 Jul 2023 05:02) (17 - 18)  SpO2: 96% (26 Jul 2023 05:02) (93% - 99%)    Parameters below as of 26 Jul 2023 05:02  Patient On (Oxygen Delivery Method): nasal cannula  O2 Flow (L/min): 2       PHYSICAL EXAM:  GENERAL: NAD, ill appearing. lethargic  HEAD:  Atraumatic, Normocephalic  EYES: EOMI, PERRLA, conjunctiva and sclera clear  NECK: Supple, No JVD  CHEST/LUNG: Clear to auscultation bilaterally; No wheeze  HEART: S1, S2; No murmurs, rubs, or gallops  ABDOMEN: Soft, Nontender, +distended; Bowel sounds present  EXTREMITIES:  2+ Peripheral Pulses, No clubbing, cyanosis.++ edema  PSYCH: Normal affect  NEUROLOGY: AAOX1-2; non-focal  SKIN: left thigh ecchymosis, resolving cellulitis, LUE hematoma    Consultant(s) Notes Reviewed:  [x ] YES  [ ] NO  Care Discussed with Consultants/Other Providers [ x] YES  [ ] NO    MEDS:   MEDICATIONS  (STANDING):  chlorhexidine 2% Cloths 1 Application(s) Topical daily  levothyroxine 25 MICROGram(s) Oral daily  methylPREDNISolone sodium succinate Injectable 125 milliGRAM(s) IV Push daily  pantoprazole  Injectable 40 milliGRAM(s) IV Push two times a day  piperacillin/tazobactam IVPB.. 3.375 Gram(s) IV Intermittent every 8 hours  ursodiol Capsule 300 milliGRAM(s) Oral two times a day    MEDICATIONS  (PRN):  ondansetron Injectable 4 milliGRAM(s) IV Push every 8 hours PRN Nausea and/or Vomiting      ALLERGIES:  No Known Allergies      LABS:                                            8.5    1.72  )-----------( 27       ( 26 Jul 2023 07:14 )             25.8   07-26    133<L>  |  99  |  8   ----------------------------<  145<H>  4.4   |  23  |  0.48<L>    Ca    8.3<L>      26 Jul 2023 07:13  Mg     1.6     07-25    TPro  5.5<L>  /  Alb  2.7<L>  /  TBili  6.7<H>  /  DBili  x   /  AST  38  /  ALT  30  /  AlkPhos  212<H>  07-26       < from: CT Abdomen and Pelvis w/ IV Cont (07.22.23 @ 16:44) >  IMPRESSION:  Small bilateral pleural effusions.  Metastatic disease of the liver as previously demonstrated.  Large ascites.  Portal vein thrombosis.  Ascending portal colopathy versus infectious colitis.  No pulmonary embolus.        --- End of Report ---        < end of copied text >  < from: CT Head No Cont (07.22.23 @ 16:42) >    IMPRESSION:    No acute intracranial hemorrhage, hydrocephalus or extra-axial fluid   collection.  Mild parenchymal volume loss and mild chronic microvascular ischemic   changes.  No CT evidence for acute transcortical infarction. Please note that MR   imaging is a more sensitive imaging modality for detection of acute   infarction and may be obtained as clinically warranted.    If clinicians have any questions/need for clarification regarding this   report, Dr. Dionisio Clark can be best reached via the patient [email or web address]   hospital email system    --- End of Report ---    < end of copied text >

## 2023-07-26 NOTE — PROGRESS NOTE ADULT - ASSESSMENT
73 year old with metastatic  pancreatic cancer and thrombosis of portal vein . Being treated with chemo and was recently in  for neutropenia.  No longer neutropenic but admitted with weakness.  CT with hepatic mets and  PVT,  abd distended and ascites presert.    1. Impressive hematoma on left extremity  due to blood draw  no active cellulitis on thigh  2. Agree with empiric zosyn  Not clear if she is infected  consider  paracentesis   discussed with family    to complete  course of zosyn tomorrow  ( 7 days).    I wish I had more to offer

## 2023-07-26 NOTE — PROGRESS NOTE ADULT - SUBJECTIVE AND OBJECTIVE BOX
Date of Service: 07-26-23 @ 07:48           CARDIOLOGY     PROGRESS  NOTE   ________________________________________________    CHIEF COMPLAINT:Patient is a 73y old  Female who presents with a chief complaint of Altered mental status  lethargic    	  REVIEW OF SYSTEMS:  CONSTITUTIONAL: No fever, weight loss, or fatigue  EYES: No eye pain, visual disturbances, or discharge  ENT:  No difficulty hearing, tinnitus, vertigo; No sinus or throat pain  NECK: No pain or stiffness  RESPIRATORY: No cough, wheezing, chills or hemoptysis; No Shortness of Breath  CARDIOVASCULAR: No chest pain, palpitations, passing out, dizziness, or leg swelling  GASTROINTESTINAL: No abdominal or epigastric pain. No nausea, vomiting, or hematemesis; No diarrhea or constipation. No melena or hematochezia.  GENITOURINARY: No dysuria, frequency, hematuria, or incontinence  NEUROLOGICAL: No headaches, memory loss, loss of strength, numbness, or tremors  SKIN: No itching, burning, rashes, or lesions   LYMPH Nodes: No enlarged glands  ENDOCRINE: No heat or cold intolerance; No hair loss  MUSCULOSKELETAL: No joint pain or swelling; No muscle, back, or extremity pain  PSYCHIATRIC: No depression, anxiety, mood swings, or difficulty sleeping  HEME/LYMPH: No easy bruising, or bleeding gums  ALLERGY AND IMMUNOLOGIC: No hives or eczema	    [ ] All others negative	  [x ] Unable to obtain    PHYSICAL EXAM:  T(C): 36.3 (07-26-23 @ 05:02), Max: 36.7 (07-25-23 @ 16:23)  HR: 82 (07-26-23 @ 05:02) (82 - 90)  BP: 103/63 (07-26-23 @ 05:02) (103/63 - 122/80)  RR: 17 (07-26-23 @ 05:02) (17 - 18)  SpO2: 96% (07-26-23 @ 05:02) (93% - 99%)  Wt(kg): --  I&O's Summary    25 Jul 2023 07:01  -  26 Jul 2023 07:00  --------------------------------------------------------  IN: 445 mL / OUT: 0 mL / NET: 445 mL        Appearance: jaundice	  HEENT:   Normal oral mucosa, PERRL, EOMI	  Lymphatic: No lymphadenopathy  Cardiovascular: Normal S1 S2, No JVD, + murmurs, No edema  Respiratory: Lungs clear to auscultation	  Psychiatry: mildly lethargic  Gastrointestinal:  Soft, Non-tender, + BS	  Skin: No rashes, No ecchymoses, No cyanosis	  Neurologic: Non-focal  Extremities: Normal range of motion, No clubbing, cyanosis or edema  Vascular: Peripheral pulses palpable 2+ bilaterally    MEDICATIONS  (STANDING):  chlorhexidine 2% Cloths 1 Application(s) Topical daily  levothyroxine 25 MICROGram(s) Oral daily  methylPREDNISolone sodium succinate Injectable 125 milliGRAM(s) IV Push daily  pantoprazole  Injectable 40 milliGRAM(s) IV Push two times a day  piperacillin/tazobactam IVPB.. 3.375 Gram(s) IV Intermittent every 8 hours  ursodiol Capsule 300 milliGRAM(s) Oral two times a day      TELEMETRY: 	    ECG:  	  RADIOLOGY:  OTHER: 	  	  LABS:	 	    CARDIAC MARKERS:                          7.5    3.59  )-----------( 27       ( 25 Jul 2023 06:52 )             22.9     07-25    134<L>  |  101  |  11  ----------------------------<  98  3.7   |  25  |  0.54    Ca    8.1<L>      25 Jul 2023 06:52  Mg     1.6     07-25    TPro  4.8<L>  /  Alb  2.4<L>  /  TBili  7.0<H>  /  DBili  x   /  AST  39  /  ALT  28  /  AlkPhos  178<H>  07-25    proBNP:   Lipid Profile:   HgA1c:   TSH:   PT/INR - ( 25 Jul 2023 06:52 )   PT: 14.8 sec;   INR: 1.27 ratio         PTT - ( 25 Jul 2023 06:52 )  PTT:30.1 sec    - Will hold off of on potential EGD and pursue medical management in alignment with the family/patient's wishes  - Extensive biliary disease not amenable to endoscopic intervention   - PPI 40 IV BID  - Daily CBC, CMP, coags  - Transfuse if Hgb < 7  - Avoid NSAIDs  - Hold AC if able, hep gtt if needed while monitoring  - Cards following, appreciate recs: recommending IVC filter if contraindication to AC  - Diagnostic/therapeutic para when able  - C diff/GI pcr  - Ensure adequate hydration  - Advance diet if no additional bleeding and hgb remains stable  < from: [facility] Duplex Lower Ext Vein Scan, Bilat (07.24.23 @ 13:22) >  IMPRESSION: There is persistent right gastrocnemius vein DVT without   proximal propagation.        < from: US Abdomen Limited (07.24.23 @ 13:48) >  Moderate to large volume abdominal ascites.    Bilateral pleural effusions.    < from: MR MRCP w/wo IV Cont (07.24.23 @ 23:10) >  Interval decrease in size of hepatic metastases since MRI of 5/15/2023.   Corresponding decrease in intrahepatic biliary dilatation.    Large volume ascites with question of omental carcinomatosis.    Persistent main/right portal vein thrombosis.      Assessment and plan  ---------------------------  73yF PMHx metastatic pancreatic ca, on chemo, dvt/pe on eliquis at home (held since wednesday)  and hypothyroidism presenting with complaint of melena and AMS. Pt was recently discharged from [facility] after being admitted for cellulitis of the L thigh and neutropenic fever/sepsis. She was found to be pancytopenic and received 2 units of PRBCs while inpatient. Her Eliquis has been held since Wednesday. She was discharged yesterday 07/21, however, pt's sons noticed dark stools after she had fecal incontinence x2. Pt sons also reported the patient was altered at home. Pt has new oxygen requirement, satting 95% on 2L, with somewhat labored breathing.  pt with metastatic pancreatic ca/ dvt on elkiquis was recently admitted with ?sepsis to [facility] presented with increasing sob, presented with melena  cta negative for PE  sob ?sec top large ascites needs paracentesis  ac held sec to bleeding   gi consult ?colitis continue Zosyn / l thigh celluitis  check cultures  nutrition eval  replete lytes, keep K >4  hold AC sec to low platelets,  doppler noted no propagation  dc lasix  may consider paracentesis after platelets transfusion  awaiting MRI results  discussed with family regarding palliative care  abdominal ultrasound and MR noted may consider platelets transfusion and paracentesis, therapeutic and diagnostic  transfuse as needed

## 2023-07-26 NOTE — PROGRESS NOTE ADULT - SUBJECTIVE AND OBJECTIVE BOX
Patient is a 73y old  Female who presents with a chief complaint of weakness (26 Jul 2023 09:25)    Pt seen and examined at bedside, family present. Denies abd pain.     MEDICATIONS  (STANDING):  chlorhexidine 2% Cloths 1 Application(s) Topical daily  levothyroxine 25 MICROGram(s) Oral daily  methylPREDNISolone sodium succinate Injectable 125 milliGRAM(s) IV Push daily  pantoprazole  Injectable 40 milliGRAM(s) IV Push two times a day  piperacillin/tazobactam IVPB.. 3.375 Gram(s) IV Intermittent every 8 hours  ursodiol Capsule 300 milliGRAM(s) Oral two times a day    MEDICATIONS  (PRN):  ondansetron Injectable 4 milliGRAM(s) IV Push every 8 hours PRN Nausea and/or Vomiting    Vital Signs Last 24 Hrs  T(C): 36.3 (26 Jul 2023 05:02), Max: 36.7 (25 Jul 2023 16:23)  T(F): 97.4 (26 Jul 2023 05:02), Max: 98.1 (25 Jul 2023 16:23)  HR: 82 (26 Jul 2023 05:02) (82 - 90)  BP: 103/63 (26 Jul 2023 05:02) (103/63 - 116/67)  BP(mean): --  RR: 17 (26 Jul 2023 05:02) (17 - 18)  SpO2: 96% (26 Jul 2023 05:02) (93% - 99%)    Parameters below as of 26 Jul 2023 05:02  Patient On (Oxygen Delivery Method): nasal cannula  O2 Flow (L/min): 2      PE  NAD  Awake, alert  Anicteric, MMM  Abd soft, NT, ND  No c/c/e  No rash grossly  FROM                          8.5    1.72  )-----------( 27       ( 26 Jul 2023 07:14 )             25.8       07-26    133<L>  |  99  |  8   ----------------------------<  145<H>  4.4   |  23  |  0.48<L>    Ca    8.3<L>      26 Jul 2023 07:13  Mg     1.6     07-25    TPro  5.5<L>  /  Alb  2.7<L>  /  TBili  6.7<H>  /  DBili  x   /  AST  38  /  ALT  30  /  AlkPhos  212<H>  07-26

## 2023-07-26 NOTE — PROGRESS NOTE ADULT - ASSESSMENT
73y Female with history of metastatic pancreatic CA presents with melena. Nephrology consulted for hypokalemia.     1) Hypokalemia: Resolved with repletion and with spot urine K/Cr low suggestive of non-renal potassium wasting. Monitor serum potassium.    2) HTN: BP low normal. Monitor off medications.    3) LE edema: Due to poor oncotic pressure. Can restart lasix as needed by cardiology. TTE with normal LVSF. Monitor UO.    4) Pancreatic CA: As per Heme/Onc.      [facility] NEPHROLOGY  Luis Alvarado M.D.  Fernando Garcia D.O.  Fay Shankar M.D.  Amy Núñez, MSN, ANP-C    Telephone: [phone]  Facsimile: [phone]    [address] [place] [address]

## 2023-07-26 NOTE — PROGRESS NOTE ADULT - SUBJECTIVE AND OBJECTIVE BOX
infectious diseases progress note:    Patient is a 73y old  Female who presents with a chief complaint of Altered mental status     (25 Jul 2023 13:19)        Altered mental status             Allergies    No Known Allergies    Intolerances        ANTIBIOTICS/RELEVANT:  antimicrobials  piperacillin/tazobactam IVPB.. 3.375 Gram(s) IV Intermittent every 8 hours    immunologic:    OTHER:  chlorhexidine 2% Cloths 1 Application(s) Topical daily  levothyroxine 25 MICROGram(s) Oral daily  methylPREDNISolone sodium succinate Injectable 125 milliGRAM(s) IV Push daily  ondansetron Injectable 4 milliGRAM(s) IV Push every 8 hours PRN  pantoprazole  Injectable 40 milliGRAM(s) IV Push two times a day  ursodiol Capsule 300 milliGRAM(s) Oral two times a day      Objective:  Vital Signs Last 24 Hrs  T(C): 36.3 (26 Jul 2023 05:02), Max: 36.7 (25 Jul 2023 16:23)  T(F): 97.4 (26 Jul 2023 05:02), Max: 98.1 (25 Jul 2023 16:23)  HR: 82 (26 Jul 2023 05:02) (82 - 90)  BP: 103/63 (26 Jul 2023 05:02) (103/63 - 122/80)  BP(mean): --  RR: 17 (26 Jul 2023 05:02) (17 - 18)  SpO2: 96% (26 Jul 2023 05:02) (93% - 99%)    Parameters below as of 26 Jul 2023 05:02  Patient On (Oxygen Delivery Method): nasal cannula  O2 Flow (L/min): 2       Eyes:KINZA, EOMI  Ear/Nose/Throat: no oral lesion, no sinus tenderness on percussion	  Neck:no JVD, no lymphadenopathy, supple  Respiratory: CTA ridge  Cardiovascular: S1S2 RRR, no murmurs  Gastrointestinal:soft, (+) BS, no HSM  Extremities:no e/e/c        LABS:                        8.5    1.72  )-----------( 27       ( 26 Jul 2023 07:14 )             25.8     07-26    133<L>  |  99  |  8   ----------------------------<  145<H>  4.4   |  23  |  0.48<L>    Ca    8.3<L>      26 Jul 2023 07:13  Mg     1.6     07-25    TPro  5.5<L>  /  Alb  2.7<L>  /  TBili  6.7<H>  /  DBili  x   /  AST  38  /  ALT  30  /  AlkPhos  212<H>  07-26    PT/INR - ( 26 Jul 2023 07:16 )   PT: 12.6 sec;   INR: 1.21 ratio         PTT - ( 26 Jul 2023 07:16 )  PTT:27.6 sec  Urinalysis Basic - ( 26 Jul 2023 07:13 )    Color: x / Appearance: x / SG: x / pH: x  Gluc: 145 mg/dL / Ketone: x  / Bili: x / Urobili: x   Blood: x / Protein: x / Nitrite: x   Leuk Esterase: x / RBC: x / WBC x   Sq Epi: x / Non Sq Epi: x / Bacteria: x          MICROBIOLOGY:    RECENT CULTURES:  07-24 @ 20:03 .Stool Feces                No enteric pathogens to date: Final culture pending    07-22 @ 17:00 Clean Catch Clean Catch (Midstream)                No growth    07-22 @ 12:20 .Blood Blood-Peripheral                No growth at 72 Hours    07-22 @ 12:00 .Blood Blood-Peripheral                No growth at 72 Hours    07-19 @ 20:00 Clean Catch Clean Catch (Midstream)                <10,000 CFU/mL Normal Urogenital Maryjane    07-19 @ 13:05 .Blood Blood-Peripheral                No growth at 5 days    07-19 @ 12:55 .Blood Blood-Peripheral                No growth at 5 days          RESPIRATORY CULTURES:              RADIOLOGY & ADDITIONAL STUDIES:        Pager [phone]  After 5 pm/weekends or if no response :[phone]

## 2023-07-26 NOTE — PROGRESS NOTE ADULT - ASSESSMENT
73yF PMHx metastatic pancreatic ca, on chemo, dvt/pe on eliquis at home (held since Wednesday) and hypothyroidism presenting with complaint of melena and AMS.    Metastatic Pancreatic Cancer  - Patient on active outpatient chemotherapy; follows with Dr. Eduardo Martinez in [place].  - No systemic therapy while inpatient or during rehabilitation.  - Continue to monitor liver functions while admitted  - Drain potential ascites as needed for comfort.  - Had Folfoxiri and gem with Dr Martinez last given 7/17/23  - PT recommends home PT    Pancytopenia  - In the setting of recent chemotherapy, active malignancy.  - Monitor and maintain hemoglobin > 7, platelet > 10.    Melena   - GI eval, holding off on EGD, pursuing medical management  - ID following. To complete zosyn tomorrow.    PE/dvt   - Holding AC, will need platelets above 50k to resume  - no pulm embolus noted on CTA   - Noted ongoing thrombosis on duplex    Alexandre Najera PA-C  Hematology/Oncology  [facility]  [phone] (office)

## 2023-07-27 NOTE — DISCHARGE NOTE PROVIDER - PROVIDER TOKENS
PROVIDER:[TOKEN:[[id number]],FOLLOWUP:[1 week],ESTABLISHEDPATIENT:[T]],FREE:[LAST:[Lisbeth],FIRST:[Eduardo],PHONE:[phone]],FAX:[(   )    -],ADDRESS:[[address] [place] [address]],SCHEDULEDAPPT:[07/31/2023],SCHEDULEDAPPTTIME:[10:00 AM],ESTABLISHEDPATIENT:[T]]

## 2023-07-27 NOTE — PROGRESS NOTE ADULT - SUBJECTIVE AND OBJECTIVE BOX
Patient is a 73y old  Female who presents with a chief complaint of weakness (26 Jul 2023 09:25)    No acute overnight events. Patient seen and examined at bedside. Patient resting comfortably no complaints reported. Family at bedisde    MEDICATIONS  (STANDING):  chlorhexidine 2% Cloths 1 Application(s) Topical daily  levothyroxine 25 MICROGram(s) Oral daily  pantoprazole  Injectable 40 milliGRAM(s) IV Push two times a day  ursodiol Capsule 300 milliGRAM(s) Oral two times a day    MEDICATIONS  (PRN):  ondansetron Injectable 4 milliGRAM(s) IV Push every 8 hours PRN Nausea and/or Vomiting      ROS  No fever, sweats, chills  No epistaxis, HA, sore throat  No CP, SOB, cough, sputum  No n/v/d, abd pain, melena, hematochezia  No edema  No rash  No anxiety  No back pain, joint pain  No bleeding, bruising  No dysuria, hematuria    Vital Signs Last 24 Hrs  T(C): 37 (27 Jul 2023 13:56), Max: 37 (27 Jul 2023 13:56)  T(F): 98.6 (27 Jul 2023 13:56), Max: 98.6 (27 Jul 2023 13:56)  HR: 99 (27 Jul 2023 15:09) (82 - 99)  BP: 127/72 (27 Jul 2023 15:09) (114/65 - 131/79)  BP(mean): --  RR: 18 (27 Jul 2023 15:09) (17 - 18)  SpO2: 92% (27 Jul 2023 15:09) (90% - 93%)    Parameters below as of 27 Jul 2023 15:09  Patient On (Oxygen Delivery Method): nasal cannula        PE  NAD  Awake, alert   MMM  RRR  CTAB  Abd soft, NT, ND  No c/c/e                            8.0    3.06  )-----------( 52       ( 27 Jul 2023 07:23 )             24.6       07-27    132<L>  |  99  |  9   ----------------------------<  151<H>  4.0   |  23  |  0.52    Ca    8.5      27 Jul 2023 07:23    TPro  5.5<L>  /  Alb  2.7<L>  /  TBili  5.8<H>  /  DBili  3.8<H>  /  AST  39  /  ALT  29  /  AlkPhos  201<H>  07-27

## 2023-07-27 NOTE — DISCHARGE NOTE PROVIDER - CARE PROVIDER_API CALL
Pablo Vee  Internal Medicine  [address], [address]  [place] [address]  Phone: [phone]  Fax: [phone]  Established Patient  Follow Up Time: 1 week    Eduardo Bob  [address] [place] [address] [address] [place] [address]  Phone: [phone]  Fax: (   )    -  Established Patient  Scheduled Appointment: 07/31/2023 10:00 AM

## 2023-07-27 NOTE — DISCHARGE NOTE PROVIDER - HOSPITAL COURSE
73 female h/o metastatic pancreatic ca on chemo, dvt/pe on eliquis, hypothyroid, here with diarrhea, melena, metabolic encephalopathy and thrombocytopenia  pt with sepsis present on admission with tachypnea, hypoxia, hypotension, tachycardia    sepsis  id consult apprec  - iv abx as per id - now completed zosyn  ?resolving left thigh cellulitis  colitis on CT  f/u cult and stool studies - ngtd  low suspicious for SBP    thrombocytopenia  likely 2/2 chemo  transfuse plts as needed. goal >20  serial cbc  d/w pts outpt oncologist.   recommended steroids for 3 days  now completed solumedrol  transfuse blood products as needed - transfuse 1 unit prbc for symptomatic anemia today 7/28  plts improved today - will still continue to hold AC on dc    diarrhea  melena  guaiac positive  gi eval noted  no plan for intervention at this time  serial cbc  transfuse prbc and plts as needed  ppi    metabolic encephalopathy  CT head neg  supportive care  improved    metastatic pancreatic ca  onc consult followng  chemo currently on hold  MRCP noted  Patient on active outpatient chemotherapy; follows with Dr. Eduardo Martinez in [place].  No systemic therapy while inpatient or during rehabilitation.  Continue to monitor liver functions while admitted  Drain potential ascites as needed for comfort - family and patient refused drain 7/27  Had Folfoxiri and gem with Dr Martinez last given 7/17/23    h/o pe/dvt  AC currently on hold given low plts and elevated inr  supp o2 prn  CTA showing PE now resolved  lower ext dopplers with gastrocnemius dvt - below knee  will repeat in 1 week  cont hold eliquis for now  heme/onc f/u regarding resuming AC as outpt      malignant ascites  low suspicious for SBP  US noted  IR consult for paracentesis. diagnostic and therapeutic   pt/family declined paracentesis as this time     fluid overload  gentle diuresis as able - lasix ordered for today  echo noted    hypothyroid  cont synthroid      d/w family bedside  d/w consultants    Patient is medically cleared for discharge. Medication reconciliation reviewed, revised, and resolved with Dr. Vee who had medically cleared patient for discharge with follow-up as advised. Please refer to discharge note for detailed hospital course. Patient is currently stable for discharge to home with home care at this time.

## 2023-07-27 NOTE — DISCHARGE NOTE PROVIDER - NSDCCPCAREPLAN_GEN_ALL_CORE_FT
PRINCIPAL DISCHARGE DIAGNOSIS  Diagnosis: Sepsis  Assessment and Plan of Treatment: Infection was the likely cause of your change in mental status, you were given IV antibiotics in the hospital and do not need any further antibiotics upon discharge.   Nutrition is important, eat small frequent meals to help ensure you get adequate calories.  Do not stay in bed all day!  Increase your activity daily as tolerated.  If you develop fever, chills, malaise, or change in mental status, call your Health Care Provider or go to the Emergency Department.      SECONDARY DISCHARGE DIAGNOSES  Diagnosis: Thrombocytopenia  Assessment and Plan of Treatment: Your platelets dropped during this admission, as a result we have held your home dose of Lovenox - please follow up with your primary care physician and oncologist to see when this can be resumed.    Diagnosis: Melena  Assessment and Plan of Treatment: As per discussion with you and your family, we have concluded on conservative management for treatment of blood in the stool.  Please follow up with your PCP should there be an increase in bloody stools or if you show signs of anemia:  Notify your doctor immediately if you experience abnormal bleeding.  Avoid overuse of NSAIDs (aspirin, Ibuprofen, Advil, Motrin, and Aleve) unless instructed to do so by your doctor.  Signs of worsening anemia include dizziness, lightheadedness, difficulty concentrating, chest pain, palpitations, and shortness of breath.  If you experience these symptoms call your doctor or call an ambulance to take you to the emergency room.    Diagnosis: Pancreatic cancer  Assessment and Plan of Treatment: Please follow up with your oncologist, appointment scheduled for monday.

## 2023-07-27 NOTE — PROGRESS NOTE ADULT - NS ATTEND AMEND GEN_ALL_CORE FT
Agree with above
Pancytopenia due to antineoplastic treatment improving.  Overall she appears improved, discussed with her son.  Given solumedrol per primary oncology ? but would avoid further steroids.   She is off antibiotics, afebrile.  Tolerating diet.   Planned for diagnostic/therapeutic paracentesis today.
Agree with above.    74 y/o female with metastatic pancreatic cancer, admitted with melena, altered mental status. No systemic therapy while inpatient. Maintain HGB > 7, PLT > 10. Noted ongoing thrombosis on duplex. AC on hold due to thrombocytopenia.
Patient with met pancreatic cancer on active chemo, admitted for AMS,  Has ascites, need drainage,  Monitoring counts while admitted and transfusion support accordingly  will follow with you

## 2023-07-27 NOTE — PROGRESS NOTE ADULT - SUBJECTIVE AND OBJECTIVE BOX
[facility] NEPHROLOGY- PROGRESS NOTE    73y Female with history of metastatic pancreatic CA presents with melena. Nephrology consulted for hypokalemia.     REVIEW OF SYSTEMS: limited due to mental status.    No Known Allergies      Hospital Medications: Medications reviewed      VITALS:  T(F): 97.7 (07-27-23 @ 05:30), Max: 97.7 (07-27-23 @ 05:30)  HR: 82 (07-27-23 @ 05:30)  BP: 124/66 (07-27-23 @ 05:30)  RR: 18 (07-27-23 @ 05:30)  SpO2: 91% (07-27-23 @ 05:30)  Wt(kg): --    07-26 @ 07:01  -  07-27 @ 07:00  --------------------------------------------------------  IN: 100 mL / OUT: 0 mL / NET: 100 mL        PHYSICAL EXAM:    Gen: NAD, calm  Cards: RRR, +S1/S2, no M/G/R  Resp: course BS B/L  GI: soft, NT, + ab distention, NABS  Vascular:  1+ LE edema B/L, RUE PICC, LUE hematoma        LABS:  07-27    132<L>  |  99  |  9   ----------------------------<  151<H>  4.0   |  23  |  0.52    Ca    8.5      27 Jul 2023 07:23    TPro  5.5<L>  /  Alb  2.7<L>  /  TBili  6.7<H>  /  DBili      /  AST  38  /  ALT  30  /  AlkPhos  212<H>  07-26    Creatinine Trend: 0.52 <--, 0.48 <--, 0.54 <--, 0.54 <--, 0.55 <--, 0.58 <--, 0.54 <--, 0.52 <--, 0.58 <--                        8.0    3.06  )-----------( 52       ( 27 Jul 2023 07:23 )             24.6     Urine Studies:  Urinalysis Basic - ( 27 Jul 2023 07:23 )    Color:  / Appearance:  / SG:  / pH:   Gluc: 151 mg/dL / Ketone:   / Bili:  / Urobili:    Blood:  / Protein:  / Nitrite:    Leuk Esterase:  / RBC:  / WBC    Sq Epi:  / Non Sq Epi:  / Bacteria:       Potassium, Random Urine: 31 mmol/L (07-24 @ 17:29)  Creatinine, Random Urine: 45 mg/dL (07-24 @ 17:29)

## 2023-07-27 NOTE — CONSULT NOTE ADULT - TIME-BASE BILLING FOR NON-FACE-TO-FACE CONSULT (MINUTES)
5-10
Detail Level: Detailed
Consent: The patient's consent was obtained including but not limited to risks of crusting, scabbing, blistering, scarring, darker or lighter pigmentary change, recurrence, incomplete removal and infection.
Duration Of Freeze Thaw-Cycle (Seconds): 0
Post-Care Instructions: I reviewed with the patient in detail post-care instructions. Patient is to wear sunprotection, and avoid picking at any of the treated lesions. Pt may apply Vaseline to crusted or scabbing areas.
Render Note In Bullet Format When Appropriate: No
Show Applicator Variable?: Yes

## 2023-07-27 NOTE — PROGRESS NOTE ADULT - SUBJECTIVE AND OBJECTIVE BOX
AMY JAIMES  73y Female  MRN:[id number]    Patient is a 73y old  Female who presents with a chief complaint of pancreatic ca (23 Jul 2023 10:23)    HPI:  73yF PMHx metastatic pancreatic ca, on chemo, dvt/pe on eliquis at home (held since wednesday)  and hypothyroidism presenting with complaint of melena and AMS. Pt was recently discharged from [facility] after being admitted for cellulitis of the L thigh and neutropenic fever/sepsis. She was found to be pancytopenic and received 2 units of PRBCs while inpatient. Her Eliquis has been held since Wednesday. She was discharged yesterday 07/21, however, pt's sons noticed dark stools after she had fecal incontinence x2. Pt sons also reported the patient was altered at home. Pt has new oxygen requirement, satting 95% on 2L, with somewhat labored breathing.    	Pt has had PICC line in since May and has ascites, of which she does not c/o any pain. Pt sons deny at home fevers. (22 Jul 2023 15:26)      Patient seen and evaluated at bedside. No acute events overnight except as noted.    Interval HPI: no acute events o/n     PAST MEDICAL & SURGICAL HISTORY:  Essential hypertension      Hyperlipidemia      Pancreatic cancer      Hypothyroidism      No pertinent past surgical history          REVIEW OF SYSTEMS:  as per hpi    VITALS:   Vital Signs Last 24 Hrs  T(C): 36.5 (27 Jul 2023 05:30), Max: 36.5 (27 Jul 2023 05:30)  T(F): 97.7 (27 Jul 2023 05:30), Max: 97.7 (27 Jul 2023 05:30)  HR: 82 (27 Jul 2023 05:30) (82 - 90)  BP: 124/66 (27 Jul 2023 05:30) (105/64 - 124/66)  BP(mean): --  RR: 18 (27 Jul 2023 05:30) (17 - 18)  SpO2: 91% (27 Jul 2023 05:30) (91% - 98%)    Parameters below as of 27 Jul 2023 05:30  Patient On (Oxygen Delivery Method): nasal cannula           PHYSICAL EXAM:  GENERAL: NAD, ill appearing. lethargic  HEAD:  Atraumatic, Normocephalic  EYES: EOMI, PERRLA, conjunctiva and sclera clear  NECK: Supple, No JVD  CHEST/LUNG: Clear to auscultation bilaterally; No wheeze  HEART: S1, S2; No murmurs, rubs, or gallops  ABDOMEN: Soft, Nontender, +distended; Bowel sounds present  EXTREMITIES:  2+ Peripheral Pulses, No clubbing, cyanosis.++ edema  PSYCH: Normal affect  NEUROLOGY: AAOX1-2; non-focal  SKIN: left thigh ecchymosis, resolving cellulitis, LUE hematoma    Consultant(s) Notes Reviewed:  [x ] YES  [ ] NO  Care Discussed with Consultants/Other Providers [ x] YES  [ ] NO    MEDS:   MEDICATIONS  (STANDING):  chlorhexidine 2% Cloths 1 Application(s) Topical daily  levothyroxine 25 MICROGram(s) Oral daily  pantoprazole  Injectable 40 milliGRAM(s) IV Push two times a day  ursodiol Capsule 300 milliGRAM(s) Oral two times a day    MEDICATIONS  (PRN):  ondansetron Injectable 4 milliGRAM(s) IV Push every 8 hours PRN Nausea and/or Vomiting        ALLERGIES:  No Known Allergies      LABS:                                           8.0    3.06  )-----------( 52       ( 27 Jul 2023 07:23 )             24.6   07-27    132<L>  |  99  |  9   ----------------------------<  151<H>  4.0   |  23  |  0.52    Ca    8.5      27 Jul 2023 07:23    TPro  5.5<L>  /  Alb  2.7<L>  /  TBili  6.7<H>  /  DBili  x   /  AST  38  /  ALT  30  /  AlkPhos  212<H>  07-26        < from: MR MRCP w/wo IV Cont (07.24.23 @ 23:10) >  IMPRESSION:  Interval decrease in size of hepatic metastases since MRI of 5/15/2023.   Corresponding decrease in intrahepatic biliary dilatation.    Large volume ascites with question of omental carcinomatosis.    Persistent main/right portal vein thrombosis.        --- End of Report ---      < end of copied text >  < from: US Abdomen Limited (07.24.23 @ 13:48) >  IMPRESSION:    Moderate to large volume abdominal ascites.    Bilateral pleural effusions.        --- End of Report ---      < end of copied text >  < from: VA Duplex Lower Ext Vein Scan, Bilat (07.24.23 @ 13:22) >    IMPRESSION: There is persistent right gastrocnemius vein DVT without   proximal propagation.    --- End of Report ---        < end of copied text >         < from: CT Abdomen and Pelvis w/ IV Cont (07.22.23 @ 16:44) >  IMPRESSION:  Small bilateral pleural effusions.  Metastatic disease of the liver as previously demonstrated.  Large ascites.  Portal vein thrombosis.  Ascending portal colopathy versus infectious colitis.  No pulmonary embolus.        --- End of Report ---        < end of copied text >  < from: CT Head No Cont (07.22.23 @ 16:42) >    IMPRESSION:    No acute intracranial hemorrhage, hydrocephalus or extra-axial fluid   collection.  Mild parenchymal volume loss and mild chronic microvascular ischemic   changes.  No CT evidence for acute transcortical infarction. Please note that MR   imaging is a more sensitive imaging modality for detection of acute   infarction and may be obtained as clinically warranted.    If clinicians have any questions/need for clarification regarding this   report, Dr. Dionisio Clark can be best reached via the patient [employer]   hospital email system    --- End of Report ---    < end of copied text >

## 2023-07-27 NOTE — CHART NOTE - NSCHARTNOTEFT_GEN_A_CORE
Patient will require a transport wheelchair in the home upon discharge due to fall risk, malignant ascites, metastatic pancreatic cancer, generalized weakness. Patient is unable to ambulate with cane or walker. Patient has adequate space in the home to maneuver the wheelchair. Without the use of a wheelchair, the patient will not be able to perform the activities of daily living. The patient has trialed a wheelchair here and is able to self-propel. Patient is willing to use at home.      Mykel Quiroz PA-C  [facility] Department of Medicine  Reachable on Teams Patient will require a transport wheelchair due to fall risk, malignant ascites, metastatic pancreatic cancer, generalized weakness. The beneficiary has a mobility limitation that significantly impairs her ability to participate in one or more MRADLs such as toileting, feeding, dressing, grooming, and bathing in customary locations in the home. The patient's mobility limitation cannot be sufficiently resolved by the use of an appropriately fitted cane or walker. The patient is unable to ambulate with a walker. Use of a transport wheelchair will significantly improve the benficiary's ability to participate in MRADLs and the beneficiary will use it on a regular basis in the home. The beneficiary has a caregiver who is available, willing, and able to provide assistance with the wheelchair. The patient is not able to self propel a standard or lightweight wheelchair.      Mykel Quiroz PA-C  [facility] Department of Medicine  Reachable on Teams

## 2023-07-27 NOTE — PROGRESS NOTE ADULT - ASSESSMENT
73 female h/o metastatic pancreatic ca on chemo, dvt/pe on eliquis, hypothyroid, here with diarrhea, melena, metabolic encephalopathy and thrombocytopenia  pt with sepsis present on admission with tachypnea, hypoxia, hypotension, tachycardia    sepsis  id consult apprec  ?resolving left thigh cellulitis  colitis on CT   iv abx as per id - now completed  f/u cult and stool studies - ngtd  low suspicious for SBP      thrombocytopenia  likely 2/2 chemo  transfuse plts as needed. goal >20  serial cbc  d/w pts outpt oncologist.   recommended steroids for 3 days  now completed solumedrol  plts improved today     diarrhea  melena  guaiac positive  gi eval noted  no plan for intervention at this time  serial cbc  transfuse prbc and plts as needed  ppi    metabolic encephalopathy  CT head neg  supportive care  improved    metastatic pancreatic ca  onc consult followng  chemo currently on hold  MRCP noted    h/o pe/dvt  AC currently on hold given low plts and elevated inr  supp o2 prn  CTA showing PE now resolved  lower ext dopplers with gastrocnemius dvt - below knee  will repeat in 1 week  cont hold eliquis for now  heme/onc f/u regarding resuming AC     malignant ascites  low suspicious for SBP  US noted  IR consult for paracentesis. diagnostic and therapeutic     fluid overload  gentle diuresis as able - pending paracentesis. hold lasix today  echo noted    hypothyroid  cont synthroid      d/w family bedside  d/w consultants    PT      Advanced care planning was discussed with patient and family.  Advanced care planning forms were reviewed and discussed as appropriate.  Differential diagnosis and plan of care discussed with patient after the evaluation.   Pain assessed and judicious use of narcotics when appropriate was discussed.  Importance of Fall prevention discussed.  Counseling on Smoking and Alcohol cessation was offered when appropriate.  Counseling on Diet, exercise, and medication compliance was done.       Approx 60 minutes spent.

## 2023-07-27 NOTE — DISCHARGE NOTE PROVIDER - NSDCMRMEDTOKEN_GEN_ALL_CORE_FT
doxycycline hyclate 100 mg oral tablet: 1 tab(s) orally every 12 hours  levothyroxine 25 mcg (0.025 mg) oral tablet: 1 tab(s) orally once a day  ondansetron 4 mg oral tablet, disintegratin tab(s) orally every 8 hours  Protonix 40 mg oral delayed release tablet: 1 tab(s) orally once a day  ursodiol 300 mg oral capsule: 1 cap(s) orally 2 times a day  Wheelchair: Dx: metastatic pancreatic cancer, malignant ascites, generalized weakness. ICD10 C25.9, R18.0, ELIJAH 99   levothyroxine 25 mcg (0.025 mg) oral tablet: 1 tab(s) orally once a day  ondansetron 4 mg oral tablet, disintegratin tab(s) orally every 8 hours  Protonix 40 mg oral delayed release tablet: 1 tab(s) orally once a day  tamsulosin 0.4 mg oral capsule: 1 cap(s) orally once a day (at bedtime)  ursodiol 300 mg oral capsule: 1 cap(s) orally 2 times a day

## 2023-07-27 NOTE — PROGRESS NOTE ADULT - SUBJECTIVE AND OBJECTIVE BOX
Date of Service: 07-27-23 @ 08:07           CARDIOLOGY     PROGRESS  NOTE   ________________________________________________    CHIEF COMPLAINT:Patient is a 73y old  Female who presents with a chief complaint of weakness (26 Jul 2023 09:25)  comfortable  	  REVIEW OF SYSTEMS:  CONSTITUTIONAL: No fever, weight loss, or fatigue  EYES: No eye pain, visual disturbances, or discharge  ENT:  No difficulty hearing, tinnitus, vertigo; No sinus or throat pain  NECK: No pain or stiffness  RESPIRATORY: No cough, wheezing, chills or hemoptysis; No Shortness of Breath  CARDIOVASCULAR: No chest pain, palpitations, passing out, dizziness, or leg swelling  GASTROINTESTINAL: No abdominal or epigastric pain. No nausea, vomiting, or hematemesis; No diarrhea or constipation. No melena or hematochezia.  GENITOURINARY: No dysuria, frequency, hematuria, or incontinence  NEUROLOGICAL: No headaches, memory loss, loss of strength, numbness, or tremors  SKIN: No itching, burning, rashes, or lesions   LYMPH Nodes: No enlarged glands  ENDOCRINE: No heat or cold intolerance; No hair loss  MUSCULOSKELETAL: No joint pain or swelling; No muscle, back, or extremity pain  PSYCHIATRIC: No depression, anxiety, mood swings, or difficulty sleeping  HEME/LYMPH: No easy bruising, or bleeding gums  ALLERGY AND IMMUNOLOGIC: No hives or eczema	    [ ] All others negative	  [x ] Unable to obtain    PHYSICAL EXAM:  T(C): 36.5 (07-27-23 @ 05:30), Max: 36.5 (07-27-23 @ 05:30)  HR: 82 (07-27-23 @ 05:30) (82 - 90)  BP: 124/66 (07-27-23 @ 05:30) (105/64 - 124/66)  RR: 18 (07-27-23 @ 05:30) (17 - 18)  SpO2: 91% (07-27-23 @ 05:30) (91% - 98%)  Wt(kg): --  I&O's Summary    26 Jul 2023 07:01  -  27 Jul 2023 07:00  --------------------------------------------------------  IN: 100 mL / OUT: 0 mL / NET: 100 mL        Appearance: jaundice  HEENT:   Normal oral mucosa, PERRL, EOMI	  Lymphatic: No lymphadenopathy  Cardiovascular: Normal S1 S2, No JVD, + murmurs, No edema  Respiratory: rhonchi  Psychiatry: A & O x 3, Mood & affect appropriate  Gastrointestinal:  Soft, Non-tender, + BS	  Skin: No rashes, No ecchymoses, No cyanosis	  Neurologic: Non-focal  Extremities: Normal range of motion, No clubbing, cyanosis or edema  Vascular: Peripheral pulses palpable 2+ bilaterally    MEDICATIONS  (STANDING):  chlorhexidine 2% Cloths 1 Application(s) Topical daily  levothyroxine 25 MICROGram(s) Oral daily  pantoprazole  Injectable 40 milliGRAM(s) IV Push two times a day  piperacillin/tazobactam IVPB.. 3.375 Gram(s) IV Intermittent every 8 hours  ursodiol Capsule 300 milliGRAM(s) Oral two times a day      TELEMETRY: 	    ECG:  	  RADIOLOGY:  OTHER: 	  	  LABS:	 	    CARDIAC MARKERS:                                8.5    1.72  )-----------( 27       ( 26 Jul 2023 07:14 )             25.8     07-26    133<L>  |  99  |  8   ----------------------------<  145<H>  4.4   |  23  |  0.48<L>    Ca    8.3<L>      26 Jul 2023 07:13    TPro  5.5<L>  /  Alb  2.7<L>  /  TBili  6.7<H>  /  DBili  x   /  AST  38  /  ALT  30  /  AlkPhos  212<H>  07-26    proBNP:   Lipid Profile:   HgA1c:   TSH:   PT/INR - ( 26 Jul 2023 07:16 )   PT: 12.6 sec;   INR: 1.21 ratio         PTT - ( 26 Jul 2023 07:16 )  PTT:27.6 sec      Assessment and plan  ---------------------------  73yF PMHx metastatic pancreatic ca, on chemo, dvt/pe on eliquis at home (held since wednesday)  and hypothyroidism presenting with complaint of melena and AMS. Pt was recently discharged from [place] after being admitted for cellulitis of the L thigh and neutropenic fever/sepsis. She was found to be pancytopenic and received 2 units of PRBCs while inpatient. Her Eliquis has been held since Wednesday. She was discharged yesterday 07/21, however, pt's sons noticed dark stools after she had fecal incontinence x2. Pt sons also reported the patient was altered at home. Pt has new oxygen requirement, satting 95% on 2L, with somewhat labored breathing.  pt with metastatic pancreatic ca/ dvt on elkiquis was recently admitted with ?sepsis to [facility] presented with increasing sob, presented with melena  cta negative for PE  sob ?sec top large ascites needs paracentesis  ac held sec to bleeding   gi consult ?colitis continue Zosyn / l thigh celluitis  check cultures  nutrition eval  replete lytes, keep K >4  hold AC sec to low platelets,  doppler noted no propagation  dc lasix  may consider paracentesis after platelets transfusion  discussed with family regarding palliative care  abdominal ultrasound and MR noted may consider platelets transfusion and paracentesis, therapeutic and diagnostic  transfuse as needed  no chemo as per onc

## 2023-07-27 NOTE — CONSULT NOTE ADULT - SUBJECTIVE AND OBJECTIVE BOX
Date of Service, 07-22-23 @ 23:23  CHIEF COMPLAINT:Patient is a 73y old  Female who presents with a chief complaint of     HPI:  73yF PMHx metastatic pancreatic ca, on chemo, dvt/pe on eliquis at home (held since wednesday)  and hypothyroidism presenting with complaint of melena and AMS. Pt was recently discharged from [place] after being admitted for cellulitis of the L thigh and neutropenic fever/sepsis. She was found to be pancytopenic and received 2 units of PRBCs while inpatient. Her Eliquis has been held since Wednesday. She was discharged yesterday 07/21, however, pt's sons noticed dark stools after she had fecal incontinence x2. Pt sons also reported the patient was altered at home. Pt has new oxygen requirement, satting 95% on 2L, with somewhat labored breathing.        PAST MEDICAL & SURGICAL HISTORY:  Essential hypertension  Hyperlipidemia  Pancreatic cancer  Hypothyroidism      No pertinent past surgical history    MEDICATIONS  (STANDING):  levothyroxine 25 MICROGram(s) Oral daily  pantoprazole  Injectable 40 milliGRAM(s) IV Push daily  piperacillin/tazobactam IVPB. 3.375 Gram(s) IV Intermittent once  ursodiol Capsule 300 milliGRAM(s) Oral two times a day  vancomycin  IVPB 1000 milliGRAM(s) IV Intermittent every 12 hours    MEDICATIONS  (PRN):  ondansetron Injectable 4 milliGRAM(s) IV Push every 8 hours PRN Nausea and/or Vomiting      FAMILY HISTORY:  Family history unknown (Father, Mother)        SOCIAL HISTORY:    [x ] Non-smoker  [ ] Smoker  [ ] Alcohol    Allergies    No Known Allergies    Intolerances    	    REVIEW OF SYSTEMS:  CONSTITUTIONAL: No fever, weight loss, or fatigue  EYES: No eye pain, visual disturbances, or discharge  ENT:  No difficulty hearing, tinnitus, vertigo; No sinus or throat pain  NECK: No pain or stiffness  RESPIRATORY: No cough, wheezing, chills or hemoptysis; + Shortness of Breath  CARDIOVASCULAR: No chest pain, palpitations, passing out, dizziness, + leg swelling  GASTROINTESTINAL: No abdominal or epigastric pain. No nausea, vomiting, or hematemesis; No diarrhea or constipation. No melena or hematochezia.  GENITOURINARY: No dysuria, frequency, hematuria, or incontinence  NEUROLOGICAL: No headaches, memory loss, loss of strength, numbness, or tremors  SKIN: No itching, burning, rashes, or lesions   LYMPH Nodes: No enlarged glands  ENDOCRINE: No heat or cold intolerance; No hair loss  MUSCULOSKELETAL: No joint pain or swelling; No muscle, back, or extremity pain  PSYCHIATRIC: No depression, anxiety, mood swings, or difficulty sleeping  HEME/LYMPH: No easy bruising, or bleeding gums  ALLERGY AND IMMUNOLOGIC: No hives or eczema	    [x ] All others negative	  [ ] Unable to obtain    PHYSICAL EXAM:  T(C): 36.4 (07-22-23 @ 22:02), Max: 36.7 (07-22-23 @ 11:04)  HR: 98 (07-22-23 @ 22:02) (98 - 101)  BP: 129/84 (07-22-23 @ 22:02) (100/61 - 129/84)  RR: 17 (07-22-23 @ 22:02) (16 - 22)  SpO2: 95% (07-22-23 @ 22:02) (94% - 98%)  Wt(kg): --  I&O's Summary      Appearance: Normal	  HEENT:   Normal oral mucosa, PERRL, EOMI	  Lymphatic: No lymphadenopathy  Cardiovascular: Normal S1 S2, No JVD, + murmurs, No edema  Respiratory: Lungs clear to auscultation	  Psychiatry: A & O x 3, Mood & affect appropriate  Gastrointestinal:  Soft, Non-tender, + BS	  Skin: No rashes, No ecchymoses, No cyanosis	  Neurologic: Non-focal  Extremities: Normal range of motion, No clubbing, cyanosis or edema  Vascular: Peripheral pulses palpable 2+ bilaterally    TELEMETRY: 	    ECG:  	  RADIOLOGY:  OTHER: 	  	  LABS:	 	    CARDIAC MARKERS:                              7.1    7.88  )-----------( 45       ( 22 Jul 2023 20:19 )             21.7     07-22    137  |  105  |  23  ----------------------------<  104<H>  3.4<L>   |  22  |  0.54    Ca    8.1<L>      22 Jul 2023 20:19  Mg     1.8     07-22    TPro  5.3<L>  /  Alb  2.6<L>  /  TBili  8.8<H>  /  DBili  x   /  AST  43<H>  /  ALT  30  /  AlkPhos  131<H>  07-22    proBNP:   Lipid Profile:   HgA1c:   TSH:   PT/INR - ( 22 Jul 2023 12:34 )   PT: 25.4 sec;   INR: 2.19 ratio         PTT - ( 22 Jul 2023 12:34 )  PTT:41.5 sec    PREVIOUS DIAGNOSTIC TESTING:    < from: 12 Lead ECG (05.17.23 @ 10:38) >  Diagnosis Line NORMAL SINUS RHYTHM  LEFT ANTERIOR FASCICULAR BLOCK  NONSPECIFIC T WAVE ABNORMALITY  ANTEROSEPTAL INFARCT , AGE UNDETERMINED  ABNORMAL ECG  WHEN COMPARED WITH 14-MAY-2023  RATE SLOWER    < from: CT Abdomen and Pelvis w/ IV Cont (07.22.23 @ 16:44) >  Small bilateral pleural effusions.  Metastatic disease of the liver as previously demonstrated.  Large ascites.  Portal vein thrombosis.  Ascending portal colopathy versus infectious colitis.  No pulmonary embolus.            
[facility] NEPHROLOGY- CONSULTATION NOTE    73y Female with history of below presents with melena. Nephrology consulted for hypokalemia.     Patient with low serum potassium on admission which appears to have been exacerbated on diuretics (now discontinued). Patient with h/o metastatic pancreatic CA on chemotherapy. Patient has been getting potassium repletion but remains hypokalemic.    REVIEW OF SYSTEMS:  Gen: no fevers  HEENT: no rhinorrhea  Neck: no sore throat  Cards: no chest pain  Resp: no dyspnea  GI: no nausea or vomiting or diarrhea  : no dysuria or hematuria  Vascular: + LE edema, + LUE pain  Derm: no rashes  Neuro: no numbness/tingling    No Known Allergies      Home Medications Reviewed  Hospital Medications:   MEDICATIONS  (STANDING):  chlorhexidine 2% Cloths 1 Application(s) Topical daily  levothyroxine 25 MICROGram(s) Oral daily  pantoprazole  Injectable 40 milliGRAM(s) IV Push daily  piperacillin/tazobactam IVPB.. 3.375 Gram(s) IV Intermittent every 8 hours  potassium chloride    Tablet ER 40 milliEquivalent(s) Oral once  potassium chloride  10 mEq/100 mL IVPB 10 milliEquivalent(s) IV Intermittent every 1 hour  ursodiol Capsule 300 milliGRAM(s) Oral two times a day  vancomycin  IVPB 1000 milliGRAM(s) IV Intermittent every 12 hours      PAST MEDICAL & SURGICAL HISTORY:  Essential hypertension      Hyperlipidemia      Pancreatic cancer      Hypothyroidism      No pertinent past surgical history          FAMILY HISTORY:  Family history unknown (Father, Mother)        SOCIAL HISTORY:  Denies toxic substance use     VITALS:  T(F): 97.8 (07-24-23 @ 08:11), Max: 98.6 (07-23-23 @ 16:43)  HR: 83 (07-24-23 @ 08:11)  BP: 101/64 (07-24-23 @ 08:11)  RR: 18 (07-24-23 @ 08:11)  SpO2: 96% (07-24-23 @ 08:11)  Wt(kg): --    07-23 @ 07:01  -  07-24 @ 07:00  --------------------------------------------------------  IN: 1170 mL / OUT: 1600 mL / NET: -430 mL    07-24 @ 07:01  -  07-24 @ 13:39  --------------------------------------------------------  IN: 0 mL / OUT: 150 mL / NET: -150 mL          PHYSICAL EXAM:  Gen: NAD, calm  HEENT: MMM  Neck: no JVD  Cards: RRR, +S1/S2, no M/G/R  Resp: CTA B/L  GI: soft, NT, + ab distention, NABS  : no CVA tenderness  Vascular: + LE edema B/L, LUE hematoma  Derm: no rashes  Neuro: non-focal    LABS:  07-24    135  |  101  |  16  ----------------------------<  103<H>  2.9<LL>   |  25  |  0.55    Ca    8.4      24 Jul 2023 05:40  Mg     1.8     07-22    TPro  5.0<L>  /  Alb  2.4<L>  /  TBili  7.7<H>  /  DBili      /  AST  45<H>  /  ALT  31  /  AlkPhos  178<H>  07-24    Creatinine Trend: 0.55 <--, 0.58 <--, 0.54 <--, 0.52 <--, 0.58 <--, 0.97 <--, 1.03 <--, 1.54 <--                        8.2    5.82  )-----------( 20       ( 24 Jul 2023 05:40 )             24.5     Urine Studies:  Urinalysis Basic - ( 24 Jul 2023 05:40 )    Color:  / Appearance:  / SG:  / pH:   Gluc: 103 mg/dL / Ketone:   / Bili:  / Urobili:    Blood:  / Protein:  / Nitrite:    Leuk Esterase:  / RBC:  / WBC    Sq Epi:  / Non Sq Epi:  / Bacteria:           RADIOLOGY & ADDITIONAL STUDIES:    < from: CT Abdomen and Pelvis w/ IV Cont (07.22.23 @ 16:44) >  IMPRESSION:  Small bilateral pleural effusions.  Metastatic disease of the liver as previously demonstrated.  Large ascites.  Portal vein thrombosis.  Ascending portal colopathy versus infectious colitis.  No pulmonary embolus.        --- End of Report ---    < end of copied text >      < from: CT Head No Cont (07.22.23 @ 16:42) >  IMPRESSION:    No acute intracranial hemorrhage, hydrocephalus or extra-axial fluid   collection.  Mild parenchymal volume loss and mild chronic microvascular ischemic   changes.  No CT evidence for acute transcortical infarction. Please note that MR   imaging is a more sensitive imaging modality for detection of acute   infarction and may be obtained as clinically warranted.    If clinicians have any questions/need for clarification regarding this   report, Dr. Dionisio Clark can be best reached via the patient secure   hospital email system    < end of copied text >      < from: Xray Chest 1 View- PORTABLE-Urgent (07.22.23 @ 14:08) >  IMPRESSION:  Right PICC present with tip in SVC region.    Persistent right hemidiaphragm elevation. Grossly clear visualized lungs.   No pleural effusions or pneumothorax. Heart size and mediastinal width   inaccurately assessed on the projection but appear grossly stable.    Trachea midline.    Generalized osteopenia.    --- End of Report ---    < end of copied text >  
Interventional Radiology    Evaluate for Procedure: Diagnostic and therapeutic Paracentesis    HPI: 73 year old Female with a PMH of metastatic pancreatic cancer on chemo, DVT/PE on Eliquis at home (held since Wednesday) and hypothyroidism presenting with complaint of melena and AMS. IR consulted for diagnostic and therapeutic Paracentesis.    Allergies: No Known Allergies    Medications (Abx/Cardiac/Anticoagulation/Blood Products)  piperacillin/tazobactam IVPB..: 25 mL/Hr IV Intermittent (07-27 @ 08:33)    Data: T(C): 36.5  HR: 82  BP: 124/66  RR: 18  SpO2: 91%    -WBC 3.06 / HgB 8.0 / Hct 24.6 / Plt 52  -Na 132 / Cl 99 / BUN 9 / Glucose 151  -K 4.0 / CO2 23 / Cr 0.52  -ALT -- / Alk Phos -- / T.Bili --  -INR 1.21 / PTT 27.6      Assessment/Plan: 73 year old Female with a PMH of metastatic pancreatic cancer on chemo, DVT/PE on Eliquis at home (held since Wednesday) and hypothyroidism presenting with complaint of melena and AMS. IR consulted for diagnostic and therapeutic Paracentesis.    - Place IR procedure order  - Will perform procedure today  - Patient does not need to be NPO for procedure  
Patient is a 73y old  Female who presents with a chief complaint of pancreatic ca (22 Jul 2023 23:22)    HPI:  73yF PMHx metastatic pancreatic ca, on chemo, dvt/pe on eliquis at home (held since wednesday)  and hypothyroidism presenting with complaint of melena and AMS. Pt was recently discharged from [place] after being admitted for cellulitis of the L thigh and neutropenic fever/sepsis. She was found to be pancytopenic and received 2 units of PRBCs while inpatient. Her Eliquis has been held since Wednesday. She was discharged yesterday 07/21, however, pt's sons noticed dark stools after she had fecal incontinence x2. Pt sons also reported the patient was altered at home. Pt has new oxygen requirement, satting 95% on 2L, with somewhat labored breathing.    	Pt has had PICC line in since May and has ascites, of which she does not c/o any pain. Pt sons deny at home fevers. (22 Jul 2023 15:26)      PAST MEDICAL & SURGICAL HISTORY:  Essential hypertension      Hyperlipidemia      Pancreatic cancer      Hypothyroidism      No pertinent past surgical history          Social history:    FAMILY HISTORY:  Family history unknown (Father, Mother)       e    Allergic/Immunologic:	No hives or rash   Allergies    No Known Allergies    Intolerances        Antimicrobials:    piperacillin/tazobactam IVPB.- 3.375 Gram(s) IV Intermittent once  piperacillin/tazobactam IVPB.. 3.375 Gram(s) IV Intermittent every 8 hours  vancomycin  IVPB 1000 milliGRAM(s) IV Intermittent every 12 hours        Vital Signs Last 24 Hrs  T(C): 36.4 (23 Jul 2023 07:50), Max: 36.7 (22 Jul 2023 11:04)  T(F): 97.6 (23 Jul 2023 07:50), Max: 98 (22 Jul 2023 11:04)  HR: 92 (23 Jul 2023 07:50) (88 - 99)  BP: 120/62 (23 Jul 2023 07:50) (100/61 - 129/84)  BP(mean): 75 (23 Jul 2023 07:50) (73 - 86)  RR: 12 (23 Jul 2023 07:50) (12 - 20)  SpO2: 97% (23 Jul 2023 07:50) (94% - 100%)    Parameters below as of 23 Jul 2023 07:50  Patient On (Oxygen Delivery Method): nasal cannula  O2 Flow (L/min): 2             cachexia     Eyes:PERRL EOMI.NO discharge or conjunctival injection    ENMT:No sinus tenderness.No thrush.No pharyngeal exudate or erythema.Fair dental hygiene    Neck:Supple,No LN,no JVD      Respiratory:Good air entry bilaterally,CTA    Cardiovascular:S1 S2 wnl, No murmurs,rub or gallops    Gastrointestinal:Soft BS(+) no tenderness no masses ,No rebound or guarding    Genitourinary:No CVA tendereness     Rectal:    Extremities:No cyanosis,clubbing or edema.    Vascular:peripheral pulses felt    Neurological:AAO X 3,No grossly focal deficits    Skin:No rash     Lymph Nodes:No palpable LNs    Musculoskeletal:No joint swelling or LOM    Psychiatric:Affect normal.                                8.2    7.91  )-----------( 17       ( 23 Jul 2023 05:38 )             24.5         07-23    136  |  104  |  22  ----------------------------<  128<H>  3.4<L>   |  24  |  0.58    Ca    8.3<L>      23 Jul 2023 05:38  Mg     1.8     07-22    TPro  4.9<L>  /  Alb  2.4<L>  /  TBili  8.0<H>  /  DBili  x   /  AST  43<H>  /  ALT  29  /  AlkPhos  144<H>  07-23      RECENT CULTURES:  07-19 @ 20:00  Clean Catch Clean Catch (Midstream)  --  --  --    <10,000 CFU/mL Normal Urogenital Maryjane  --  07-19 @ 13:05  .Blood Blood-Peripheral  --  --  --    No growth at 72 Hours  --  07-19 @ 12:55  .Blood Blood-Peripheral  --  --  --    No growth at 72 Hours  --      MICROBIOLOGY:  Culture Results:   <10,000 CFU/mL Normal Urogenital Maryjane (07-19 @ 20:00)  Culture Results:   No growth at 72 Hours (07-19 @ 13:05)  Culture Results:   No growth at 72 Hours (07-19 @ 12:55)          Radiology:      Assessment:        Recommendations and Plan:    Pager [phone]  After 5 pm/weekends or if no response :[phone]      
Reason for consult: pancreatic cancer     HPI:  73yF PMHx metastatic pancreatic ca, on chemo, dvt/pe on eliquis at home (held since wednesday)  and hypothyroidism presenting with complaint of melena and AMS. Pt was recently discharged from [place] after being admitted for cellulitis of the L thigh and neutropenic fever/sepsis. She was found to be pancytopenic and received 2 units of PRBCs while inpatient. Her Eliquis has been held since Wednesday. She was discharged yesterday 07/21, however, pt's sons noticed dark stools after she had fecal incontinence x2. Pt sons also reported the patient was altered at home. Pt has new oxygen requirement, satting 95% on 2L, with somewhat labored breathing.    	Pt has had PICC line in since May and has ascites, of which she does not c/o any pain. Pt sons deny at home fevers. (22 Jul 2023 15:26)      seen at bedside. mental status at baseline per son. having difficulty with appetite     PAST MEDICAL & SURGICAL HISTORY:  Essential hypertension      Hyperlipidemia      Pancreatic cancer      Hypothyroidism      No pertinent past surgical history          FAMILY HISTORY:  Family history unknown (Father, Mother)        Alochol: Denied  Smoking: Nonsmoker  Drug Use: Denied  Marital Status:         Allergies    No Known Allergies    Intolerances        MEDICATIONS  (STANDING):  furosemide   Injectable 20 milliGRAM(s) IV Push daily  levothyroxine 25 MICROGram(s) Oral daily  pantoprazole  Injectable 40 milliGRAM(s) IV Push daily  phytonadione   Solution 2.5 milliGRAM(s) Oral once  piperacillin/tazobactam IVPB.- 3.375 Gram(s) IV Intermittent once  piperacillin/tazobactam IVPB.. 3.375 Gram(s) IV Intermittent every 8 hours  potassium chloride    Tablet ER 40 milliEquivalent(s) Oral once  ursodiol Capsule 300 milliGRAM(s) Oral two times a day  vancomycin  IVPB 1000 milliGRAM(s) IV Intermittent every 12 hours    MEDICATIONS  (PRN):  ondansetron Injectable 4 milliGRAM(s) IV Push every 8 hours PRN Nausea and/or Vomiting      ROS:     + weakness fatigue   + melena       PHYSICAL EXAM:     GENERAL:  Appears ill   HEENT:  NC/AT,  conjunctivae clear and pink  CHEST: good air entry   HEART: s1s2+   ABDOMEN:   mild distention   LN: no palpable Lymphadenopathy                           8.6    8.18  )-----------( 15       ( 23 Jul 2023 09:53 )             25.3       07-23    136  |  104  |  22  ----------------------------<  128<H>  3.4<L>   |  24  |  0.58    Ca    8.3<L>      23 Jul 2023 05:38  Mg     1.8     07-22    TPro  4.9<L>  /  Alb  2.4<L>  /  TBili  8.0<H>  /  DBili  x   /  AST  43<H>  /  ALT  29  /  AlkPhos  144<H>  07-23  
Chief Complaint:  Patient is a 73y old  Female who presents with a chief complaint of pancreatic ca (23 Jul 2023 10:23)      HPI:AMY JAIMES is a 73yF with pancreatic cancer with liver mets c/b intrahepatic disease/stenosis on FOLFOX,  dvt/pe on eliquis at home (held since Wednesday), recently discharged from [place] 7/21 after treatment for L thigh cellulitis/neutropenic fever/sepsis c/b pancytopenia and now presents with her two sons to the ED with change in mental status, sob, and black watery stool x1. GI consulted for melena.    Patient no longer confused and sons at bedside, whom she prefers to translate. Describes 1 black watery BM overnight on Friday and one Saturday morning. Denies prior episodes of black/red stool. No abdominal pain, but has been complaining of a lot heartburn and decreased appetite; no reported weight loss. Denies n/v, no constipation, and no watery BMs prior to this episode. Denies heavy use of NSAIDs, no pepto-bismol or iron at home. No fevers at home. EGD/colon many years ago, per son, believes they were normal. No fhx of colon cancer, other cancer. No smoking history or alcohol, illicit/IVDU.    She's been HDS and afebrile, requiring O2. Hgb was 6.9 7/21 and transfused 1 pRBC 7/22 to correct hgb to 8.2 today. No white count, PLT 17 from 45 from 11 after 1 PLT. INR 1.86, BUN 22 and Cr .58. Bili 8, , AST 43 and ALT 29, improved from prior admission 5/2023. CTPE negative for PE. CTAP revealed metastatic liver disease as previously mentioned, large ascites, PVT, ascending colon edema suggesting of ascending portal colopathy vs infectious colitis.     Note patient was previously evaluated by inhouse [facility] 5/16/23 for jaundice; extensive biliary disease not amenable for ERCP and stenting so endoscopic intervention was deferred.       PMHX/PSHX:  Essential hypertension    Hyperlipidemia    Pancreatic cancer    Hypothyroidism    No pertinent past surgical history      Allergies:  No Known Allergies      Home Medications: reviewed  Hospital Medications:  levothyroxine 25 MICROGram(s) Oral daily  ondansetron Injectable 4 milliGRAM(s) IV Push every 8 hours PRN  pantoprazole  Injectable 40 milliGRAM(s) IV Push daily  piperacillin/tazobactam IVPB.- 3.375 Gram(s) IV Intermittent once  piperacillin/tazobactam IVPB.. 3.375 Gram(s) IV Intermittent every 8 hours  potassium chloride    Tablet ER 40 milliEquivalent(s) Oral once  ursodiol Capsule 300 milliGRAM(s) Oral two times a day  vancomycin  IVPB 1000 milliGRAM(s) IV Intermittent every 12 hours      Social History:   Tob: Denies  EtOH: Denies  Illicit Drugs: Denies    Family history:     Denies family history of colon cancer/polyps, stomach cancer/polyps, pancreatic cancer/masses, liver cancer/disease, ovarian cancer and endometrial cancer.    ROS:   Complete and normal except as mentioned above.    PHYSICAL EXAM:   Vital Signs:  Vital Signs Last 24 Hrs  T(C): 36.4 (23 Jul 2023 07:50), Max: 36.7 (22 Jul 2023 11:04)  T(F): 97.6 (23 Jul 2023 07:50), Max: 98 (22 Jul 2023 11:04)  HR: 92 (23 Jul 2023 07:50) (88 - 99)  BP: 120/62 (23 Jul 2023 07:50) (100/61 - 129/84)  BP(mean): 75 (23 Jul 2023 07:50) (73 - 86)  RR: 12 (23 Jul 2023 07:50) (12 - 20)  SpO2: 97% (23 Jul 2023 07:50) (94% - 100%)    Parameters below as of 23 Jul 2023 07:50  Patient On (Oxygen Delivery Method): nasal cannula  O2 Flow (L/min): 2    Daily     Daily     GENERAL: no acute distress, though uncomfortable appearing  NEURO: aox3  HEENT: scleral icterus  CHEST: no respiratory distress, no accessory muscle use, on NC  CARDIAC: regular rate   ABDOMEN: soft, non-tender, distended, no rebound or guarding  DANIELLE: brown stool  EXTREMITIES: warm, well perfused, +edema  SKIN: jaundice    LABS: reviewed                        8.6    8.18  )-----------( 15       ( 23 Jul 2023 09:53 )             25.3     07-23    136  |  104  |  22  ----------------------------<  128<H>  3.4<L>   |  24  |  0.58    Ca    8.3<L>      23 Jul 2023 05:38  Mg     1.8     07-22    TPro  4.9<L>  /  Alb  2.4<L>  /  TBili  8.0<H>  /  DBili  x   /  AST  43<H>  /  ALT  29  /  AlkPhos  144<H>  07-23    LIVER FUNCTIONS - ( 23 Jul 2023 05:38 )  Alb: 2.4 g/dL / Pro: 4.9 g/dL / ALK PHOS: 144 U/L / ALT: 29 U/L / AST: 43 U/L / GGT: x               Diagnostic Studies: see sunrise for full report

## 2023-07-27 NOTE — PROGRESS NOTE ADULT - ASSESSMENT
73yF PMHx metastatic pancreatic ca, on chemo, dvt/pe on eliquis at home (held since Wednesday) and hypothyroidism presenting with complaint of melena and AMS.    Metastatic Pancreatic Cancer  - Patient on active outpatient chemotherapy; follows with Dr. Eduardo Martniez in [place].  - No systemic therapy while inpatient or during rehabilitation.  - Continue to monitor liver functions while admitted  - Drain potential ascites as needed for comfort.  - Had Folfoxiri and gem with Dr Martinez last given 7/17/23  - PT recommends home PT    Pancytopenia  - In the setting of recent chemotherapy, active malignancy.  - Monitor and maintain hemoglobin > 7, platelet > 10.    Melena   - GI eval, holding off on EGD, pursuing medical management  - ID following. s/p empiric zosyn     PE/dvt   - Holding AC, will need platelets above 50k to resume  - no pulm embolus noted on CTA   - Noted ongoing thrombosis on duplex    Nicky Dugan NP  Hematology/ Oncology  [facility]  [phone] (office)  [phone] (alt office)  Evenings and weekends please call MD on call or office

## 2023-07-28 NOTE — DISCHARGE NOTE NURSING/CASE MANAGEMENT/SOCIAL WORK - PATIENT PORTAL LINK FT
You can access the FollowMyHealth Patient Portal offered by [facility] by registering at the following website: [email or web address]. By joining [employer]s FollowMyHealth portal, you will also be able to view your health information using other applications (apps) compatible with our system.

## 2023-07-28 NOTE — PROGRESS NOTE ADULT - ASSESSMENT
73 female h/o metastatic pancreatic ca on chemo, dvt/pe on eliquis, hypothyroid, here with diarrhea, melena, metabolic encephalopathy and thrombocytopenia  pt with sepsis present on admission with tachypnea, hypoxia, hypotension, tachycardia    sepsis  id consult apprec  ?resolving left thigh cellulitis  colitis on CT   iv abx as per id - now completed  f/u cult and stool studies - ngtd  low suspicious for SBP      thrombocytopenia  likely 2/2 chemo  transfuse plts as needed. goal >20  serial cbc  d/w pts outpt oncologist.   recommended steroids for 3 days  now completed solumedrol  transfuse blood products as needed   plts improved today   transfuse 1 unit prbc for symptomatic anemia today     diarrhea  melena  guaiac positive  gi eval noted  no plan for intervention at this time  serial cbc  transfuse prbc and plts as needed  ppi    metabolic encephalopathy  CT head neg  supportive care  improved    metastatic pancreatic ca  onc consult followng  chemo currently on hold  MRCP noted    h/o pe/dvt  AC currently on hold given low plts and elevated inr  supp o2 prn  CTA showing PE now resolved  lower ext dopplers with gastrocnemius dvt - below knee  will repeat in 1 week  cont hold eliquis for now  heme/onc f/u regarding resuming AC as outpt      malignant ascites  low suspicious for SBP  US noted  IR consult for paracentesis. diagnostic and therapeutic   pt/family declined paracentesis as this time     fluid overload  gentle diuresis as able - lasix ordered for today  echo noted    hypothyroid  cont synthroid      d/w family bedside  d/w consultants    PT      Advanced care planning was discussed with patient and family.  Advanced care planning forms were reviewed and discussed as appropriate.  Differential diagnosis and plan of care discussed with patient after the evaluation.   Pain assessed and judicious use of narcotics when appropriate was discussed.  Importance of Fall prevention discussed.  Counseling on Smoking and Alcohol cessation was offered when appropriate.  Counseling on Diet, exercise, and medication compliance was done.       Approx 60 minutes spent.

## 2023-07-28 NOTE — PROGRESS NOTE ADULT - ASSESSMENT
73y Female with history of metastatic pancreatic CA presents with melena. Nephrology consulted for hypokalemia.     1) Hypokalemia: Resolved with repletion and with spot urine K/Cr low suggestive of non-renal potassium wasting. Monitor serum potassium.    2) HTN: BP low normal. Monitor off medications.    3) LE edema: Due to poor oncotic pressure. Will give lasix 20 mg IV X 1 dose today. TTE with normal LVSF. Monitor UO.    4) Hyponatremia: Mild and likely due to volume overload. Lasix as above. Monitor serum Na.    5) Pancreatic CA: As per Heme/Onc.      [facility] NEPHROLOGY  Luis Alvarado M.D.  Fernando Garcia D.O.  Fay Shankar M.D.  Amy Núñez, MSN, ANP-C    Telephone: [phone]  Facsimile: [phone]    [address] [place] [address]

## 2023-07-28 NOTE — DISCHARGE NOTE NURSING/CASE MANAGEMENT/SOCIAL WORK - NSDCPEFALRISK_GEN_ALL_CORE
For information on Fall & Injury Prevention, visit: [email or web address] OR  [email or web address] OR  https://www.cdc.gov/steadi/patient.html

## 2023-07-28 NOTE — PROGRESS NOTE ADULT - SUBJECTIVE AND OBJECTIVE BOX
AMY JAIMES  73y Female  MRN:[id number]    Patient is a 73y old  Female who presents with a chief complaint of pancreatic ca (23 Jul 2023 10:23)    HPI:  73yF PMHx metastatic pancreatic ca, on chemo, dvt/pe on eliquis at home (held since wednesday)  and hypothyroidism presenting with complaint of melena and AMS. Pt was recently discharged from [place] after being admitted for cellulitis of the L thigh and neutropenic fever/sepsis. She was found to be pancytopenic and received 2 units of PRBCs while inpatient. Her Eliquis has been held since Wednesday. She was discharged yesterday 07/21, however, pt's sons noticed dark stools after she had fecal incontinence x2. Pt sons also reported the patient was altered at home. Pt has new oxygen requirement, satting 95% on 2L, with somewhat labored breathing.    	Pt has had PICC line in since May and has ascites, of which she does not c/o any pain. Pt sons deny at home fevers. (22 Jul 2023 15:26)      Patient seen and evaluated at bedside. No acute events overnight except as noted.    Interval HPI: no acute events o/n     PAST MEDICAL & SURGICAL HISTORY:  Essential hypertension      Hyperlipidemia      Pancreatic cancer      Hypothyroidism      No pertinent past surgical history          REVIEW OF SYSTEMS:  as per hpi    VITALS:   Vital Signs Last 24 Hrs  T(C): 36.4 (28 Jul 2023 10:37), Max: 37 (27 Jul 2023 13:56)  T(F): 97.5 (28 Jul 2023 10:37), Max: 98.6 (27 Jul 2023 13:56)  HR: 95 (28 Jul 2023 10:37) (88 - 99)  BP: 113/61 (28 Jul 2023 10:37) (102/56 - 131/79)  BP(mean): --  RR: 18 (28 Jul 2023 10:37) (18 - 18)  SpO2: 91% (28 Jul 2023 10:37) (90% - 93%)    Parameters below as of 28 Jul 2023 10:37  Patient On (Oxygen Delivery Method): room air         PHYSICAL EXAM:  GENERAL: NAD, ill appearing. lethargic  HEAD:  Atraumatic, Normocephalic  EYES: EOMI, PERRLA, conjunctiva and sclera clear  NECK: Supple, No JVD  CHEST/LUNG: Clear to auscultation bilaterally; No wheeze  HEART: S1, S2; No murmurs, rubs, or gallops  ABDOMEN: Soft, Nontender, +distended; Bowel sounds present  EXTREMITIES:  2+ Peripheral Pulses, No clubbing, cyanosis.++ edema  PSYCH: Normal affect  NEUROLOGY: AAOX1-2; non-focal  SKIN: left thigh ecchymosis, resolving cellulitis, LUE hematoma    Consultant(s) Notes Reviewed:  [x ] YES  [ ] NO  Care Discussed with Consultants/Other Providers [ x] YES  [ ] NO    MEDS:   MEDICATIONS  (STANDING):  chlorhexidine 2% Cloths 1 Application(s) Topical daily  furosemide   Injectable 20 milliGRAM(s) IV Push once  levothyroxine 25 MICROGram(s) Oral daily  pantoprazole  Injectable 40 milliGRAM(s) IV Push two times a day  tamsulosin 0.4 milliGRAM(s) Oral at bedtime  ursodiol Capsule 300 milliGRAM(s) Oral two times a day    MEDICATIONS  (PRN):  ondansetron Injectable 4 milliGRAM(s) IV Push every 8 hours PRN Nausea and/or Vomiting      ALLERGIES:  No Known Allergies      LABS:                                                     7.7    4.66  )-----------( 82       ( 28 Jul 2023 06:50 )             23.9     07-28    132<L>  |  99  |  10  ----------------------------<  144<H>  4.0   |  24  |  0.54    Ca    8.9      28 Jul 2023 06:49    TPro  5.3<L>  /  Alb  2.7<L>  /  TBili  5.4<H>  /  DBili  3.5<H>  /  AST  36  /  ALT  27  /  AlkPhos  197<H>  07-28      < from: MR MRCP w/wo IV Cont (07.24.23 @ 23:10) >  IMPRESSION:  Interval decrease in size of hepatic metastases since MRI of 5/15/2023.   Corresponding decrease in intrahepatic biliary dilatation.    Large volume ascites with question of omental carcinomatosis.    Persistent main/right portal vein thrombosis.        --- End of Report ---      < end of copied text >  < from: US Abdomen Limited (07.24.23 @ 13:48) >  IMPRESSION:    Moderate to large volume abdominal ascites.    Bilateral pleural effusions.        --- End of Report ---      < end of copied text >  < from: VA Duplex Lower Ext Vein Scan, Bilat (07.24.23 @ 13:22) >    IMPRESSION: There is persistent right gastrocnemius vein DVT without   proximal propagation.    --- End of Report ---        < end of copied text >         < from: CT Abdomen and Pelvis w/ IV Cont (07.22.23 @ 16:44) >  IMPRESSION:  Small bilateral pleural effusions.  Metastatic disease of the liver as previously demonstrated.  Large ascites.  Portal vein thrombosis.  Ascending portal colopathy versus infectious colitis.  No pulmonary embolus.        --- End of Report ---        < end of copied text >  < from: CT Head No Cont (07.22.23 @ 16:42) >    IMPRESSION:    No acute intracranial hemorrhage, hydrocephalus or extra-axial fluid   collection.  Mild parenchymal volume loss and mild chronic microvascular ischemic   changes.  No CT evidence for acute transcortical infarction. Please note that MR   imaging is a more sensitive imaging modality for detection of acute   infarction and may be obtained as clinically warranted.    If clinicians have any questions/need for clarification regarding this   report, Dr. Dionisio Clark can be best reached via the patient [employer]   hospital email system    --- End of Report ---    < end of copied text >

## 2023-07-28 NOTE — PROGRESS NOTE ADULT - PROVIDER SPECIALTY LIST ADULT
Cardiology
Cardiology
Internal Medicine
Nephrology
Nephrology
Cardiology
Gastroenterology
Heme/Onc
Internal Medicine
Nephrology
Infectious Disease
Infectious Disease
Internal Medicine
Internal Medicine
Nephrology
Infectious Disease

## 2023-07-28 NOTE — CHART NOTE - NSCHARTNOTEFT_GEN_A_CORE
Patient is confined to a single level without a bathroom and requires a 3-in-1 commode.    Lorelei Johansen PA-C  Department of Medicine  [id number]

## 2023-07-28 NOTE — PROGRESS NOTE ADULT - SUBJECTIVE AND OBJECTIVE BOX
[facility] NEPHROLOGY- PROGRESS NOTE    73y Female with history of metastatic pancreatic CA presents with melena. Nephrology consulted for hypokalemia.     REVIEW OF SYSTEMS: limited due to mental status.    No Known Allergies      Hospital Medications: Medications reviewed      VITALS:  T(F): 97.5 (07-28-23 @ 10:37), Max: 98.6 (07-27-23 @ 13:56)  HR: 95 (07-28-23 @ 10:37)  BP: 113/61 (07-28-23 @ 10:37)  RR: 18 (07-28-23 @ 10:37)  SpO2: 91% (07-28-23 @ 10:37)  Wt(kg): --    07-27 @ 07:01  -  07-28 @ 07:00  --------------------------------------------------------  IN: 100 mL / OUT: 650 mL / NET: -550 mL      Height (cm): 165.1 (07-28 @ 10:37)  Weight (kg): 76 (07-28 @ 10:37)  BMI (kg/m2): 27.9 (07-28 @ 10:37)  BSA (m2): 1.83 (07-28 @ 10:37)      PHYSICAL EXAM:    Gen: NAD, calm  Cards: RRR, +S1/S2, no M/G/R  Resp: course BS B/L  GI: soft, NT, + ab distention, NABS  Vascular:  1+ LE edema B/L, RUE PICC, LUE hematoma        LABS:  07-28    132<L>  |  99  |  10  ----------------------------<  144<H>  4.0   |  24  |  0.54    Ca    8.9      28 Jul 2023 06:49    TPro  5.3<L>  /  Alb  2.7<L>  /  TBili  5.4<H>  /  DBili  3.5<H>  /  AST  36  /  ALT  27  /  AlkPhos  197<H>  07-28    Creatinine Trend: 0.54 <--, 0.52 <--, 0.48 <--, 0.54 <--, 0.54 <--, 0.55 <--, 0.58 <--, 0.54 <--, 0.52 <--                        7.7    4.66  )-----------( 82       ( 28 Jul 2023 06:50 )             23.9     Urine Studies:  Urinalysis Basic - ( 28 Jul 2023 06:49 )    Color:  / Appearance:  / SG:  / pH:   Gluc: 144 mg/dL / Ketone:   / Bili:  / Urobili:    Blood:  / Protein:  / Nitrite:    Leuk Esterase:  / RBC:  / WBC    Sq Epi:  / Non Sq Epi:  / Bacteria:       Potassium, Random Urine: 31 mmol/L (07-24 @ 17:29)  Creatinine, Random Urine: 45 mg/dL (07-24 @ 17:29)

## 2023-07-28 NOTE — DISCHARGE NOTE NURSING/CASE MANAGEMENT/SOCIAL WORK - NSDCPNINST_GEN_ALL_CORE
call md for discomforts felt-- safety measures reemphasized --incontinent associated dermatitis in perineal and bilateral buttocks (barrier cream applied)turn and change position frequently-- keep skin clean and dry always

## 2023-07-28 NOTE — PROGRESS NOTE ADULT - SUBJECTIVE AND OBJECTIVE BOX
Date of Service: 07-28-23 @ 08:04           CARDIOLOGY     PROGRESS  NOTE   ________________________________________________    CHIEF COMPLAINT:Patient is a 73y old  Female who presents with a chief complaint of weakness (26 Jul 2023 09:25)  comfortable  	  REVIEW OF SYSTEMS:  CONSTITUTIONAL: No fever, weight loss, or fatigue  EYES: No eye pain, visual disturbances, or discharge  ENT:  No difficulty hearing, tinnitus, vertigo; No sinus or throat pain  NECK: No pain or stiffness  RESPIRATORY: No cough, wheezing, chills or hemoptysis; No Shortness of Breath  CARDIOVASCULAR: No chest pain, palpitations, passing out, dizziness, or leg swelling  GASTROINTESTINAL: No abdominal or epigastric pain. No nausea, vomiting, or hematemesis; No diarrhea or constipation. No melena or hematochezia.  GENITOURINARY: No dysuria, frequency, hematuria, or incontinence  NEUROLOGICAL: No headaches, memory loss, loss of strength, numbness, or tremors  SKIN: No itching, burning, rashes, or lesions   LYMPH Nodes: No enlarged glands  ENDOCRINE: No heat or cold intolerance; No hair loss  MUSCULOSKELETAL: No joint pain or swelling; No muscle, back, or extremity pain  PSYCHIATRIC: No depression, anxiety, mood swings, or difficulty sleeping  HEME/LYMPH: No easy bruising, or bleeding gums  ALLERGY AND IMMUNOLOGIC: No hives or eczema	    [x ] All others negative	  [ ] Unable to obtain    PHYSICAL EXAM:  T(C): 36.8 (07-28-23 @ 05:40), Max: 37 (07-27-23 @ 13:56)  HR: 92 (07-28-23 @ 05:40) (88 - 99)  BP: 109/64 (07-28-23 @ 05:40) (102/56 - 131/79)  RR: 18 (07-28-23 @ 05:40) (18 - 18)  SpO2: 92% (07-28-23 @ 05:40) (90% - 93%)  Wt(kg): --  I&O's Summary    27 Jul 2023 07:01  -  28 Jul 2023 07:00  --------------------------------------------------------  IN: 100 mL / OUT: 650 mL / NET: -550 mL        Appearance: Normal/ + jaundice	  HEENT:   Normal oral mucosa, PERRL, EOMI	  Lymphatic: No lymphadenopathy  Cardiovascular: Normal S1 S2, No JVD, + murmurs, No edema  Respiratory: rhonchi  Psychiatry: A & O x 3, Mood & affect appropriate  Gastrointestinal:  Soft, Non-tender, + BS	  Skin: No rashes, No ecchymoses, No cyanosis	  Neurologic: Non-focal  Extremities: Normal range of motion, No clubbing, cyanosis or edema  Vascular: Peripheral pulses palpable 2+ bilaterally    MEDICATIONS  (STANDING):  chlorhexidine 2% Cloths 1 Application(s) Topical daily  levothyroxine 25 MICROGram(s) Oral daily  pantoprazole  Injectable 40 milliGRAM(s) IV Push two times a day  tamsulosin 0.4 milliGRAM(s) Oral at bedtime  ursodiol Capsule 300 milliGRAM(s) Oral two times a day      TELEMETRY: 	    ECG:  	  RADIOLOGY:  OTHER: 	  	  LABS:	 	    CARDIAC MARKERS:                          7.7    4.66  )-----------( 82       ( 28 Jul 2023 06:50 )             23.9     07-28    132<L>  |  99  |  10  ----------------------------<  144<H>  4.0   |  24  |  0.54    Ca    8.9      28 Jul 2023 06:49    TPro  5.3<L>  /  Alb  2.7<L>  /  TBili  5.4<H>  /  DBili  3.5<H>  /  AST  36  /  ALT  27  /  AlkPhos  197<H>  07-28    proBNP:   Lipid Profile:   HgA1c:   TSH:     Assessment and plan  ---------------------------  73yF PMHx metastatic pancreatic ca, on chemo, dvt/pe on eliquis at home (held since wednesday)  and hypothyroidism presenting with complaint of melena and AMS. Pt was recently discharged from [place] after being admitted for cellulitis of the L thigh and neutropenic fever/sepsis. She was found to be pancytopenic and received 2 units of PRBCs while inpatient. Her Eliquis has been held since Wednesday. She was discharged yesterday 07/21, however, pt's sons noticed dark stools after she had fecal incontinence x2. Pt sons also reported the patient was altered at home. Pt has new oxygen requirement, satting 95% on 2L, with somewhat labored breathing.  pt with metastatic pancreatic ca/ dvt on elkiquis was recently admitted with ?sepsis to [facility] presented with increasing sob, presented with melena  cta negative for PE  sob ?sec top large ascites needs paracentesis  ac held sec to bleeding   gi consult ?colitis continue Zosyn / l thigh celluitis  check cultures  nutrition eval  replete lytes, keep K >4  hold AC sec to low platelets,  doppler noted no propagation  dc lasix  may consider paracentesis after platelets transfusion  discussed with family regarding palliative care  abdominal ultrasound and MR noted may consider paracentesis as platelets has improved  transfuse as needed  no chemo as per onc

## 2023-07-30 NOTE — ED ADULT NURSE NOTE - ALCOHOL PRE SCREEN (AUDIT - C)
Patient transported to [facility] Naomi Nicole RN  07/29/23 0968
Statement Selected

## 2023-08-18 NOTE — ED ADULT NURSE REASSESSMENT NOTE - NS ED NURSE REASSESS COMMENT FT1
Patient straight cathed for urine using sterile technique. Second RN present to confirm sterility. Explained procedure as it was being done - Pt tolerated procedure well. Sterile specimens collected and sent to lab as ordered. drained aprox 50 ml of urine. Comfort and safety provided.

## 2023-08-18 NOTE — ED PROVIDER NOTE - OBJECTIVE STATEMENT
73 female h/o metastatic pancreatic ca on chemo, dvt/pe previously on eliquis d/c'd yesterday due to thrombocytopenia, hypothyroid, here with [facility] with recent admission for metabolic encephalopathy and thrombocytopenia. GIB x 1 week with increasing somnolence.

## 2023-08-18 NOTE — ED PROVIDER NOTE - PHYSICAL EXAMINATION
PE:  Gen: ill appearing, speaking to son in shortened but full sentences, uncomfortable appearing  Head: NCAT  ENT: MMM, pale conjunctiva  Chest: tachycardic with regular rhythm  Lungs: Symmetrical chest rise, lungs CTAB  Abdomen: soft, distended, grossly tender  Ext: No gross deformities  Neuro: awake and alert   Skin: no rashes PE:  Gen: ill appearing, speaking to son in shortened but full sentences, uncomfortable appearing, jaundice  Head: NCAT  ENT: MMM, pale conjunctiva  Chest: tachycardic with regular rhythm  Lungs: Symmetrical chest rise, lungs CTAB  Abdomen: soft, grossly distended but not tender  Ext: No gross deformities  Neuro: awake and alert   Skin: no rashes

## 2023-08-18 NOTE — ED PROVIDER NOTE - CLINICAL SUMMARY MEDICAL DECISION MAKING FREE TEXT BOX
Nazanin Iglesias, Attending Physician: 73F with GIB and likely sepsis here for increasing confusion. Pt arrived tachycardic, hypotensive and hypoxic. Full CODE per [facility] with son at bedside. Pt with known thrombocytopenia. DDx includes but not limited to: upper GIB, lower GIB, sepsis, hemorrhagic shock. Will obtain labs, XR, CTAP given extent of abdominal distention and patient to be admitted. Nazanin Iglesias, Attending Physician: 73F with GIB and likely sepsis here for increasing confusion. Pt arrived tachycardic, hypotensive and hypoxic. Full CODE per [facility] with son at bedside. Pt with known thrombocytopenia. DDx includes but not limited to: upper GIB, lower GIB, sepsis, septic shock, hemorrhagic shock. Will obtain labs, XR, CTAP given extent of abdominal distention and patient to be admitted to MICU vs. floor pending resuscitation. Low threshold for PRBC but given concern for sepsis will fluid resuscitate pending H/H.

## 2023-08-18 NOTE — CONSULT NOTE ADULT - SUBJECTIVE AND OBJECTIVE BOX
Date of Service, 23 @ 21:15  CHIEF COMPLAINT:Patient is a 73y old  Female who presents with a chief complaint of     HPI:  73 female h/o metastatic pancreatic ca on chemo, dvt/pe previously on eliquis d/c'd yesterday due to thrombocytopenia, hypothyroid, here with BRBPR with recent admission for metabolic encephalopathy and thrombocytopenia. GIB x 1 week with increasing somnolence.    PAST MEDICAL & SURGICAL HISTORY:  Essential hypertension  Hyperlipidemia  Pancreatic cancer  Hypothyroidism      No pertinent past surgical history    MEDICATIONS  (STANDING):  acetaminophen   IVPB .. 750 milliGRAM(s) IV Intermittent Once  ondansetron Injectable 4 milliGRAM(s) IV Push once  pantoprazole  Injectable 80 milliGRAM(s) IV Push Once  piperacillin/tazobactam IVPB. 3.375 Gram(s) IV Intermittent once    MEDICATIONS  (PRN):  levothyroxine 25 mcg (0.025 mg) oral tablet: 1 tab(s) orally once a day  · 	3:1 Commode as directed: As directed  - A41.9 / C25.9  · 	tamsulosin 0.4 mg oral capsule: 1 cap(s) orally once a day (at bedtime)  · 	ursodiol 300 mg oral capsule: 1 cap(s) orally 2 times a day  · 	ondansetron 4 mg oral tablet, disintegratin tab(s) orally every 8 hours  · 	levothyroxine 25 mcg (0.025 mg) oral tablet: 1 tab(s) orally once a day  · 	Protonix 40 mg oral delayed release tablet: 1 tab(s) orally once a day    FAMILY HISTORY:  Family history unknown (Father, Mother)    SOCIAL HISTORY:    [x ] Non-smoker  [ ] Smoker  [ ] Alcohol    Allergies    No Known Allergies    Intolerances    	    REVIEW OF SYSTEMS:  CONSTITUTIONAL: No fever, weight loss, or fatigue  EYES: No eye pain, visual disturbances, or discharge  ENT:  No difficulty hearing, tinnitus, vertigo; No sinus or throat pain  NECK: No pain or stiffness  RESPIRATORY: No cough, wheezing, chills or hemoptysis; + Shortness of Breath  CARDIOVASCULAR: No chest pain, palpitations, passing out, dizziness, or leg swelling  GASTROINTESTINAL: + abdominal or epigastric pain. No nausea, vomiting, or hematemesis; No diarrhea or constipation. No melena or hematochezia.  GENITOURINARY: No dysuria, frequency, hematuria, or incontinence  NEUROLOGICAL: No headaches, memory loss, loss of strength, numbness, or tremors  SKIN: No itching, burning, rashes, or lesions   LYMPH Nodes: No enlarged glands  ENDOCRINE: No heat or cold intolerance; No hair loss  MUSCULOSKELETAL: No joint pain or swelling; No muscle, back, or extremity pain  PSYCHIATRIC: No depression, anxiety, mood swings, or difficulty sleeping  HEME/LYMPH: No easy bruising, or bleeding gums  ALLERGY AND IMMUNOLOGIC: No hives or eczema	    [x ] All others negative	  [ ] Unable to obtain    PHYSICAL EXAM:  T(C): --  HR: 138 (23 @ 20:37) (138 - 138)  BP: 82/48 (23 @ 20:37) (82/48 - 82/48)  RR: 18 (23 @ 20:37) (18 - 18)  SpO2: 91% (23 @ 20:37) (91% - 91%)  Wt(kg): --  I&O's Summary      Appearance: Normal	  HEENT:   Normal oral mucosa, PERRL, EOMI	  Lymphatic: No lymphadenopathy  Cardiovascular: Normal S1 S2, No JVD, + murmurs, No edema  Respiratory: rhonchi  Gastrointestinal:  Soft, Non-tender, + BS	  Skin: No rashes, No ecchymoses, No cyanosis	  Neurologic: Non-focal  Extremities: Normal range of motion, No clubbing, cyanosis or edema  Vascular: Peripheral pulses palpable 2+ bilaterally    TELEMETRY: 	    ECG:  	  RADIOLOGY:  OTHER: 	  	  LABS:	 	    CARDIAC MARKERS:      proBNP:   Lipid Profile:   HgA1c:   TSH:       PREVIOUS DIAGNOSTIC TESTING:    < from: CT Angio Chest PE Protocol w/ IV Cont (23 @ 16:44) >  Small bilateral pleural effusions.  Metastatic disease of the liver as previously demonstrated.  Large ascites.  Portal vein thrombosis.  Ascending portal colopathy versus infectious colitis.  No pulmonary embolus.

## 2023-08-18 NOTE — ED PROVIDER NOTE - PROGRESS NOTE DETAILS
Nazanin Iglesias, Attending Physician: Pt with lactate 9. Pt receiving IVF at this time pending Hgb. Discussed with Dr. Pablo Vyas. Pt with no hx of HF. Will give IVF, frequent reassessments and patient to be admitted to Dr. Alen Vyas unless patinet meets ICU criteria. Attending (Andre Steiner D.O.):  Patient signed out to me, here in septic shock, neutropenic fever, concern for gib bleed. hgb stable at 8 s/p 3L ivf, persistent hypotension, now on 0.5mcg/kg/min of norepi. O2 sat 90%, no sig resp distress, + mild abd distention, ascites. prbcs NOT given. s/p vanc and zosyn empirically. Last bps 105/64 (78), still tachycardic to 128. more acidotic, lactate uptrend, maybe 2/2 beta agonism. Will give 1 amp bicarb. Discussed with micu, accepted. All discussed with family.

## 2023-08-18 NOTE — ED ADULT NURSE NOTE - NSFALLRISKINTERV_ED_ALL_ED

## 2023-08-18 NOTE — ED ADULT NURSE NOTE - BIRTH SEX
Female sister, health care proxy at the bedside, states this is the patient's normal baseline, labs, ua reviwed, no further intervention, dc home

## 2023-08-18 NOTE — ED ADULT NURSE NOTE - OBJECTIVE STATEMENT
patient is a 72 y/o F with hx of stage IV pancreatic CA, HTN, hypothyroidism BIBEMS from home accompanied by son c/o bloody stools. per son at bedside patient has been having bright red bleeding and worsening lethargy at home x1 week. on arrival patient is lethargic, febrile, tachycardic, , hypoxic, tachypneic, and hypotensive. patient is a&ox4, Farsi speaking. patient placed on 6 L via NC. tachycardic to 130s. patient with PICC line in place to RUE. patient is a 74 y/o F with hx of stage IV pancreatic CA, HTN, hypothyroidism BIBEMS from home accompanied by son c/o bloody stools. per son at bedside patient has been having bright red bleeding and worsening lethargy at home x1 week. on arrival patient is lethargic, febrile, tachycardic, , hypoxic, tachypneic, and hypotensive. patient is a&ox4, Farsi speaking. patient placed on 6 L via NC. tachycardic to 130s. patient with PICC line in place to RUE. abdomen distended, nontender to palpation. skin intact, jaundiced. placed on CM, ECG obtained. MD Iglesias at bedside to assess patient. no signs of active GI bleed at this time as no blood present in diaper. patient and son updated on plan of care. comfort and safety maintained. per son at bedside patient full code

## 2023-08-19 PROBLEM — E03.9 HYPOTHYROIDISM, UNSPECIFIED: Chronic | Status: ACTIVE | Noted: 2023-01-01

## 2023-08-19 NOTE — PROCEDURE NOTE - NSINFORMCONSENT_GEN_A_CORE
Family, preferred verbal consent/Benefits, risks, and possible complications of procedure explained to patient/caregiver who verbalized understanding and gave verbal consent.

## 2023-08-19 NOTE — CONSULT NOTE ADULT - SUBJECTIVE AND OBJECTIVE BOX
[facility] DIVISION OF KIDNEY DISEASES AND HYPERTENSION -- [phone]  -- INITIAL CONSULT NOTE  --------------------------------------------------------------------------------  HPI: 72 yo F with h/o metastatic pancreatic cancer (mets to liver, and omentum), DVT, and hypothyroidism currently admitted to MICU for septic shock in the setting of neutropenia. Nephrology was consulted for metabolic acidosis/lactic acidosis.    Pt. was seen and evaluated in the MICU. Pt. is intubated/sedated and requiring IV vasopressor support. Unable to provide ROS.      PAST HISTORY  --------------------------------------------------------------------------------  PAST MEDICAL & SURGICAL HISTORY:  Essential hypertension  Hyperlipidemia  Pancreatic cancer  Hypothyroidism    No pertinent past surgical history      FAMILY HISTORY:  Patient unable to provide medical history      PAST SOCIAL HISTORY:    ALLERGIES & MEDICATIONS  --------------------------------------------------------------------------------  Allergies    No Known Allergies    Intolerances      Standing Inpatient Medications  chlorhexidine 0.12% Liquid 15 milliLiter(s) Oral Mucosa every 12 hours  chlorhexidine 4% Liquid 1 Application(s) Topical <User Schedule>  chlorhexidine 4% Liquid 1 Application(s) Topical <User Schedule>  dexMEDEtomidine Infusion 0.1 MICROgram(s)/kG/Hr IV Continuous <Continuous>  dextrose 20%. 1000 milliLiter(s) IV Continuous <Continuous>  dextrose 50% Injectable 25 Gram(s) IV Push once  dextrose 50% Injectable 25 Gram(s) IV Push once  dextrose 50% Injectable 12.5 Gram(s) IV Push once  dextrose Oral Gel 15 Gram(s) Oral once  glucagon  Injectable 1 milliGRAM(s) IntraMuscular once  insulin lispro (ADMELOG) corrective regimen sliding scale   SubCutaneous every 6 hours  magnesium sulfate  IVPB 1 Gram(s) IV Intermittent once  meropenem  IVPB 1000 milliGRAM(s) IV Intermittent every 8 hours  norepinephrine Infusion 1 MICROgram(s)/kG/Min IV Continuous <Continuous>  pantoprazole  Injectable 40 milliGRAM(s) IV Push every 12 hours  potassium phosphate / sodium phosphate Powder (PHOS-NaK) 1 Packet(s) Oral once  propofol Infusion 5 MICROgram(s)/kG/Min IV Continuous <Continuous>  sodium bicarbonate  Infusion 0.325 mEq/kG/Hr IV Continuous <Continuous>  vasopressin Infusion 0.01 Unit(s)/Min IV Continuous <Continuous>    PRN Inpatient Medications  sodium chloride 0.9% lock flush 10 milliLiter(s) IV Push every 1 hour PRN      REVIEW OF SYSTEMS  --------------------------------------------------------------------------------  Unable to obtain ROS    VITALS/PHYSICAL EXAM  --------------------------------------------------------------------------------  T(C): 36.7 (08-19-23 @ 20:00), Max: 39.4 (08-18-23 @ 21:15)  HR: 103 (08-19-23 @ 20:45) (99 - 128)  BP: 117/62 (08-19-23 @ 05:00) (67/43 - 128/71)  RR: 30 (08-19-23 @ 20:45) (18 - 46)  SpO2: 99% (08-19-23 @ 20:45) (88% - 100%)  Wt(kg): --  Height (cm): 165.1 (08-19-23 @ 09:00)  Weight (kg): 69.3 (08-19-23 @ 09:00)  BMI (kg/m2): 25.4 (08-19-23 @ 09:00)  BSA (m2): 1.76 (08-19-23 @ 09:00)      08-18-23 @ 07:01  -  08-19-23 @ 07:00  --------------------------------------------------------  IN: 1434.5 mL / OUT: 300 mL / NET: 1134.5 mL    08-19-23 @ 07:01  -  08-19-23 @ 20:57  --------------------------------------------------------  IN: 4444.8 mL / OUT: 3800 mL / NET: 644.8 mL      Physical Exam:  Gen: Ill-appearing  HEENT: +ET tube  Pulm: Mechanical breath sounds BL  CV: S1S2  Abd: Appears distended  Ext: No LE edema B/L  Neuro: Sedated  Skin: Warm and dry    LABS/STUDIES  --------------------------------------------------------------------------------              7.5    2.00  >-----------<  34       [08-19-23 @ 19:18]              23.4     134  |  94  |  16  ----------------------------<  201      [08-19-23 @ 19:18]  3.6   |  <10  |  0.92        Ca     7.0     [08-19-23 @ 19:18]      Mg     1.5     [08-19-23 @ 19:18]      Phos  1.9     [08-19-23 @ 19:18]    TPro  4.6  /  Alb  2.8  /  TBili  4.3  /  DBili  x   /  AST  52  /  ALT  16  /  AlkPhos  75  [08-19-23 @ 19:18]    PT/INR: PT 38.8 , INR 3.66       [08-19-23 @ 19:18]  PTT: 60.5       [08-19-23 @ 19:18]          [08-19-23 @ 19:18]    Creatinine Trend:  SCr 0.92 [08-19 @ 19:18]  SCr 0.90 [08-19 @ 09:26]  SCr 0.90 [08-19 @ 03:07]  SCr 0.92 [08-19 @ 00:17]  SCr 0.84 [08-18 @ 21:26]    Urine Creatinine 37      [08-19-23 @ 10:07]  Urine Sodium 21      [08-19-23 @ 10:07]    Ferritin 652      [05-15-23 @ 11:55]  Vitamin D (25OH) 15.0      [07-22-23 @ 21:00]  TSH 8.46      [05-19-23 @ 06:48]    HBsAg Nonreact      [05-14-23 @ 19:59]  HCV 0.20, Nonreact      [05-16-23 @ 07:15]

## 2023-08-19 NOTE — H&P ADULT - NSHPLABSRESULTS_GEN_ALL_CORE
08-19    133<L>  |  95<L>  |  16  ----------------------------<  100<H>  2.9<LL>   |  17<L>  |  0.92    Ca    7.6<L>      19 Aug 2023 00:17    TPro  4.5<L>  /  Alb  2.0<L>  /  TBili  4.5<H>  /  DBili  x   /  AST  57<H>  /  ALT  22  /  AlkPhos  124<H>  08-19    Blood Gas Venous - Lactate: 11.3 mmol/L (08.19.23 @ 00:16)   Blood Gas Profile - Venous (08.19.23 @ 00:16)   pH, Venous: 7.23: No collection time indicated, please interpret with caution  pCO2, Venous: 45 mmHg  pO2, Venous: 31 mmHg  HCO3, Venous: 19 mmol/L  Base Excess, Venous: -8.2 mmol/L  Oxygen Saturation, Venous: 40.5 %  Total CO2, Venous: 20 mmol/L 08-19    133<L>  |  95<L>  |  16  ----------------------------<  100<H>  2.9<LL>   |  17<L>  |  0.92    Ca    7.6<L>      19 Aug 2023 00:17    TPro  4.5<L>  /  Alb  2.0<L>  /  TBili  4.5<H>  /  DBili  x   /  AST  57<H>  /  ALT  22  /  AlkPhos  124<H>  08-19    Blood Gas Venous - Lactate: 11.3 mmol/L (08.19.23 @ 00:16)   Blood Gas Profile - Venous (08.19.23 @ 00:16)   pH, Venous: 7.23: No collection time indicated, please interpret with caution  pCO2, Venous: 45 mmHg  pO2, Venous: 31 mmHg  HCO3, Venous: 19 mmol/L  Base Excess, Venous: -8.2 mmol/L  Oxygen Saturation, Venous: 40.5 %  Total CO2, Venous: 20 mmol/L    RADIOLOGY:  Personally reviewed.    < from: CT Angio Chest PE Protocol w/ IV Cont (08.19.23 @ 02:46) >      INTERPRETATION:  Motion limited study.    No filling defect to the level of the lobar arteries bilaterally.    Redemonstrated right middle and lower lobe atelectasis, mildly increased   in volume when compared to prior study. Small bilateral effusions, left   greater than right. Linear atelectasis left lung base, similar to prior.    < end of copied text >    < from: CT Abdomen and Pelvis w/ IV Cont (08.19.23 @ 02:45) >    IMPRESSION:  1.   Large volume ascites.  2.   Interval increase in size in hepatic metastases.  3.   Nodular omental opacities compatible with peritoneal carcinomatosis.  4.   Small right pleural effusion.  5.   Consolidation in the lung bases.  6.   Chronically thrombosed portal vein.    < end of copied text >

## 2023-08-19 NOTE — PATIENT PROFILE ADULT - NSPRONUTRITIONRISK_GEN_A_NUR
Dr. Ortega notified in person per Dr. Pineda via perfect serve.      No indicators present Pressure injury stage 2 or greater

## 2023-08-19 NOTE — PROGRESS NOTE ADULT - SUBJECTIVE AND OBJECTIVE BOX
Date of Service: 08-19-23 @ 12:13           CARDIOLOGY     PROGRESS  NOTE   ________________________________________________    CHIEF COMPLAINT:Patient is a 73y old  Female who presents with a chief complaint of Lethargy (19 Aug 2023 01:33)  events noted, sedated on vent  	  REVIEW OF SYSTEMS:  CONSTITUTIONAL: No fever, weight loss, or fatigue  EYES: No eye pain, visual disturbances, or discharge  ENT:  No difficulty hearing, tinnitus, vertigo; No sinus or throat pain  NECK: No pain or stiffness  RESPIRATORY: No cough, wheezing, chills or hemoptysis; No Shortness of Breath  CARDIOVASCULAR: No chest pain, palpitations, passing out, dizziness, or leg swelling  GASTROINTESTINAL: No abdominal or epigastric pain. No nausea, vomiting, or hematemesis; No diarrhea or constipation. No melena or hematochezia.  GENITOURINARY: No dysuria, frequency, hematuria, or incontinence  NEUROLOGICAL: No headaches, memory loss, loss of strength, numbness, or tremors  SKIN: No itching, burning, rashes, or lesions   LYMPH Nodes: No enlarged glands  ENDOCRINE: No heat or cold intolerance; No hair loss  MUSCULOSKELETAL: No joint pain or swelling; No muscle, back, or extremity pain  PSYCHIATRIC: No depression, anxiety, mood swings, or difficulty sleeping  HEME/LYMPH: No easy bruising, or bleeding gums  ALLERGY AND IMMUNOLOGIC: No hives or eczema	    [ ] All others negative	  [x ] Unable to obtain    PHYSICAL EXAM:  T(C): 36.7 (08-19-23 @ 12:00), Max: 39.4 (08-18-23 @ 21:15)  HR: 108 (08-19-23 @ 12:00) (104 - 138)  BP: 117/62 (08-19-23 @ 05:00) (67/43 - 128/71)  RR: 30 (08-19-23 @ 12:00) (18 - 46)  SpO2: 99% (08-19-23 @ 12:00) (88% - 100%)  Wt(kg): --  I&O's Summary    18 Aug 2023 07:01  -  19 Aug 2023 07:00  --------------------------------------------------------  IN: 1434.5 mL / OUT: 300 mL / NET: 1134.5 mL    19 Aug 2023 07:01  -  19 Aug 2023 12:13  --------------------------------------------------------  IN: 436.7 mL / OUT: 40 mL / NET: 396.7 mL        Appearance: sedated on vent  HEENT:   Normal oral mucosa, PERRL, EOMI, jaundice	  Lymphatic: No lymphadenopathy  Cardiovascular: Normal S1 S2, No JVD, + murmurs, No edema  Respiratory: Lungs clear to auscultation	  Psychiatry: A & O x 3, Mood & affect appropriate  Gastrointestinal:  Soft, Non-tender, + BS, asciutes  Skin: No rashes, No ecchymoses, No cyanosis	  Extremities: Normal range of motion, No clubbing, cyanosis or edema  Vascular: Peripheral pulses palpable 2+ bilaterally    MEDICATIONS  (STANDING):  chlorhexidine 0.12% Liquid 15 milliLiter(s) Oral Mucosa every 12 hours  chlorhexidine 4% Liquid 1 Application(s) Topical <User Schedule>  chlorhexidine 4% Liquid 1 Application(s) Topical <User Schedule>  dextrose 20%. 1000 milliLiter(s) (50 mL/Hr) IV Continuous <Continuous>  meropenem  IVPB 1000 milliGRAM(s) IV Intermittent every 8 hours  norepinephrine Infusion 0.05 MICROgram(s)/kG/Min (4.68 mL/Hr) IV Continuous <Continuous>  pantoprazole  Injectable 40 milliGRAM(s) IV Push every 12 hours  potassium chloride  20 mEq/100 mL IVPB 20 milliEquivalent(s) IV Intermittent every 2 hours  propofol Infusion 5 MICROgram(s)/kG/Min (1.5 mL/Hr) IV Continuous <Continuous>      TELEMETRY: 	    ECG:  	  RADIOLOGY:  OTHER: 	  	  LABS:	 	    CARDIAC MARKERS:                        6.3    0.74  )-----------( 31       ( 19 Aug 2023 09:26 )             19.7     08-19    133<L>  |  95<L>  |  16  ----------------------------<  112<H>  3.1<L>   |  10<LL>  |  0.90    Ca    7.3<L>      19 Aug 2023 09:26  Phos  2.5     08-19  Mg     1.4     08-19    TPro  4.7<L>  /  Alb  2.9<L>  /  TBili  4.2<H>  /  DBili  x   /  AST  52<H>  /  ALT  15  /  AlkPhos  85  08-19    proBNP:   Lipid Profile:   HgA1c:   TSH:   PT/INR - ( 19 Aug 2023 09:26 )   PT: 35.8 sec;   INR: 3.37 ratio         PTT - ( 19 Aug 2023 09:26 )  PTT:49.7 sec      < from: 12 Lead ECG (08.19.23 @ 03:15) >  Diagnosis Line SINUS TACHYCARDIA  LEFT AXIS DEVIATION  LOW VOLTAGE QRS  POSSIBLE ANTEROLATERAL INFARCT , AGE UNDETERMINED  ABNORMAL ECG  WHEN COMPARED WITH ECG OF 08-  NO SIGNIFICANT CHANGE WAS FOUND    < from: Xray Chest 1 View- PORTABLE-Urgent (Xray Chest 1 View- PORTABLE-Urgent .) (08.19.23 @ 09:12) >  There is interval placement of left IJ terminating in SVC.  Interval placement of ET tube below the level of the clavicles.  Right-sided PICC terminating in SVC.  Bilateral low lung volumes in redemonstratedelevation of right   hemidiaphragm.  Study is limited by overlying objects in the left lung field.  The lungs are clear.  There is a right-sided pleural effusion seen on prior imaging. No   pneumothorax.  The heart is unable to be assessed due to patient positioning.  The visualized osseous structures demonstrate no acute pathology.    IMPRESSION:  Interval placement of left IJ terminating in SVC and ET tube below the   level of clavicles.        Assessment and plan  ---------------------------  73 female h/o metastatic pancreatic ca on chemo, dvt/pe previously on eliquis d/c'd yesterday due to thrombocytopenia, hypothyroid, here with BRBPR with recent admission for metabolic encephalopathy and thrombocytopenia. GIB x 1 week with increasing somnolence.  pt with metastatic pancreatic cA s/p chemo with hypotension and change of mental status and lethargy  sepsis on broad spectrum abx  sedated on vent  continue pressor  abdominal paracentesis as tolertaed  spoke to the family, prognosis is poor

## 2023-08-19 NOTE — H&P ADULT - ATTENDING COMMENTS
Patient seen and examined.  Agree with resident note as above.  Patient with hx as noted including metastatic pancreatic cancer complicated by large ascites, hyperbilirubinemia due to intrahepatic biliary obstruction, on palliative chemo.  Now patient presents with BRBPR/fever/altered mentation.  In the ER was found to have hypoTN that was refractory to IVF, requiring Levophed gtt.  Patient admitted to MICU in that setting.    On arrival to MICU patient remains in shock, respiratory distress, delirium.  ABG shows severe metabolic and respiratory acidosis.  POCUS at bedside shows very elevated hemidiaphragms with bibasilar atelectasis and large ascites below diaphragm.  Heart is hyperdynamic.  IVC difficult to visualize but appears small and variable.      Performed emergent intubation for progressive acidemia.  Albumin resuscitation and levophed gtt for shock.  Will give FFP and then A line/TLC/ and d/c PICC.  Needs paracentesis to decompress abdomen.  Strict I/O and trend Cr/UOP with volume resuscitation.  Follow cx and empiric vanc/zosyn.    All the above discussed with family at bedside, extensive GOC discussion done.  Family wants a trial of aggressive measures with all ciritcal care.  FULLCODE.    Rest of plan as above and  I have edited as appropriate

## 2023-08-19 NOTE — H&P ADULT - NSICDXFAMILYHX_GEN_ALL_CORE_FT
FAMILY HISTORY:  Father  Still living? Unknown  Family history unknown, Age at diagnosis: Age Unknown    Mother  Still living? Unknown  Family history unknown, Age at diagnosis: Age Unknown     FAMILY HISTORY:  Patient unable to provide medical history

## 2023-08-19 NOTE — PROGRESS NOTE ADULT - SUBJECTIVE AND OBJECTIVE BOX
HPI:  73F with metastatic pancreatic cancer (mets to liver and omentum, on chemo, last on 8/14-8/15), PVT, R gastroc DVT/?PE (eliquis d/c'ed for thrombocytopenia), hypothyroidism, recent admission in 7/2023 for melena presenting from home with lethargy and fevers. History obtained mostly from son at bedside. Per son, patient has been having bright red bloody stools for the past week, which was attributed to thrombocytopenia. She was noted to be lethargic today with low grade fever at home, which prompted them to bring her.    In ED, patient was hypotensive & febrile to 103, was given 2L IVF but persistently hypotensive requiring pressor. Labs notable for neutropenia with severe lactic acidosis. Admitted to MICU for likely septic shock requiring pressor.  (19 Aug 2023 01:33)       ## Labs:  CBC:                        8.0    1.15  )-----------( 19       ( 19 Aug 2023 13:45 )             24.9     Chem:  08-19    133<L>  |  95<L>  |  16  ----------------------------<  112<H>  3.1<L>   |  10<LL>  |  0.90    Ca    7.3<L>      19 Aug 2023 09:26  Phos  2.5     08-19  Mg     1.4     08-19    TPro  4.7<L>  /  Alb  2.9<L>  /  TBili  4.2<H>  /  DBili  x   /  AST  52<H>  /  ALT  15  /  AlkPhos  85  08-19    Coags:  PT/INR - ( 19 Aug 2023 09:26 )   PT: 35.8 sec;   INR: 3.37 ratio         PTT - ( 19 Aug 2023 09:26 )  PTT:49.7 sec  culture blood:  --  08-18 @ 20:50            culture sputum:     Growth in aerobic bottle: Gram Negative Rods  Growth in anaerobic bottle: Gram Negative Rods  Direct identification is available within approximately 3-5  hours either by Blood Panel Multiplexed PCR or Direct  MALDI-TOF. Details: [email or web address]           culture urine:  -- 08-18 @ 20:50  culture blood:  --  08-18 @ 20:42            culture sputum:     Growth in aerobic bottle: Gram Negative Rods  Growth in anaerobic bottle: Gram Negative Rods           culture urine:  -- 08-18 @ 20:42             ## Medications:  meropenem  IVPB 1000 milliGRAM(s) IV Intermittent every 8 hours    norepinephrine Infusion 0.05 MICROgram(s)/kG/Min IV Continuous <Continuous>          pantoprazole  Injectable 40 milliGRAM(s) IV Push every 12 hours    propofol Infusion 5 MICROgram(s)/kG/Min IV Continuous <Continuous>      ## Vitals:  T(C): 36.7 (08-19-23 @ 12:00), Max: 39.4 (08-18-23 @ 21:15)  HR: 121 (08-19-23 @ 13:45) (104 - 138)  BP: 117/62 (08-19-23 @ 05:00) (67/43 - 128/71)  BP(mean): 80 (08-19-23 @ 05:00) (46 - 90)  RR: 30 (08-19-23 @ 13:45) (18 - 46)  SpO2: 100% (08-19-23 @ 13:45) (88% - 100%)  Wt(kg): --  Vent: Mode: AC/ CMV (Assist Control/ Continuous Mandatory Ventilation), RR (machine): 30, RR (patient): 30, TV (machine): 400, FiO2: 70, PEEP: 5, PIP: 32  ABG: ABG - ( 19 Aug 2023 13:15 )  pH, Arterial: 7.25  pH, Blood: x     /  pCO2: 23    /  pO2: 183   / HCO3: 10    / Base Excess: -15.6 /  SaO2: 100.0                 08-18 @ 07:01  -  08-19 @ 07:00  --------------------------------------------------------  IN: 1434.5 mL / OUT: 300 mL / NET: 1134.5 mL    08-19 @ 07:01  -  08-19 @ 15:23  --------------------------------------------------------  IN: 1523.4 mL / OUT: 115 mL / NET: 1408.4 mL         GENERAL: sedated, intubated  HEAD: atraumatic, normocephalic  EYES: PERRLA, scleral icterus bilaterally  NECK: supple, no LAD  HEART: +s1/s2, tachycardic, regular rhythm, no murmurs or gallops  LUNGS: clear to auscultation anteriorly, no wheezes  ABDOMEN: distended,  no rigidity or guarding  EXTREMITIES: +3 pitting edema bilaterally, warm  SKIN: jaundiced      < from: CT Angio Chest PE Protocol w/ IV Cont (08.19.23 @ 02:46) >    ACC: [id number] EXAM:  CT ANGIO CHEST PULM ART WAWIC   ORDERED BY:  CLEMENTINE HINOJOSA     ACC: [id number] EXAM:  CT ABDOMEN AND PELVIS IC   ORDERED BY:  VIKTOR CRAWFORD     PROCEDURE DATE:  08/19/2023          INTERPRETATION:  PROCEDURE INFORMATION:  Exam: CT angiogram of the chest; CT Abdomen And Pelvis With Contrast  Exam date and time: 8/19/2023 2:35 AM  Age: 73 years old  Clinical indication: Abd distention, HX of pancreatitic cancer with   sepsis and  gib. Question pulmonary embolism.    TECHNIQUE:  Imaging protocol: CT angiogram of the chest; Computed tomography of the   abdomen and pelvis with contrast.  Contrast material: IV: IV CONTRAST DOCUMENTED IN UNLINKED CONCURRENT EXAM;  Contrast route: IV;    COMPARISON:  CT ABDOMEN AND PELVIS WITH IV CONTRAST 05/14/2023 4:40 PM    FINDINGS:    CHEST:  Lungs: Consolidation and focal atelectasis are seen in the bilateral   lower and middle lobes.  Pleural spaces: Small right pleural effusion.  Vessels: No pulmonary embolism.    HEART: Mild cardiomegaly. No pericardial effusion.  Mediastinum: No adenopathy.  Liver: Ill-defined hypoattenuating liver lesions measure up to   approximately 2  cm in diameter, increased in size when compared to the examination from  05/14/2023.  Gallbladder and bile ducts: Gallbladder is decompressed.  Pancreas: Normal. No ductal dilation.  Spleen: Normal. No splenomegaly.  Adrenal glands: No mass.  Kidneys and ureters: Normal. No hydronephrosis.  Stomach and bowel: Unremarkable. No obstruction. No mucosal thickening.  Appendix: No evidence of appendicitis.    Intraperitoneal space: Large volume ascites. Nodular omental opacities,  compatible with peritoneal carcinomatosis.  Vasculature: Portal venous chronically thrombosed with cavernous  transformation.  Lymph nodes: No lymphadenopathy.  Urinary bladder: Unremarkable as visualized.  Reproductive: Unremarkable as visualized.  Bones/joints: No acute osseous abnormality.  Soft tissues: Unremarkable.    IMPRESSION:  1.   Large volume ascites.  2.   Interval increase in size in hepatic metastases.  3.   Nodular omental opacities compatible with peritoneal carcinomatosis.  4.   Small right pleural effusion.  5.   Consolidation in the lung bases.  6.   Chronically thrombosed portal vein.  7.  No pulmonary embolism    --- End of Report ---            ADRIANA VALDEZ MD; Attending Radiologist  This document has been electronically signed. Aug 19 2023  8:48AM    < end of copied text >

## 2023-08-19 NOTE — CONSULT NOTE ADULT - SUBJECTIVE AND OBJECTIVE BOX
AMY JAIMES  73y Female  MRN:[id number]    Patient is a 73y old  Female who presents with a chief complaint of Lethargy (19 Aug 2023 20:57)    HPI: pt well known to me  73F with metastatic pancreatic cancer (mets to liver and omentum, on chemo, last on 8/14-8/15), PVT, R gastroc DVT/?PE (eliquis d/c'ed for thrombocytopenia), hypothyroidism, recent admission in 7/2023 for melena presenting from home with lethargy and fevers. History obtained mostly from son at bedside. Per son, patient has been having bright red bloody stools for the past week, which was attributed to thrombocytopenia. She was noted to be lethargic today with low grade fever at home, which prompted them to bring her.    In ED, patient was hypotensive & febrile to 103, was given 2L IVF but persistently hypotensive requiring pressor. Labs notable for neutropenia with severe lactic acidosis. Admitted to MICU for likely septic shock requiring pressor.  (19 Aug 2023 01:33)      Patient seen and evaluated at bedside. No acute events overnight except as noted.    Interval HPI: pt admitted to micu, intubated, on pressors    PAST MEDICAL & SURGICAL HISTORY:  Essential hypertension      Hyperlipidemia      Pancreatic cancer      Hypothyroidism      No pertinent past surgical history          REVIEW OF SYSTEMS:  as per hpi     VITALS:  Vital Signs Last 24 Hrs  T(C): 36.7 (19 Aug 2023 20:00), Max: 37.4 (18 Aug 2023 23:04)  T(F): 98.1 (19 Aug 2023 20:00), Max: 99.3 (18 Aug 2023 23:04)  HR: 102 (19 Aug 2023 21:00) (99 - 128)  BP: 117/62 (19 Aug 2023 05:00) (67/43 - 128/71)  BP(mean): 80 (19 Aug 2023 05:00) (46 - 90)  RR: 30 (19 Aug 2023 20:45) (18 - 46)  SpO2: 100% (19 Aug 2023 21:00) (88% - 100%)    Parameters below as of 19 Aug 2023 20:00  Patient On (Oxygen Delivery Method): ventilator    O2 Concentration (%): 70  CAPILLARY BLOOD GLUCOSE      POCT Blood Glucose.: 236 mg/dL (19 Aug 2023 21:31)  POCT Blood Glucose.: 212 mg/dL (19 Aug 2023 20:01)  POCT Blood Glucose.: 214 mg/dL (19 Aug 2023 17:01)  POCT Blood Glucose.: 163 mg/dL (19 Aug 2023 11:33)  POCT Blood Glucose.: 86 mg/dL (19 Aug 2023 10:01)  POCT Blood Glucose.: 251 mg/dL (19 Aug 2023 06:49)  POCT Blood Glucose.: 47 mg/dL (19 Aug 2023 06:19)  POCT Blood Glucose.: 173 mg/dL (18 Aug 2023 22:37)  POCT Blood Glucose.: 59 mg/dL (18 Aug 2023 22:21)    I&O's Summary    18 Aug 2023 07:01  -  19 Aug 2023 07:00  --------------------------------------------------------  IN: 1434.5 mL / OUT: 300 mL / NET: 1134.5 mL    19 Aug 2023 07:01  -  19 Aug 2023 21:58  --------------------------------------------------------  IN: 4757.9 mL / OUT: 3800 mL / NET: 957.9 mL        PHYSICAL EXAM:  GENERAL: intubated, sedated  HEAD:  Atraumatic, Normocephalic  EYES: EOMI, PERRLA, conjunctiva and sclera clear  NECK: Supple, No JVD  CHEST/LUNG: Clear to auscultation bilaterally; No wheeze  HEART: S1, S2; No murmurs, rubs, or gallops  ABDOMEN: Soft, Nontender, Nondistended; Bowel sounds present  EXTREMITIES:  2+ Peripheral Pulses, No clubbing, cyanosis. + edema  NEUROLOGY: sedated  SKIN: No rashes or lesions    Consultant(s) Notes Reviewed:  [x ] YES  [ ] NO  Care Discussed with Consultants/Other Providers [ x] YES  [ ] NO    MEDS:  MEDICATIONS  (STANDING):  chlorhexidine 0.12% Liquid 15 milliLiter(s) Oral Mucosa every 12 hours  chlorhexidine 4% Liquid 1 Application(s) Topical <User Schedule>  chlorhexidine 4% Liquid 1 Application(s) Topical <User Schedule>  dexMEDEtomidine Infusion 0.1 MICROgram(s)/kG/Hr (1.73 mL/Hr) IV Continuous <Continuous>  dextrose 20%. 1000 milliLiter(s) (25 mL/Hr) IV Continuous <Continuous>  dextrose 50% Injectable 25 Gram(s) IV Push once  dextrose 50% Injectable 25 Gram(s) IV Push once  dextrose 50% Injectable 12.5 Gram(s) IV Push once  dextrose Oral Gel 15 Gram(s) Oral once  glucagon  Injectable 1 milliGRAM(s) IntraMuscular once  insulin lispro (ADMELOG) corrective regimen sliding scale   SubCutaneous every 6 hours  magnesium sulfate  IVPB 1 Gram(s) IV Intermittent once  meropenem  IVPB 1000 milliGRAM(s) IV Intermittent every 8 hours  norepinephrine Infusion 1 MICROgram(s)/kG/Min (65 mL/Hr) IV Continuous <Continuous>  pantoprazole  Injectable 40 milliGRAM(s) IV Push every 12 hours  potassium phosphate IVPB 15 milliMole(s) IV Intermittent once  propofol Infusion 5 MICROgram(s)/kG/Min (1.5 mL/Hr) IV Continuous <Continuous>  sodium bicarbonate  Infusion 0.325 mEq/kG/Hr (150 mL/Hr) IV Continuous <Continuous>  vasopressin Infusion 0.01 Unit(s)/Min (1.5 mL/Hr) IV Continuous <Continuous>    MEDICATIONS  (PRN):  sodium chloride 0.9% lock flush 10 milliLiter(s) IV Push every 1 hour PRN Pre/post blood products, medications, blood draw, and to maintain line patency    ALLERGIES:  No Known Allergies      LABS:                        7.5    2.00  )-----------( 34       ( 19 Aug 2023 19:18 )             23.4     08-19    134<L>  |  94<L>  |  16  ----------------------------<  201<H>  3.6   |  <10<LL>  |  0.92    Ca    7.0<L>      19 Aug 2023 19:18  Phos  1.9     08-19  Mg     1.5     08-19    TPro  4.6<L>  /  Alb  2.8<L>  /  TBili  4.3<H>  /  DBili  x   /  AST  52<H>  /  ALT  16  /  AlkPhos  75  08-19    PT/INR - ( 19 Aug 2023 19:18 )   PT: 38.8 sec;   INR: 3.66 ratio         PTT - ( 19 Aug 2023 19:18 )  PTT:60.5 sec      LIVER FUNCTIONS - ( 19 Aug 2023 19:18 )  Alb: 2.8 g/dL / Pro: 4.6 g/dL / ALK PHOS: 75 U/L / ALT: 16 U/L / AST: 52 U/L / GGT: x           Urinalysis Basic - ( 19 Aug 2023 19:18 )    Color: x / Appearance: x / SG: x / pH: x  Gluc: 201 mg/dL / Ketone: x  / Bili: x / Urobili: x   Blood: x / Protein: x / Nitrite: x   Leuk Esterase: x / RBC: x / WBC x   Sq Epi: x / Non Sq Epi: x / Bacteria: x       cultures: Culture Results:   Growth in aerobic bottle: Gram Negative Rods  Growth in anaerobic bottle: Gram Negative Rods  Direct identification is available within approximately 3-5  hours either by Blood Panel Multiplexed PCR or Direct  MALDI-TOF. Details: [email or web address] (08-18 @ 20:50)  Culture Results:   Growth in aerobic bottle: Gram Negative Rods  Growth in anaerobic bottle: Gram Negative Rods (08-18 @ 20:42)       < from: CT Angio Chest PE Protocol w/ IV Cont (08.19.23 @ 02:46) >  IMPRESSION:  1.   Large volume ascites.  2.   Interval increase in size in hepatic metastases.  3.   Nodular omental opacities compatible with peritoneal carcinomatosis.  4.   Small right pleural effusion.  5.   Consolidation in the lung bases.  6.   Chronically thrombosed portal vein.  7.  No pulmonary embolism    --- End of Report ---      < end of copied text >  < from: CT Head No Cont (08.19.23 @ 02:45) >  IMPRESSION:    No evidence of acute intracranial hemorrhage, midline shift or CT   evidence of acute territorial infarct.    If the patient's symptoms persist, consider short interval follow-up head   CT or brain MRI if there are no MRI contraindications.    --- End of Report ---      < end of copied text >

## 2023-08-19 NOTE — H&P ADULT - CONVERSATION DETAILS
On arrival to MICU, patient with significantly increased WOB and worsening lethargy. On exam, patient with severe abdominal distension with bedside POCUS showing high volume ascites concerning for abdominal compartment syndrome. Labs notable for worsening acidosis. After long discussion with family (two sons) and multiple outpatient doctors (oncologist, PCP, cardiologist) over the phone, decision was made to proceed to be full code. Patient intubated for airway protection.

## 2023-08-19 NOTE — H&P ADULT - HISTORY OF PRESENT ILLNESS
72 yo F, hx of metastatic pancreatic CA on chemo, DVT/PE (eliquis d/c'd recently due to thrombocytopenia), and hypothyroid, presents for bright red blood per rectum.  Patient was recently admitted 7/19/23 for metabolic encephalopathy and thrombocytopenia after having melena and somnolence for 1 week and discharged 7/28/23.  In ED, patient with hypotension requiring Levophed likely in the setting of septic shock given neutropenic fever, lactate 11.3, and tachycardia.  Admitted to MICU for further management and evaluation. 73F with metastatic pancreatic cancer (mets to liver and omentum, on chemo, last on 8/14-8/15), PVT, R gastroc DVT/?PE (eliquis d/c'ed for thrombocytopenia), hypothyroidism, recent admission in 7/2023 for melena presenting from home with lethargy and fevers. History obtained mostly from son at bedside. Per son, patient has been having bright red bloody stools for the past week, which was attributed to thrombocytopenia. She was noted to be lethargic today with low grade fever at home, which prompted them to bring her.    In ED, patient was hypotensive & febrile to 103, was given 2L IVF but persistently hypotensive requiring pressor. Labs notable for neutropenia with severe lactic acidosis. Admitted to MICU for likely septic shock requiring pressor.

## 2023-08-19 NOTE — PROGRESS NOTE ADULT - ASSESSMENT
73F with metastatic pancreatic cancer (mets to liver and omentum, last chemo on 8/14-8/15), PVT, R gastroc DVT/?PE (eliquis d/c'ed for thrombocytopenia), hypothyroidism p/w neutropenic septic shock, AHRF s/p intubation, TESFAYE, hypoglycemia and found to be covid + and Ecoli bacteremia    - cont analgosedation, titrate to goal rass  - cont vent support, MV increased given significant acidosis  - on pressors for septic shock, maintain map>65  - no a/c for DVT given low plt and high risk of bleeding given her accelerated fibrinolytic state  - s/p cryo and plt and prbc tx  - plan for paracentesis for dx and therapeutic purpose (r/o sbp and decrease IAH as bladder pressure 16)  - monitor uo/lytes, appreciate Renal reccs and will monitor if needs HD  - cont abx w/ carbapenem and s/p vanco x1  - check fungitell  - cont dextrose gtt for hypoglycemia (sepsis and liver decompensation related)    d/w sons at bedside. cont aggressive care. Tho prognosis unfortunately remains very poor given severity of MOSF and baseline aggressive cancer 73F with metastatic pancreatic cancer (mets to liver and omentum, last chemo on 8/14-8/15), PVT, R gastroc DVT/?PE (eliquis d/c'ed for thrombocytopenia), hypothyroidism p/w neutropenic septic shock, AHRF s/p intubation, TESFAYE, hypoglycemia and found to be covid + and Ecoli bacteremia    - cont analgosedation, titrate to goal rass  - cont vent support, MV increased given significant acidosis  - on pressors for septic shock, maintain map>65  - no a/c for DVT given low plt and high risk of bleeding given her accelerated fibrinolytic state  - s/p cryo and plt and prbc tx  - plan for paracentesis for dx and therapeutic purpose (r/o sbp and decrease IAH as bladder pressure 16)  - monitor uo/lytes, appreciate Renal reccs and will monitor if needs HD  - cont abx w/ carbapenem and s/p vanco x1  - check fungitell  - cont dextrose gtt for hypoglycemia (sepsis and liver decompensation related)  DVT ppx SCD  GOC: d/w sons at bedside. cont aggressive care. Tho prognosis unfortunately remains very poor given severity of MOSF and baseline aggressive cancer 73F with metastatic pancreatic cancer (mets to liver and omentum, last chemo on 8/14-8/15), PVT, R gastroc DVT/?PE (eliquis d/c'ed for thrombocytopenia), hypothyroidism p/w neutropenic septic shock, AHRF s/p intubation, TESFAYE, hypoglycemia and found to be covid + and Ecoli bacteremia    - cont analgosedation, titrate to goal rass  - cont vent support, MV increased given significant acidosis  - on pressors for septic shock, maintain map>65  - no a/c for DVT given low plt and high risk of bleeding given her accelerated fibrinolytic state  - s/p cryo and plt and prbc tx  - plan for paracentesis for dx and therapeutic purpose (r/o sbp and decrease IAH as bladder pressure 16)  - monitor uo/lytes, appreciate Renal reccs and will monitor if needs HD  - cont abx w/ carbapenem and s/p vanco x1  - check fungitell  - covid + ve but would not use RDV given acute liver dysfxn and hold off steroids given likely not covid PNA given CT picture that appears to have basilar atelectasis and not true covid pna (w/ ggo)  - cont dextrose gtt for hypoglycemia (sepsis and liver decompensation related)  DVT ppx SCD  GOC: d/w sons at bedside. cont aggressive care. Tho prognosis unfortunately remains very poor given severity of MOSF and baseline aggressive cancer

## 2023-08-19 NOTE — CONSULT NOTE ADULT - ASSESSMENT
73 female h/o pancreatic ca with mets on chemo, hypothryroid, dvt/pe on eliquis, now admitted to micu with septic shock    metastatic pancreatic ca  septic shock  ascites  bacteremia  covid19  renal failure  pancytopenia  coagulapathy    vent support  pressors  plan for paracentesis  possible cvvhd if no urine outpt  broad spectrum abx  f/u cult and sens  transfusion of blood products as need    d/w family at bedside   want to cont aggressive care  full code    prognosis poor    d/w micu team, renal, cards, heme/onc  mngt as per micu      Advanced care planning was discussed with patient and family.  Advanced care planning forms were reviewed and discussed as appropriate.  Differential diagnosis and plan of care discussed with patient after the evaluation.   Pain assessed and judicious use of narcotics when appropriate was discussed.  Importance of Fall prevention discussed.  Counseling on Smoking and Alcohol cessation was offered when appropriate.  Counseling on Diet, exercise, and medication compliance was done.       Approx 60 minutes spent.

## 2023-08-19 NOTE — AIRWAY PLACEMENT NOTE ADULT - AIRWAY COMMENTS:
Order for intubation given and verified with provider. The patient was identified by full name and birth date compared to the identification band.  Pt intubated with glide 3 and 7.5 ETT.

## 2023-08-19 NOTE — CONSULT NOTE ADULT - ATTENDING COMMENTS
[FreeTextEntry1] : Presents for follow-up of chronic hypertension, hyperlipidemia and obesity. [de-identified] : \par Medical Issue 1: Chronic hypertension: unstable and variably controlled with BP readings fluctuating from 120s-140s/70s-90s; he ran out of all his medication 1 month ago, went to pharmacy for refill but was told refill could not be given until he made appointment to see us in the office; he denies medication side effects and denies chest pains, shortness of breath, headache and vision changes; he is not compliant with low-salt diet and does not exercise regularly\par \par Medical Issue 2: Hyperlipidemia: unstable and variably controlled; he ran out of his statin medication 1 month ago; he is not compliant with low-fat diet and he works nights at [employer] as  so he eats fast food regularly; he does not exercise regularly anymore\par \par Medical Issue 3: Obesity: unstable and poorly controlled with increase in weight since last visit; he admits to poor diet and less exercise since he is working nights at [employer]; he is motivated to lose weight and would like resources to help achieve his weight loss goals 72 yo F with h/o metastatic pancreatic cancer (mets to liver, and omentum), DVT, and hypothyroidism currently admitted to MICU for septic shock in the setting of neutropenia. Nephrology was consulted for metabolic acidosis/lactic acidosis.  Intubated, sedated.  Reviewed dismal condition of patient with family  1.  ARF--main indication is 2.  Unfortunately volume removal will worsen 3 and 4.  Paracentesis to decrease IABP.  Consent obtained for CRRT.  Unlikly to tolerate  2.  Respiratory failure, acute, hypoxic-- >60%.  NOT likely to benefit from UF  3.  Acidosis--severe lactic.  HCO3 for pH <7.1  4.  Hypotension--pressor optimization with goal to improve perfusion    discussed with micu team, attending  Dinesh Mason MD  contact me on TEAMS

## 2023-08-19 NOTE — ED ADULT NURSE REASSESSMENT NOTE - NS ED NURSE REASSESS COMMENT FT1
at CT when patient lying flat, prior to actual scan patient became hypoxic to 85% on 6 L via NC. MD Steiner aware. patient placed on 15 L via NRB with improvement to 95%. ALMITA Castillo at bedside to transport patient to CT from MICU with ED MD

## 2023-08-19 NOTE — PATIENT PROFILE ADULT - FALL HARM RISK - HARM RISK INTERVENTIONS
Assistance with ambulation/Assistance OOB with selected safe patient handling equipment/Communicate Risk of Fall with Harm to all staff/Discuss with provider need for PT consult/Monitor gait and stability/Provide patient with walking aids - walker, cane, crutches/Reinforce activity limits and safety measures with patient and family/Review medications for side effects contributing to fall risk/Sit up slowly, dangle for a short time, stand at bedside before walking/Tailored Fall Risk Interventions/Toileting schedule using arm’s reach rule for commode and bathroom/Visual Cue: Yellow wristband and red socks/Bed in lowest position, wheels locked, appropriate side rails in place/Call bell, personal items and telephone in reach/Instruct patient to call for assistance before getting out of bed or chair/Non-slip footwear when patient is out of bed/[place] to call system/Physically safe environment - no spills, clutter or unnecessary equipment/Purposeful Proactive Rounding/Room/bathroom lighting operational, light cord in reach

## 2023-08-19 NOTE — PATIENT PROFILE ADULT - NSPROPTRIGHTNOTIFYOBTAINDET_GEN_A_NUR
Number Of Group Ii Special Stains Used ([id number]): None unable to assess, pt not responding to questions

## 2023-08-19 NOTE — H&P ADULT - ASSESSMENT
Assessment:    74 yo F, hx of metastatic pancreatic CA on chemo, DVT/PE (eliquis d/c'd recently due to thrombocytopenia), and hypothyroid, presents for bright red blood per rectum.  In ED, patient with hypotension requiring Levophed likely in the setting of septic shock.  Admitted to MICU for further management and evaluation.    Plan:    NEURO  -baseline mental status?  -continue to monitor    CARDIOVASCULAR  #shock  -likely in the setting of sepsis given neutropenic fever, tachycardia, lactic acidosis  -on levophed drip; MAP goal of >65  -on prophylactic vanc and zosyn    PULMONARY      GI  #rectal bleeding  -Hg stable at 8.1  -continue to monitor   -CT abd/pelvis ordered-results pending    #hx of pancreatic CA      RENAL    HEME/ONC    ID    ENDOCRINE    ETHICS 73F with metastatic pancreatic cancer (mets to liver and omentum, last chemo on 8/14-8/15), PVT, R gastroc DVT/?PE (eliquis d/c'ed for thrombocytopenia), hypothyroidism, recent admission in 7/2023 for melena presenting from home with lethargy and fever. Septic on presentation in setting of neutropenia. Admitted to MICU for shock state requiring pressor.     NEUROLOGIC  #lethargy  - baseline mental status AAOx3  - currently at baseline but lethargic, intermittently answering questions  - monitor mental status while addressing reversible causes    CARDIOVASCULAR  #shock state  - TTE 7/24/23: EF 61%,   -likely in the setting of sepsis given neutropenic fever, tachycardia, lactic acidosis  -on levophed drip; MAP goal of >65  -on prophylactic vanc and zosyn    PULMONARY      GI  #rectal bleeding  -Hg stable at 8.1  -continue to monitor   -CT abd/pelvis ordered-results pending    #hx of pancreatic CA      RENAL    HEME/ONC    ID    ENDOCRINE    ETHICS 73F with metastatic pancreatic cancer (mets to liver and omentum, last chemo on 8/14-8/15), PVT, R gastroc DVT/?PE (eliquis d/c'ed for thrombocytopenia), hypothyroidism, recent admission in 7/2023 for melena presenting from home with lethargy and fever. Septic on presentation in setting of neutropenia. Admitted to MICU for shock state requiring pressor.     NEUROLOGIC  #lethargy  - baseline mental status AAOx3  - currently at baseline but lethargic, intermittently answering questions  - monitor mental status while addressing reversible causes    CARDIOVASCULAR  #shock state  - TTE 7/24/23: EF 61%, normal LV and RV systolic function  - likely in the setting of sepsis given neutropenia, fever, lactic acidosis  -on levophed drip; MAP goal of >65      PULMONARY      GI  #rectal bleeding  -Hg stable at 8.1  -continue to monitor   -CT abd/pelvis ordered-results pending    #hx of pancreatic CA      RENAL    HEME/ONC    ID  #Sepsis  #Neutropenic Fever  - UA positive  - CXR with small bilateral pleural effusions  - f/u CT C/A/P  - f/u RVP, blood and urine cultures    ENDOCRINE    ETHICS 73F with metastatic pancreatic cancer (mets to liver and omentum, last chemo on 8/14-8/15), PVT, R gastroc DVT/?PE (eliquis d/c'ed for thrombocytopenia), hypothyroidism, recent admission in 7/2023 for melena presenting from home with lethargy and fever. Septic on presentation in setting of neutropenia. Admitted to MICU for shock state requiring pressor.     NEUROLOGIC  #Metabolic encephalopathy   #Intubated & sedated  - baseline mental status AAOx3  - presenting with lethargy, progressively worsened iso acidemia  - propofol gtt, RASS goal 0 to -1     CARDIOVASCULAR  #Shock state  - hypotensive to SBP 50, s/p 2L in ED  - TTE 7/24/23: EF 61%, normal LV and RV systolic function  - likely in setting of severe sepsis, however, also component of decreased preload from increased intra-abdominal pressure  - continue levo gtt, wean as tolerated  - will require urgent paracentesis to reduce pressure    PULMONARY  #Intubated and ventilated  - intubated on 8/19 for airway protection iso encephalopathy   - settings: 400/24/5/100  - monitor serial gas, titrate as needed    GI  #BRBPR  - has been having bloody diarrhea for the past week per family  - H/H stable  - continue to monitor  - f/u CT AP    RENAL  #TESFAYE  - Cr bump from 0.5 -> 0.9  - possibly iso elevated intra-abdominal pressures  - monitor UOP on aparicio catheter    HEME/ONC  #metastatic pancreatic cancer  #coagulopathy  #ascites  - recently diagnosed, follows Dr. Eduardo Martinez in [facility]  - on palliative chemotherapy to reduce size & relieve compression  - last chemotherapy on 8/14-15   - urgent diagnostic & therapeutic paracentesis   - 1U plasma pending procedures    ID  #Sepsis  #Neutropenic Fever  - UA positive  - CXR with small bilateral pleural effusions  - f/u CT C/A/P  - f/u RVP, blood and urine cultures  - continue zosyn and vancomycin     ENDOCRINE  - no active issues  - monitor FS q6h    ETHICS  - full code 73F with metastatic pancreatic cancer (mets to liver and omentum, last chemo on 8/14-8/15), PVT, R gastroc DVT/?PE (eliquis d/c'ed for thrombocytopenia), hypothyroidism, recent admission in 7/2023 for melena presenting from home with lethargy and fever. Septic on presentation in setting of neutropenia. Admitted to MICU for shock state requiring pressor.     NEUROLOGIC  #Metabolic encephalopathy   #Intubated & sedated  - baseline mental status AAOx3  - presenting with lethargy, progressively worsened iso acidemia  - propofol gtt, RASS goal 0 to -1     CARDIOVASCULAR  #Shock state  - hypotensive to SBP 50, s/p 2L in ED  - TTE 7/24/23: EF 61%, normal LV and RV systolic function  - likely in setting of severe sepsis, however, also component of decreased preload from increased intra-abdominal pressure  - continue levo gtt, wean as tolerated  - will require urgent paracentesis to reduce intraabdominal pressure    PULMONARY  #Intubated and ventilated  #Acute hypoxic and hypercarbic respiratory failure  - intubated on 8/19 for airway protection iso encephalopathy   - settings: 400/24/5/100  - monitor serial gas, titrate as needed  -decompress abdomen    GI  #BRBPR  - has been having bloody diarrhea for the past week per family  - H/H stable  - continue to monitor  - f/u CT AP    RENAL  #TESFAYE  - Cr bump from 0.5 -> 0.9  - possibly iso elevated intra-abdominal pressures  - monitor UOP on aparicio catheter    HEME/ONC  #metastatic pancreatic cancer  #coagulopathy  #ascites  - recently diagnosed, follows Dr. Eduardo Martinez in [facility]  - on palliative chemotherapy to reduce size & relieve compression  - last chemotherapy on 8/14-15   - urgent diagnostic & therapeutic paracentesis   - 1U plasma pending procedures    ID  #Sepsis  #Neutropenic Fever  - UA positive  - CXR with small bilateral pleural effusions  - f/u CT C/A/P  - f/u RVP, blood and urine cultures  - continue zosyn and vancomycin     ENDOCRINE  - no active issues  - monitor FS q6h    ETHICS  - full code  -sons are NOK

## 2023-08-19 NOTE — H&P ADULT - NSHPPHYSICALEXAM_GEN_ALL_CORE
GENERAL: NAD, lying in bed comfortably  HEAD:  Atraumatic, normocephalic  EYES: EOMI, PERRLA, conjunctiva and sclera clear  NECK: Supple, trachea midline, no JVD  HEART: Regular rate and rhythm, no murmurs, rubs, or gallops  LUNGS: Unlabored respirations.  Clear to auscultation bilaterally, no crackles, wheezing, or rhonchi  ABDOMEN: Soft, nontender, nondistended, +BS  EXTREMITIES: 2+ peripheral pulses bilaterally. No clubbing, cyanosis, or edema  NERVOUS SYSTEM:  A&Ox3, moving all extremities, no focal deficits   SKIN: No rashes or lesions GENERAL: moaning, opening eyes, on NC  HEAD: atraumatic, normocephalic  EYES: PERRLA, scleral icterus bilaterally  NECK: supple, no LAD  HEART: +s1/s2, tachycardic, regular rhythm, no murmurs or gallops  LUNGS: clear to auscultation anteriorly, no wheezes  ABDOMEN: distended, grimacing to palpation, no rigidity or guarding  EXTREMITIES: +3 pitting edema bilaterally, warm  NEURO:   SKIN: no rashes or lesions GENERAL: moaning, opening eyes, on NC  HEAD: atraumatic, normocephalic  EYES: PERRLA, scleral icterus bilaterally  NECK: supple, no LAD  HEART: +s1/s2, tachycardic, regular rhythm, no murmurs or gallops  LUNGS: clear to auscultation anteriorly, no wheezes  ABDOMEN: distended, grimacing to palpation, no rigidity or guarding  EXTREMITIES: +3 pitting edema bilaterally, warm  SKIN: no rashes or lesions ICU Vital Signs Last 24 Hrs  T(C): 37.2 (19 Aug 2023 00:50), Max: 39.4 (18 Aug 2023 21:15)  T(F): 99 (19 Aug 2023 00:50), Max: 103 (18 Aug 2023 21:15)  HR: 109 (19 Aug 2023 05:45) (109 - 138)  BP: 117/62 (19 Aug 2023 05:00) (70/38 - 128/71)  BP(mean): 80 (19 Aug 2023 05:00) (46 - 90)  ABP: 116/44 (19 Aug 2023 05:45) (109/43 - 117/46)  ABP(mean): 70 (19 Aug 2023 05:45) (70 - 73)  RR: 26 (19 Aug 2023 05:45) (18 - 46)  SpO2: 99% (19 Aug 2023 05:45) (88% - 100%)    O2 Parameters below as of 19 Aug 2023 02:55  Patient On (Oxygen Delivery Method): mask, nonrebreather  O2 Flow (L/min): 15      GENERAL: moaning, opening eyes, on NC  HEAD: atraumatic, normocephalic  EYES: PERRLA, scleral icterus bilaterally  NECK: supple, no LAD  HEART: +s1/s2, tachycardic, regular rhythm, no murmurs or gallops  LUNGS: clear to auscultation anteriorly, no wheezes  ABDOMEN: distended, grimacing to palpation, no rigidity or guarding  EXTREMITIES: +3 pitting edema bilaterally, warm  SKIN: no rashes or lesions

## 2023-08-20 NOTE — PROGRESS NOTE ADULT - SUBJECTIVE AND OBJECTIVE BOX
[facility] Division of Kidney Diseases & Hypertension  FOLLOW UP NOTE  [phone]--------------------------------------------------------------------------------    HPI: 72 yo F with h/o metastatic pancreatic cancer (mets to liver, and omentum), DVT, and hypothyroidism currently admitted to MICU for septic shock in the setting of neutropenia. Pt. being seen for metabolic acidosis/lactic acidosis.      24 hour events/subjective: Pt. was seen and evaluated in the MICU. Pt. is intubated/sedated and requiring IV vasopressor support. Unable to provide ROS.      PAST HISTORY  --------------------------------------------------------------------------------  No significant changes to PMH, PSH, FHx, SHx, unless otherwise noted    ALLERGIES & MEDICATIONS  --------------------------------------------------------------------------------  Allergies    No Known Allergies    Intolerances      Standing Inpatient Medications  chlorhexidine 0.12% Liquid 15 milliLiter(s) Oral Mucosa every 12 hours  chlorhexidine 4% Liquid 1 Application(s) Topical <User Schedule>  chlorhexidine 4% Liquid 1 Application(s) Topical <User Schedule>  dexMEDEtomidine Infusion 0.1 MICROgram(s)/kG/Hr IV Continuous <Continuous>  meropenem  IVPB 1000 milliGRAM(s) IV Intermittent every 8 hours  norepinephrine Infusion 1 MICROgram(s)/kG/Min IV Continuous <Continuous>  pantoprazole  Injectable 40 milliGRAM(s) IV Push every 12 hours  propofol Infusion 5 MICROgram(s)/kG/Min IV Continuous <Continuous>  prothrombin complex concentrate IVPB (KCENTRA) 2000 International Unit(s) IV Intermittent once  sodium bicarbonate  Infusion 0.325 mEq/kG/Hr IV Continuous <Continuous>  vasopressin Infusion 0.01 Unit(s)/Min IV Continuous <Continuous>    PRN Inpatient Medications  sodium chloride 0.9% lock flush 10 milliLiter(s) IV Push every 1 hour PRN      REVIEW OF SYSTEMS  --------------------------------------------------------------------------------  See HPI    VITALS/PHYSICAL EXAM  --------------------------------------------------------------------------------  T(C): 36.9 (08-20-23 @ 11:00), Max: 36.9 (08-20-23 @ 11:00)  HR: 96 (08-20-23 @ 11:15) (88 - 126)  BP: --  RR: 30 (08-20-23 @ 11:15) (30 - 30)  SpO2: 95% (08-20-23 @ 11:15) (95% - 100%)  Wt(kg): --  Height (cm): 165.1 (08-19-23 @ 09:00)  Weight (kg): 69.3 (08-19-23 @ 09:00)  BMI (kg/m2): 25.4 (08-19-23 @ 09:00)  BSA (m2): 1.76 (08-19-23 @ 09:00)      08-19-23 @ 07:01  -  08-20-23 @ 07:00  --------------------------------------------------------  IN: 7529.1 mL / OUT: 4030 mL / NET: 3499.1 mL    08-20-23 @ 07:01  -  08-20-23 @ 11:21  --------------------------------------------------------  IN: 855 mL / OUT: 45 mL / NET: 810 mL    Physical Exam:  Gen: Ill-appearing  HEENT: +ET tube  Pulm: Mechanical breath sounds BL  CV: S1S2  Abd: Appears distended  Ext: +BL LE edema BL  Neuro: Sedated  Skin: Warm and dry    LABS/STUDIES  --------------------------------------------------------------------------------              7.5    2.65  >-----------<  18       [08-20-23 @ 00:20]              23.2     132  |  94  |  17  ----------------------------<  224      [08-20-23 @ 00:20]  3.4   |  <10  |  0.96        Ca     7.4     [08-20-23 @ 00:20]      Mg     1.5     [08-19-23 @ 19:18]      Phos  1.9     [08-19-23 @ 19:18]    TPro  4.4  /  Alb  2.6  /  TBili  5.5  /  DBili  x   /  AST  51  /  ALT  15  /  AlkPhos  68  [08-20-23 @ 00:20]    PT/INR: PT 42.2 , INR 3.99       [08-20-23 @ 00:20]  PTT: 72.4       [08-20-23 @ 00:20]          [08-19-23 @ 19:18]    Creatinine Trend:  SCr 0.96 [08-20 @ 00:20]  SCr 0.92 [08-19 @ 19:18]  SCr 0.90 [08-19 @ 09:26]  SCr 0.90 [08-19 @ 03:07]  SCr 0.92 [08-19 @ 00:17]    Urine Creatinine 37      [08-19-23 @ 10:07]  Urine Sodium 21      [08-19-23 @ 10:07]

## 2023-08-20 NOTE — PROGRESS NOTE ADULT - ASSESSMENT
73 female h/o pancreatic ca with mets on chemo, hypothryroid, dvt/pe on eliquis, now admitted to micu with septic shock    metastatic pancreatic ca  septic shock  ascites  bacteremia  covid19  anuric renal failure  pancytopenia  coagulapathy    vent support  pressors  s/p paracentesis  plan for CVVHD as tolerated  broad spectrum abx  f/u cult and sens  transfusion of blood products as need    d/w family at bedside   DNR    prognosis poor    d/w micu team, renal, cards, heme/onc  mngt as per micu      Advanced care planning was discussed with patient and family.  Advanced care planning forms were reviewed and discussed as appropriate.  Differential diagnosis and plan of care discussed with patient after the evaluation.   Pain assessed and judicious use of narcotics when appropriate was discussed.  Importance of Fall prevention discussed.  Counseling on Smoking and Alcohol cessation was offered when appropriate.  Counseling on Diet, exercise, and medication compliance was done.       Approx 60 minutes spent.

## 2023-08-20 NOTE — PROCEDURE NOTE - NSPROCDETAILS_GEN_ALL_CORE
location identified, draped/prepped, sterile technique used, needle inserted/introduced/positive blood return obtained via catheter/connected to a pressurized flush line/sutured in place/hemostasis with direct pressure, dressing applied/Seldinger technique/all materials/supplies accounted for at end of procedure
audible air bolus/placement confirmed by auscultation/orogastric/gastric secretions aspirated, placement confirmed/connected to suction
guidewire recovered/lumen(s) aspirated and flushed/sterile dressing applied/sterile technique, catheter placed/ultrasound guidance with use of sterile gel and probe cove
guidewire recovered/lumen(s) aspirated and flushed/sterile dressing applied/sterile technique, catheter placed/ultrasound guidance with use of sterile gel and probe cove
location identified, sterile technique used, catheter introduced, fluid drained

## 2023-08-20 NOTE — PROGRESS NOTE ADULT - SUBJECTIVE AND OBJECTIVE BOX
INTERVAL HPI/OVERNIGHT EVENTS:    SUBJECTIVE: Patient seen and examined at bedside.     CONSTITUTIONAL: No weakness, fevers or chills  EYES/ENT: No visual changes;  No vertigo or throat pain   NECK: No pain or stiffness  RESPIRATORY: No cough, wheezing, hemoptysis; No shortness of breath  CARDIOVASCULAR: No chest pain or palpitations  GASTROINTESTINAL: No abdominal or epigastric pain. No nausea, vomiting, or hematemesis; No diarrhea or constipation. No melena or hematochezia.  GENITOURINARY: No dysuria, frequency or hematuria  NEUROLOGICAL: No numbness or weakness  SKIN: No itching, rashes    OBJECTIVE:    VITAL SIGNS:  ICU Vital Signs Last 24 Hrs  T(C): 35.4 (20 Aug 2023 06:00), Max: 36.7 (19 Aug 2023 12:00)  T(F): 95.7 (20 Aug 2023 06:00), Max: 98.1 (19 Aug 2023 12:00)  HR: 90 (20 Aug 2023 06:45) (88 - 126)  BP: --  BP(mean): --  ABP: 112/47 (20 Aug 2023 06:45) (94/45 - 123/53)  ABP(mean): 73 (20 Aug 2023 06:45) (63 - 83)  RR: 30 (20 Aug 2023 06:45) (27 - 30)  SpO2: 100% (20 Aug 2023 06:45) (96% - 100%)    O2 Parameters below as of 19 Aug 2023 20:00  Patient On (Oxygen Delivery Method): ventilator    O2 Concentration (%): 70      Mode: AC/ CMV (Assist Control/ Continuous Mandatory Ventilation), RR (machine): 30, TV (machine): 400, FiO2: 50, PEEP: 5, ITime: 1, MAP: 11, PIP: 29    08-19 @ 07:01  -  08-20 @ 07:00  --------------------------------------------------------  IN: 7479.7 mL / OUT: 4025 mL / NET: 3454.7 mL      CAPILLARY BLOOD GLUCOSE      POCT Blood Glucose.: 206 mg/dL (20 Aug 2023 05:49)      PHYSICAL EXAM:    General: NAD  HEENT: NC/AT; PERRL, clear conjunctiva  Neck: supple  Respiratory: CTA b/l  Cardiovascular: +S1/S2; RRR  Abdomen: soft, NT/ND; +BS x4  Extremities: WWP, 2+ peripheral pulses b/l; no LE edema  Skin: normal color and turgor; no rash  Neurological:    MEDICATIONS:  MEDICATIONS  (STANDING):  chlorhexidine 0.12% Liquid 15 milliLiter(s) Oral Mucosa every 12 hours  chlorhexidine 4% Liquid 1 Application(s) Topical <User Schedule>  chlorhexidine 4% Liquid 1 Application(s) Topical <User Schedule>  dexMEDEtomidine Infusion 0.1 MICROgram(s)/kG/Hr (1.73 mL/Hr) IV Continuous <Continuous>  meropenem  IVPB 1000 milliGRAM(s) IV Intermittent every 8 hours  norepinephrine Infusion 1 MICROgram(s)/kG/Min (65 mL/Hr) IV Continuous <Continuous>  pantoprazole  Injectable 40 milliGRAM(s) IV Push every 12 hours  propofol Infusion 5 MICROgram(s)/kG/Min (1.5 mL/Hr) IV Continuous <Continuous>  sodium bicarbonate  Infusion 0.325 mEq/kG/Hr (150 mL/Hr) IV Continuous <Continuous>  vasopressin Infusion 0.01 Unit(s)/Min (1.5 mL/Hr) IV Continuous <Continuous>    MEDICATIONS  (PRN):  sodium chloride 0.9% lock flush 10 milliLiter(s) IV Push every 1 hour PRN Pre/post blood products, medications, blood draw, and to maintain line patency      ALLERGIES:  Allergies    No Known Allergies    Intolerances        LABS:                        7.5    2.65  )-----------( 18       ( 20 Aug 2023 00:20 )             23.2     08-20    132<L>  |  94<L>  |  17  ----------------------------<  224<H>  3.4<L>   |  <10<LL>  |  0.96    Ca    7.4<L>      20 Aug 2023 00:20  Phos  1.9     08-19  Mg     1.5     08-19    TPro  4.4<L>  /  Alb  2.6<L>  /  TBili  5.5<H>  /  DBili  x   /  AST  51<H>  /  ALT  15  /  AlkPhos  68  08-20    PT/INR - ( 20 Aug 2023 00:20 )   PT: 42.2 sec;   INR: 3.99 ratio         PTT - ( 20 Aug 2023 00:20 )  PTT:72.4 sec  Urinalysis Basic - ( 20 Aug 2023 00:20 )    Color: x / Appearance: x / SG: x / pH: x  Gluc: 224 mg/dL / Ketone: x  / Bili: x / Urobili: x   Blood: x / Protein: x / Nitrite: x   Leuk Esterase: x / RBC: x / WBC x   Sq Epi: x / Non Sq Epi: x / Bacteria: x        RADIOLOGY & ADDITIONAL TESTS: Reviewed. INTERVAL HPI/OVERNIGHT EVENTS:    SUBJECTIVE: Patient seen and examined at bedside. No overnight events. Paracentesis yesterday with 3.3L of fluid drained, ostomy left in place for continuous drainage.    ROS: Unable to assess due to mental status.    OBJECTIVE:    VITAL SIGNS:  ICU Vital Signs Last 24 Hrs  T(C): 35.4 (20 Aug 2023 06:00), Max: 36.7 (19 Aug 2023 12:00)  T(F): 95.7 (20 Aug 2023 06:00), Max: 98.1 (19 Aug 2023 12:00)  HR: 90 (20 Aug 2023 06:45) (88 - 126)  BP: --  BP(mean): --  ABP: 112/47 (20 Aug 2023 06:45) (94/45 - 123/53)  ABP(mean): 73 (20 Aug 2023 06:45) (63 - 83)  RR: 30 (20 Aug 2023 06:45) (27 - 30)  SpO2: 100% (20 Aug 2023 06:45) (96% - 100%)    O2 Parameters below as of 19 Aug 2023 20:00  Patient On (Oxygen Delivery Method): ventilator    O2 Concentration (%): 70    Mode: AC/ CMV (Assist Control/ Continuous Mandatory Ventilation), RR (machine): 30, TV (machine): 400, FiO2: 50, PEEP: 5, ITime: 1, MAP: 11, PIP: 29    08-19 @ 07:01  -  08-20 @ 07:00  --------------------------------------------------------  IN: 7479.7 mL / OUT: 4025 mL / NET: 3454.7 mL    CAPILLARY BLOOD GLUCOSE  POCT Blood Glucose.: 206 mg/dL (20 Aug 2023 05:49)    PHYSICAL EXAM:  GENERAL: intubated, sedated  HEAD:  Atraumatic, Normocephalic  EYES: EOMI, PERRLA, conjunctiva and sclera clear  NECK: Supple, No JVD  CHEST/LUNG: Clear to auscultation bilaterally; No wheeze  HEART: S1, S2; No murmurs, rubs, or gallops  ABDOMEN: Soft, Nontender, Nondistended; Bowel sounds present  EXTREMITIES:  2+ Peripheral Pulses, No clubbing, cyanosis. + edema  NEUROLOGY: sedated  SKIN: No rashes or lesions    MEDICATIONS:  MEDICATIONS  (STANDING):  chlorhexidine 0.12% Liquid 15 milliLiter(s) Oral Mucosa every 12 hours  chlorhexidine 4% Liquid 1 Application(s) Topical <User Schedule>  chlorhexidine 4% Liquid 1 Application(s) Topical <User Schedule>  dexMEDEtomidine Infusion 0.1 MICROgram(s)/kG/Hr (1.73 mL/Hr) IV Continuous <Continuous>  meropenem  IVPB 1000 milliGRAM(s) IV Intermittent every 8 hours  norepinephrine Infusion 1 MICROgram(s)/kG/Min (65 mL/Hr) IV Continuous <Continuous>  pantoprazole  Injectable 40 milliGRAM(s) IV Push every 12 hours  propofol Infusion 5 MICROgram(s)/kG/Min (1.5 mL/Hr) IV Continuous <Continuous>  sodium bicarbonate  Infusion 0.325 mEq/kG/Hr (150 mL/Hr) IV Continuous <Continuous>  vasopressin Infusion 0.01 Unit(s)/Min (1.5 mL/Hr) IV Continuous <Continuous>    MEDICATIONS  (PRN):  sodium chloride 0.9% lock flush 10 milliLiter(s) IV Push every 1 hour PRN Pre/post blood products, medications, blood draw, and to maintain line patency    ALLERGIES:  Allergies  No Known Allergies    Intolerances    LABS:                        7.5    2.65  )-----------( 18       ( 20 Aug 2023 00:20 )             23.2     08-20    132<L>  |  94<L>  |  17  ----------------------------<  224<H>  3.4<L>   |  <10<LL>  |  0.96    Ca    7.4<L>      20 Aug 2023 00:20  Phos  1.9     08-19  Mg     1.5     08-19    TPro  4.4<L>  /  Alb  2.6<L>  /  TBili  5.5<H>  /  DBili  x   /  AST  51<H>  /  ALT  15  /  AlkPhos  68  08-20    PT/INR - ( 20 Aug 2023 00:20 )   PT: 42.2 sec;   INR: 3.99 ratio      PTT - ( 20 Aug 2023 00:20 )  PTT:72.4 sec  Urinalysis Basic - ( 20 Aug 2023 00:20 )    Color: x / Appearance: x / SG: x / pH: x  Gluc: 224 mg/dL / Ketone: x  / Bili: x / Urobili: x   Blood: x / Protein: x / Nitrite: x   Leuk Esterase: x / RBC: x / WBC x   Sq Epi: x / Non Sq Epi: x / Bacteria: x    RADIOLOGY & ADDITIONAL TESTS: Reviewed.

## 2023-08-20 NOTE — PROCEDURE NOTE - NSINDICATIONS_GEN_A_CORE
anuria/oliguria/ascites/hemodynamic instability
drainage/feeds
critical illness/emergency venous access/hypertonic/irritant infusion/venous access
critical patient/monitoring purposes
dialysis/CRRT

## 2023-08-20 NOTE — PROGRESS NOTE ADULT - SUBJECTIVE AND OBJECTIVE BOX
AMY JAIMES  73y Female  MRN:[id number]    Patient is a 73y old  Female who presents with a chief complaint of Lethargy (19 Aug 2023 20:57)    HPI: pt well known to me  73F with metastatic pancreatic cancer (mets to liver and omentum, on chemo, last on 8/14-8/15), PVT, R gastroc DVT/?PE (eliquis d/c'ed for thrombocytopenia), hypothyroidism, recent admission in 7/2023 for melena presenting from home with lethargy and fevers. History obtained mostly from son at bedside. Per son, patient has been having bright red bloody stools for the past week, which was attributed to thrombocytopenia. She was noted to be lethargic today with low grade fever at home, which prompted them to bring her.    In ED, patient was hypotensive & febrile to 103, was given 2L IVF but persistently hypotensive requiring pressor. Labs notable for neutropenia with severe lactic acidosis. Admitted to MICU for likely septic shock requiring pressor.  (19 Aug 2023 01:33)      Patient seen and evaluated at bedside. No acute events overnight except as noted.    Interval HPI: remains intubated on pressors    PAST MEDICAL & SURGICAL HISTORY:  Essential hypertension      Hyperlipidemia      Pancreatic cancer      Hypothyroidism      No pertinent past surgical history          REVIEW OF SYSTEMS:  as per hpi     VITALS:   ICU Vital Signs Last 24 Hrs  T(C): 36.7 (20 Aug 2023 16:00), Max: 36.9 (20 Aug 2023 11:00)  T(F): 98.1 (20 Aug 2023 16:00), Max: 98.4 (20 Aug 2023 11:00)  HR: 97 (20 Aug 2023 16:15) (88 - 119)  BP: --  BP(mean): --  ABP: 113/51 (20 Aug 2023 16:15) (96/36 - 123/53)  ABP(mean): 75 (20 Aug 2023 16:15) (60 - 83)  RR: 30 (20 Aug 2023 16:15) (30 - 30)  SpO2: 94% (20 Aug 2023 16:15) (93% - 100%)    O2 Parameters below as of 20 Aug 2023 08:00  Patient On (Oxygen Delivery Method): ventilator    O2 Concentration (%): 50      PHYSICAL EXAM:  GENERAL: intubated, sedated  HEAD:  Atraumatic, Normocephalic  EYES: EOMI, PERRLA, conjunctiva and sclera clear  NECK: Supple, No JVD  CHEST/LUNG: Clear to auscultation bilaterally; No wheeze  HEART: S1, S2; No murmurs, rubs, or gallops  ABDOMEN: Soft, Nontender, Nondistended; Bowel sounds present  EXTREMITIES:  2+ Peripheral Pulses, No clubbing, cyanosis. + edema  NEUROLOGY: sedated  SKIN: No rashes or lesions    Consultant(s) Notes Reviewed:  [x ] YES  [ ] NO  Care Discussed with Consultants/Other Providers [ x] YES  [ ] NO    MEDS:   MEDICATIONS  (STANDING):  chlorhexidine 0.12% Liquid 15 milliLiter(s) Oral Mucosa every 12 hours  chlorhexidine 4% Liquid 1 Application(s) Topical <User Schedule>  chlorhexidine 4% Liquid 1 Application(s) Topical <User Schedule>  chlorhexidine 4% Liquid 1 Application(s) Topical <User Schedule>  CRRT Treatment    <Continuous>  dexMEDEtomidine Infusion 0.1 MICROgram(s)/kG/Hr (1.73 mL/Hr) IV Continuous <Continuous>  meropenem  IVPB 1000 milliGRAM(s) IV Intermittent every 8 hours  norepinephrine Infusion 1 MICROgram(s)/kG/Min (65 mL/Hr) IV Continuous <Continuous>  pantoprazole  Injectable 40 milliGRAM(s) IV Push every 12 hours  phenylephrine    Infusion 0.1 MICROgram(s)/kG/Min (2.6 mL/Hr) IV Continuous <Continuous>  Phoxillum Filtration BK 4 / 2.5 5000 milliLiter(s) (1800 mL/Hr) CRRT <Continuous>  PrismaSOL Filtration BGK 4 / 2.5 5000 milliLiter(s) (1600 mL/Hr) CRRT <Continuous>  PrismaSOL Filtration BGK 4 / 2.5 5000 milliLiter(s) (200 mL/Hr) CRRT <Continuous>  propofol Infusion 5 MICROgram(s)/kG/Min (1.5 mL/Hr) IV Continuous <Continuous>  vasopressin Infusion 0.01 Unit(s)/Min (1.5 mL/Hr) IV Continuous <Continuous>    MEDICATIONS  (PRN):  sodium chloride 0.9% lock flush 10 milliLiter(s) IV Push every 1 hour PRN Pre/post blood products, medications, blood draw, and to maintain line patency  sodium chloride 0.9% lock flush 10 milliLiter(s) IV Push every 1 hour PRN Pre/post blood products, medications, blood draw, and to maintain line patency      ALLERGIES:  No Known Allergies      LABS:                                          8.4    4.77  )-----------( 11       ( 20 Aug 2023 11:39 )             25.2   08-20    132<L>  |  94<L>  |  17  ----------------------------<  224<H>  3.4<L>   |  <10<LL>  |  0.96    Ca    7.4<L>      20 Aug 2023 00:20  Phos  1.9     08-19  Mg     1.5     08-19    TPro  4.4<L>  /  Alb  2.6<L>  /  TBili  5.5<H>  /  DBili  x   /  AST  51<H>  /  ALT  15  /  AlkPhos  68  08-20         cultures: Culture Results:   Growth in aerobic bottle: Gram Negative Rods  Growth in anaerobic bottle: Gram Negative Rods  Direct identification is available within approximately 3-5  hours either by Blood Panel Multiplexed PCR or Direct  MALDI-TOF. Details: [email or web address] (08-18 @ 20:50)  Culture Results:   Growth in aerobic bottle: Gram Negative Rods  Growth in anaerobic bottle: Gram Negative Rods (08-18 @ 20:42)       < from: CT Angio Chest PE Protocol w/ IV Cont (08.19.23 @ 02:46) >  IMPRESSION:  1.   Large volume ascites.  2.   Interval increase in size in hepatic metastases.  3.   Nodular omental opacities compatible with peritoneal carcinomatosis.  4.   Small right pleural effusion.  5.   Consolidation in the lung bases.  6.   Chronically thrombosed portal vein.  7.  No pulmonary embolism    --- End of Report ---      < end of copied text >  < from: CT Head No Cont (08.19.23 @ 02:45) >  IMPRESSION:    No evidence of acute intracranial hemorrhage, midline shift or CT   evidence of acute territorial infarct.    If the patient's symptoms persist, consider short interval follow-up head   CT or brain MRI if there are no MRI contraindications.    --- End of Report ---      < end of copied text >

## 2023-08-20 NOTE — PROGRESS NOTE ADULT - SUBJECTIVE AND OBJECTIVE BOX
Date of Service: 08-20-23 @ 07:27           CARDIOLOGY     PROGRESS  NOTE   ________________________________________________    CHIEF COMPLAINT:Patient is a 73y old  Female who presents with a chief complaint of Lethargy (19 Aug 2023 20:57)  sedated on vent  	  REVIEW OF SYSTEMS:  CONSTITUTIONAL: No fever, weight loss, or fatigue  EYES: No eye pain, visual disturbances, or discharge  ENT:  No difficulty hearing, tinnitus, vertigo; No sinus or throat pain  NECK: No pain or stiffness  RESPIRATORY: No cough, wheezing, chills or hemoptysis;  Shortness of Breath  CARDIOVASCULAR: No chest pain, palpitations, passing out, dizziness, or leg swelling  GASTROINTESTINAL: No abdominal or epigastric pain. No nausea, vomiting, or hematemesis; No diarrhea or constipation. No melena or hematochezia.  GENITOURINARY: No dysuria, frequency, hematuria, or incontinence  NEUROLOGICAL: No headaches, memory loss, loss of strength, numbness, or tremors  SKIN: No itching, burning, rashes, or lesions   LYMPH Nodes: No enlarged glands  ENDOCRINE: No heat or cold intolerance; No hair loss  MUSCULOSKELETAL: No joint pain or swelling; No muscle, back, or extremity pain  PSYCHIATRIC: No depression, anxiety, mood swings, or difficulty sleeping  HEME/LYMPH: No easy bruising, or bleeding gums  ALLERGY AND IMMUNOLOGIC: No hives or eczema	    [ ] All others negative	  [x ] Unable to obtain    PHYSICAL EXAM:  T(C): 35.4 (08-20-23 @ 06:00), Max: 36.7 (08-19-23 @ 12:00)  HR: 90 (08-20-23 @ 06:45) (88 - 126)  BP: --  RR: 30 (08-20-23 @ 06:45) (27 - 30)  SpO2: 100% (08-20-23 @ 06:45) (96% - 100%)  Wt(kg): --  I&O's Summary    19 Aug 2023 07:01  -  20 Aug 2023 07:00  --------------------------------------------------------  IN: 7479.7 mL / OUT: 4025 mL / NET: 3454.7 mL        Appearance: Normal/ jaundice	  HEENT:   Normal oral mucosa, PERRL, EOMI	  Lymphatic: No lymphadenopathy  Cardiovascular: Normal S1 S2, No JVD, + murmurs, No edema  Respiratory: rhonchi  Psychiatry: A & O x 3, Mood & affect appropriate  Gastrointestinal:  Soft, Non-tender, + BS, ascites  Skin: No rashes, No ecchymoses, No cyanosis	  Neurologic: Non-focal  Extremities: Normal range of motion, No clubbing, cyanosis or edema  Vascular: Peripheral pulses palpable 2+ bilaterally    MEDICATIONS  (STANDING):  chlorhexidine 0.12% Liquid 15 milliLiter(s) Oral Mucosa every 12 hours  chlorhexidine 4% Liquid 1 Application(s) Topical <User Schedule>  chlorhexidine 4% Liquid 1 Application(s) Topical <User Schedule>  dexMEDEtomidine Infusion 0.1 MICROgram(s)/kG/Hr (1.73 mL/Hr) IV Continuous <Continuous>  meropenem  IVPB 1000 milliGRAM(s) IV Intermittent every 8 hours  norepinephrine Infusion 1 MICROgram(s)/kG/Min (65 mL/Hr) IV Continuous <Continuous>  pantoprazole  Injectable 40 milliGRAM(s) IV Push every 12 hours  propofol Infusion 5 MICROgram(s)/kG/Min (1.5 mL/Hr) IV Continuous <Continuous>  sodium bicarbonate  Infusion 0.325 mEq/kG/Hr (150 mL/Hr) IV Continuous <Continuous>  vasopressin Infusion 0.01 Unit(s)/Min (1.5 mL/Hr) IV Continuous <Continuous>      TELEMETRY: 	    ECG:  	  RADIOLOGY:  OTHER: 	  	  LABS:	 	    CARDIAC MARKERS:                            7.5    2.65  )-----------( 18       ( 20 Aug 2023 00:20 )             23.2     08-20    132<L>  |  94<L>  |  17  ----------------------------<  224<H>  3.4<L>   |  <10<LL>  |  0.96    Ca    7.4<L>      20 Aug 2023 00:20  Phos  1.9     08-19  Mg     1.5     08-19    TPro  4.4<L>  /  Alb  2.6<L>  /  TBili  5.5<H>  /  DBili  x   /  AST  51<H>  /  ALT  15  /  AlkPhos  68  08-20    proBNP:   Lipid Profile:   HgA1c:   TSH:   PT/INR - ( 20 Aug 2023 00:20 )   PT: 42.2 sec;   INR: 3.99 ratio         PTT - ( 20 Aug 2023 00:20 )  PTT:72.4 sec    - cont analgosedation, titrate to goal rass  - cont vent support, MV increased given significant acidosis  - on pressors for septic shock, maintain map>65  - no a/c for DVT given low plt and high risk of bleeding given her accelerated fibrinolytic state  - s/p cryo and plt and prbc tx  - plan for paracentesis for dx and therapeutic purpose (r/o sbp and decrease IAH as bladder pressure 16)  - monitor uo/lytes, appreciate Renal reccs and will monitor if needs HD  - cont abx w/ carbapenem and s/p vanco x1  - check fungitell  - covid + ve but would not use RDV given acute liver dysfxn and hold off steroids given likely not covid PNA given CT picture that appears to have basilar atelectasis and not true covid pna (w/ ggo)  - cont dextrose gtt for hypoglycemia (sepsis and liver decompensation related)  DVT ppx SCD      Assessment and plan  ---------------------------  73 female h/o metastatic pancreatic ca on chemo, dvt/pe previously on eliquis d/c'd yesterday due to thrombocytopenia, hypothyroid, here with BRBPR with recent admission for metabolic encephalopathy and thrombocytopenia. GIB x 1 week with increasing somnolence.  pt with metastatic pancreatic cA s/p chemo with hypotension and change of mental status and lethargy  sepsis on broad spectrum abx  sedated on vent  continue pressor  abdominal paracentesis as tolerated large ascites  abx  spoke to the family, prognosis is poor

## 2023-08-20 NOTE — PROGRESS NOTE ADULT - ASSESSMENT
73F with metastatic pancreatic cancer (mets to liver and omentum, last chemo on 8/14-8/15), PVT, R gastroc DVT/?PE (eliquis d/c'ed for thrombocytopenia), hypothyroidism, recent admission in 7/2023 for melena presenting from home with lethargy and fever. Septic on presentation in setting of neutropenia. Admitted to MICU for shock state requiring pressor.     NEUROLOGIC  #Metabolic encephalopathy   #Intubated & sedated  - baseline mental status AAOx3  - presenting with lethargy, progressively worsened iso acidemia  - propofol gtt, RASS goal 0 to -1  - Off sedation this AM (8/20)     CARDIOVASCULAR  #Shock state  - hypotensive to SBP 50, s/p 2L in ED  - TTE 7/24/23: EF 61%, normal LV and RV systolic function  - likely in setting of severe sepsis, however, also component of decreased preload from increased intra-abdominal pressure  - continue levo gtt, wean as tolerated  - will require urgent paracentesis to reduce intraabdominal pressure    PULMONARY  #Intubated and ventilated  #Acute hypoxic and hypercarbic respiratory failure  - intubated on 8/19 for airway protection iso encephalopathy   - settings: 400/24/5/100  - monitor serial gas, titrate as needed  -decompress abdomen    GI  #BRBPR  - has been having bloody diarrhea for the past week per family  - H/H stable  - continue to monitor  - f/u CT AP    RENAL  #TESFAYE  - Cr bump from 0.5 -> 0.9  - possibly iso elevated intra-abdominal pressures  - monitor UOP on aparicio catheter    HEME/ONC  #metastatic pancreatic cancer  #coagulopathy  #ascites  - recently diagnosed, follows Dr. Eduardo Martinez in [facility]  - on palliative chemotherapy to reduce size & relieve compression  - last chemotherapy on 8/14-15   - urgent diagnostic & therapeutic paracentesis   - 1U plasma pending procedures    ID  #Sepsis  #Neutropenic Fever  - UA positive  - CXR with small bilateral pleural effusions  - f/u CT C/A/P  - f/u RVP, blood and urine cultures  - continue zosyn and vancomycin     ENDOCRINE  - no active issues  - monitor FS q6h    ETHICS  - full code  -sons are NOK 73F with metastatic pancreatic cancer (mets to liver and omentum, last chemo on 8/14-8/15), PVT, R gastroc DVT/?PE (eliquis d/c'ed for thrombocytopenia), hypothyroidism, recent admission in 7/2023 for melena presenting from home with lethargy and fever. Septic on presentation in setting of neutropenia. Admitted to MICU for shock state requiring pressor.     NEUROLOGIC  #Metabolic encephalopathy   #Intubated & sedated  - baseline mental status AAOx3  - presenting with lethargy, progressively worsened iso acidemia  - propofol gtt, RASS goal 0 to -1  - Off sedation this AM (8/20)     CARDIOVASCULAR  #Shock state  - hypotensive to SBP 50, s/p 2L in ED  - TTE 7/24/23: EF 61%, normal LV and RV systolic function  - likely in setting of severe sepsis, however, also component of decreased preload from increased intra-abdominal pressure  - continue levo gtt, wean as tolerated  - Paracentesis (8/19) drained 3.3L, ostomy bag continuously draining.    PULMONARY  #Intubated and ventilated  #Acute hypoxic and hypercarbic respiratory failure  - intubated on 8/19 for airway protection iso encephalopathy   - settings: 400/24/5/100  - monitor serial gas, titrate as needed  - decompress abdomen    GI  #BRBPR  - has been having bloody diarrhea for the past week per family  - H/H stable  - continue to monitor  - f/u CT AP    RENAL  #TESFAYE  - Cr bump from 0.5 -> 0.9  - possibly iso elevated intra-abdominal pressures  - monitor UOP on aparicio catheter    HEME/ONC  #metastatic pancreatic cancer  #coagulopathy  #ascites  - recently diagnosed, follows Dr. Eduardo Martinez in [facility]  - on palliative chemotherapy to reduce size & relieve compression  - last chemotherapy on 8/14-15   - urgent diagnostic & therapeutic paracentesis   - 1U plasma pending procedures    ID  #Sepsis  #Neutropenic Fever  - UA positive  - CXR with small bilateral pleural effusions  - f/u CT C/A/P  - f/u RVP, blood and urine cultures  - continue zosyn and vancomycin     ENDOCRINE  - no active issues  - monitor FS q6h    ETHICS  - full code  -sons are NOK 73F with metastatic pancreatic cancer (mets to liver and omentum, last chemo on 8/14-8/15), PVT, R gastroc DVT/?PE (eliquis d/c'ed for thrombocytopenia), hypothyroidism, recent admission in 7/2023 for melena presenting from home with lethargy and fever. Septic on presentation in setting of neutropenia. Admitted to MICU for shock state requiring pressor. Plan for initiation of CRRT today.    NEUROLOGIC  #Metabolic encephalopathy   #Intubated & sedated  - baseline mental status AAOx3  - presenting with lethargy, progressively worsened iso acidemia  - propofol gtt, RASS goal 0 to -1  - Off sedation this AM (8/20)     CARDIOVASCULAR  #Shock state  - hypotensive to SBP 50, s/p 2L in ED  - TTE 7/24/23: EF 61%, normal LV and RV systolic function  - likely in setting of severe sepsis, however, also component of decreased preload from increased intra-abdominal pressure  - continue levo gtt, wean as tolerated  - Paracentesis (8/19) drained 3.3L, ostomy bag continuously draining.    PULMONARY  #Intubated and ventilated  #Acute hypoxic and hypercarbic respiratory failure  - intubated on 8/19 for airway protection iso encephalopathy   - settings: 400/24/5/100  - monitor serial gas, titrate as needed  - decompress abdomen    GI  #BRBPR  - has been having bloody diarrhea for the past week per family  - H/H stable  - CT AP: Showing large volume ascites (pre-paracentesis), interval increase in hepatic metastasis, nodular omental opacities consistent with peritoneal carcinomatosis, small right pleural effusion + lung consolidation, and a chronically thrombosed portal vein.  - continue to monitor    RENAL  #TESFAYE  - Cr bump from 0.5 -> 0.96  - possibly iso elevated intra-abdominal pressures  - monitor UOP on aparicio catheter  - CRRT today due to acidemia and worsening UOP.    HEME/ONC  #metastatic pancreatic cancer  #coagulopathy  #ascites  - recently diagnosed, follows Dr. Eduardo Martinez in [facility]  - on palliative chemotherapy to reduce size & relieve compression  - last chemotherapy on 8/14-15   - urgent diagnostic & therapeutic paracentesis   - 1U plasma pending procedures    ID  #Sepsis  #Neutropenic Fever  - UA positive  - CXR with small bilateral pleural effusions  - f/u CT C/A/P  - f/u RVP, blood and urine cultures  - continue zosyn and vancomycin     ENDOCRINE  - no active issues  - monitor FS q6h    ETHICS  - full code  -sons are NOK

## 2023-08-20 NOTE — PROCEDURE NOTE - NSPOSTPRCRAD_GEN_A_CORE
central line located in the superior vena cava
central line located in the superior vena cava/no pneumothorax

## 2023-08-20 NOTE — PROCEDURE NOTE - NSPROCNAME_GEN_A_CORE
Arterial Puncture/Cannulation
Central Line Insertion
Gastric Intubation/Gastric Lavage
Paracentesis
Central Line Insertion

## 2023-08-21 NOTE — PROGRESS NOTE ADULT - ASSESSMENT
A/P: 73 female h/o pancreatic ca with mets on chemo, hypothryroid, dvt/pe on eliquis, now admitted to micu with septic shock    Wound Consult requested to assist w/ management of Fluid over load w/ blistering  Incontinence Dermatitis of Buttocks      BLE/ BUE Adaptic dressing and wrap in ATTENDS purple pads  Buttocks/ Sacrum Arnaud BID and prn soiling        Continue w/ attends under pads and Pericare w/ aparicio cath maintenance as per protocol  BLE elevation & Compression  Abx per MICU  Moisturize intact skin w/ SWEEN cream BID  Nutrition Consult for optimization       encourage high quality protein, gavin/ prosource, MVI & Vit C to promote wound healing  Continue turning and positioning w/ offloading assistive devices as per protocol  Waffle Cushion to chair when oob to chair  Continue w/ low air loss pressure redistribution bed surface   Pt will need Group 2 mattress on hospital bed and ROHO cushion for wheel chair upon discharge home  Care as per micu, will follow w/ you/ remain available as requested  Upon discharge f/u as outpatient at [facility] [address] [place] [address] [phone]  Seen w/ attng & RN and D/w team  Thank you for this consult  Sharon El PA-C CWS [id number]  Nights/ Weekends/ Holidays please call:  General Surgery Consult pager [phone] for emergencies  Wound PT for multilayer leg wrapping or VAC issues ([phone])   I spent 55 minutes face to face w/ this pt of which more than 50% of the time was spent counseling & coordinating care of this pt.

## 2023-08-21 NOTE — DIETITIAN INITIAL EVALUATION ADULT - ORAL INTAKE PTA/DIET HISTORY
Unable to obtain subjective information at this time, pt intubated/sedated. NKFA or intolerances noted per chart. Per previous RD notes, pt follows Kosher diet.

## 2023-08-21 NOTE — CONSULT NOTE ADULT - NS ATTEND AMEND GEN_ALL_CORE FT
Pt seen and examined with ACP.  Assessment and plan reviewed and discussed.  Agree with above.    Status of wounds and treatment recommendations d/w  pt's son at bedside.  All questions answered.   Son expressed understanding.     I spent 55  minutes face to face w/ this pt of which more than 50% of the time was spent counseling & coordinating care of this pt.

## 2023-08-21 NOTE — PROGRESS NOTE ADULT - SUBJECTIVE AND OBJECTIVE BOX
[facility] Division of Kidney Diseases & Hypertension  FOLLOW UP NOTE  [phone]--------------------------------------------------------------------------------    HPI: 74 yo F with h/o metastatic pancreatic cancer (mets to liver, and omentum), DVT, and hypothyroidism currently admitted to MICU for septic shock in the setting of neutropenia. Pt. being seen for metabolic acidosis/lactic acidosis.    24 hour events/subjective: Pt. was seen and evaluated in the MICU. Pt. is intubated/sedated and requiring IV vasopressor support. Unable to provide ROS. CRRT tubing noted to be clotted during rounds this morning.      PAST HISTORY  --------------------------------------------------------------------------------  No significant changes to PMH, PSH, FHx, SHx, unless otherwise noted    ALLERGIES & MEDICATIONS  --------------------------------------------------------------------------------  Allergies    No Known Allergies    Intolerances      Standing Inpatient Medications  cefTRIAXone   IVPB 2000 milliGRAM(s) IV Intermittent every 24 hours  chlorhexidine 0.12% Liquid 15 milliLiter(s) Oral Mucosa every 12 hours  chlorhexidine 4% Liquid 1 Application(s) Topical <User Schedule>  chlorhexidine 4% Liquid 1 Application(s) Topical <User Schedule>  chlorhexidine 4% Liquid 1 Application(s) Topical <User Schedule>  CRRT Treatment    <Continuous>  dexMEDEtomidine Infusion 0.1 MICROgram(s)/kG/Hr IV Continuous <Continuous>  dextrose 10%. 1000 milliLiter(s) IV Continuous <Continuous>  norepinephrine Infusion 1 MICROgram(s)/kG/Min IV Continuous <Continuous>  pantoprazole  Injectable 40 milliGRAM(s) IV Push every 12 hours  phenylephrine    Infusion 0.1 MICROgram(s)/kG/Min IV Continuous <Continuous>  Phoxillum Filtration BK 4 / 2.5 5000 milliLiter(s) CRRT <Continuous>  PrismaSOL Filtration BGK 4 / 2.5 5000 milliLiter(s) CRRT <Continuous>  PrismaSOL Filtration BGK 4 / 2.5 5000 milliLiter(s) CRRT <Continuous>  propofol Infusion 5 MICROgram(s)/kG/Min IV Continuous <Continuous>  thiamine IVPB 200 milliGRAM(s) IV Intermittent <User Schedule>  vasopressin Infusion 0.01 Unit(s)/Min IV Continuous <Continuous>    PRN Inpatient Medications  sodium chloride 0.9% lock flush 10 milliLiter(s) IV Push every 1 hour PRN  sodium chloride 0.9% lock flush 10 milliLiter(s) IV Push every 1 hour PRN      REVIEW OF SYSTEMS  --------------------------------------------------------------------------------  See HPI    VITALS/PHYSICAL EXAM  --------------------------------------------------------------------------------  T(C): 30 (08-21-23 @ 08:00), Max: 36.8 (08-20-23 @ 12:00)  HR: 83 (08-21-23 @ 11:15) (63 - 109)  BP: --  RR: 25 (08-21-23 @ 11:15) (15 - 30)  SpO2: 94% (08-21-23 @ 11:15) (84% - 100%)  Wt(kg): --      08-20-23 @ 07:01  -  08-21-23 @ 07:00  --------------------------------------------------------  IN: 3750.7 mL / OUT: 1754 mL / NET: 1996.7 mL    08-21-23 @ 07:01  -  08-21-23 @ 11:22  --------------------------------------------------------  IN: 289.5 mL / OUT: 105 mL / NET: 184.5 mL      Physical Exam:  Gen: Ill-appearing  HEENT: +ET tube  Pulm: Mechanical breath sounds BL  CV: S1S2  Abd: Soft, non-tender  Ext: +BL LE edema  Neuro: Sedated  Skin: Warm and dry  Vascular access: RIJ non-tunneled HD catheter      LABS/STUDIES  --------------------------------------------------------------------------------              8.8    5.44  >-----------<  16       [08-21-23 @ 05:43]              25.9     135  |  99  |  9   ----------------------------<  100      [08-21-23 @ 05:44]  4.0   |  11  |  0.46        Ca     7.2     [08-21-23 @ 05:44]      Mg     1.9     [08-21-23 @ 05:44]      Phos  2.3     [08-21-23 @ 05:44]    TPro  4.2  /  Alb  2.2  /  TBili  6.6  /  DBili  x   /  AST  38  /  ALT  17  /  AlkPhos  93  [08-21-23 @ 05:44]    PT/INR: PT 17.2 , INR 1.66       [08-20-23 @ 23:56]  PTT: 54.9       [08-20-23 @ 23:56]          [08-19-23 @ 19:18]    Creatinine Trend:  SCr 0.46 [08-21 @ 05:44]  SCr 0.61 [08-20 @ 23:56]  SCr 0.96 [08-20 @ 00:20]  SCr 0.92 [08-19 @ 19:18]  SCr 0.90 [08-19 @ 09:26]    Urine Creatinine 37      [08-19-23 @ 10:07]  Urine Sodium 21      [08-19-23 @ 10:07]

## 2023-08-21 NOTE — DIETITIAN INITIAL EVALUATION ADULT - PERTINENT MEDS FT
MEDICATIONS  (STANDING):  cefTRIAXone   IVPB 2000 milliGRAM(s) IV Intermittent every 24 hours  chlorhexidine 0.12% Liquid 15 milliLiter(s) Oral Mucosa every 12 hours  chlorhexidine 4% Liquid 1 Application(s) Topical <User Schedule>  chlorhexidine 4% Liquid 1 Application(s) Topical <User Schedule>  chlorhexidine 4% Liquid 1 Application(s) Topical <User Schedule>  CRRT Treatment    <Continuous>  dexMEDEtomidine Infusion 0.1 MICROgram(s)/kG/Hr (1.73 mL/Hr) IV Continuous <Continuous>  dextrose 10%. 1000 milliLiter(s) (30 mL/Hr) IV Continuous <Continuous>  norepinephrine Infusion 1 MICROgram(s)/kG/Min (65 mL/Hr) IV Continuous <Continuous>  pantoprazole  Injectable 40 milliGRAM(s) IV Push every 12 hours  phenylephrine    Infusion 0.1 MICROgram(s)/kG/Min (2.6 mL/Hr) IV Continuous <Continuous>  Phoxillum Filtration BK 4 / 2.5 5000 milliLiter(s) (1800 mL/Hr) CRRT <Continuous>  PrismaSOL Filtration BGK 4 / 2.5 5000 milliLiter(s) (1600 mL/Hr) CRRT <Continuous>  PrismaSOL Filtration BGK 4 / 2.5 5000 milliLiter(s) (200 mL/Hr) CRRT <Continuous>  propofol Infusion 5 MICROgram(s)/kG/Min (1.5 mL/Hr) IV Continuous <Continuous>  thiamine IVPB 200 milliGRAM(s) IV Intermittent <User Schedule>  vasopressin Infusion 0.01 Unit(s)/Min (1.5 mL/Hr) IV Continuous <Continuous>    MEDICATIONS  (PRN):  sodium chloride 0.9% lock flush 10 milliLiter(s) IV Push every 1 hour PRN Pre/post blood products, medications, blood draw, and to maintain line patency  sodium chloride 0.9% lock flush 10 milliLiter(s) IV Push every 1 hour PRN Pre/post blood products, medications, blood draw, and to maintain line patency

## 2023-08-21 NOTE — DIETITIAN INITIAL EVALUATION ADULT - NS FNS DIET ORDER
Diet, NPO with Tube Feed:   Tube Feeding Modality: Orogastric  Vital High Protein (VITALHP)  Total Volume for 24 Hours (mL): 720  Continuous  Starting Tube Feed Rate {mL per Hour}: 10  Increase Tube Feed Rate by (mL): 10  Until Goal Tube Feed Rate (mL per Hour): 30  Tube Feed Duration (in Hours): 24  Tube Feed Start Time: 10:30 (08-21-23 @ 10:15)

## 2023-08-21 NOTE — CONSULT NOTE ADULT - SUBJECTIVE AND OBJECTIVE BOX
Wound SURGERY CONSULT NOTE    HPI:  73F with metastatic pancreatic cancer (mets to liver and omentum, on chemo, last on 8/14-8/15), PVT, R gastroc DVT/?PE (eliquis d/c'ed for thrombocytopenia), hypothyroidism, recent admission in 7/2023 for melena presenting from home with lethargy and fevers. History obtained mostly from son at bedside. Per son, patient has been having bright red bloody stools for the past week, which was attributed to thrombocytopenia. She was noted to be lethargic today with low grade fever at home, which prompted them to bring her.    In ED, patient was hypotensive & febrile to 103, was given 2L IVF but persistently hypotensive requiring pressor. Labs notable for neutropenia with severe lactic acidosis. Admitted to MICU for likely septic shock requiring pressor.      Wound consult requested by team to assist w/ management of leg/ buttocks wounds.  Pt unable to c/o pain, drainage, odor, color change,  or worsening swelling. Offloading and pericare initiated upon admission as pt sedentary 2/2 to illness. No h/o bites, scratches, falls, trauma.  Appetite had been so/so prior to admission 2/2 advanved illness.  All questions asked and answered to pt's son's expressed understanding and satisfaction.    Current Diet: Diet, NPO with Tube Feed:   Tube Feeding Modality: Orogastric  Vital AF (VITALAFRTH)  Total Volume for 24 Hours (mL): 1260  Continuous  Starting Tube Feed Rate mL per Hour: 10  Increase Tube Feed Rate by (mL): 10  Until Goal Tube Feed Rate (mL per Hour): 70  Tube Feed Duration (in Hours): 18  Tube Feed Start Time: 10:30 (08-21-23 @ 14:55)      PAST MEDICAL & SURGICAL HISTORY:  Essential hypertension    Hyperlipidemia    Pancreatic cancer    Hypothyroidism    No pertinent past surgical history      REVIEW OF SYSTEMS: Pt unable to offer      MEDICATIONS  (STANDING):  albumin human 25% IVPB 50 milliLiter(s) IV Intermittent every 6 hours  cefTRIAXone   IVPB 2000 milliGRAM(s) IV Intermittent every 24 hours  chlorhexidine 0.12% Liquid 15 milliLiter(s) Oral Mucosa every 12 hours  chlorhexidine 4% Liquid 1 Application(s) Topical <User Schedule>  chlorhexidine 4% Liquid 1 Application(s) Topical <User Schedule>  chlorhexidine 4% Liquid 1 Application(s) Topical <User Schedule>  CRRT Treatment    <Continuous>  dexMEDEtomidine Infusion 0.1 MICROgram(s)/kG/Hr (1.73 mL/Hr) IV Continuous <Continuous>  dextrose 10%. 1000 milliLiter(s) (50 mL/Hr) IV Continuous <Continuous>  norepinephrine Infusion 1 MICROgram(s)/kG/Min (65 mL/Hr) IV Continuous <Continuous>  pantoprazole  Injectable 40 milliGRAM(s) IV Push every 12 hours  phenylephrine    Infusion 0.1 MICROgram(s)/kG/Min (2.6 mL/Hr) IV Continuous <Continuous>  Phoxillum Filtration BK 4 / 2.5 5000 milliLiter(s) (1800 mL/Hr) CRRT <Continuous>  PrismaSOL Filtration BGK 4 / 2.5 5000 milliLiter(s) (200 mL/Hr) CRRT <Continuous>  PrismaSOL Filtration BGK 4 / 2.5 5000 milliLiter(s) (1600 mL/Hr) CRRT <Continuous>  propofol Infusion 5 MICROgram(s)/kG/Min (1.5 mL/Hr) IV Continuous <Continuous>  sodium bicarbonate  Infusion 0.162 mEq/kG/Hr (75 mL/Hr) IV Continuous <Continuous>  thiamine IVPB 200 milliGRAM(s) IV Intermittent <User Schedule>  vasopressin Infusion 0.01 Unit(s)/Min (1.5 mL/Hr) IV Continuous <Continuous>    MEDICATIONS  (PRN):  sodium chloride 0.9% lock flush 10 milliLiter(s) IV Push every 1 hour PRN Pre/post blood products, medications, blood draw, and to maintain line patency  sodium chloride 0.9% lock flush 10 milliLiter(s) IV Push every 1 hour PRN Pre/post blood products, medications, blood draw, and to maintain line patency      No Known Allergies    SOCIAL HISTORY:  / No smoking, ETOH, drugs    FAMILY HISTORY:  no h/o significant problems    PHYSICAL EXAM:  Vital Signs Last 24 Hrs  T(C): 33 (21 Aug 2023 16:00), Max: 36.4 (20 Aug 2023 19:00)  T(F): 91.4 (21 Aug 2023 16:00), Max: 97.5 (20 Aug 2023 19:00)  HR: 68 (21 Aug 2023 17:19) (63 - 86)  BP: --  BP(mean): --  RR: 30 (21 Aug 2023 16:45) (15 - 30)  SpO2: 93% (21 Aug 2023 17:19) (84% - 100%)    Parameters below as of 21 Aug 2023 08:00  Patient On (Oxygen Delivery Method): ventilator    O2 Concentration (%): 30    Guarded but stable, frail, vent, pressors gtt  WD/ WN/ WG  Total Care Sport bed  HEENT:  NC/AT, sclera clear, mucosa moist, throat clear, trachea midline, neck supple  Respiratory: nonlabored w/ equal chest rise, intubated  Gastrointestinal: soft NT/ND (+)NGT  : (+)aparicio cath  Neurology: nonverbal, no follow commands, paraplegic  Psych: calm/ appropriate  Musculoskeletal:  pROM, no deformities/ contractures  Vascular: BLE equally warm,  no cyanosis, clubbing, nor acute ischemia        BLE edema equal           no BLE DP/PT pulses palpable  Skin: thin, dry, pale, frail,  ecchymosis w/o hematoma  Buttocks w/ hyperpigmented skin of moisture  Lt hip into buttocks w/ irregularly shaped maroon/ hyperpigmented skin w/ blistering  Lt sided arm/ leg/ hips w/ ruptured blistering and epidermolysis w/ serous drainage      mechanical debridement of epidermis w/ moistened gauze  No odor, erythema, increased warmth, tenderness, induration, fluctuance, nor crepitus    LABS/ CULTURES/ RADIOLOGY:                        8.8    5.44  )-----------( 16       ( 21 Aug 2023 05:43 )             25.9       135  |  99  |  8   ----------------------------<  71      [08-21-23 @ 11:46]  4.4   |  <10  |  0.46        Ca     7.1     [08-21-23 @ 11:46]      Mg     2.0     [08-21-23 @ 11:46]      Phos  3.6     [08-21-23 @ 11:46]    TPro  3.9  /  Alb  2.2  /  TBili  6.8  /  DBili  x   /  AST  41  /  ALT  15  /  AlkPhos  95  [08-21-23 @ 11:46]    PT/INR: PT 17.2 , INR 1.66       [08-20-23 @ 23:56]  PTT: 54.9       [08-20-23 @ 23:56]      A1C with Estimated Average Glucose Result: 5.3 % (05-18-23 @ 06:14)      Culture - Blood (collected 08-19-23 @ 06:10)  Source: .Blood Blood-Peripheral  Preliminary Report (08-21-23 @ 09:01):    No growth at 48 Hours    Culture - Blood (collected 08-18-23 @ 20:50)  Source: .Blood PICC Tip  Gram Stain (08-19-23 @ 12:33):    Growth in aerobic bottle: Gram Negative Rods    Growth in anaerobic bottle: Gram Negative Rods  Final Report (08-20-23 @ 15:44):    Growth in aerobic and anaerobic bottles: Escherichia coli    Direct identification is available within approximately 3-5    hours either by Blood Panel Multiplexed PCR or Direct    MALDI-TOF. Details: [email or web address]  Organism: Blood Culture PCR  Escherichia coli (08-20-23 @ 15:44)  Organism: Escherichia coli (08-20-23 @ 15:44)      Method Type: SAMANTHA      -  Amikacin: S <=16      -  Ampicillin: S <=8 These ampicillin results predict results for amoxicillin      -  Ampicillin/Sulbactam: S <=4/2 Enterobacter, Klebsiella aerogenes, Citrobacter, and Serratia may develop resistance during prolonged therapy (3-4 days)      -  Aztreonam: S <=4      -  Cefazolin: S <=2 Enterobacter, Klebsiella aerogenes, Citrobacter, and Serratia may develop resistance during prolonged therapy (3-4 days)      -  Cefepime: S <=2      -  Cefoxitin: S <=8      -  Ceftriaxone: S <=1 Enterobacter, Klebsiella aerogenes, Citrobacter, and Serratia may develop resistance during prolonged therapy      -  Ciprofloxacin: R >2      -  Ertapenem: S <=0.5      -  Gentamicin: S <=2      -  Imipenem: S <=1      -  Levofloxacin: R >4      -  Meropenem: S <=1      -  Piperacillin/Tazobactam: S <=8      -  Tobramycin: S <=2      -  Trimethoprim/Sulfamethoxazole: S <=0.5/9.5  Organism: Blood Culture PCR (08-20-23 @ 15:44)      Method Type: PCR      -  Escherichia coli: Detec    Culture - Blood (collected 08-18-23 @ 20:42)  Source: .Blood Blood-Peripheral  Gram Stain (08-19-23 @ 11:43):    Growth in aerobic bottle: Gram Negative Rods    Growth in anaerobic bottle: Gram Negative Rods  Final Report (08-20-23 @ 16:13):    Growth in aerobic and anaerobic bottles: Escherichia coli    See previous culture [id number]         Wound SURGERY CONSULT NOTE    HPI:  73F with metastatic pancreatic cancer (mets to liver and omentum, on chemo, last on 8/14-8/15), PVT, R gastroc DVT/?PE (eliquis d/c'ed for thrombocytopenia), hypothyroidism, recent admission in 7/2023 for melena presenting from home with lethargy and fevers. History obtained mostly from son at bedside. Per son, patient has been having bright red bloody stools for the past week, which was attributed to thrombocytopenia. She was noted to be lethargic today with low grade fever at home, which prompted them to bring her.    In ED, patient was hypotensive & febrile to 103, was given 2L IVF but persistently hypotensive requiring pressor. Labs notable for neutropenia with severe lactic acidosis. Admitted to MICU for likely septic shock requiring pressor.      Wound consult requested by team to assist w/ management of leg/ buttocks wounds.  Pt unable to c/o pain, drainage, odor, color change,  or worsening swelling. Offloading and pericare initiated upon admission as pt sedentary 2/2 to illness. No h/o bites, scratches, falls, trauma.  Appetite had been so/so prior to admission 2/2 advanved illness.  All questions asked and answered to pt's son's expressed understanding and satisfaction.    Current Diet: Diet, NPO with Tube Feed:   Tube Feeding Modality: Orogastric  Vital AF (VITALAFRTH)  Total Volume for 24 Hours (mL): 1260  Continuous  Starting Tube Feed Rate mL per Hour: 10  Increase Tube Feed Rate by (mL): 10  Until Goal Tube Feed Rate (mL per Hour): 70  Tube Feed Duration (in Hours): 18  Tube Feed Start Time: 10:30 (08-21-23 @ 14:55)      PAST MEDICAL & SURGICAL HISTORY:  Essential hypertension    Hyperlipidemia    Pancreatic cancer    Hypothyroidism    No pertinent past surgical history      REVIEW OF SYSTEMS: Pt unable to offer      MEDICATIONS  (STANDING):  albumin human 25% IVPB 50 milliLiter(s) IV Intermittent every 6 hours  cefTRIAXone   IVPB 2000 milliGRAM(s) IV Intermittent every 24 hours  chlorhexidine 0.12% Liquid 15 milliLiter(s) Oral Mucosa every 12 hours  chlorhexidine 4% Liquid 1 Application(s) Topical <User Schedule>  chlorhexidine 4% Liquid 1 Application(s) Topical <User Schedule>  chlorhexidine 4% Liquid 1 Application(s) Topical <User Schedule>  CRRT Treatment    <Continuous>  dexMEDEtomidine Infusion 0.1 MICROgram(s)/kG/Hr (1.73 mL/Hr) IV Continuous <Continuous>  dextrose 10%. 1000 milliLiter(s) (50 mL/Hr) IV Continuous <Continuous>  norepinephrine Infusion 1 MICROgram(s)/kG/Min (65 mL/Hr) IV Continuous <Continuous>  pantoprazole  Injectable 40 milliGRAM(s) IV Push every 12 hours  phenylephrine    Infusion 0.1 MICROgram(s)/kG/Min (2.6 mL/Hr) IV Continuous <Continuous>  Phoxillum Filtration BK 4 / 2.5 5000 milliLiter(s) (1800 mL/Hr) CRRT <Continuous>  PrismaSOL Filtration BGK 4 / 2.5 5000 milliLiter(s) (200 mL/Hr) CRRT <Continuous>  PrismaSOL Filtration BGK 4 / 2.5 5000 milliLiter(s) (1600 mL/Hr) CRRT <Continuous>  propofol Infusion 5 MICROgram(s)/kG/Min (1.5 mL/Hr) IV Continuous <Continuous>  sodium bicarbonate  Infusion 0.162 mEq/kG/Hr (75 mL/Hr) IV Continuous <Continuous>  thiamine IVPB 200 milliGRAM(s) IV Intermittent <User Schedule>  vasopressin Infusion 0.01 Unit(s)/Min (1.5 mL/Hr) IV Continuous <Continuous>    MEDICATIONS  (PRN):  sodium chloride 0.9% lock flush 10 milliLiter(s) IV Push every 1 hour PRN Pre/post blood products, medications, blood draw, and to maintain line patency  sodium chloride 0.9% lock flush 10 milliLiter(s) IV Push every 1 hour PRN Pre/post blood products, medications, blood draw, and to maintain line patency      No Known Allergies    SOCIAL HISTORY:  / No smoking, ETOH, drugs    FAMILY HISTORY:  no h/o significant problems    PHYSICAL EXAM:  Vital Signs Last 24 Hrs  T(C): 33 (21 Aug 2023 16:00), Max: 36.4 (20 Aug 2023 19:00)  T(F): 91.4 (21 Aug 2023 16:00), Max: 97.5 (20 Aug 2023 19:00)  HR: 68 (21 Aug 2023 17:19) (63 - 86)  BP: --  BP(mean): --  RR: 30 (21 Aug 2023 16:45) (15 - 30)  SpO2: 93% (21 Aug 2023 17:19) (84% - 100%)    Parameters below as of 21 Aug 2023 08:00  Patient On (Oxygen Delivery Method): ventilator    O2 Concentration (%): 30    Guarded but stable, frail, vent, pressors gtt  WD/ WN/ WG  Total Care Sport bed  HEENT:  NC/AT, sclera clear, mucosa moist, throat clear, trachea midline, neck supple  Respiratory: nonlabored w/ equal chest rise, intubated  Gastrointestinal: soft NT/ND (+)NGT  : (+)aparicio cath  Neurology: nonverbal, no follow commands, paraplegic  Psych: calm/ appropriate  Musculoskeletal:  pROM, no deformities/ contractures  Vascular: BLE equally warm,  no cyanosis, clubbing, nor acute ischemia        BLE edema equal           no BLE DP/PT pulses palpable  Skin: thin, dry, pale, frail,  ecchymosis w/o hematoma  Buttocks w/ hyperpigmented skin of moisture  Lt hip into buttocks w/ irregularly shaped maroon/ hyperpigmented skin w/ blistering  Lt sided arm/ leg/ hips w/ ruptured blistering and epidermolysis w/ serous drainage      mechanical debridement of epidermis w/ moistened gauze  No odor, erythema, increased warmth, tenderness, induration, fluctuance, nor crepitus    LABS/ CULTURES/ RADIOLOGY:                        8.8    5.44  )-----------( 16       ( 21 Aug 2023 05:43 )             25.9       135  |  99  |  8   ----------------------------<  71      [08-21-23 @ 11:46]  4.4   |  <10  |  0.46        Ca     7.1     [08-21-23 @ 11:46]      Mg     2.0     [08-21-23 @ 11:46]      Phos  3.6     [08-21-23 @ 11:46]    TPro  3.9  /  Alb  2.2  /  TBili  6.8  /  DBili  x   /  AST  41  /  ALT  15  /  AlkPhos  95  [08-21-23 @ 11:46]    PT/INR: PT 17.2 , INR 1.66       [08-20-23 @ 23:56]  PTT: 54.9       [08-20-23 @ 23:56]      A1C with Estimated Average Glucose Result: 5.3 % (05-18-23 @ 06:14)      Culture - Blood (collected 08-19-23 @ 06:10)  Source: .Blood Blood-Peripheral  Preliminary Report (08-21-23 @ 09:01):    No growth at 48 Hours    Culture - Blood (collected 08-18-23 @ 20:50)  Source: .Blood PICC Tip  Gram Stain (08-19-23 @ 12:33):    Growth in aerobic bottle: Gram Negative Rods    Growth in anaerobic bottle: Gram Negative Rods  Final Report (08-20-23 @ 15:44):    Growth in aerobic and anaerobic bottles: Escherichia coli    Direct identification is available within approximately 3-5    hours either by Blood Panel Multiplexed PCR or Direct    MALDI-TOF. Details: [email or web address]  Organism: Blood Culture PCR  Escherichia coli (08-20-23 @ 15:44)  Organism: Escherichia coli (08-20-23 @ 15:44)      Method Type: SAMANTHA      -  Amikacin: S <=16      -  Ampicillin: S <=8 These ampicillin results predict results for amoxicillin      -  Ampicillin/Sulbactam: S <=4/2 Enterobacter, Klebsiella aerogenes, Citrobacter, and Serratia may develop resistance during prolonged therapy (3-4 days)      -  Aztreonam: S <=4      -  Cefazolin: S <=2 Enterobacter, Klebsiella aerogenes, Citrobacter, and Serratia may develop resistance during prolonged therapy (3-4 days)      -  Cefepime: S <=2      -  Cefoxitin: S <=8      -  Ceftriaxone: S <=1 Enterobacter, Klebsiella aerogenes, Citrobacter, and Serratia may develop resistance during prolonged therapy      -  Ciprofloxacin: R >2      -  Ertapenem: S <=0.5      -  Gentamicin: S <=2      -  Imipenem: S <=1      -  Levofloxacin: R >4      -  Meropenem: S <=1      -  Piperacillin/Tazobactam: S <=8      -  Tobramycin: S <=2      -  Trimethoprim/Sulfamethoxazole: S <=0.5/9.5  Organism: Blood Culture PCR (08-20-23 @ 15:44)      Method Type: PCR      -  Escherichia coli: Detec    Culture - Blood (collected 08-18-23 @ 20:42)  Source: .Blood Blood-Peripheral  Gram Stain (08-19-23 @ 11:43):    Growth in aerobic bottle: Gram Negative Rods    Growth in anaerobic bottle: Gram Negative Rods  Final Report (08-20-23 @ 16:13):    Growth in aerobic and anaerobic bottles: Escherichia coli    See previous culture [id number]         Wound SURGERY CONSULT NOTE    HPI:  73F with metastatic pancreatic cancer (mets to liver and omentum, on chemo, last on 8/14-8/15), PVT, R gastroc DVT/?PE (eliquis d/c'ed for thrombocytopenia), hypothyroidism, recent admission in 7/2023 for melena presenting from home with lethargy and fevers. History obtained mostly from son at bedside. Per son, patient has been having bright red bloody stools for the past week, which was attributed to thrombocytopenia. She was noted to be lethargic today with low grade fever at home, which prompted them to bring her.    In ED, patient was hypotensive & febrile to 103, was given 2L IVF but persistently hypotensive requiring pressor. Labs notable for neutropenia with severe lactic acidosis. Admitted to MICU for likely septic shock requiring pressor.      Wound consult requested by team to assist w/ management of leg/ buttocks wounds.  Pt unable to c/o pain, drainage, odor, color change,  or worsening swelling. Offloading and pericare initiated upon admission as pt sedentary 2/2 to illness. No h/o bites, scratches, falls, trauma.  Appetite had been so/so prior to admission 2/2 advanced illness.  All questions asked and answered to pt's son's expressed understanding and satisfaction.    Current Diet: Diet, NPO with Tube Feed:   Tube Feeding Modality: Orogastric  Vital AF (VITALAFRTH)  Total Volume for 24 Hours (mL): 1260  Continuous  Starting Tube Feed Rate mL per Hour: 10  Increase Tube Feed Rate by (mL): 10  Until Goal Tube Feed Rate (mL per Hour): 70  Tube Feed Duration (in Hours): 18  Tube Feed Start Time: 10:30 (08-21-23 @ 14:55)      PAST MEDICAL & SURGICAL HISTORY:  Essential hypertension    Hyperlipidemia    Pancreatic cancer    Hypothyroidism    No pertinent past surgical history      REVIEW OF SYSTEMS: Pt unable to offer      MEDICATIONS  (STANDING):  albumin human 25% IVPB 50 milliLiter(s) IV Intermittent every 6 hours  cefTRIAXone   IVPB 2000 milliGRAM(s) IV Intermittent every 24 hours  chlorhexidine 0.12% Liquid 15 milliLiter(s) Oral Mucosa every 12 hours  chlorhexidine 4% Liquid 1 Application(s) Topical <User Schedule>  chlorhexidine 4% Liquid 1 Application(s) Topical <User Schedule>  chlorhexidine 4% Liquid 1 Application(s) Topical <User Schedule>  CRRT Treatment    <Continuous>  dexMEDEtomidine Infusion 0.1 MICROgram(s)/kG/Hr (1.73 mL/Hr) IV Continuous <Continuous>  dextrose 10%. 1000 milliLiter(s) (50 mL/Hr) IV Continuous <Continuous>  norepinephrine Infusion 1 MICROgram(s)/kG/Min (65 mL/Hr) IV Continuous <Continuous>  pantoprazole  Injectable 40 milliGRAM(s) IV Push every 12 hours  phenylephrine    Infusion 0.1 MICROgram(s)/kG/Min (2.6 mL/Hr) IV Continuous <Continuous>  Phoxillum Filtration BK 4 / 2.5 5000 milliLiter(s) (1800 mL/Hr) CRRT <Continuous>  PrismaSOL Filtration BGK 4 / 2.5 5000 milliLiter(s) (200 mL/Hr) CRRT <Continuous>  PrismaSOL Filtration BGK 4 / 2.5 5000 milliLiter(s) (1600 mL/Hr) CRRT <Continuous>  propofol Infusion 5 MICROgram(s)/kG/Min (1.5 mL/Hr) IV Continuous <Continuous>  sodium bicarbonate  Infusion 0.162 mEq/kG/Hr (75 mL/Hr) IV Continuous <Continuous>  thiamine IVPB 200 milliGRAM(s) IV Intermittent <User Schedule>  vasopressin Infusion 0.01 Unit(s)/Min (1.5 mL/Hr) IV Continuous <Continuous>    MEDICATIONS  (PRN):  sodium chloride 0.9% lock flush 10 milliLiter(s) IV Push every 1 hour PRN Pre/post blood products, medications, blood draw, and to maintain line patency  sodium chloride 0.9% lock flush 10 milliLiter(s) IV Push every 1 hour PRN Pre/post blood products, medications, blood draw, and to maintain line patency      No Known Allergies    SOCIAL HISTORY:  / No smoking, ETOH, drugs    FAMILY HISTORY:  no h/o significant problems    PHYSICAL EXAM:  Vital Signs Last 24 Hrs  T(C): 33 (21 Aug 2023 16:00), Max: 36.4 (20 Aug 2023 19:00)  T(F): 91.4 (21 Aug 2023 16:00), Max: 97.5 (20 Aug 2023 19:00)  HR: 68 (21 Aug 2023 17:19) (63 - 86)  BP: --  BP(mean): --  RR: 30 (21 Aug 2023 16:45) (15 - 30)  SpO2: 93% (21 Aug 2023 17:19) (84% - 100%)    Parameters below as of 21 Aug 2023 08:00  Patient On (Oxygen Delivery Method): ventilator    O2 Concentration (%): 30    Guarded but stable, frail, vent, pressors gtt  WD/ WN/ WG  Total Care Sport bed  HEENT:  NC/AT, sclera icteric, mucosa moist, throat clear, trachea midline, neck supple  Respiratory: nonlabored w/ equal chest rise, intubated  Gastrointestinal: soft NT/ND (+)NGT  : (+)aparicio cath  Neurology: nonverbal, no follow commands, paraplegic  Psych: calm/ appropriate  Musculoskeletal:  pROM, no deformities/ contractures  Vascular: BLE equally warm,  no cyanosis, clubbing, nor acute ischemia        BLE edema equal           no BLE DP/PT pulses palpable  Skin: thin, dry, pale, frail,  ecchymosis w/o hematoma, jaundice  Buttocks w/ hyperpigmented skin of moisture  Lt hip into buttocks w/ irregularly shaped maroon/ hyperpigmented skin w/ blistering   Lt sided arm/ leg/ hips w/ ruptured blistering and epidermolysis w/ serous drainage      mechanical debridement of epidermis w/ moistened gauze  No odor, erythema, increased warmth, tenderness, induration, fluctuance, nor crepitus    LABS/ CULTURES/ RADIOLOGY:                        8.8    5.44  )-----------( 16       ( 21 Aug 2023 05:43 )             25.9       135  |  99  |  8   ----------------------------<  71      [08-21-23 @ 11:46]  4.4   |  <10  |  0.46        Ca     7.1     [08-21-23 @ 11:46]      Mg     2.0     [08-21-23 @ 11:46]      Phos  3.6     [08-21-23 @ 11:46]    TPro  3.9  /  Alb  2.2  /  TBili  6.8  /  DBili  x   /  AST  41  /  ALT  15  /  AlkPhos  95  [08-21-23 @ 11:46]    PT/INR: PT 17.2 , INR 1.66       [08-20-23 @ 23:56]  PTT: 54.9       [08-20-23 @ 23:56]      A1C with Estimated Average Glucose Result: 5.3 % (05-18-23 @ 06:14)      Culture - Blood (collected 08-19-23 @ 06:10)  Source: .Blood Blood-Peripheral  Preliminary Report (08-21-23 @ 09:01):    No growth at 48 Hours    Culture - Blood (collected 08-18-23 @ 20:50)  Source: .Blood PICC Tip  Gram Stain (08-19-23 @ 12:33):    Growth in aerobic bottle: Gram Negative Rods    Growth in anaerobic bottle: Gram Negative Rods  Final Report (08-20-23 @ 15:44):    Growth in aerobic and anaerobic bottles: Escherichia coli    Direct identification is available within approximately 3-5    hours either by Blood Panel Multiplexed PCR or Direct    MALDI-TOF. Details: [email or web address]  Organism: Blood Culture PCR  Escherichia coli (08-20-23 @ 15:44)  Organism: Escherichia coli (08-20-23 @ 15:44)      Method Type: SAMANTHA      -  Amikacin: S <=16      -  Ampicillin: S <=8 These ampicillin results predict results for amoxicillin      -  Ampicillin/Sulbactam: S <=4/2 Enterobacter, Klebsiella aerogenes, Citrobacter, and Serratia may develop resistance during prolonged therapy (3-4 days)      -  Aztreonam: S <=4      -  Cefazolin: S <=2 Enterobacter, Klebsiella aerogenes, Citrobacter, and Serratia may develop resistance during prolonged therapy (3-4 days)      -  Cefepime: S <=2      -  Cefoxitin: S <=8      -  Ceftriaxone: S <=1 Enterobacter, Klebsiella aerogenes, Citrobacter, and Serratia may develop resistance during prolonged therapy      -  Ciprofloxacin: R >2      -  Ertapenem: S <=0.5      -  Gentamicin: S <=2      -  Imipenem: S <=1      -  Levofloxacin: R >4      -  Meropenem: S <=1      -  Piperacillin/Tazobactam: S <=8      -  Tobramycin: S <=2      -  Trimethoprim/Sulfamethoxazole: S <=0.5/9.5  Organism: Blood Culture PCR (08-20-23 @ 15:44)      Method Type: PCR      -  Escherichia coli: Detec    Culture - Blood (collected 08-18-23 @ 20:42)  Source: .Blood Blood-Peripheral  Gram Stain (08-19-23 @ 11:43):    Growth in aerobic bottle: Gram Negative Rods    Growth in anaerobic bottle: Gram Negative Rods  Final Report (08-20-23 @ 16:13):    Growth in aerobic and anaerobic bottles: Escherichia coli    See previous culture [id number]

## 2023-08-21 NOTE — DIETITIAN INITIAL EVALUATION ADULT - ADD RECOMMEND
****IN PROGRESS/INCOMPLETE****  Continue thiamine as ordered. Consider multivitamin and vitamin C to promote wound healing. Monitor GI tolerance to feeds, RD remains available to adjust TF regimen/formulary as needed/upon request. Continue to monitor nutritional intake, labs, weights, BM, skin, clinical course.

## 2023-08-21 NOTE — DIETITIAN INITIAL EVALUATION ADULT - ENTERAL
Recommend change in EN regimen to Vital AF at 10ml/hr advancing as tolerated to goal rate of 70ml/hr x 18hr to provide 1260ml volume, 1512kcal (+330kcal from propofol = 1914kcal), 95g protein, 1022ml free water. Meets ~28kcal/kg, 1.4g/kg protein based on dosing wt 69.2kg. Defer additional free water to team.

## 2023-08-21 NOTE — DIETITIAN INITIAL EVALUATION ADULT - PERTINENT LABORATORY DATA
08-21    135  |  99  |  9   ----------------------------<  100<H>  4.0   |  11<L>  |  0.46<L>    Ca    7.2<L>      21 Aug 2023 05:44  Phos  2.3     08-21  Mg     1.9     08-21    TPro  4.2<L>  /  Alb  2.2<L>  /  TBili  6.6<H>  /  DBili  x   /  AST  38  /  ALT  17  /  AlkPhos  93  08-21  POCT Blood Glucose.: 121 mg/dL (08-21-23 @ 02:25)  A1C with Estimated Average Glucose Result: 5.3 % (05-18-23 @ 06:14)

## 2023-08-21 NOTE — DIETITIAN INITIAL EVALUATION ADULT - OTHER INFO
- Septic shock, on phenylephrine, norepinephrine and vasopressin  - Propofol infusing @ 12.5ml/hr (x 24hr would provide 330kcal)  - IVF: D10% @ 30ml/hr  - S/p paracentesis 8/19 - 3.3L, ostomy left in place for continuous drainage    Weight Hx:  - Dosing weight 152.7lbs, current wt 161.1lbs, however pt with severe edema. Per previous RD note pt 147lbs 7/25/23. Will continue to monitor/trend.

## 2023-08-21 NOTE — PROGRESS NOTE ADULT - SUBJECTIVE AND OBJECTIVE BOX
INTERVAL HPI/OVERNIGHT EVENTS:    SUBJECTIVE: Patient seen and examined at bedside.     ROS: Unable to obtain due to mental status.    OBJECTIVE:    VITAL SIGNS:  ICU Vital Signs Last 24 Hrs  T(C): 30 (21 Aug 2023 08:00), Max: 36.9 (20 Aug 2023 11:00)  T(F): 86 (21 Aug 2023 08:00), Max: 98.4 (20 Aug 2023 11:00)  HR: 63 (21 Aug 2023 08:00) (63 - 109)  BP: --  BP(mean): --  ABP: 101/47 (21 Aug 2023 08:00) (82/38 - 117/55)  ABP(mean): 69 (21 Aug 2023 08:00) (56 - 80)  RR: 30 (21 Aug 2023 08:00) (15 - 30)  SpO2: 97% (21 Aug 2023 08:00) (84% - 100%)    O2 Parameters below as of 21 Aug 2023 08:00  Patient On (Oxygen Delivery Method): ventilator    O2 Concentration (%): 30    Mode: AC/ CMV (Assist Control/ Continuous Mandatory Ventilation), RR (machine): 30, TV (machine): 400, FiO2: 30, PEEP: 5, ITime: 1, MAP: 11, PIP: 25    08-20 @ 07:01  -  08-21 @ 07:00  --------------------------------------------------------  IN: 3750.7 mL / OUT: 1754 mL / NET: 1996.7 mL    08-21 @ 07:01  -  08-21 @ 08:29  --------------------------------------------------------  IN: 96.5 mL / OUT: 5 mL / NET: 91.5 mL    CAPILLARY BLOOD GLUCOSE  POCT Blood Glucose.: 121 mg/dL (21 Aug 2023 02:25)    PHYSICAL EXAM:    General: NAD  HEENT: NC/AT; PERRL, clear conjunctiva  Neck: supple  Respiratory: CTA b/l  Cardiovascular: +S1/S2; RRR  Abdomen: soft, NT/ND; +BS x4  Extremities: WWP, 2+ peripheral pulses b/l; no LE edema  Skin: normal color and turgor; no rash  Neurological:    MEDICATIONS:  MEDICATIONS  (STANDING):  cefTRIAXone   IVPB 2000 milliGRAM(s) IV Intermittent every 24 hours  chlorhexidine 0.12% Liquid 15 milliLiter(s) Oral Mucosa every 12 hours  chlorhexidine 4% Liquid 1 Application(s) Topical <User Schedule>  chlorhexidine 4% Liquid 1 Application(s) Topical <User Schedule>  chlorhexidine 4% Liquid 1 Application(s) Topical <User Schedule>  CRRT Treatment    <Continuous>  dexMEDEtomidine Infusion 0.1 MICROgram(s)/kG/Hr (1.73 mL/Hr) IV Continuous <Continuous>  dextrose 10%. 1000 milliLiter(s) (30 mL/Hr) IV Continuous <Continuous>  norepinephrine Infusion 1 MICROgram(s)/kG/Min (65 mL/Hr) IV Continuous <Continuous>  pantoprazole  Injectable 40 milliGRAM(s) IV Push every 12 hours  phenylephrine    Infusion 0.1 MICROgram(s)/kG/Min (2.6 mL/Hr) IV Continuous <Continuous>  Phoxillum Filtration BK 4 / 2.5 5000 milliLiter(s) (1800 mL/Hr) CRRT <Continuous>  PrismaSOL Filtration BGK 4 / 2.5 5000 milliLiter(s) (1600 mL/Hr) CRRT <Continuous>  PrismaSOL Filtration BGK 4 / 2.5 5000 milliLiter(s) (200 mL/Hr) CRRT <Continuous>  propofol Infusion 5 MICROgram(s)/kG/Min (1.5 mL/Hr) IV Continuous <Continuous>  vasopressin Infusion 0.01 Unit(s)/Min (1.5 mL/Hr) IV Continuous <Continuous>    MEDICATIONS  (PRN):  sodium chloride 0.9% lock flush 10 milliLiter(s) IV Push every 1 hour PRN Pre/post blood products, medications, blood draw, and to maintain line patency  sodium chloride 0.9% lock flush 10 milliLiter(s) IV Push every 1 hour PRN Pre/post blood products, medications, blood draw, and to maintain line patency    ALLERGIES:  Allergies    No Known Allergies    Intolerances    LABS:                        8.8    5.44  )-----------( 16       ( 21 Aug 2023 05:43 )             25.9     08-21    135  |  99  |  9   ----------------------------<  100<H>  4.0   |  11<L>  |  0.46<L>    Ca    7.2<L>      21 Aug 2023 05:44  Phos  2.3     08-21  Mg     1.9     08-21    TPro  4.2<L>  /  Alb  2.2<L>  /  TBili  6.6<H>  /  DBili  x   /  AST  38  /  ALT  17  /  AlkPhos  93  08-21    PT/INR - ( 20 Aug 2023 23:56 )   PT: 17.2 sec;   INR: 1.66 ratio      PTT - ( 20 Aug 2023 23:56 )  PTT:54.9 sec  Urinalysis Basic - ( 21 Aug 2023 05:44 )    Color: x / Appearance: x / SG: x / pH: x  Gluc: 100 mg/dL / Ketone: x  / Bili: x / Urobili: x   Blood: x / Protein: x / Nitrite: x   Leuk Esterase: x / RBC: x / WBC x   Sq Epi: x / Non Sq Epi: x / Bacteria: x    RADIOLOGY & ADDITIONAL TESTS: Reviewed. INTERVAL HPI/OVERNIGHT EVENTS:    SUBJECTIVE: Patient seen and examined at bedside. DNR reversed overnight, now full code. CRRT increased and 1/2U of platelets transfused overnight.    ROS: Unable to obtain due to mental status.    OBJECTIVE:    VITAL SIGNS:  ICU Vital Signs Last 24 Hrs  T(C): 30 (21 Aug 2023 08:00), Max: 36.9 (20 Aug 2023 11:00)  T(F): 86 (21 Aug 2023 08:00), Max: 98.4 (20 Aug 2023 11:00)  HR: 63 (21 Aug 2023 08:00) (63 - 109)  BP: --  BP(mean): --  ABP: 101/47 (21 Aug 2023 08:00) (82/38 - 117/55)  ABP(mean): 69 (21 Aug 2023 08:00) (56 - 80)  RR: 30 (21 Aug 2023 08:00) (15 - 30)  SpO2: 97% (21 Aug 2023 08:00) (84% - 100%)    O2 Parameters below as of 21 Aug 2023 08:00  Patient On (Oxygen Delivery Method): ventilator    O2 Concentration (%): 30    Mode: AC/ CMV (Assist Control/ Continuous Mandatory Ventilation), RR (machine): 30, TV (machine): 400, FiO2: 30, PEEP: 5, ITime: 1, MAP: 11, PIP: 25    08-20 @ 07:01  -  08-21 @ 07:00  --------------------------------------------------------  IN: 3750.7 mL / OUT: 1754 mL / NET: 1996.7 mL    08-21 @ 07:01  -  08-21 @ 08:29  --------------------------------------------------------  IN: 96.5 mL / OUT: 5 mL / NET: 91.5 mL    CAPILLARY BLOOD GLUCOSE  POCT Blood Glucose.: 121 mg/dL (21 Aug 2023 02:25)    PHYSICAL EXAM:    GENERAL: intubated, sedated  HEAD:  Atraumatic, Normocephalic  EYES: EOMI, PERRLA, conjunctiva and sclera clear  NECK: Supple, No JVD  CHEST/LUNG: Clear to auscultation bilaterally; No wheeze  HEART: S1, S2; No murmurs, rubs, or gallops  ABDOMEN: Soft, Nontender, Nondistended; Bowel sounds present  EXTREMITIES:  2+ Peripheral Pulses, No clubbing, cyanosis. + edema  NEUROLOGY: sedated  SKIN: No rashes or lesions    MEDICATIONS:  MEDICATIONS  (STANDING):  cefTRIAXone   IVPB 2000 milliGRAM(s) IV Intermittent every 24 hours  chlorhexidine 0.12% Liquid 15 milliLiter(s) Oral Mucosa every 12 hours  chlorhexidine 4% Liquid 1 Application(s) Topical <User Schedule>  chlorhexidine 4% Liquid 1 Application(s) Topical <User Schedule>  chlorhexidine 4% Liquid 1 Application(s) Topical <User Schedule>  CRRT Treatment    <Continuous>  dexMEDEtomidine Infusion 0.1 MICROgram(s)/kG/Hr (1.73 mL/Hr) IV Continuous <Continuous>  dextrose 10%. 1000 milliLiter(s) (30 mL/Hr) IV Continuous <Continuous>  norepinephrine Infusion 1 MICROgram(s)/kG/Min (65 mL/Hr) IV Continuous <Continuous>  pantoprazole  Injectable 40 milliGRAM(s) IV Push every 12 hours  phenylephrine    Infusion 0.1 MICROgram(s)/kG/Min (2.6 mL/Hr) IV Continuous <Continuous>  Phoxillum Filtration BK 4 / 2.5 5000 milliLiter(s) (1800 mL/Hr) CRRT <Continuous>  PrismaSOL Filtration BGK 4 / 2.5 5000 milliLiter(s) (1600 mL/Hr) CRRT <Continuous>  PrismaSOL Filtration BGK 4 / 2.5 5000 milliLiter(s) (200 mL/Hr) CRRT <Continuous>  propofol Infusion 5 MICROgram(s)/kG/Min (1.5 mL/Hr) IV Continuous <Continuous>  vasopressin Infusion 0.01 Unit(s)/Min (1.5 mL/Hr) IV Continuous <Continuous>    MEDICATIONS  (PRN):  sodium chloride 0.9% lock flush 10 milliLiter(s) IV Push every 1 hour PRN Pre/post blood products, medications, blood draw, and to maintain line patency  sodium chloride 0.9% lock flush 10 milliLiter(s) IV Push every 1 hour PRN Pre/post blood products, medications, blood draw, and to maintain line patency    ALLERGIES:  Allergies  No Known Allergies    Intolerances    LABS:                        8.8    5.44  )-----------( 16       ( 21 Aug 2023 05:43 )             25.9     08-21    135  |  99  |  9   ----------------------------<  100<H>  4.0   |  11<L>  |  0.46<L>    Ca    7.2<L>      21 Aug 2023 05:44  Phos  2.3     08-21  Mg     1.9     08-21    TPro  4.2<L>  /  Alb  2.2<L>  /  TBili  6.6<H>  /  DBili  x   /  AST  38  /  ALT  17  /  AlkPhos  93  08-21    PT/INR - ( 20 Aug 2023 23:56 )   PT: 17.2 sec;   INR: 1.66 ratio      PTT - ( 20 Aug 2023 23:56 )  PTT:54.9 sec  Urinalysis Basic - ( 21 Aug 2023 05:44 )    Color: x / Appearance: x / SG: x / pH: x  Gluc: 100 mg/dL / Ketone: x  / Bili: x / Urobili: x   Blood: x / Protein: x / Nitrite: x   Leuk Esterase: x / RBC: x / WBC x   Sq Epi: x / Non Sq Epi: x / Bacteria: x    RADIOLOGY & ADDITIONAL TESTS: Reviewed.

## 2023-08-21 NOTE — DIETITIAN INITIAL EVALUATION ADULT - REASON FOR ADMISSION
Duplicate     Pt is a "73F with metastatic pancreatic cancer (mets to liver and omentum, last chemo on 8/14-8/15), PVT, R gastroc DVT/?PE (eliquis d/c'ed for thrombocytopenia), hypothyroidism, recent admission in 7/2023 for melena presenting from home with lethargy and fever. Septic on presentation in setting of neutropenia. Admitted to MICU for shock state requiring pressor. Requiring CRRT."

## 2023-08-21 NOTE — DIETITIAN INITIAL EVALUATION ADULT - NSFNSGIIOFT_GEN_A_CORE
Last BM PTA.    08-20-23 @ 07:01  -  08-21-23 @ 07:00  --------------------------------------------------------  OUT:    Gastrostomy Tube (mL): 25 mL  Total OUT: 25 mL    Total NET: -25 mL

## 2023-08-21 NOTE — PROGRESS NOTE ADULT - SUBJECTIVE AND OBJECTIVE BOX
Date of Service: 08-21-23 @ 10:19           CARDIOLOGY     PROGRESS  NOTE   ________________________________________________    CHIEF COMPLAINT:Patient is a 73y old  Female who presents with a chief complaint of Lethargy (21 Aug 2023 08:29)  sedated on vent  	  REVIEW OF SYSTEMS:  CONSTITUTIONAL: No fever, weight loss, or fatigue  EYES: No eye pain, visual disturbances, or discharge  ENT:  No difficulty hearing, tinnitus, vertigo; No sinus or throat pain  NECK: No pain or stiffness  RESPIRATORY: No cough, wheezing, chills or hemoptysis; No Shortness of Breath  CARDIOVASCULAR: No chest pain, palpitations, passing out, dizziness, or leg swelling  GASTROINTESTINAL: No abdominal or epigastric pain. No nausea, vomiting, or hematemesis; No diarrhea or constipation. No melena or hematochezia.  GENITOURINARY: No dysuria, frequency, hematuria, or incontinence  NEUROLOGICAL: No headaches, memory loss, loss of strength, numbness, or tremors  SKIN: No itching, burning, rashes, or lesions   LYMPH Nodes: No enlarged glands  ENDOCRINE: No heat or cold intolerance; No hair loss  MUSCULOSKELETAL: No joint pain or swelling; No muscle, back, or extremity pain  PSYCHIATRIC: No depression, anxiety, mood swings, or difficulty sleeping  HEME/LYMPH: No easy bruising, or bleeding gums  ALLERGY AND IMMUNOLOGIC: No hives or eczema	    [ ] All others negative	  [ x] Unable to obtain    PHYSICAL EXAM:  T(C): 30 (08-21-23 @ 08:00), Max: 36.9 (08-20-23 @ 11:00)  HR: 72 (08-21-23 @ 10:15) (63 - 109)  BP: --  RR: 25 (08-21-23 @ 10:15) (15 - 30)  SpO2: 94% (08-21-23 @ 10:15) (84% - 100%)  Wt(kg): --  I&O's Summary    20 Aug 2023 07:01  -  21 Aug 2023 07:00  --------------------------------------------------------  IN: 3750.7 mL / OUT: 1754 mL / NET: 1996.7 mL    21 Aug 2023 07:01  -  21 Aug 2023 10:19  --------------------------------------------------------  IN: 289.5 mL / OUT: 105 mL / NET: 184.5 mL        Appearance: sedated on vent/ jaundice  HEENT:   Normal oral mucosa, PERRL, EOMI	  Lymphatic: No lymphadenopathy  Cardiovascular: Normal S1 S2, No JVD, +murmurs, + edema  Respiratory: Lungs clear to auscultation	  Psychiatry: sedated on vent  Gastrointestinal:  Soft, Non-tender, + BS	  Skin: No rashes, No ecchymoses, No cyanosis, + Jaundice	  Neurologic: Non-focal  Extremities: Normal range of motion, No clubbing, cyanosis , + edema  Vascular: Peripheral pulses palpable 2+ bilaterally    MEDICATIONS  (STANDING):  cefTRIAXone   IVPB 2000 milliGRAM(s) IV Intermittent every 24 hours  chlorhexidine 0.12% Liquid 15 milliLiter(s) Oral Mucosa every 12 hours  chlorhexidine 4% Liquid 1 Application(s) Topical <User Schedule>  chlorhexidine 4% Liquid 1 Application(s) Topical <User Schedule>  chlorhexidine 4% Liquid 1 Application(s) Topical <User Schedule>  CRRT Treatment    <Continuous>  dexMEDEtomidine Infusion 0.1 MICROgram(s)/kG/Hr (1.73 mL/Hr) IV Continuous <Continuous>  dextrose 10%. 1000 milliLiter(s) (30 mL/Hr) IV Continuous <Continuous>  norepinephrine Infusion 1 MICROgram(s)/kG/Min (65 mL/Hr) IV Continuous <Continuous>  pantoprazole  Injectable 40 milliGRAM(s) IV Push every 12 hours  phenylephrine    Infusion 0.1 MICROgram(s)/kG/Min (2.6 mL/Hr) IV Continuous <Continuous>  Phoxillum Filtration BK 4 / 2.5 5000 milliLiter(s) (1800 mL/Hr) CRRT <Continuous>  PrismaSOL Filtration BGK 4 / 2.5 5000 milliLiter(s) (1600 mL/Hr) CRRT <Continuous>  PrismaSOL Filtration BGK 4 / 2.5 5000 milliLiter(s) (200 mL/Hr) CRRT <Continuous>  propofol Infusion 5 MICROgram(s)/kG/Min (1.5 mL/Hr) IV Continuous <Continuous>  thiamine IVPB 200 milliGRAM(s) IV Intermittent <User Schedule>  vasopressin Infusion 0.01 Unit(s)/Min (1.5 mL/Hr) IV Continuous <Continuous>      TELEMETRY: 	    ECG:  	  RADIOLOGY:  OTHER: 	  	  LABS:	 	    CARDIAC MARKERS:                          8.8    5.44  )-----------( 16       ( 21 Aug 2023 05:43 )             25.9     08-21    135  |  99  |  9   ----------------------------<  100<H>  4.0   |  11<L>  |  0.46<L>    Ca    7.2<L>      21 Aug 2023 05:44  Phos  2.3     08-21  Mg     1.9     08-21    TPro  4.2<L>  /  Alb  2.2<L>  /  TBili  6.6<H>  /  DBili  x   /  AST  38  /  ALT  17  /  AlkPhos  93  08-21    proBNP:   Lipid Profile:   HgA1c:   TSH:   PT/INR - ( 20 Aug 2023 23:56 )   PT: 17.2 sec;   INR: 1.66 ratio         PTT - ( 20 Aug 2023 23:56 )  PTT:54.9 sec      Assessment and plan  ---------------------------  73 female h/o metastatic pancreatic ca on chemo, dvt/pe previously on eliquis d/c'd yesterday due to thrombocytopenia, hypothyroid, here with [facility] with recent admission for metabolic encephalopathy and thrombocytopenia. GIB x 1 week with increasing somnolence.  pt with metastatic pancreatic cA s/p chemo with hypotension and change of mental status and lethargy  sepsis on broad spectrum abx  sedated on vent  continue pressor  abdominal paracentesis as tolerated large ascites  abx  spoke to the family, prognosis is poor  started on CRRT , not sure will benefit pt overall  ?septic  pt with endstage pancreatic CA , may consider palliative if family agrees

## 2023-08-21 NOTE — DIETITIAN INITIAL EVALUATION ADULT - NAME AND PHONE
Karlie Machado MS, RD, CDN, [facility]; Pager #[phone] or MS Teams (preferred)

## 2023-08-21 NOTE — CONSULT NOTE ADULT - ASSESSMENT
A/P: 73 female h/o pancreatic ca with mets on chemo, hypothryroid, dvt/pe on eliquis, now admitted to micu with septic shock    Wound Consult requested to assist w/ management of Fluid over load w/ blistering  Incontinence Dermatitis of Buttocks      BLE/ BUE Adaptic dressing and wrap in ATTENDS purple pads  Buttocks/ Sacrum Arnaud BID and prn soiling        Continue w/ attends under pads and Pericare w/ aparicio cath maintenance as per protocol  BLE elevation & Compression  Abx per MICU  Moisturize intact skin w/ SWEEN cream BID  Nutrition Consult for optimization       encourage high quality protein, gavin/ prosource, MVI & Vit C to promote wound healing  Continue turning and positioning w/ offloading assistive devices as per protocol  Waffle Cushion to chair when oob to chair  Continue w/ low air loss pressure redistribution bed surface   Pt will need Group 2 mattress on hospital bed and ROHO cushion for wheel chair upon discharge home  Care as per micu, will follow w/ you/ remain available as requested  Upon discharge f/u as outpatient at [facility] [address] [place] [address] [phone]  Seen w/ attng & RN and D/w team  Thank you for this consult  Sharon El PA-C CWS [id number]  Nights/ Weekends/ Holidays please call:  General Surgery Consult pager ([phone]) for emergencies  Wound PT for multilayer leg wrapping or VAC issues (x [phone])   I spent 55 minutes face to face w/ this pt of which more than 50% of the time was spent counseling & coordinating care of this pt.  A/P: 73 female h/o pancreatic ca with mets on chemo, hypothryroid, dvt/pe on eliquis, now admitted to micu with septic shock    Wound Consult requested to assist w/ management of:  Fluid over load w/ blistering and open wounds   Incontinence Dermatitis of Buttocks      BLE/ BUE Adaptic dressing and wrap in ATTENDS purple pads  Buttocks/ Sacrum Arnaud BID and prn soiling        Continue w/ attends under pads and Pericare w/ aparicio cath maintenance as per protocol  BLE elevation & Compression  Abx per MICU  Moisturize intact skin w/ SWEEN cream BID  Nutrition Consult for optimization       encourage high quality protein, gavin/ prosource, MVI & Vit C to promote wound healing  Continue turning and positioning w/ offloading assistive devices as per protocol  Waffle Cushion to chair when oob to chair  Continue w/ low air loss pressure redistribution bed surface   Pt will need Group 2 mattress on hospital bed and ROHO cushion for wheel chair upon discharge home  Care as per micu, will follow w/ you/ remain available as requested  Upon discharge f/u as outpatient at [facility] Center [address] [place] [address] [phone]  Seen w/ attng & RN and D/w team  Thank you for this consult  Sharon El PA-C CWS [id number]  Nights/ Weekends/ Holidays please call:  General Surgery Consult pager ([phone]) for emergencies  Wound PT for multilayer leg wrapping or VAC issues (x [phone])

## 2023-08-21 NOTE — PROGRESS NOTE ADULT - SUBJECTIVE AND OBJECTIVE BOX
Wound SURGERY CONSULT NOTE    HPI:  73F with metastatic pancreatic cancer (mets to liver and omentum, on chemo, last on 8/14-8/15), PVT, R gastroc DVT/?PE (eliquis d/c'ed for thrombocytopenia), hypothyroidism, recent admission in 7/2023 for melena presenting from home with lethargy and fevers. History obtained mostly from son at bedside. Per son, patient has been having bright red bloody stools for the past week, which was attributed to thrombocytopenia. She was noted to be lethargic today with low grade fever at home, which prompted them to bring her.    In ED, patient was hypotensive & febrile to 103, was given 2L IVF but persistently hypotensive requiring pressor. Labs notable for neutropenia with severe lactic acidosis. Admitted to MICU for likely septic shock requiring pressor.      Wound consult requested by team to assist w/ management of leg/ buttocks wounds.  Pt unable to c/o pain, drainage, odor, color change,  or worsening swelling. Offloading and pericare initiated upon admission as pt sedentary 2/2 to illness. No h/o bites, scratches, falls, trauma.  Appetite had been so/so prior to admission 2/2 advanved illness.  All questions asked and answered to pt's son's expressed understanding and satisfaction.    Current Diet: Diet, NPO with Tube Feed:   Tube Feeding Modality: Orogastric  Vital AF (VITALAFRTH)  Total Volume for 24 Hours (mL): 1260  Continuous  Starting Tube Feed Rate mL per Hour: 10  Increase Tube Feed Rate by (mL): 10  Until Goal Tube Feed Rate (mL per Hour): 70  Tube Feed Duration (in Hours): 18  Tube Feed Start Time: 10:30 (08-21-23 @ 14:55)      PAST MEDICAL & SURGICAL HISTORY:  Essential hypertension    Hyperlipidemia    Pancreatic cancer    Hypothyroidism    No pertinent past surgical history      REVIEW OF SYSTEMS: Pt unable to offer      MEDICATIONS  (STANDING):  albumin human 25% IVPB 50 milliLiter(s) IV Intermittent every 6 hours  cefTRIAXone   IVPB 2000 milliGRAM(s) IV Intermittent every 24 hours  chlorhexidine 0.12% Liquid 15 milliLiter(s) Oral Mucosa every 12 hours  chlorhexidine 4% Liquid 1 Application(s) Topical <User Schedule>  chlorhexidine 4% Liquid 1 Application(s) Topical <User Schedule>  chlorhexidine 4% Liquid 1 Application(s) Topical <User Schedule>  CRRT Treatment    <Continuous>  dexMEDEtomidine Infusion 0.1 MICROgram(s)/kG/Hr (1.73 mL/Hr) IV Continuous <Continuous>  dextrose 10%. 1000 milliLiter(s) (50 mL/Hr) IV Continuous <Continuous>  norepinephrine Infusion 1 MICROgram(s)/kG/Min (65 mL/Hr) IV Continuous <Continuous>  pantoprazole  Injectable 40 milliGRAM(s) IV Push every 12 hours  phenylephrine    Infusion 0.1 MICROgram(s)/kG/Min (2.6 mL/Hr) IV Continuous <Continuous>  Phoxillum Filtration BK 4 / 2.5 5000 milliLiter(s) (1800 mL/Hr) CRRT <Continuous>  PrismaSOL Filtration BGK 4 / 2.5 5000 milliLiter(s) (200 mL/Hr) CRRT <Continuous>  PrismaSOL Filtration BGK 4 / 2.5 5000 milliLiter(s) (1600 mL/Hr) CRRT <Continuous>  propofol Infusion 5 MICROgram(s)/kG/Min (1.5 mL/Hr) IV Continuous <Continuous>  sodium bicarbonate  Infusion 0.162 mEq/kG/Hr (75 mL/Hr) IV Continuous <Continuous>  thiamine IVPB 200 milliGRAM(s) IV Intermittent <User Schedule>  vasopressin Infusion 0.01 Unit(s)/Min (1.5 mL/Hr) IV Continuous <Continuous>    MEDICATIONS  (PRN):  sodium chloride 0.9% lock flush 10 milliLiter(s) IV Push every 1 hour PRN Pre/post blood products, medications, blood draw, and to maintain line patency  sodium chloride 0.9% lock flush 10 milliLiter(s) IV Push every 1 hour PRN Pre/post blood products, medications, blood draw, and to maintain line patency      No Known Allergies    SOCIAL HISTORY:  / No smoking, ETOH, drugs    FAMILY HISTORY:  no h/o significant problems    PHYSICAL EXAM:  Vital Signs Last 24 Hrs  T(C): 33 (21 Aug 2023 16:00), Max: 36.4 (20 Aug 2023 19:00)  T(F): 91.4 (21 Aug 2023 16:00), Max: 97.5 (20 Aug 2023 19:00)  HR: 68 (21 Aug 2023 17:19) (63 - 86)  BP: --  BP(mean): --  RR: 30 (21 Aug 2023 16:45) (15 - 30)  SpO2: 93% (21 Aug 2023 17:19) (84% - 100%)    Parameters below as of 21 Aug 2023 08:00  Patient On (Oxygen Delivery Method): ventilator    O2 Concentration (%): 30    Guarded but stable, frail, vent, pressors gtt  WD/ WN/ WG  Total Care Sport bed  HEENT:  NC/AT, sclera clear, mucosa moist, throat clear, trachea midline, neck supple  Respiratory: nonlabored w/ equal chest rise, intubated  Gastrointestinal: soft NT/ND (+)NGT  : (+)aparicio cath  Neurology: nonverbal, no follow commands, paraplegic  Psych: calm/ appropriate  Musculoskeletal:  pROM, no deformities/ contractures  Vascular: BLE equally warm,  no cyanosis, clubbing, nor acute ischemia        BLE edema equal           no BLE DP/PT pulses palpable  Skin: thin, dry, pale, frail,  ecchymosis w/o hematoma  Buttocks w/ hyperpigmented skin of moisture  Lt hip into buttocks w/ irregularly shaped maroon/ hyperpigmented skin w/ blistering  Lt sided arm/ leg/ hips w/ ruptured blistering and epidermolysis w/ serous drainage      mechanical debridement of epidermis w/ moistened gauze  No odor, erythema, increased warmth, tenderness, induration, fluctuance, nor crepitus    LABS/ CULTURES/ RADIOLOGY:                        8.8    5.44  )-----------( 16       ( 21 Aug 2023 05:43 )             25.9       135  |  99  |  8   ----------------------------<  71      [08-21-23 @ 11:46]  4.4   |  <10  |  0.46        Ca     7.1     [08-21-23 @ 11:46]      Mg     2.0     [08-21-23 @ 11:46]      Phos  3.6     [08-21-23 @ 11:46]    TPro  3.9  /  Alb  2.2  /  TBili  6.8  /  DBili  x   /  AST  41  /  ALT  15  /  AlkPhos  95  [08-21-23 @ 11:46]    PT/INR: PT 17.2 , INR 1.66       [08-20-23 @ 23:56]  PTT: 54.9       [08-20-23 @ 23:56]      A1C with Estimated Average Glucose Result: 5.3 % (05-18-23 @ 06:14)      Culture - Blood (collected 08-19-23 @ 06:10)  Source: .Blood Blood-Peripheral  Preliminary Report (08-21-23 @ 09:01):    No growth at 48 Hours    Culture - Blood (collected 08-18-23 @ 20:50)  Source: .Blood PICC Tip  Gram Stain (08-19-23 @ 12:33):    Growth in aerobic bottle: Gram Negative Rods    Growth in anaerobic bottle: Gram Negative Rods  Final Report (08-20-23 @ 15:44):    Growth in aerobic and anaerobic bottles: Escherichia coli    Direct identification is available within approximately 3-5    hours either by Blood Panel Multiplexed PCR or Direct    MALDI-TOF. Details: [email or web address]  Organism: Blood Culture PCR  Escherichia coli (08-20-23 @ 15:44)  Organism: Escherichia coli (08-20-23 @ 15:44)      Method Type: SAMANTHA      -  Amikacin: S <=16      -  Ampicillin: S <=8 These ampicillin results predict results for amoxicillin      -  Ampicillin/Sulbactam: S <=4/2 Enterobacter, Klebsiella aerogenes, Citrobacter, and Serratia may develop resistance during prolonged therapy (3-4 days)      -  Aztreonam: S <=4      -  Cefazolin: S <=2 Enterobacter, Klebsiella aerogenes, Citrobacter, and Serratia may develop resistance during prolonged therapy (3-4 days)      -  Cefepime: S <=2      -  Cefoxitin: S <=8      -  Ceftriaxone: S <=1 Enterobacter, Klebsiella aerogenes, Citrobacter, and Serratia may develop resistance during prolonged therapy      -  Ciprofloxacin: R >2      -  Ertapenem: S <=0.5      -  Gentamicin: S <=2      -  Imipenem: S <=1      -  Levofloxacin: R >4      -  Meropenem: S <=1      -  Piperacillin/Tazobactam: S <=8      -  Tobramycin: S <=2      -  Trimethoprim/Sulfamethoxazole: S <=0.5/9.5  Organism: Blood Culture PCR (08-20-23 @ 15:44)      Method Type: PCR      -  Escherichia coli: Detec    Culture - Blood (collected 08-18-23 @ 20:42)  Source: .Blood Blood-Peripheral  Gram Stain (08-19-23 @ 11:43):    Growth in aerobic bottle: Gram Negative Rods    Growth in anaerobic bottle: Gram Negative Rods  Final Report (08-20-23 @ 16:13):    Growth in aerobic and anaerobic bottles: Escherichia coli    See previous culture [id number]

## 2023-08-21 NOTE — PROGRESS NOTE ADULT - SUBJECTIVE AND OBJECTIVE BOX
AMY JAIMES  73y Female  MRN:[id number]    Patient is a 73y old  Female who presents with a chief complaint of Lethargy (19 Aug 2023 20:57)    HPI: pt well known to me  73F with metastatic pancreatic cancer (mets to liver and omentum, on chemo, last on 8/14-8/15), PVT, R gastroc DVT/?PE (eliquis d/c'ed for thrombocytopenia), hypothyroidism, recent admission in 7/2023 for melena presenting from home with lethargy and fevers. History obtained mostly from son at bedside. Per son, patient has been having bright red bloody stools for the past week, which was attributed to thrombocytopenia. She was noted to be lethargic today with low grade fever at home, which prompted them to bring her.    In ED, patient was hypotensive & febrile to 103, was given 2L IVF but persistently hypotensive requiring pressor. Labs notable for neutropenia with severe lactic acidosis. Admitted to MICU for likely septic shock requiring pressor.  (19 Aug 2023 01:33)      Patient seen and evaluated at bedside. No acute events overnight except as noted.    Interval HPI: remains intubated on pressors. on CVVHD. family rescinded DNR     PAST MEDICAL & SURGICAL HISTORY:  Essential hypertension      Hyperlipidemia      Pancreatic cancer      Hypothyroidism      No pertinent past surgical history          REVIEW OF SYSTEMS:  as per hpi     VITALS:   ICU Vital Signs Last 24 Hrs  T(C): 33 (21 Aug 2023 12:00), Max: 36.7 (20 Aug 2023 16:00)  T(F): 91.4 (21 Aug 2023 12:00), Max: 98.1 (20 Aug 2023 16:00)  HR: 75 (21 Aug 2023 12:15) (63 - 109)  BP: --  BP(mean): --  ABP: 100/49 (21 Aug 2023 12:15) (82/38 - 117/55)  ABP(mean): 70 (21 Aug 2023 12:15) (56 - 80)  RR: 30 (21 Aug 2023 12:15) (15 - 30)  SpO2: 98% (21 Aug 2023 12:15) (84% - 100%)    O2 Parameters below as of 21 Aug 2023 08:00  Patient On (Oxygen Delivery Method): ventilator    O2 Concentration (%): 30        PHYSICAL EXAM:  GENERAL: intubated, sedated  HEAD:  Atraumatic, Normocephalic  EYES: EOMI, PERRLA, conjunctiva and sclera clear  NECK: Supple, No JVD  CHEST/LUNG: Clear to auscultation bilaterally; No wheeze  HEART: S1, S2; No murmurs, rubs, or gallops  ABDOMEN: Soft, Nontender, Nondistended; Bowel sounds present  EXTREMITIES:  2+ Peripheral Pulses, No clubbing, cyanosis. + edema  NEUROLOGY: sedated       Consultant(s) Notes Reviewed:  [x ] YES  [ ] NO  Care Discussed with Consultants/Other Providers [ x] YES  [ ] NO    MEDS:   MEDICATIONS  (STANDING):  cefTRIAXone   IVPB 2000 milliGRAM(s) IV Intermittent every 24 hours  chlorhexidine 0.12% Liquid 15 milliLiter(s) Oral Mucosa every 12 hours  chlorhexidine 4% Liquid 1 Application(s) Topical <User Schedule>  chlorhexidine 4% Liquid 1 Application(s) Topical <User Schedule>  chlorhexidine 4% Liquid 1 Application(s) Topical <User Schedule>  CRRT Treatment    <Continuous>  dexMEDEtomidine Infusion 0.1 MICROgram(s)/kG/Hr (1.73 mL/Hr) IV Continuous <Continuous>  dextrose 10%. 1000 milliLiter(s) (50 mL/Hr) IV Continuous <Continuous>  dextrose 50% Injectable 50 milliLiter(s) IV Push once  norepinephrine Infusion 1 MICROgram(s)/kG/Min (65 mL/Hr) IV Continuous <Continuous>  pantoprazole  Injectable 40 milliGRAM(s) IV Push every 12 hours  phenylephrine    Infusion 0.1 MICROgram(s)/kG/Min (2.6 mL/Hr) IV Continuous <Continuous>  Phoxillum Filtration BK 4 / 2.5 5000 milliLiter(s) (1800 mL/Hr) CRRT <Continuous>  PrismaSOL Filtration BGK 4 / 2.5 5000 milliLiter(s) (200 mL/Hr) CRRT <Continuous>  PrismaSOL Filtration BGK 4 / 2.5 5000 milliLiter(s) (1600 mL/Hr) CRRT <Continuous>  propofol Infusion 5 MICROgram(s)/kG/Min (1.5 mL/Hr) IV Continuous <Continuous>  thiamine IVPB 200 milliGRAM(s) IV Intermittent <User Schedule>  vasopressin Infusion 0.01 Unit(s)/Min (1.5 mL/Hr) IV Continuous <Continuous>    MEDICATIONS  (PRN):  sodium chloride 0.9% lock flush 10 milliLiter(s) IV Push every 1 hour PRN Pre/post blood products, medications, blood draw, and to maintain line patency  sodium chloride 0.9% lock flush 10 milliLiter(s) IV Push every 1 hour PRN Pre/post blood products, medications, blood draw, and to maintain line patency      ALLERGIES:  No Known Allergies      LABS:                                               8.8    5.44  )-----------( 16       ( 21 Aug 2023 05:43 )             25.9   08-21    135  |  99  |  8   ----------------------------<  71  4.4   |  <10<LL>  |  0.46<L>    Ca    7.1<L>      21 Aug 2023 11:46  Phos  3.6     08-21  Mg     2.0     08-21    TPro  3.9<L>  /  Alb  2.2<L>  /  TBili  6.8<H>  /  DBili  x   /  AST  41<H>  /  ALT  15  /  AlkPhos  95  08-21       cultures: Culture Results:   Growth in aerobic bottle: Gram Negative Rods  Growth in anaerobic bottle: Gram Negative Rods  Direct identification is available within approximately 3-5  hours either by Blood Panel Multiplexed PCR or Direct  MALDI-TOF. Details: [email or web address] (08-18 @ 20:50)  Culture Results:   Growth in aerobic bottle: Gram Negative Rods  Growth in anaerobic bottle: Gram Negative Rods (08-18 @ 20:42)       < from: CT Angio Chest PE Protocol w/ IV Cont (08.19.23 @ 02:46) >  IMPRESSION:  1.   Large volume ascites.  2.   Interval increase in size in hepatic metastases.  3.   Nodular omental opacities compatible with peritoneal carcinomatosis.  4.   Small right pleural effusion.  5.   Consolidation in the lung bases.  6.   Chronically thrombosed portal vein.  7.  No pulmonary embolism    --- End of Report ---      < end of copied text >  < from: CT Head No Cont (08.19.23 @ 02:45) >  IMPRESSION:    No evidence of acute intracranial hemorrhage, midline shift or CT   evidence of acute territorial infarct.    If the patient's symptoms persist, consider short interval follow-up head   CT or brain MRI if there are no MRI contraindications.    --- End of Report ---      < end of copied text >

## 2023-08-21 NOTE — PROGRESS NOTE ADULT - ASSESSMENT
73 female h/o pancreatic ca with mets on chemo, hypothryroid, dvt/pe on eliquis, now admitted to micu with septic shock    metastatic pancreatic ca  septic shock  ascites  bacteremia  covid19  anuric renal failure  pancytopenia  coagulapathy  hypothermia     vent support  pressors  s/p paracentesis  CVVHD as tolerated  broad spectrum abx  f/u cult and sens. repeat cult ngtd  transfusion of blood products as need  warming protocol    d/w family at bedside       prognosis poor    d/w micu team, renal, cards, heme/onc    mngt as per micu      Advanced care planning was discussed with patient and family.  Advanced care planning forms were reviewed and discussed as appropriate.  Differential diagnosis and plan of care discussed with patient after the evaluation.   Pain assessed and judicious use of narcotics when appropriate was discussed.  Importance of Fall prevention discussed.  Counseling on Smoking and Alcohol cessation was offered when appropriate.  Counseling on Diet, exercise, and medication compliance was done.       Approx 60 minutes spent.

## 2023-08-22 NOTE — PROGRESS NOTE ADULT - SUBJECTIVE AND OBJECTIVE BOX
INTERVAL HPI/OVERNIGHT EVENTS:    SUBJECTIVE: Patient seen and examined at bedside.     ROS: Unable to assess due to mental status.    OBJECTIVE:    VITAL SIGNS:  ICU Vital Signs Last 24 Hrs  T(C): 30.1 (22 Aug 2023 07:00), Max: 33 (21 Aug 2023 12:00)  T(F): 86.2 (22 Aug 2023 07:00), Max: 91.4 (21 Aug 2023 12:00)  HR: 97 (22 Aug 2023 07:00) (59 - 97)  BP: --  BP(mean): --  ABP: 73/37 (22 Aug 2023 07:00) (71/37 - 105/51)  ABP(mean): 49 (22 Aug 2023 07:00) (49 - 74)  RR: 32 (22 Aug 2023 07:00) (25 - 32)  SpO2: 75% (22 Aug 2023 07:00) (75% - 100%)    O2 Parameters below as of 22 Aug 2023 07:00  Patient On (Oxygen Delivery Method): ventilator    O2 Concentration (%): 100    Mode: AC/ CMV (Assist Control/ Continuous Mandatory Ventilation), RR (machine): 32, TV (machine): 400, FiO2: 100, PEEP: 5, ITime: 1, MAP: 12, PIP: 25    08-21 @ 07:01  -  08-22 @ 07:00  --------------------------------------------------------  IN: 5153.4 mL / OUT: 2535 mL / NET: 2618.4 mL    CAPILLARY BLOOD GLUCOSE    POCT Blood Glucose.: 121 mg/dL (21 Aug 2023 02:25)    PHYSICAL EXAM:  GENERAL: intubated, sedated  HEAD:  Atraumatic, Normocephalic  EYES: EOMI, PERRLA, conjunctiva and sclera clear  NECK: Supple, No JVD  CHEST/LUNG: Clear to auscultation bilaterally; No wheeze  HEART: S1, S2; No murmurs, rubs, or gallops  ABDOMEN: Soft, Nontender, Nondistended; Bowel sounds present  EXTREMITIES:  2+ Peripheral Pulses, No clubbing, cyanosis. + edema  NEUROLOGY: sedated  SKIN: No rashes or lesions    MEDICATIONS:  MEDICATIONS  (STANDING):  albumin human 25% IVPB 50 milliLiter(s) IV Intermittent every 6 hours  cefTRIAXone   IVPB 2000 milliGRAM(s) IV Intermittent every 24 hours  chlorhexidine 0.12% Liquid 15 milliLiter(s) Oral Mucosa every 12 hours  chlorhexidine 4% Liquid 1 Application(s) Topical <User Schedule>  CRRT Treatment    <Continuous>  norepinephrine Infusion 1 MICROgram(s)/kG/Min (65 mL/Hr) IV Continuous <Continuous>  pantoprazole  Injectable 40 milliGRAM(s) IV Push every 12 hours  phenylephrine    Infusion 4 MICROgram(s)/kG/Min (52 mL/Hr) IV Continuous <Continuous>  Phoxillum Filtration BK 4 / 2.5 5000 milliLiter(s) (1800 mL/Hr) CRRT <Continuous>  PrismaSOL Filtration BGK 4 / 2.5 5000 milliLiter(s) (1600 mL/Hr) CRRT <Continuous>  PrismaSOL Filtration BGK 4 / 2.5 5000 milliLiter(s) (200 mL/Hr) CRRT <Continuous>  propofol Infusion 5 MICROgram(s)/kG/Min (1.5 mL/Hr) IV Continuous <Continuous>  sodium bicarbonate  Infusion 0.216 mEq/kG/Hr (100 mL/Hr) IV Continuous <Continuous>  thiamine IVPB 200 milliGRAM(s) IV Intermittent <User Schedule>  vasopressin Infusion 0.01 Unit(s)/Min (1.5 mL/Hr) IV Continuous <Continuous>    MEDICATIONS  (PRN):  sodium chloride 0.9% lock flush 10 milliLiter(s) IV Push every 1 hour PRN Pre/post blood products, medications, blood draw, and to maintain line patency  sodium chloride 0.9% lock flush 10 milliLiter(s) IV Push every 1 hour PRN Pre/post blood products, medications, blood draw, and to maintain line patency    ALLERGIES:  Allergies  No Known Allergies    Intolerances    LABS:                        8.5    6.00  )-----------( 14       ( 22 Aug 2023 00:26 )             25.9     08-22    135  |  100  |  5<L>  ----------------------------<  147<H>  4.4   |  <10<LL>  |  0.37<L>    Ca    7.3<L>      22 Aug 2023 05:59  Phos  3.2     08-22  Mg     1.8     08-22    TPro  3.9<L>  /  Alb  2.4<L>  /  TBili  6.6<H>  /  DBili  x   /  AST  48<H>  /  ALT  16  /  AlkPhos  104  08-22    PT/INR - ( 22 Aug 2023 00:26 )   PT: 24.1 sec;   INR: 2.24 ratio      PTT - ( 22 Aug 2023 00:26 )  PTT:64.3 sec  Urinalysis Basic - ( 22 Aug 2023 05:59 )    Color: x / Appearance: x / SG: x / pH: x  Gluc: 147 mg/dL / Ketone: x  / Bili: x / Urobili: x   Blood: x / Protein: x / Nitrite: x   Leuk Esterase: x / RBC: x / WBC x   Sq Epi: x / Non Sq Epi: x / Bacteria: x    RADIOLOGY & ADDITIONAL TESTS: Reviewed. INTERVAL HPI/OVERNIGHT EVENTS:    SUBJECTIVE: Patient seen and examined at bedside. CRRT paused overnight due to hypotension, now capped on multiple pressors. 1U of platelets transfused overnight, and calcium repleted due to hypocalcemia. GOC discussed with family; they will not go through with signing MOLST form but do not want to initiate chest compression or defibrillation.    ROS: Unable to assess due to mental status.    OBJECTIVE:    VITAL SIGNS:  ICU Vital Signs Last 24 Hrs  T(C): 30.1 (22 Aug 2023 07:00), Max: 33 (21 Aug 2023 12:00)  T(F): 86.2 (22 Aug 2023 07:00), Max: 91.4 (21 Aug 2023 12:00)  HR: 97 (22 Aug 2023 07:00) (59 - 97)  BP: --  BP(mean): --  ABP: 73/37 (22 Aug 2023 07:00) (71/37 - 105/51)  ABP(mean): 49 (22 Aug 2023 07:00) (49 - 74)  RR: 32 (22 Aug 2023 07:00) (25 - 32)  SpO2: 75% (22 Aug 2023 07:00) (75% - 100%)    O2 Parameters below as of 22 Aug 2023 07:00  Patient On (Oxygen Delivery Method): ventilator    O2 Concentration (%): 100    Mode: AC/ CMV (Assist Control/ Continuous Mandatory Ventilation), RR (machine): 32, TV (machine): 400, FiO2: 100, PEEP: 5, ITime: 1, MAP: 12, PIP: 25    08-21 @ 07:01  -  08-22 @ 07:00  --------------------------------------------------------  IN: 5153.4 mL / OUT: 2535 mL / NET: 2618.4 mL    CAPILLARY BLOOD GLUCOSE    POCT Blood Glucose.: 121 mg/dL (21 Aug 2023 02:25)    PHYSICAL EXAM:  GENERAL: intubated, sedated  HEAD:  Atraumatic, Normocephalic  EYES: EOMI, PERRLA, conjunctiva and sclera clear  NECK: Supple, No JVD  CHEST/LUNG: Clear to auscultation bilaterally; No wheeze  HEART: S1, S2; No murmurs, rubs, or gallops  ABDOMEN: Soft, Nontender, Nondistended; Bowel sounds present  EXTREMITIES:  2+ Peripheral Pulses, No clubbing, cyanosis. 2+ pitting edema  NEUROLOGY: sedated  SKIN: No rashes or lesions    MEDICATIONS:  MEDICATIONS  (STANDING):  albumin human 25% IVPB 50 milliLiter(s) IV Intermittent every 6 hours  cefTRIAXone   IVPB 2000 milliGRAM(s) IV Intermittent every 24 hours  chlorhexidine 0.12% Liquid 15 milliLiter(s) Oral Mucosa every 12 hours  chlorhexidine 4% Liquid 1 Application(s) Topical <User Schedule>  CRRT Treatment    <Continuous>  norepinephrine Infusion 1 MICROgram(s)/kG/Min (65 mL/Hr) IV Continuous <Continuous>  pantoprazole  Injectable 40 milliGRAM(s) IV Push every 12 hours  phenylephrine    Infusion 4 MICROgram(s)/kG/Min (52 mL/Hr) IV Continuous <Continuous>  Phoxillum Filtration BK 4 / 2.5 5000 milliLiter(s) (1800 mL/Hr) CRRT <Continuous>  PrismaSOL Filtration BGK 4 / 2.5 5000 milliLiter(s) (1600 mL/Hr) CRRT <Continuous>  PrismaSOL Filtration BGK 4 / 2.5 5000 milliLiter(s) (200 mL/Hr) CRRT <Continuous>  propofol Infusion 5 MICROgram(s)/kG/Min (1.5 mL/Hr) IV Continuous <Continuous>  sodium bicarbonate  Infusion 0.216 mEq/kG/Hr (100 mL/Hr) IV Continuous <Continuous>  thiamine IVPB 200 milliGRAM(s) IV Intermittent <User Schedule>  vasopressin Infusion 0.01 Unit(s)/Min (1.5 mL/Hr) IV Continuous <Continuous>    MEDICATIONS  (PRN):  sodium chloride 0.9% lock flush 10 milliLiter(s) IV Push every 1 hour PRN Pre/post blood products, medications, blood draw, and to maintain line patency  sodium chloride 0.9% lock flush 10 milliLiter(s) IV Push every 1 hour PRN Pre/post blood products, medications, blood draw, and to maintain line patency    ALLERGIES:  Allergies  No Known Allergies    Intolerances    LABS:                        8.5    6.00  )-----------( 14       ( 22 Aug 2023 00:26 )             25.9     08-22    135  |  100  |  5<L>  ----------------------------<  147<H>  4.4   |  <10<LL>  |  0.37<L>    Ca    7.3<L>      22 Aug 2023 05:59  Phos  3.2     08-22  Mg     1.8     08-22    TPro  3.9<L>  /  Alb  2.4<L>  /  TBili  6.6<H>  /  DBili  x   /  AST  48<H>  /  ALT  16  /  AlkPhos  104  08-22    PT/INR - ( 22 Aug 2023 00:26 )   PT: 24.1 sec;   INR: 2.24 ratio      PTT - ( 22 Aug 2023 00:26 )  PTT:64.3 sec  Urinalysis Basic - ( 22 Aug 2023 05:59 )    Color: x / Appearance: x / SG: x / pH: x  Gluc: 147 mg/dL / Ketone: x  / Bili: x / Urobili: x   Blood: x / Protein: x / Nitrite: x   Leuk Esterase: x / RBC: x / WBC x   Sq Epi: x / Non Sq Epi: x / Bacteria: x    RADIOLOGY & ADDITIONAL TESTS: Reviewed.

## 2023-08-22 NOTE — PROGRESS NOTE ADULT - SUBJECTIVE AND OBJECTIVE BOX
Date of Service: 08-22-23 @ 10:07           CARDIOLOGY     PROGRESS  NOTE   ________________________________________________    CHIEF COMPLAINT:Patient is a 73y old  Female who presents with a chief complaint of Lethargy (22 Aug 2023 08:48)  sedated on vent  	  REVIEW OF SYSTEMS:  CONSTITUTIONAL: No fever, weight loss, or fatigue  EYES: No eye pain, visual disturbances, or discharge  ENT:  No difficulty hearing, tinnitus, vertigo; No sinus or throat pain  NECK: No pain or stiffness  RESPIRATORY: No cough, wheezing, chills or hemoptysis; No Shortness of Breath  CARDIOVASCULAR: No chest pain, palpitations, passing out, dizziness, or leg swelling  GASTROINTESTINAL: No abdominal or epigastric pain. No nausea, vomiting, or hematemesis; No diarrhea or constipation. No melena or hematochezia.  GENITOURINARY: No dysuria, frequency, hematuria, or incontinence  NEUROLOGICAL: No headaches, memory loss, loss of strength, numbness, or tremors  SKIN: No itching, burning, rashes, or lesions   LYMPH Nodes: No enlarged glands  ENDOCRINE: No heat or cold intolerance; No hair loss  MUSCULOSKELETAL: No joint pain or swelling; No muscle, back, or extremity pain  PSYCHIATRIC: No depression, anxiety, mood swings, or difficulty sleeping  HEME/LYMPH: No easy bruising, or bleeding gums  ALLERGY AND IMMUNOLOGIC: No hives or eczema	    [ ] All others negative	  [x ] Unable to obtain    PHYSICAL EXAM:  T(C): 36 (08-22-23 @ 09:15), Max: 36 (08-22-23 @ 09:15)  HR: 104 (08-22-23 @ 09:15) (59 - 104)  BP: --  RR: 32 (08-22-23 @ 09:15) (25 - 32)  SpO2: 76% (08-22-23 @ 09:15) (74% - 100%)  Wt(kg): --  I&O's Summary    21 Aug 2023 07:01  -  22 Aug 2023 07:00  --------------------------------------------------------  IN: 5153.4 mL / OUT: 2535 mL / NET: 2618.4 mL    22 Aug 2023 07:01  -  22 Aug 2023 10:07  --------------------------------------------------------  IN: 295 mL / OUT: 5 mL / NET: 290 mL  Mode: AC/ CMV (Assist Control/ Continuous Mandatory Ventilation), RR (machine): 32, TV (machine): 400, FiO2: 100, PEEP: 5, ITime: 1, MAP: 12, PIP: 25      Appearance: sedated on vent/ jaundice  HEENT:   Normal oral mucosa, PERRL, EOMI	  Lymphatic: No lymphadenopathy  Cardiovascular: Normal S1 S2, No JVD, + murmurs, No edema  Respiratory: rhonchi  Psychiatry: on vent  Gastrointestinal:  Soft, Non-tender, + BS	  Skin: No rashes, No ecchymoses, No cyanosis	  Extremities: Normal range of motion, No clubbing, cyanosis or edema  Vascular: Peripheral pulses palpable 2+ bilaterally    MEDICATIONS  (STANDING):  albumin human 25% IVPB 50 milliLiter(s) IV Intermittent every 6 hours  cefTRIAXone   IVPB 2000 milliGRAM(s) IV Intermittent every 24 hours  chlorhexidine 0.12% Liquid 15 milliLiter(s) Oral Mucosa every 12 hours  chlorhexidine 4% Liquid 1 Application(s) Topical <User Schedule>  norepinephrine Infusion 1 MICROgram(s)/kG/Min (65 mL/Hr) IV Continuous <Continuous>  pantoprazole  Injectable 40 milliGRAM(s) IV Push every 12 hours  phenylephrine    Infusion 4 MICROgram(s)/kG/Min (52 mL/Hr) IV Continuous <Continuous>  propofol Infusion 5 MICROgram(s)/kG/Min (1.5 mL/Hr) IV Continuous <Continuous>  sodium bicarbonate  Infusion 0.216 mEq/kG/Hr (100 mL/Hr) IV Continuous <Continuous>  thiamine IVPB 200 milliGRAM(s) IV Intermittent <User Schedule>  vasopressin Infusion 0.01 Unit(s)/Min (1.5 mL/Hr) IV Continuous <Continuous>      TELEMETRY: 	    ECG:  	  RADIOLOGY:  OTHER: 	  	  LABS:	 	    CARDIAC MARKERS:                                8.5    6.00  )-----------( 14       ( 22 Aug 2023 00:26 )             25.9     08-22    135  |  100  |  5<L>  ----------------------------<  147<H>  4.4   |  <10<LL>  |  0.37<L>    Ca    7.3<L>      22 Aug 2023 05:59  Phos  3.2     08-22  Mg     1.8     08-22    TPro  3.9<L>  /  Alb  2.4<L>  /  TBili  6.6<H>  /  DBili  x   /  AST  48<H>  /  ALT  16  /  AlkPhos  104  08-22    proBNP:   Lipid Profile:   HgA1c:   TSH:   PT/INR - ( 22 Aug 2023 00:26 )   PT: 24.1 sec;   INR: 2.24 ratio         PTT - ( 22 Aug 2023 00:26 )  PTT:64.3 sec  Respiratory Viral Panel with COVID-19 by POLO (08.19.23 @ 10:09)    Rapid RVP Result: Detected   SARS-CoV-2: Detected: This Respiratory Panel uses polymerase chain reaction (PCR) to detect for  adenovirus; coronavirus (HKU1, NL63, 229E, OC43); human metapneumovirus  (hMPV); human enterovirus/rhinovirus (Entero/RV); influenza A; influenza  A/H1; influenza A/H3; influenza A/H1-2009; influenza B; parainfluenza  viruses 1, 2, 3, 4; respiratory syncytial virus; Mycoplasma pneumoniae;  Chlamydophila pneumoniae; and SARS-CoV-2.        Assessment and plan  ---------------------------  73 female h/o metastatic pancreatic ca on chemo, dvt/pe previously on eliquis d/c'd yesterday due to thrombocytopenia, hypothyroid, here with BRBPR with recent admission for metabolic encephalopathy and thrombocytopenia. GIB x 1 week with increasing somnolence.  pt with metastatic pancreatic cA s/p chemo with hypotension and change of mental status and lethargy  sepsis on broad spectrum abx  sedated on vent  continue pressor  abdominal paracentesis as tolerated large ascites  abx  spoke to the family, prognosis is poor  started on CRRT , not sure will benefit pt overall  ?septic  pt with endstage pancreatic CA , may consider palliative if family agrees  + rvp  CRRT held sec to hypotension

## 2023-08-22 NOTE — PROGRESS NOTE ADULT - ASSESSMENT
73 female h/o pancreatic ca with mets on chemo, hypothryroid, dvt/pe on eliquis, now admitted to micu with septic shock    metastatic pancreatic ca  septic shock  ascites  bacteremia  covid19  anuric renal failure  pancytopenia  coagulapathy  hypothermia     vent support  pressors  s/p paracentesis  CVVHD as tolerated- now stopped due to hypotension  broad spectrum abx  f/u cult and sens. repeat cult ngtd  transfusion of blood products as need  warming protocol    8/22: cvvhd stopped due to hypotension. now maxxed on 3 pressors. on 100% fio2. remains hypotensive, hypethermic and hypoxic  pt actively dying. d/w family at bedside. now agreeable to no compressions or shock.        prognosis poor    d/w micu team, renal, cards, heme/onc    mngt as per micu      Advanced care planning was discussed with patient and family.  Advanced care planning forms were reviewed and discussed as appropriate.  Differential diagnosis and plan of care discussed with patient after the evaluation.   Pain assessed and judicious use of narcotics when appropriate was discussed.  Importance of Fall prevention discussed.  Counseling on Smoking and Alcohol cessation was offered when appropriate.  Counseling on Diet, exercise, and medication compliance was done.       Approx 60 minutes spent.   none

## 2023-08-22 NOTE — PROGRESS NOTE ADULT - ASSESSMENT
73F with metastatic pancreatic cancer (mets to liver and omentum, last chemo on 8/14-8/15), PVT, R gastroc DVT/?PE (eliquis d/c'ed for thrombocytopenia), hypothyroidism, recent admission in 7/2023 for melena presenting from home with lethargy and fever. Septic on presentation in setting of neutropenia. Admitted to MICU for shock state requiring pressor. Requiring CRRT; now paused due to hypotension on multiple pressors. DNR reversed, now full code.     NEUROLOGIC  #Metabolic encephalopathy   #Intubated & sedated  - baseline mental status AAOx3  - presenting with lethargy, progressively worsened iso acidemia  - propofol gtt, RASS goal 0 to -1     CARDIOVASCULAR  #Shock state  - hypotensive to SBP 50, s/p 2L in ED  - TTE 7/24/23: EF 61%, normal LV and RV systolic function  - likely in setting of severe sepsis, however, also component of decreased preload from increased intra-abdominal pressure  - continue levo, abdullahi, and vasopressin gtt  - Paracentesis (8/19) drained 3.3L, ostomy bag continuously draining.    PULMONARY  #Intubated and ventilated  #Acute hypoxic and hypercarbic respiratory failure  - intubated on 8/19 for airway protection iso encephalopathy   - settings: 400/24/5/100  - monitor serial gas, titrate as needed    GI  #BRBPR  - has been having bloody diarrhea for the past week per family  - H/H stable  - CT AP: Showing large volume ascites (pre-paracentesis), interval increase in hepatic metastasis, nodular omental opacities consistent with peritoneal carcinomatosis, small right pleural effusion + lung consolidation, and a chronically thrombosed portal vein.  - continue to monitor    RENAL  #TESFAYE  - Cr bump from 0.5 -> 0.96. Now down to 0.46  - possibly iso elevated intra-abdominal pressures  - monitor UOP on aparicio catheter  - CRRT due to acidemia and worsening UOP. Now paused due to hypotension.    HEME/ONC  #metastatic pancreatic cancer  #coagulopathy  #ascites  - recently diagnosed, follows Dr. Eduardo Martinez in [facility]  - on palliative chemotherapy to reduce size & relieve compression  - last chemotherapy on 8/14-15   - urgent diagnostic & therapeutic paracentesis   - 1U plasma pending procedures    ID  #Sepsis  #Neutropenic Fever  - UA positive  - CXR with small bilateral pleural effusions  - f/u CT C/A/P  - f/u RVP, blood and urine cultures  - Switched to CTX    ENDOCRINE  - no active issues  - monitor FS q6h    ETHICS  - full code  - sons are NOK 73F with metastatic pancreatic cancer (mets to liver and omentum, last chemo on 8/14-8/15), PVT, R gastroc DVT/?PE (eliquis d/c'ed for thrombocytopenia), hypothyroidism, recent admission in 7/2023 for melena presenting from home with lethargy and fever. Septic on presentation in setting of neutropenia. Admitted to MICU for shock state requiring pressor. Requiring CRRT; now paused due to hypotension and capped on multiple pressors. DNR reversed, now full code.     NEUROLOGIC  #Metabolic encephalopathy   #Intubated & sedated  - Baseline mental status AAOx3  - Presenting with lethargy, progressively worsened iso acidemia  - Propofol gtt, RASS goal 0 to -1     CARDIOVASCULAR  #Shock state  - Hypotensive to SBP 50, s/p 2L in ED  - TTE 7/24/23: EF 61%, normal LV and RV systolic function  - Likely in setting of severe sepsis, however, also component of decreased preload from increased intra-abdominal pressure  - Continue levo, abdullahi, and vasopressin gtt  - Paracentesis (8/19) drained 3.3L, ostomy bag continuously draining.    PULMONARY  #Intubated and ventilated  #Acute hypoxic and hypercarbic respiratory failure  - Intubated on 8/19 for airway protection iso encephalopathy   - Settings: 400/30/5/100  - Monitor serial gas, titrate as needed    GI  #BRBPR  - Has been having bloody diarrhea for the past week per family  - H/H stable  - CT AP: Showing large volume ascites (pre-paracentesis), interval increase in hepatic metastasis, nodular omental opacities consistent with peritoneal carcinomatosis, small right pleural effusion + lung consolidation, and a chronically thrombosed portal vein.  - Continue to monitor    RENAL  #TESFAYE  - Cr bump from 0.5 -> 0.96. Now down to 0.46  - Possibly iso elevated intra-abdominal pressures  - Monitor UOP on aparicio catheter  - CRRT due to acidemia and worsening UOP. Now paused due to hypotension.    HEME/ONC  #Metastatic pancreatic cancer  #Coagulopathy  #Ascites  - Recently diagnosed, follows Dr. Eduardo Martinez in [facility]  - On palliative chemotherapy to reduce size & relieve compression  - Last chemotherapy on 8/14-15   - Urgent diagnostic & therapeutic paracentesis   - 1U plasma pending procedures    ID  #Sepsis  #Neutropenic Fever  - UA positive  - CXR with small bilateral pleural effusions  - F/u CT C/A/P  - F/u RVP, blood and urine cultures  - Switched to CTX    ENDOCRINE  - No active issues  - On D10 50cc/hr for hypoglycemia  - Monitor FS q6h    ETHICS  - Full code  - Sons are NOK

## 2023-08-22 NOTE — CONSULT NOTE ADULT - PROBLEM SELECTOR RECOMMENDATION 9
Pt. with metabolic acidosis in the setting of septic shock and lactic acidosis. pH is low at 7.25 and SCO2  to 10. Serum lactate noted to be trending up, most recently ~15. Pt. was started on IV bicarbonate infusion. SCr is WNL, however, uop noted ot be decreasing. Agree with paracentesis. Upon discussion with MICU team and pt's family, if uop/acidosis does not improve with IV bicarbonate infusion and paracentesis, will need to consider CRRT. CRRT consent was obtained from pt's family and placed in chart.
ppsv 10%: pt requires assistance with all ADLs turn and positioning   pt currently on MAX pressors, remains intubated in MICU

## 2023-08-22 NOTE — PROGRESS NOTE ADULT - SUBJECTIVE AND OBJECTIVE BOX
[facility] Division of Kidney Diseases & Hypertension  FOLLOW UP NOTE  [phone]--------------------------------------------------------------------------------    HPI: 72 yo F with h/o metastatic pancreatic cancer (mets to liver, and omentum), DVT, and hypothyroidism currently admitted to MICU for septic shock in the setting of neutropenia. Pt. being seen for metabolic acidosis/lactic acidosis.    24 hour events/subjective: Pt. was seen and evaluated with family present in the MICU this morning. Pt. is intubated/sedated and requiring IV vasopressor support. Overnight, CRRT was held due to hypotension and increasing pressor requirements. Pt. unable to provide ROS.      PAST HISTORY  --------------------------------------------------------------------------------  No significant changes to PMH, PSH, FHx, SHx, unless otherwise noted    ALLERGIES & MEDICATIONS  --------------------------------------------------------------------------------  Allergies    No Known Allergies    Intolerances      Standing Inpatient Medications  albumin human 25% IVPB 50 milliLiter(s) IV Intermittent every 6 hours  cefTRIAXone   IVPB 2000 milliGRAM(s) IV Intermittent every 24 hours  chlorhexidine 0.12% Liquid 15 milliLiter(s) Oral Mucosa every 12 hours  chlorhexidine 4% Liquid 1 Application(s) Topical <User Schedule>  norepinephrine Infusion 1 MICROgram(s)/kG/Min IV Continuous <Continuous>  pantoprazole  Injectable 40 milliGRAM(s) IV Push every 12 hours  phenylephrine    Infusion 4 MICROgram(s)/kG/Min IV Continuous <Continuous>  propofol Infusion 5 MICROgram(s)/kG/Min IV Continuous <Continuous>  sodium bicarbonate  Infusion 0.216 mEq/kG/Hr IV Continuous <Continuous>  thiamine IVPB 200 milliGRAM(s) IV Intermittent <User Schedule>  vasopressin Infusion 0.01 Unit(s)/Min IV Continuous <Continuous>    PRN Inpatient Medications  sodium chloride 0.9% lock flush 10 milliLiter(s) IV Push every 1 hour PRN  sodium chloride 0.9% lock flush 10 milliLiter(s) IV Push every 1 hour PRN      REVIEW OF SYSTEMS  --------------------------------------------------------------------------------  See HPI    VITALS/PHYSICAL EXAM  --------------------------------------------------------------------------------  T(C): 35.1 (08-22-23 @ 08:00), Max: 35.1 (08-22-23 @ 08:00)  HR: 103 (08-22-23 @ 08:30) (59 - 103)  BP: --  RR: 32 (08-22-23 @ 08:30) (25 - 32)  SpO2: 75% (08-22-23 @ 08:30) (74% - 100%)  Wt(kg): --      08-21-23 @ 07:01  -  08-22-23 @ 07:00  --------------------------------------------------------  IN: 5153.4 mL / OUT: 2535 mL / NET: 2618.4 mL    08-22-23 @ 07:01  -  08-22-23 @ 08:48  --------------------------------------------------------  IN: 295 mL / OUT: 5 mL / NET: 290 mL      Physical Exam:  Gen: Ill-appearing  HEENT: +ET tube  Pulm: Mechanical breath sounds BL  CV: S1S2  Abd: Soft, non-tender  Ext: +Significant BL LE edema  Neuro: Sedated  Skin: Warm and dry  Vascular access: RIJ non-tunneled HD catheter    LABS/STUDIES  --------------------------------------------------------------------------------              8.5    6.00  >-----------<  14       [08-22-23 @ 00:26]              25.9     135  |  100  |  5   ----------------------------<  147      [08-22-23 @ 05:59]  4.4   |  <10  |  0.37        Ca     7.3     [08-22-23 @ 05:59]      Mg     1.8     [08-22-23 @ 05:59]      Phos  3.2     [08-22-23 @ 05:59]    TPro  3.9  /  Alb  2.4  /  TBili  6.6  /  DBili  x   /  AST  48  /  ALT  16  /  AlkPhos  104  [08-22-23 @ 05:59]    PT/INR: PT 24.1 , INR 2.24       [08-22-23 @ 00:26]  PTT: 64.3       [08-22-23 @ 00:26]          [08-22-23 @ 00:26]    Creatinine Trend:  SCr 0.37 [08-22 @ 05:59]  SCr <0.30 [08-22 @ 00:26]  SCr 0.30 [08-21 @ 17:28]  SCr 0.46 [08-21 @ 11:46]  SCr 0.46 [08-21 @ 05:44]    Urine Creatinine 37      [08-19-23 @ 10:07]  Urine Sodium 21      [08-19-23 @ 10:07]

## 2023-08-22 NOTE — PROGRESS NOTE ADULT - SUBJECTIVE AND OBJECTIVE BOX
AMY JAIMES  73y Female  MRN:[id number]    Patient is a 73y old  Female who presents with a chief complaint of Lethargy (19 Aug 2023 20:57)    HPI: pt well known to me  73F with metastatic pancreatic cancer (mets to liver and omentum, on chemo, last on 8/14-8/15), PVT, R gastroc DVT/?PE (eliquis d/c'ed for thrombocytopenia), hypothyroidism, recent admission in 7/2023 for melena presenting from home with lethargy and fevers. History obtained mostly from son at bedside. Per son, patient has been having bright red bloody stools for the past week, which was attributed to thrombocytopenia. She was noted to be lethargic today with low grade fever at home, which prompted them to bring her.    In ED, patient was hypotensive & febrile to 103, was given 2L IVF but persistently hypotensive requiring pressor. Labs notable for neutropenia with severe lactic acidosis. Admitted to MICU for likely septic shock requiring pressor.  (19 Aug 2023 01:33)      Patient seen and evaluated at bedside in MICU    Interval HPI: overnight events noted      PAST MEDICAL & SURGICAL HISTORY:  Essential hypertension      Hyperlipidemia      Pancreatic cancer      Hypothyroidism      No pertinent past surgical history          REVIEW OF SYSTEMS:  as per hpi     VITALS:   ICU Vital Signs Last 24 Hrs  T(C): 36.3 (22 Aug 2023 12:00), Max: 36.6 (22 Aug 2023 11:00)  T(F): 97.3 (22 Aug 2023 12:00), Max: 97.9 (22 Aug 2023 11:00)  HR: 111 (22 Aug 2023 14:15) (59 - 111)  BP: --  BP(mean): --  ABP: 73/35 (22 Aug 2023 14:15) (69/35 - 103/51)  ABP(mean): 48 (22 Aug 2023 14:15) (47 - 74)  RR: 32 (22 Aug 2023 14:15) (30 - 32)  SpO2: 100% (22 Aug 2023 14:15) (74% - 100%)    O2 Parameters below as of 22 Aug 2023 12:00  Patient On (Oxygen Delivery Method): ventilator                PHYSICAL EXAM:  GENERAL: intubated, sedated  HEAD:  Atraumatic, Normocephalic  EYES: EOMI, PERRLA, conjunctiva and sclera clear  NECK: Supple, No JVD  CHEST/LUNG: Clear to auscultation bilaterally; No wheeze  HEART: S1, S2; No murmurs, rubs, or gallops  ABDOMEN: Soft, Nontender, Nondistended; Bowel sounds present  EXTREMITIES:  2+ Peripheral Pulses, No clubbing, cyanosis. + edema  NEUROLOGY: sedated       Consultant(s) Notes Reviewed:  [x ] YES  [ ] NO  Care Discussed with Consultants/Other Providers [ x] YES  [ ] NO    MEDS:   MEDICATIONS  (STANDING):  cefTRIAXone   IVPB 2000 milliGRAM(s) IV Intermittent every 24 hours  chlorhexidine 0.12% Liquid 15 milliLiter(s) Oral Mucosa every 12 hours  chlorhexidine 4% Liquid 1 Application(s) Topical <User Schedule>  norepinephrine Infusion 1 MICROgram(s)/kG/Min (65 mL/Hr) IV Continuous <Continuous>  pantoprazole  Injectable 40 milliGRAM(s) IV Push every 12 hours  phenylephrine    Infusion 4 MICROgram(s)/kG/Min (52 mL/Hr) IV Continuous <Continuous>  propofol Infusion 5 MICROgram(s)/kG/Min (1.5 mL/Hr) IV Continuous <Continuous>  sodium bicarbonate  Infusion 0.216 mEq/kG/Hr (100 mL/Hr) IV Continuous <Continuous>  thiamine IVPB 200 milliGRAM(s) IV Intermittent <User Schedule>  vasopressin Infusion 0.01 Unit(s)/Min (1.5 mL/Hr) IV Continuous <Continuous>    MEDICATIONS  (PRN):  sodium chloride 0.9% lock flush 10 milliLiter(s) IV Push every 1 hour PRN Pre/post blood products, medications, blood draw, and to maintain line patency  sodium chloride 0.9% lock flush 10 milliLiter(s) IV Push every 1 hour PRN Pre/post blood products, medications, blood draw, and to maintain line patency      ALLERGIES:  No Known Allergies      LABS:                                                  8.5    6.00  )-----------( 14       ( 22 Aug 2023 00:26 )             25.9   08-22    135  |  100  |  5<L>  ----------------------------<  147<H>  4.4   |  <10<LL>  |  0.37<L>    Ca    7.3<L>      22 Aug 2023 05:59  Phos  3.2     08-22  Mg     1.8     08-22    TPro  3.9<L>  /  Alb  2.4<L>  /  TBili  6.6<H>  /  DBili  x   /  AST  48<H>  /  ALT  16  /  AlkPhos  104  08-22       cultures: Culture Results:   Growth in aerobic bottle: Gram Negative Rods  Growth in anaerobic bottle: Gram Negative Rods  Direct identification is available within approximately 3-5  hours either by Blood Panel Multiplexed PCR or Direct  MALDI-TOF. Details: [email or web address] (08-18 @ 20:50)  Culture Results:   Growth in aerobic bottle: Gram Negative Rods  Growth in anaerobic bottle: Gram Negative Rods (08-18 @ 20:42)       < from: CT Angio Chest PE Protocol w/ IV Cont (08.19.23 @ 02:46) >  IMPRESSION:  1.   Large volume ascites.  2.   Interval increase in size in hepatic metastases.  3.   Nodular omental opacities compatible with peritoneal carcinomatosis.  4.   Small right pleural effusion.  5.   Consolidation in the lung bases.  6.   Chronically thrombosed portal vein.  7.  No pulmonary embolism    --- End of Report ---      < end of copied text >  < from: CT Head No Cont (08.19.23 @ 02:45) >  IMPRESSION:    No evidence of acute intracranial hemorrhage, midline shift or CT   evidence of acute territorial infarct.    If the patient's symptoms persist, consider short interval follow-up head   CT or brain MRI if there are no MRI contraindications.    --- End of Report ---      < end of copied text >

## 2023-08-22 NOTE — CONSULT NOTE ADULT - PROBLEM SELECTOR RECOMMENDATION 4
see GOC above  family does not want to complete MOLST at this time but in the event patient heart stops/ develops a lethal arrythmia they do not want CPR or for the patient to be shocked, family would like continued medical management. education provided on the importance of documenting with MOLST-> family declined at this time, communicated to MICU team and bedside RN     family open to symptom management in the event patient develops symptoms of end of life

## 2023-08-22 NOTE — CONSULT NOTE ADULT - ASSESSMENT
73F with metastatic pancreatic cancer (mets to liver and omentum, last chemo on 8/14-8/15), PVT, R gastroc DVT/?PE (eliquis d/c'ed for thrombocytopenia), hypothyroidism, recent admission in 7/2023 for melena presenting from home with lethargy and fever. Septic on presentation in setting of neutropenia. Admitted to MICU for shock state requiring pressor. Requiring CRRT; now paused due to hypotension on multiple pressors. Palliative care consulted for GOC.

## 2023-08-22 NOTE — CONSULT NOTE ADULT - SUBJECTIVE AND OBJECTIVE BOX
Date of Service: 08-22-23 @ 11:34    HPI: 73F with metastatic pancreatic cancer (mets to liver and omentum, last chemo on 8/14-8/15), PVT, R gastroc DVT/?PE (eliquis d/ced for thrombocytopenia), hypothyroidism, recent admission in 7/2023 for melena presenting from home with lethargy and fever. Septic on presentation in setting of neutropenia. Admitted to MICU for shock state requiring pressor. Requiring CRRT; now paused due to hypotension on multiple pressors. Palliative care consulted for GOC.       PERTINENT PM/SXH:   Essential hypertension    Hyperlipidemia    Pancreatic cancer    Hypothyroidism      No pertinent past surgical history      FAMILY HISTORY:  Patient unable to provide medical history        ITEMS NOT CHECKED ARE NOT PRESENT    SOCIAL HISTORY:   Significant other/partner[ ]  Children[x ]  Presybeterian/Spirituality: Scientology   Substance hx:  [ ]   Tobacco hx:  [ ]   Alcohol hx: [ ]   Home Opioid hx:  [ x] I-Stop Reference No:#: [id number]  Living Situation: [ x]Home  [ ]Long term care  [ ]Rehab [ ]Other    ADVANCE DIRECTIVES:    DNR/MOLST  [ ]  Living Will  [ ]   DECISION MAKER(s):  [ ] Health Care Proxy(s)  [x] Surrogate(s)  [ ] Guardian           Name(s): Phone Number(s): Behnan, Ebi, Mansour     BASELINE (I)ADL(s) (prior to admission):  [place]: [x ]Total  [ ] Moderate [ ]Dependent    Allergies    No Known Allergies    Intolerances    MEDICATIONS  (STANDING):  albumin human 25% IVPB 50 milliLiter(s) IV Intermittent every 6 hours  cefTRIAXone   IVPB 2000 milliGRAM(s) IV Intermittent every 24 hours  chlorhexidine 0.12% Liquid 15 milliLiter(s) Oral Mucosa every 12 hours  chlorhexidine 4% Liquid 1 Application(s) Topical <User Schedule>  norepinephrine Infusion 1 MICROgram(s)/kG/Min (65 mL/Hr) IV Continuous <Continuous>  pantoprazole  Injectable 40 milliGRAM(s) IV Push every 12 hours  phenylephrine    Infusion 4 MICROgram(s)/kG/Min (52 mL/Hr) IV Continuous <Continuous>  propofol Infusion 5 MICROgram(s)/kG/Min (1.5 mL/Hr) IV Continuous <Continuous>  sodium bicarbonate  Infusion 0.216 mEq/kG/Hr (100 mL/Hr) IV Continuous <Continuous>  thiamine IVPB 200 milliGRAM(s) IV Intermittent <User Schedule>  vasopressin Infusion 0.01 Unit(s)/Min (1.5 mL/Hr) IV Continuous <Continuous>    MEDICATIONS  (PRN):  sodium chloride 0.9% lock flush 10 milliLiter(s) IV Push every 1 hour PRN Pre/post blood products, medications, blood draw, and to maintain line patency  sodium chloride 0.9% lock flush 10 milliLiter(s) IV Push every 1 hour PRN Pre/post blood products, medications, blood draw, and to maintain line patency    PRESENT SYMPTOMS: [ x]Unable to self-report see CPOT, PAINADs, RDOS  Source if other than patient:  [ ]Family   [x ]Team     Pain: [ ]yes [ ]no  QOL impact -   Location -                    Aggravating factors -  Quality -  Radiation -  Timing-  Severity (0-10 scale):  Minimal acceptable level (0-10 scale):       Dyspnea:                           [ ]Mild [ ]Moderate [ ]Severe  Anxiety:                             [ ]Mild [ ]Moderate [ ]Severe  Fatigue:                             [ ]Mild [ ]Moderate [ ]Severe  Nausea:                             [ ]Mild [ ]Moderate [ ]Severe  Loss of appetite:              [ ]Mild [ ]Moderate [ ]Severe  Constipation:                    [ ]Mild [ ]Moderate [ ]Severe    PCSSQ [Palliative Care Spiritual Screening Question]   Severity (0-10):  Score of 4 or > indicate consideration of Chaplaincy referral.  Chaplaincy Referral: [ x] yes [ ] refused [x ] following    Caregiver [place]? : [ ] yes [ ] no [ ] deferred:  Social work referral [ ] Patient & Family Centered Care Referral [ ]     Anticipatory Grief Present?: [ x] yes [ ] no  [ ] deferred: Palliative Social work referral [ ]  Patient & Family Centered Care Referral [ ]       Other Symptoms:  [ ]All other review of systems negative   [ x] Unable to obtain due to poor mentation    PHYSICAL EXAM:  Vital Signs Last 24 Hrs  T(C): 36.6 (22 Aug 2023 11:00), Max: 36.6 (22 Aug 2023 11:00)  T(F): 97.9 (22 Aug 2023 11:00), Max: 97.9 (22 Aug 2023 11:00)  HR: 109 (22 Aug 2023 11:30) (59 - 109)  BP: --  BP(mean): --  RR: 32 (22 Aug 2023 11:30) (25 - 32)  SpO2: 82% (22 Aug 2023 11:30) (74% - 100%)    Parameters below as of 22 Aug 2023 07:00  Patient On (Oxygen Delivery Method): ventilator    O2 Concentration (%): 100 I&O's Summary    21 Aug 2023 07:01  -  22 Aug 2023 07:00  --------------------------------------------------------  IN: 5153.4 mL / OUT: 2535 mL / NET: 2618.4 mL    22 Aug 2023 07:01  -  22 Aug 2023 11:34  --------------------------------------------------------  IN: 651.1 mL / OUT: 5 mL / NET: 646.1 mL        GENERAL:  [ ]Alert  [ ]Oriented x   [ ]Lethargic  [ ]Cachexia  [ x]Unarousable  [ ]Verbal  [x ]Non-Verbal  Behavioral:   [ ]Anxiety  [ ]Delirium [ ]Agitation [ ]Other  HEENT:  [ ]Normal   [ ]Dry mouth   [x ]ET Tube/Trach  [ ]Oral lesions  PULMONARY:   [ ]Clear [ ]Tachypnea  [ ]Audible excessive secretions   [ ]Rhonchi        [ ]Right [ ]Left [ ]Bilateral  [ ]Crackles        [ ]Right [ ]Left [ ]Bilateral  [ ]Wheezing     [ ]Right [ ]Left [ ]Bilateral  [ ]Diminished BS [ ] Right [ ]Left [ ]Bilateral  CARDIOVASCULAR:    [ ]Regular [ ]Irregular [ x]Tachy  [ ]Hany [ ]Murmur [ ]Other  GASTROINTESTINAL:  [x]Soft  [ ]Distended   [x]+BS  [x]Non tender [ ]Tender  [ ]PEG [x ]OGT/ NGT   Last BM:    GENITOURINARY:  [ ]Normal [ ]Incontinent   [ ]Oliguria/Anuria   [ ]Yoo  MUSCULOSKELETAL:   [ ]Normal   [x]Weakness  [ ]Bed/Wheelchair bound [ ]Edema  NEUROLOGIC:   [x]No focal deficits  [ ] Cognitive impairment  [ ] Dysphagia [ ]Dysarthria [ ] Paresis [ ]Other   SKIN:   [x]Normal  [ ]Rash   [ ]Pressure ulcer(s) [ ]y [ ]n present on admission    CRITICAL CARE:  [ ] Shock Present  [ ]Septic [ ]Cardiogenic [ ]Neurologic [ ]Hypovolemic  [ ]  Vasopressors [ ]  Inotropes   [ ]Respiratory failure present [ ]Mechanical ventilation [ ]Non-invasive ventilatory support [ ]High flow  Mode: AC/ CMV (Assist Control/ Continuous Mandatory Ventilation), RR (machine): 32, TV (machine): 400, FiO2: 100, PEEP: 5, PS: 5, ITime: 1, MAP: 12, PIP: 25  [ ]Acute  [ ]Chronic [ ]Hypoxic  [ ]Hypercarbic [ ]Other  [ ]Other organ failure     LABS:                        8.5    6.00  )-----------( 14       ( 22 Aug 2023 00:26 )             25.9   08-22    135  |  100  |  5<L>  ----------------------------<  147<H>  4.4   |  <10<LL>  |  0.37<L>    Ca    7.3<L>      22 Aug 2023 05:59  Phos  3.2     08-22  Mg     1.8     08-22    TPro  3.9<L>  /  Alb  2.4<L>  /  TBili  6.6<H>  /  DBili  x   /  AST  48<H>  /  ALT  16  /  AlkPhos  104  08-22  PT/INR - ( 22 Aug 2023 00:26 )   PT: 24.1 sec;   INR: 2.24 ratio         PTT - ( 22 Aug 2023 00:26 )  PTT:64.3 sec    Urinalysis Basic - ( 22 Aug 2023 05:59 )    Color: x / Appearance: x / SG: x / pH: x  Gluc: 147 mg/dL / Ketone: x  / Bili: x / Urobili: x   Blood: x / Protein: x / Nitrite: x   Leuk Esterase: x / RBC: x / WBC x   Sq Epi: x / Non Sq Epi: x / Bacteria: x      RADIOLOGY & ADDITIONAL STUDIES:    PROTEIN CALORIE MALNUTRITION PRESENT: [ ]mild [ ]moderate [ ]severe [ ]underweight [ ]morbid obesity  https://www.andeal.org/vault/2440/web/files/ONC/Table_Clinical%20Characteristics%20to%20Document%20Malnutrition-White%20JV%20et%20al%202012.pdf    Height (cm): 165.1 (08-19-23 @ 09:00), 165.1 (07-28-23 @ 10:37), 165.1 (07-19-23 @ 12:50)  Weight (kg): 69.3 (08-19-23 @ 09:00), 76 (07-28-23 @ 10:37), 66.7 (07-19-23 @ 12:50)  BMI (kg/m2): 25.4 (08-19-23 @ 09:00), 27.9 (07-28-23 @ 10:37), 24.5 (07-19-23 @ 12:50)    [ ]PPSV2 < or = to 30% [ ]significant weight loss  [ ]poor nutritional intake  [ ]anasarca[ ]Artificial Nutrition      Other REFERRALS:  [ ]Hospice  [ ]Child Life  [ ]Social Work  [ ]Case management [ ]Holistic Therapy     Care Coordination Assessment 201 [C. Provider] (08-21-23 @ 14:01)      Palliative Performance Scale:  http://npcrc.org/files/news/palliative_performance_scale_ppsv2.pdf  (Ctrl +  left click to view)  Respiratory Distress Observation Tool:  https://homecareinformation.net/handouts/hen/Respiratory_Distress_Observation_Scale.pdf (Ctrl +  left click to view)  PAINAD Score:  http://geriatrictoolkit.[place].[facility]/cog/painad.pdf (Ctrl +  left click to view)   Date of Service: 08-22-23 @ 11:34    HPI: 73F with metastatic pancreatic cancer (mets to liver and omentum, last chemo on 8/14-8/15), PVT, R gastroc DVT/?PE (eliquis d/ced for thrombocytopenia), hypothyroidism, recent admission in 7/2023 for melena presenting from home with lethargy and fever. Septic on presentation in setting of neutropenia. Admitted to MICU for shock state requiring pressor. Requiring CRRT; now paused due to hypotension on multiple pressors. Palliative care consulted for GOC.       PERTINENT PM/SXH:   Essential hypertension    Hyperlipidemia    Pancreatic cancer    Hypothyroidism      No pertinent past surgical history      FAMILY HISTORY:  Patient unable to provide medical history        ITEMS NOT CHECKED ARE NOT PRESENT    SOCIAL HISTORY:   Significant other/partner[ ]  Children[x ]  Samaritan/Spirituality: Druze   Substance hx:  [ ]   Tobacco hx:  [ ]   Alcohol hx: [ ]   Home Opioid hx:  [ x] I-Stop Reference No:#: [id number]  Living Situation: [ x]Home  [ ]Long term care  [ ]Rehab [ ]Other    ADVANCE DIRECTIVES:    DNR/MOLST  [ ]  Living Will  [ ]   DECISION MAKER(s):  [ ] Health Care Proxy(s)  [x] Surrogate(s)  [ ] Guardian           Name(s): Phone Number(s): Behnan, Ebi, Mansour     BASELINE (I)ADL(s) (prior to admission):  [place]: [x ]Total  [ ] Moderate [ ]Dependent    Allergies    No Known Allergies    Intolerances    MEDICATIONS  (STANDING):  albumin human 25% IVPB 50 milliLiter(s) IV Intermittent every 6 hours  cefTRIAXone   IVPB 2000 milliGRAM(s) IV Intermittent every 24 hours  chlorhexidine 0.12% Liquid 15 milliLiter(s) Oral Mucosa every 12 hours  chlorhexidine 4% Liquid 1 Application(s) Topical <User Schedule>  norepinephrine Infusion 1 MICROgram(s)/kG/Min (65 mL/Hr) IV Continuous <Continuous>  pantoprazole  Injectable 40 milliGRAM(s) IV Push every 12 hours  phenylephrine    Infusion 4 MICROgram(s)/kG/Min (52 mL/Hr) IV Continuous <Continuous>  propofol Infusion 5 MICROgram(s)/kG/Min (1.5 mL/Hr) IV Continuous <Continuous>  sodium bicarbonate  Infusion 0.216 mEq/kG/Hr (100 mL/Hr) IV Continuous <Continuous>  thiamine IVPB 200 milliGRAM(s) IV Intermittent <User Schedule>  vasopressin Infusion 0.01 Unit(s)/Min (1.5 mL/Hr) IV Continuous <Continuous>    MEDICATIONS  (PRN):  sodium chloride 0.9% lock flush 10 milliLiter(s) IV Push every 1 hour PRN Pre/post blood products, medications, blood draw, and to maintain line patency  sodium chloride 0.9% lock flush 10 milliLiter(s) IV Push every 1 hour PRN Pre/post blood products, medications, blood draw, and to maintain line patency    PRESENT SYMPTOMS: [ x]Unable to self-report see CPOT, PAINADs, RDOS  Source if other than patient:  [ ]Family   [x ]Team     Pain: [ ]yes [ ]no  QOL impact -   Location -                    Aggravating factors -  Quality -  Radiation -  Timing-  Severity (0-10 scale):  Minimal acceptable level (0-10 scale):       Dyspnea:                           [ ]Mild [ ]Moderate [ ]Severe  Anxiety:                             [ ]Mild [ ]Moderate [ ]Severe  Fatigue:                             [ ]Mild [ ]Moderate [ ]Severe  Nausea:                             [ ]Mild [ ]Moderate [ ]Severe  Loss of appetite:              [ ]Mild [ ]Moderate [ ]Severe  Constipation:                    [ ]Mild [ ]Moderate [ ]Severe    PCSSQ [Palliative Care Spiritual Screening Question]   Severity (0-10):  Score of 4 or > indicate consideration of Chaplaincy referral.  Chaplaincy Referral: [ x] yes [ ] refused [x ] following    Caregiver [place]? : [ ] yes [ ] no [ ] deferred:  Social work referral [ ] Patient & Family Centered Care Referral [ ]     Anticipatory Grief Present?: [ x] yes [ ] no  [ ] deferred: Palliative Social work referral [ ]  Patient & Family Centered Care Referral [ ]       Other Symptoms:  [ ]All other review of systems negative   [ x] Unable to obtain due to poor mentation    PHYSICAL EXAM:  Vital Signs Last 24 Hrs  T(C): 36.6 (22 Aug 2023 11:00), Max: 36.6 (22 Aug 2023 11:00)  T(F): 97.9 (22 Aug 2023 11:00), Max: 97.9 (22 Aug 2023 11:00)  HR: 109 (22 Aug 2023 11:30) (59 - 109)  BP: --  BP(mean): --  RR: 32 (22 Aug 2023 11:30) (25 - 32)  SpO2: 82% (22 Aug 2023 11:30) (74% - 100%)    Parameters below as of 22 Aug 2023 07:00  Patient On (Oxygen Delivery Method): ventilator    O2 Concentration (%): 100 I&O's Summary    21 Aug 2023 07:01  -  22 Aug 2023 07:00  --------------------------------------------------------  IN: 5153.4 mL / OUT: 2535 mL / NET: 2618.4 mL    22 Aug 2023 07:01  -  22 Aug 2023 11:34  --------------------------------------------------------  IN: 651.1 mL / OUT: 5 mL / NET: 646.1 mL        GENERAL:  [ ]Alert  [ ]Oriented x   [ ]Lethargic  [ ]Cachexia  [ x]Unarousable  [ ]Verbal  [x ]Non-Verbal  Behavioral:   [ ]Anxiety  [ ]Delirium [ ]Agitation [ ]Other  HEENT:  [ ]Normal   [ ]Dry mouth   [x ]ET Tube/Trach  [ ]Oral lesions  PULMONARY:   [ ]Clear [ ]Tachypnea  [ ]Audible excessive secretions   [ ]Rhonchi        [ ]Right [ ]Left [ ]Bilateral  [ ]Crackles        [ ]Right [ ]Left [ ]Bilateral  [ ]Wheezing     [ ]Right [ ]Left [ ]Bilateral  [ ]Diminished BS [ ] Right [ ]Left [ ]Bilateral  CARDIOVASCULAR:    [ ]Regular [ ]Irregular [ x]Tachy  [ ]Hany [ ]Murmur [ ]Other  GASTROINTESTINAL:  [x]Soft  [ ]Distended   [x]+BS  [x]Non tender [ ]Tender  [ ]PEG [x ]OGT/ NGT   Last BM:    GENITOURINARY:  [ ]Normal [ ]Incontinent   [ ]Oliguria/Anuria   [ ]Yoo  MUSCULOSKELETAL:   [ ]Normal   [x]Weakness  [ ]Bed/Wheelchair bound [ ]Edema  NEUROLOGIC:   [ ]No focal deficits  [x ] Cognitive impairment  [ ] Dysphagia [ ]Dysarthria [ ] Paresis [ ]Other   SKIN:   [ ]Normal  [ ]Rash   [ ]Pressure ulcer(s) [ ]y [ ]n present on admission Multiple skin lesions     CRITICAL CARE:  [ ] Shock Present  [x ]Septic [ ]Cardiogenic [ ]Neurologic [ ]Hypovolemic  [ ]  Vasopressors [ ]  Inotropes   [ x]Respiratory failure present [ x]Mechanical ventilation [ ]Non-invasive ventilatory support [ ]High flow  Mode: AC/ CMV (Assist Control/ Continuous Mandatory Ventilation), RR (machine): 32, TV (machine): 400, FiO2: 100, PEEP: 5, PS: 5, ITime: 1, MAP: 12, PIP: 25  [ ]Acute  [ ]Chronic [ ]Hypoxic  [ ]Hypercarbic [ ]Other  [ ]Other organ failure     LABS:                        8.5    6.00  )-----------( 14       ( 22 Aug 2023 00:26 )             25.9   08-22    135  |  100  |  5<L>  ----------------------------<  147<H>  4.4   |  <10<LL>  |  0.37<L>    Ca    7.3<L>      22 Aug 2023 05:59  Phos  3.2     08-22  Mg     1.8     08-22    TPro  3.9<L>  /  Alb  2.4<L>  /  TBili  6.6<H>  /  DBili  x   /  AST  48<H>  /  ALT  16  /  AlkPhos  104  08-22  PT/INR - ( 22 Aug 2023 00:26 )   PT: 24.1 sec;   INR: 2.24 ratio         PTT - ( 22 Aug 2023 00:26 )  PTT:64.3 sec    Urinalysis Basic - ( 22 Aug 2023 05:59 )    Color: x / Appearance: x / SG: x / pH: x  Gluc: 147 mg/dL / Ketone: x  / Bili: x / Urobili: x   Blood: x / Protein: x / Nitrite: x   Leuk Esterase: x / RBC: x / WBC x   Sq Epi: x / Non Sq Epi: x / Bacteria: x      RADIOLOGY & ADDITIONAL STUDIES:  < from: Xray Chest 1 View- PORTABLE-Urgent (Xray Chest 1 View- PORTABLE-Urgent .) (08.20.23 @ 15:23) >  ACC: [id number] EXAM:  XR CHEST PORTABLE URGENT 1V   ORDERED BY: ANGIE MARCH     PROCEDURE DATE:  08/20/2023          INTERPRETATION:  EXAMINATION: XR CHEST URGENT    CLINICAL INDICATION: Line Placement    TECHNIQUE: Single frontal, portable view of the chest was obtained.    COMPARISON: Chest x-ray None.    FINDINGS:  Right IJ Shiley with catheter placement at the cavoatrial junction. Left   IJ catheter tip at the cavoatrial junction. ET tube tip above the guru.   NG tip below the diaphragm.  The heart is normal in size.  Subsegmental bibasilar atelectasis.  There is no pneumothorax or pleural effusion.  No acute bony abnormality.    IMPRESSION:  Lines and tubes as above. Bibasilar patchy opacification representing   subsegmental atelectasis versus pneumonia.    --- End of Report ---          GELY RANGEL MD; Resident Radiologist  This document has been electronically signed.  JEREMI BEATTY MD; Attending Interventional Radiologist  This document has been electronically signed. Aug 20 2023  3:26PM    < end of copied text >    PROTEIN CALORIE MALNUTRITION PRESENT: [ ]mild [ ]moderate [ ]severe [ ]underweight [ ]morbid obesity  https://www.andeal.org/vault/2440/web/files/ONC/Table_Clinical%20Characteristics%20to%20Document%20Malnutrition-White%20JV%20et%20al%202012.pdf    Height (cm): 165.1 (08-19-23 @ 09:00), 165.1 (07-28-23 @ 10:37), 165.1 (07-19-23 @ 12:50)  Weight (kg): 69.3 (08-19-23 @ 09:00), 76 (07-28-23 @ 10:37), 66.7 (07-19-23 @ 12:50)  BMI (kg/m2): 25.4 (08-19-23 @ 09:00), 27.9 (07-28-23 @ 10:37), 24.5 (07-19-23 @ 12:50)    [x ]PPSV2 < or = to 30% [ ]significant weight loss  [ ]poor nutritional intake  [ ]anasarca[ ]Artificial Nutrition      Other REFERRALS:  [ ]Hospice  [ ]Child Life  [ ]Social Work  [ ]Case management [ ]Holistic Therapy     Care Coordination Assessment 201 [C. Provider] (08-21-23 @ 14:01)      Palliative Performance Scale:  http://npcrc.org/files/news/palliative_performance_scale_ppsv2.pdf  (Ctrl +  left click to view)  Respiratory Distress Observation Tool:  https://homecareinformation.net/handouts/hen/Respiratory_Distress_Observation_Scale.pdf (Ctrl +  left click to view)  PAINAD Score:  http://geriatrictoolkit.[place].[facility]/cog/painad.pdf (Ctrl +  left click to view)

## 2023-08-22 NOTE — CONSULT NOTE ADULT - PROBLEM SELECTOR RECOMMENDATION 2
Pt. with hypokalemia. Serum potassium is low at 3.1. Pt. received IV potassium repletion. Recommend to monitor serum potassium.    If you have any questions, please feel free to contact me  Imelda Meneses  Nephrology Fellow  [phone] / [employer] Teams(Preferred)  (After 5pm or on weekends please page the on-call fellow)
- recently diagnosed, follows Dr. Eduardo Martinez in [facility]  - on palliative chemotherapy to reduce size & relieve compression  - last chemotherapy on 8/14-15

## 2023-08-22 NOTE — CONSULT NOTE ADULT - CONVERSATION DETAILS
Met with patient's two sons while they were speaking with Dr. Pablo Guthrie. Family discussed that they would like to continue current medical management and escalation of care but in the event his heart stops they would not want chest compressions or the patient to be shocked. Shared this should be documented on a MOLST in order to ensure these protections are implemented. Due to Buddhism reasons family wishing not to complete documentation at this time and hope that communication with the MICU team to be enough. Informed bedside RN and MICU team. Discussed symptom management in the event symptoms of end of life arise low dose symptom medications can be offered. Education provided regarding symptom management, that medications are not to hasten death but to treat symptoms. Family agreeable if symptoms arise.

## 2023-08-22 NOTE — CONSULT NOTE ADULT - PROBLEM SELECTOR RECOMMENDATION 3
- intubated on 8/19 for airway protection iso encephalopathy - intubated on 8/19 for airway protection iso encephalopathy  - likely in setting of severe sepsis, however, also component of decreased preload from increased intra-abdominal pressure  - continue levo, abdullahi, and vasopressin gtt  - Paracentesis (8/19) drained 3.3L, ostomy bag continuously draining.  pt currently on max pressors   defer to MICU for management

## 2023-08-22 NOTE — CONSULT NOTE ADULT - PROBLEM SELECTOR RECOMMENDATION 5
will continue to follow for GOC/symptoms  in the event patient develops symptoms of end of life and family amenable can consider initiating   - start dilaudid 0.3 mg IVP q2h PRN severe pain/dyspnea  - start dilaudid 0.2 IVP q2h PRN mod pain  - start ativan 0.2mg IVP q2h PRN agitation/anxiety  - start glycopyrrolate 0.4mg q6h PRN secretions  - bowel regimen while on opioids     case discussed with bedside RN and MICU team   Can be reached by TEAMS M-F 9-5 Ruth Mccormick Any other time please page [phone] if needed

## 2023-08-23 NOTE — PROGRESS NOTE ADULT - SUBJECTIVE AND OBJECTIVE BOX
Date of Service: 08-23-23 @ 10:58           CARDIOLOGY     PROGRESS  NOTE   ________________________________________________    CHIEF COMPLAINT:Patient is a 73y old  Female who presents with a chief complaint of Lethargy (23 Aug 2023 07:59)  sedated on vent  	  REVIEW OF SYSTEMS:  CONSTITUTIONAL: No fever, weight loss, or fatigue  EYES: No eye pain, visual disturbances, or discharge  ENT:  No difficulty hearing, tinnitus, vertigo; No sinus or throat pain  NECK: No pain or stiffness  RESPIRATORY: No cough, wheezing, chills or hemoptysis; No Shortness of Breath  CARDIOVASCULAR: No chest pain, palpitations, passing out, dizziness, or leg swelling  GASTROINTESTINAL: No abdominal or epigastric pain. No nausea, vomiting, or hematemesis; No diarrhea or constipation. No melena or hematochezia.  GENITOURINARY: No dysuria, frequency, hematuria, or incontinence  NEUROLOGICAL: No headaches, memory loss, loss of strength, numbness, or tremors  SKIN: No itching, burning, rashes, or lesions   LYMPH Nodes: No enlarged glands  ENDOCRINE: No heat or cold intolerance; No hair loss  MUSCULOSKELETAL: No joint pain or swelling; No muscle, back, or extremity pain  PSYCHIATRIC: No depression, anxiety, mood swings, or difficulty sleeping  HEME/LYMPH: No easy bruising, or bleeding gums  ALLERGY AND IMMUNOLOGIC: No hives or eczema	    [ ] All others negative	  [x ] Unable to obtain    PHYSICAL EXAM:  T(C): 35.8 (08-23-23 @ 08:00), Max: 38.3 (08-22-23 @ 18:00)  HR: 101 (08-23-23 @ 09:45) (92 - 115)  BP: --  RR: 32 (08-23-23 @ 09:45) (31 - 34)  SpO2: 99% (08-23-23 @ 09:45) (81% - 100%)  Wt(kg): --  I&O's Summary    22 Aug 2023 07:01  -  23 Aug 2023 07:00  --------------------------------------------------------  IN: 5295.1 mL / OUT: 60 mL / NET: 5235.1 mL        Appearance: sedtaed on vent  HEENT:   Normal oral mucosa, PERRL, EOMI	  Lymphatic: No lymphadenopathy  Cardiovascular: Normal S1 S2, No JVD, No murmurs, + edema  Respiratory:rhonchi  Psychiatry:sedated  Gastrointestinal:  Soft, Non-tender, + BS	  Skin: No rashes, No ecchymoses, No cyanosis	  Extremities: Normal range of motion, No clubbing, cyanosis , + edema  Vascular: Peripheral pulses palpable 2+ bilaterally    MEDICATIONS  (STANDING):  buMETAnide IVPB 4 milliGRAM(s) IV Intermittent once  cefTRIAXone   IVPB 2000 milliGRAM(s) IV Intermittent every 24 hours  chlorhexidine 0.12% Liquid 15 milliLiter(s) Oral Mucosa every 12 hours  chlorhexidine 4% Liquid 1 Application(s) Topical <User Schedule>  metoclopramide Injectable 10 milliGRAM(s) IV Push every 6 hours  norepinephrine Infusion 1 MICROgram(s)/kG/Min (65 mL/Hr) IV Continuous <Continuous>  pantoprazole  Injectable 40 milliGRAM(s) IV Push every 12 hours  phenylephrine    Infusion 4 MICROgram(s)/kG/Min (52 mL/Hr) IV Continuous <Continuous>  propofol Infusion 5 MICROgram(s)/kG/Min (1.5 mL/Hr) IV Continuous <Continuous>  sodium bicarbonate  Infusion 0.216 mEq/kG/Hr (100 mL/Hr) IV Continuous <Continuous>  thiamine IVPB 200 milliGRAM(s) IV Intermittent <User Schedule>  vasopressin Infusion 0.01 Unit(s)/Min (1.5 mL/Hr) IV Continuous <Continuous>      TELEMETRY: 	    ECG:  	  RADIOLOGY:  OTHER: 	  	  LABS:	 	    CARDIAC MARKERS:                                6.9    27.12 )-----------( 5        ( 23 Aug 2023 00:16 )             21.3     08-23    134<L>  |  98  |  7   ----------------------------<  109<H>  4.6   |  <10<LL>  |  0.64    Ca    7.5<L>      23 Aug 2023 00:16  Phos  3.3     08-23  Mg     1.7     08-23    TPro  3.9<L>  /  Alb  2.6<L>  /  TBili  8.6<H>  /  DBili  x   /  AST  58<H>  /  ALT  23  /  AlkPhos  183<H>  08-23    proBNP:   Lipid Profile:   HgA1c:   TSH:   PT/INR - ( 23 Aug 2023 00:16 )   PT: 22.8 sec;   INR: 2.22 ratio         PTT - ( 23 Aug 2023 00:16 )  PTT:72.4 sec      Assessment and plan  ---------------------------  73 female h/o metastatic pancreatic ca on chemo, dvt/pe previously on eliquis d/c'd yesterday due to thrombocytopenia, hypothyroid, here with [facility] with recent admission for metabolic encephalopathy and thrombocytopenia. GIB x 1 week with increasing somnolence.  pt with metastatic pancreatic cA s/p chemo with hypotension and change of mental status and lethargy  sepsis on broad spectrum abx  sedated on vent  continue pressor  abdominal paracentesis as tolerated large ascites  abx  spoke to the family, prognosis is poor  started on CRRT , not sure will benefit pt overall  ?septic  pt with endstage pancreatic CA , may consider palliative if family agrees  + rvp  CRRT held sec to hypotension, on iv Bumex  family  mare  aware of poor prognosis

## 2023-08-23 NOTE — PROGRESS NOTE ADULT - CONVERSATION DETAILS
Met with patient's two sons and  outside patient's room. They shared this is a very sad situation and appreciate the support. Family had questions regarding the discontinuation of the bicarb gtt, as at this time they do not want to deescalate care. Discussed pH level WNL, and risk of fluid overload. Discussed concern for prolonged suffering and to what extent we should continue treatment. At this time the family shared they understand the poor prognosis and likely irreversible illness but do not want to deescalate care. They shared their insight is based on their slick and honoring her wishes. Emotional support provided.

## 2023-08-23 NOTE — PROGRESS NOTE ADULT - SUBJECTIVE AND OBJECTIVE BOX
[facility] Division of Kidney Diseases & Hypertension  FOLLOW UP NOTE  [phone]--------------------------------------------------------------------------------    Chief Complaint:  Sepsis    HPI:  72 yo F with h/o metastatic pancreatic cancer (mets to liver, and omentum), DVT, and hypothyroidism currently admitted to MICU for septic shock in the setting of neutropenia. Pt. being seen for metabolic acidosis/lactic acidosis.    24 hour events/subjective: Pt. was seen at the [facility]. Pt. is intubated/sedated and requiring IV vasopressor support. She maxed out on pressors - Phenylephrine, levophed and vasopressin. , CRRT was held due to hypotension and increasing pressor requirements. Pt. unable to provide ROS.    PAST HISTORY  --------------------------------------------------------------------------------  No significant changes to PMH, PSH, FHx, SHx, unless otherwise noted    ALLERGIES & MEDICATIONS  --------------------------------------------------------------------------------  Allergies    No Known Allergies    Intolerances      Standing Inpatient Medications  cefTRIAXone   IVPB 2000 milliGRAM(s) IV Intermittent every 24 hours  chlorhexidine 0.12% Liquid 15 milliLiter(s) Oral Mucosa every 12 hours  chlorhexidine 4% Liquid 1 Application(s) Topical <User Schedule>  dextrose 10%. 1000 milliLiter(s) IV Continuous <Continuous>  norepinephrine Infusion 1 MICROgram(s)/kG/Min IV Continuous <Continuous>  pantoprazole  Injectable 40 milliGRAM(s) IV Push every 12 hours  phenylephrine    Infusion 4 MICROgram(s)/kG/Min IV Continuous <Continuous>  propofol Infusion 5 MICROgram(s)/kG/Min IV Continuous <Continuous>  sodium bicarbonate  Infusion 0.216 mEq/kG/Hr IV Continuous <Continuous>  thiamine IVPB 200 milliGRAM(s) IV Intermittent <User Schedule>  vasopressin Infusion 0.01 Unit(s)/Min IV Continuous <Continuous>    PRN Inpatient Medications  sodium chloride 0.9% lock flush 10 milliLiter(s) IV Push every 1 hour PRN  sodium chloride 0.9% lock flush 10 milliLiter(s) IV Push every 1 hour PRN      REVIEW OF SYSTEMS  --------------------------------------------------------------------------------  ROS was not obtainable.    VITALS/PHYSICAL EXAM  --------------------------------------------------------------------------------  T(C): 35.3 (08-23-23 @ 06:00), Max: 38.3 (08-22-23 @ 18:00)  HR: 92 (08-23-23 @ 06:15) (92 - 115)  BP: --  RR: 32 (08-23-23 @ 06:15) (31 - 34)  SpO2: 100% (08-23-23 @ 06:15) (74% - 100%)  Wt(kg): --        08-22-23 @ 07:01  -  08-23-23 @ 07:00  --------------------------------------------------------  IN: 5295.1 mL / OUT: 60 mL / NET: 5235.1 mL      Physical Exam:  	Gen: Intubated and sedated.  	HEENT: PERRL, supple neck, clear oropharynx  	Pulm: Crackles +  	CV: RRR, S1S2;  	Abd: +BS, soft, edema + on abd wall	              : Yoo +              Extremities: Pitting pedal edema upto thigh and abdomen               Neuro: intubated and sedated.   	Skin: Warm, without rashes  	  LABS/STUDIES  --------------------------------------------------------------------------------              6.9    27.12 >-----------<  5        [08-23-23 @ 00:16]              21.3     134  |  98  |  7   ----------------------------<  109      [08-23-23 @ 00:16]  4.6   |  <10  |  0.64        Ca     7.5     [08-23-23 @ 00:16]      Mg     1.7     [08-23-23 @ 00:16]      Phos  3.3     [08-23-23 @ 00:16]    TPro  3.9  /  Alb  2.6  /  TBili  8.6  /  DBili  x   /  AST  58  /  ALT  23  /  AlkPhos  183  [08-23-23 @ 00:16]    PT/INR: PT 22.8 , INR 2.22       [08-23-23 @ 00:16]  PTT: 72.4       [08-23-23 @ 00:16]          [08-22-23 @ 00:26]    Creatinine Trend:  SCr 0.64 [08-23 @ 00:16]  SCr 0.37 [08-22 @ 05:59]  SCr <0.30 [08-22 @ 00:26]  SCr 0.30 [08-21 @ 17:28]  SCr 0.46 [08-21 @ 11:46]    Urinalysis - [08-23-23 @ 00:16]      Color  / Appearance  / SG  / pH       Gluc 109 / Ketone   / Bili  / Urobili        Blood  / Protein  / Leuk Est  / Nitrite       RBC  / WBC  / Hyaline  / Gran  / Sq Epi  / Non Sq Epi  / Bacteria     Urine Creatinine 37      [08-19-23 @ 10:07]  Urine Sodium 21      [08-19-23 @ 10:07]

## 2023-08-23 NOTE — PROGRESS NOTE ADULT - ASSESSMENT
73 female h/o pancreatic ca with mets on chemo, hypothryroid, dvt/pe on eliquis, now admitted to micu with septic shock    metastatic pancreatic ca  septic shock  ascites  bacteremia  covid19  anuric renal failure  pancytopenia  coagulapathy  hypothermia     vent support  pressors  s/p paracentesis  CVVHD  - now stopped due to hypotension  broad spectrum abx  f/u cult and sens. repeat cult ngtd  transfusion of blood products as need  warming protocol    8/22: cvvhd stopped due to hypotension. now maxxed on 3 pressors. on 100% fio2. remains hypotensive, hypethermic and hypoxic  pt actively dying. d/w family at bedside. now agreeable to no compressions or shock.    8/23: pt remains hypotensive on 3 pressors. no cvvhd 2/2 hypotension. pt at end of life. prognosis poor. family aware       prognosis poor    d/w micu team, renal, cards, heme/onc    mngt as per micu      Advanced care planning was discussed with patient and family.  Advanced care planning forms were reviewed and discussed as appropriate.  Differential diagnosis and plan of care discussed with patient after the evaluation.   Pain assessed and judicious use of narcotics when appropriate was discussed.  Importance of Fall prevention discussed.  Counseling on Smoking and Alcohol cessation was offered when appropriate.  Counseling on Diet, exercise, and medication compliance was done.       Approx 60 minutes spent.

## 2023-08-23 NOTE — PROGRESS NOTE ADULT - SUBJECTIVE AND OBJECTIVE BOX
SUBJECTIVE AND OBJECTIVE: Pt seen and examined at bedside. Pt remains intubated with capped pressors in MICU   Indication for Geriatrics and Palliative Care Services/INTERVAL HPI: GOC     OVERNIGHT EVENTS: pt requiring platelets     DNR on chart:  Allergies    No Known Allergies    Intolerances    MEDICATIONS  (STANDING):  cefTRIAXone   IVPB 2000 milliGRAM(s) IV Intermittent every 24 hours  chlorhexidine 0.12% Liquid 15 milliLiter(s) Oral Mucosa every 12 hours  chlorhexidine 4% Liquid 1 Application(s) Topical <User Schedule>  metoclopramide Injectable 10 milliGRAM(s) IV Push every 6 hours  norepinephrine Infusion 1 MICROgram(s)/kG/Min (65 mL/Hr) IV Continuous <Continuous>  pantoprazole  Injectable 40 milliGRAM(s) IV Push every 12 hours  phenylephrine    Infusion 4 MICROgram(s)/kG/Min (52 mL/Hr) IV Continuous <Continuous>  propofol Infusion 5 MICROgram(s)/kG/Min (1.5 mL/Hr) IV Continuous <Continuous>  sodium bicarbonate  Infusion 0.216 mEq/kG/Hr (100 mL/Hr) IV Continuous <Continuous>  thiamine IVPB 200 milliGRAM(s) IV Intermittent <User Schedule>  vasopressin Infusion 0.01 Unit(s)/Min (1.5 mL/Hr) IV Continuous <Continuous>    MEDICATIONS  (PRN):  sodium chloride 0.9% lock flush 10 milliLiter(s) IV Push every 1 hour PRN Pre/post blood products, medications, blood draw, and to maintain line patency  sodium chloride 0.9% lock flush 10 milliLiter(s) IV Push every 1 hour PRN Pre/post blood products, medications, blood draw, and to maintain line patency      ITEMS UNCHECKED ARE NOT PRESENT    PRESENT SYMPTOMS: [x ]Unable to self-report - see [ ] CPOT [ x] PAINADS [x ] RDOS  Source if other than patient:  [ ]Family   [ x]Team     Pain:  [ ]yes [ ]no  QOL impact -   Location -                    Aggravating factors -  Quality -  Radiation -  Timing-  Severity (0-10 scale):  Minimal acceptable level (0-10 scale):     CPOT:    https://www.sccm.org/getattachment/[email or web address]-[email or web address]/Critical-Care-Pain-Observation-Tool-(CPOT)    PAINAD Score: See PAINAD tool and score below       Dyspnea:                           [ ]Mild [ ]Moderate [ ]Severe    RDOS: See RDOS tool and score below   0 to 2  minimal or no respiratory distress   3  mild distress  4 to 6 moderate distress  >7 severe distress      Anxiety:                             [ ]Mild [ ]Moderate [ ]Severe  Fatigue:                             [ ]Mild [ ]Moderate [ ]Severe  Nausea:                             [ ]Mild [ ]Moderate [ ]Severe  Loss of appetite:              [ ]Mild [ ]Moderate [ ]Severe  Constipation:                    [ ]Mild [ ]Moderate [ ]Severe    PCSSQ[Palliative Care Spiritual Screening Question]   Severity (0-10):  Score of 4 or > indicate consideration of Chaplaincy referral.  Chaplaincy Referral: [x ] yes [ ] refused [ x] following [ ] Deferred     Caregiver [place]? : [ x] yes [ ] no [ ] Deferred [ ] Declined             Social work referral [ x] Patient & Family Centered Care Referral [ ]     Anticipatory Grief present?:  [ x] yes [ ] no  [ ] Deferred                  Social work referral [ ] Chaplaincy Referral [x ]    		  Other Symptoms:  [x ]All other review of systems negative - pt unable to participate obtunded and intubated z    Palliative Performance Status Version 2:   See PPSv2 tool and score below         PHYSICAL EXAM:  Vital Signs Last 24 Hrs  T(C): 35.8 (23 Aug 2023 08:00), Max: 38.3 (22 Aug 2023 18:00)  T(F): 96.4 (23 Aug 2023 08:00), Max: 100.9 (22 Aug 2023 18:00)  HR: 99 (23 Aug 2023 14:48) (92 - 115)  BP: --  BP(mean): --  RR: 32 (23 Aug 2023 13:30) (31 - 34)  SpO2: 100% (23 Aug 2023 14:48) (90% - 100%)    Parameters below as of 22 Aug 2023 20:00  Patient On (Oxygen Delivery Method): ventilator    O2 Concentration (%): 100 I&O's Summary    22 Aug 2023 07:01  -  23 Aug 2023 07:00  --------------------------------------------------------  IN: 5295.1 mL / OUT: 60 mL / NET: 5235.1 mL    23 Aug 2023 07:01  -  23 Aug 2023 15:17  --------------------------------------------------------  IN: 861 mL / OUT: 70 mL / NET: 791 mL       GENERAL: [ ]Cachexia    [ ]Alert  [ ]Oriented x   [ ]Lethargic  [x]Unarousable  [ ]Verbal  [ ]Non-Verbal  Behavioral:   [ ]Anxiety  [ ]Delirium [ ]Agitation [ ]Other  HEENT:  [ ]Normal   [ ]Dry mouth   [x ]ET Tube/Trach  [ ]Oral lesions  PULMONARY:   [ ]Clear [ ]Tachypnea  [ ]Audible excessive secretions   [ ]Rhonchi        [ ]Right [ ]Left [ ]Bilateral  [ ]Crackles        [ ]Right [ ]Left [ ]Bilateral  [ ]Wheezing     [ ]Right [ ]Left [ ]Bilateral  [x ]Diminished BS [ ] Right [ ]Left [ x]Bilateral  CARDIOVASCULAR:    [ ]Regular [ ]Irregular [x ]Tachy  [ ]Hany [ ]Murmur [ ]Other  GASTROINTESTINAL:  [x ]Soft  [ ]Distended   [x ]+BS  [x ]Non tender [ ]Tender  [ ]Other [ ]PEG [ ]OGT/ NGT   Last BM:   GENITOURINARY:  [ ]Normal [ ]Incontinent   [x ]Oliguria/Anuria   [ ]Yoo  MUSCULOSKELETAL:   [ ]Normal   [ x]Weakness  [ ]Bed/Wheelchair bound [ ]Edema  NEUROLOGIC:   [ ]No focal deficits  [ ] Cognitive impairment  [ ] Dysphagia [ ]Dysarthria [ ] Paresis [ ]Other   SKIN:   [ ]Normal  [ ]Rash  [ ]Other  [ ]Pressure ulcer(s) [ ]y [ ]n present on admission    CRITICAL CARE:  [ ]Shock Present  [ ]Septic [ ]Cardiogenic [ ]Neurologic [ ]Hypovolemic  [x ]Vasopressors [ ]Inotropes  [x ]Respiratory failure present [x ]Mechanical Ventilation [ ]Non-invasive ventilatory support [ ]High-Flow Mode: AC/ CMV (Assist Control/ Continuous Mandatory Ventilation), RR (machine): 32, TV (machine): 400, FiO2: 70, PEEP: 5, ITime: 1, MAP: 13, PIP: 28  [ ]Acute  [ ]Chronic [ ]Hypoxic  [ ]Hypercarbic [ ]Other  [ ]Other organ failure     LABS:                        6.9    27.12 )-----------( 5        ( 23 Aug 2023 00:16 )             21.3   08-23    134<L>  |  98  |  7   ----------------------------<  109<H>  4.6   |  <10<LL>  |  0.64    Ca    7.5<L>      23 Aug 2023 00:16  Phos  3.3     08-23  Mg     1.7     08-23    TPro  3.9<L>  /  Alb  2.6<L>  /  TBili  8.6<H>  /  DBili  x   /  AST  58<H>  /  ALT  23  /  AlkPhos  183<H>  08-23  PT/INR - ( 23 Aug 2023 00:16 )   PT: 22.8 sec;   INR: 2.22 ratio         PTT - ( 23 Aug 2023 00:16 )  PTT:72.4 sec    Urinalysis Basic - ( 23 Aug 2023 00:16 )    Color: x / Appearance: x / SG: x / pH: x  Gluc: 109 mg/dL / Ketone: x  / Bili: x / Urobili: x   Blood: x / Protein: x / Nitrite: x   Leuk Esterase: x / RBC: x / WBC x   Sq Epi: x / Non Sq Epi: x / Bacteria: x      RADIOLOGY & ADDITIONAL STUDIES:  < from: Xray Chest 1 View- PORTABLE-Urgent (Xray Chest 1 View- PORTABLE-Urgent .) (08.20.23 @ 15:23) >    ACC: [id number] EXAM:  XR CHEST PORTABLE URGENT 1V   ORDERED BY: ANGIE MARCH     PROCEDURE DATE:  08/20/2023          INTERPRETATION:  EXAMINATION: XR CHEST URGENT    CLINICAL INDICATION: Line Placement    TECHNIQUE: Single frontal, portable view of the chest was obtained.    COMPARISON: Chest x-ray None.    FINDINGS:  Right IJ Shiley with catheter placement at the cavoatrial junction. Left   IJ catheter tip at the cavoatrial junction. ET tube tip above the guru.   NG tip below the diaphragm.  The heart is normal in size.  Subsegmental bibasilar atelectasis.  There is no pneumothorax or pleural effusion.  No acute bony abnormality.    IMPRESSION:  Lines and tubes as above. Bibasilar patchy opacification representing   subsegmental atelectasis versus pneumonia.    --- End of Report ---          GELY RANGEL MD; Resident Radiologist  This document has been electronically signed.  JEREMI BEATTY MD; Attending Interventional Radiologist  This document has been electronically signed. Aug 20 2023  3:26PM    < end of copied text >    Protein Calorie Malnutrition Present: [ ]mild [ ]moderate [ ]severe [ ]underweight [ ]morbid obesity  https://www.andeal.org/vault/2440/web/files/ONC/Table_Clinical%20Characteristics%20to%20Document%20Malnutrition-White%20JV%20et%20al%202012.pdf    Height (cm): 165.1 (08-19-23 @ 09:00), 165.1 (07-28-23 @ 10:37), 165.1 (07-19-23 @ 12:50)  Weight (kg): 69.3 (08-19-23 @ 09:00), 76 (07-28-23 @ 10:37), 66.7 (07-19-23 @ 12:50)  BMI (kg/m2): 25.4 (08-19-23 @ 09:00), 27.9 (07-28-23 @ 10:37), 24.5 (07-19-23 @ 12:50)    [ x]PPSV2 < or = 30%  [ ]significant weight loss [ ]poor nutritional intake [ ]anasarca[ ]Artificial Nutrition    Other REFERRALS:  [ ]Hospice  [ ]Child Life  [ ]Social Work  [ ]Case management [ ]Holistic Therapy     Goals of Care Document:

## 2023-08-23 NOTE — PROGRESS NOTE ADULT - PROBLEM SELECTOR PLAN 5
see GOC above  case discussed with MICU team/MICU SW  if patient develops symptoms of distress can consider ordering  - dilaudid 0.3 mg IVP q4h PRN severe pain/dyspnea  - dilaudid 0.2 IVP q4h PRN mod pain  will continue to follow for GOC  Can be reached by TEAMS M-F 9-5 Ruth Mccormick Any other time please page [phone] if needed

## 2023-08-23 NOTE — PROGRESS NOTE ADULT - SUBJECTIVE AND OBJECTIVE BOX
INTERVAL HPI/OVERNIGHT EVENTS:    SUBJECTIVE: Patient seen and examined at bedside. Vomited this AM; paused tube feeds.    ROS: Unable to assess due to mental status.    OBJECTIVE:    VITAL SIGNS:  ICU Vital Signs Last 24 Hrs  T(C): 35.3 (23 Aug 2023 06:00), Max: 38.3 (22 Aug 2023 18:00)  T(F): 95.5 (23 Aug 2023 06:00), Max: 100.9 (22 Aug 2023 18:00)  HR: 92 (23 Aug 2023 06:15) (92 - 115)  BP: --  BP(mean): --  ABP: 88/39 (23 Aug 2023 06:15) (67/33 - 90/39)  ABP(mean): 57 (23 Aug 2023 06:15) (44 - 59)  RR: 32 (23 Aug 2023 06:15) (31 - 34)  SpO2: 100% (23 Aug 2023 06:15) (74% - 100%)    O2 Parameters below as of 22 Aug 2023 20:00  Patient On (Oxygen Delivery Method): ventilator    O2 Concentration (%): 100    Mode: AC/ CMV (Assist Control/ Continuous Mandatory Ventilation), RR (machine): 32, TV (machine): 400, FiO2: 100, PEEP: 5, PS: , ITime: 1, MAP: 12, PIP: 24    08-22 @ 07:01  -  08-23 @ 07:00  --------------------------------------------------------  IN: 5295.1 mL / OUT: 60 mL / NET: 5235.1 mL    CAPILLARY BLOOD GLUCOSE    POCT Blood Glucose.: 138 mg/dL (22 Aug 2023 12:33)    PHYSICAL EXAM:  GENERAL: intubated, sedated  HEAD:  Atraumatic, Normocephalic  EYES: EOMI, PERRLA, conjunctiva and sclera clear  NECK: Supple, No JVD  CHEST/LUNG: Clear to auscultation bilaterally; No wheeze  HEART: S1, S2; No murmurs, rubs, or gallops  ABDOMEN: Soft, Nontender, Nondistended; Bowel sounds present  EXTREMITIES:  2+ Peripheral Pulses, No clubbing, cyanosis. 2+ pitting edema  NEUROLOGY: sedated  SKIN: No rashes or lesions    MEDICATIONS:  MEDICATIONS  (STANDING):  cefTRIAXone   IVPB 2000 milliGRAM(s) IV Intermittent every 24 hours  chlorhexidine 0.12% Liquid 15 milliLiter(s) Oral Mucosa every 12 hours  chlorhexidine 4% Liquid 1 Application(s) Topical <User Schedule>  dextrose 10%. 1000 milliLiter(s) (50 mL/Hr) IV Continuous <Continuous>  norepinephrine Infusion 1 MICROgram(s)/kG/Min (65 mL/Hr) IV Continuous <Continuous>  pantoprazole  Injectable 40 milliGRAM(s) IV Push every 12 hours  phenylephrine    Infusion 4 MICROgram(s)/kG/Min (52 mL/Hr) IV Continuous <Continuous>  propofol Infusion 5 MICROgram(s)/kG/Min (1.5 mL/Hr) IV Continuous <Continuous>  sodium bicarbonate  Infusion 0.216 mEq/kG/Hr (100 mL/Hr) IV Continuous <Continuous>  thiamine IVPB 200 milliGRAM(s) IV Intermittent <User Schedule>  vasopressin Infusion 0.01 Unit(s)/Min (1.5 mL/Hr) IV Continuous <Continuous>    MEDICATIONS  (PRN):  sodium chloride 0.9% lock flush 10 milliLiter(s) IV Push every 1 hour PRN Pre/post blood products, medications, blood draw, and to maintain line patency  sodium chloride 0.9% lock flush 10 milliLiter(s) IV Push every 1 hour PRN Pre/post blood products, medications, blood draw, and to maintain line patency    ALLERGIES:  Allergies  No Known Allergies    Intolerances    LABS:                        6.9    27.12 )-----------( 5        ( 23 Aug 2023 00:16 )             21.3     08-23    134<L>  |  98  |  7   ----------------------------<  109<H>  4.6   |  <10<LL>  |  0.64    Ca    7.5<L>      23 Aug 2023 00:16  Phos  3.3     08-23  Mg     1.7     08-23    TPro  3.9<L>  /  Alb  2.6<L>  /  TBili  8.6<H>  /  DBili  x   /  AST  58<H>  /  ALT  23  /  AlkPhos  183<H>  08-23    PT/INR - ( 23 Aug 2023 00:16 )   PT: 22.8 sec;   INR: 2.22 ratio      PTT - ( 23 Aug 2023 00:16 )  PTT:72.4 sec  Urinalysis Basic - ( 23 Aug 2023 00:16 )    Color: x / Appearance: x / SG: x / pH: x  Gluc: 109 mg/dL / Ketone: x  / Bili: x / Urobili: x   Blood: x / Protein: x / Nitrite: x   Leuk Esterase: x / RBC: x / WBC x   Sq Epi: x / Non Sq Epi: x / Bacteria: x    RADIOLOGY & ADDITIONAL TESTS: Reviewed.

## 2023-08-23 NOTE — PROVIDER CONTACT NOTE (OTHER) - RECOMMENDATIONS
Continue covid isolation at this time.  Continue to monitor.
MD Villagomez spoke with family regarding frequency of labs and explained risk benefits of receiving excess fluid to family and further explained the poor prognosis of the pt

## 2023-08-23 NOTE — PROGRESS NOTE ADULT - SUBJECTIVE AND OBJECTIVE BOX
AMY JAIMES  73y Female  MRN:[id number]    Patient is a 73y old  Female who presents with a chief complaint of Lethargy (19 Aug 2023 20:57)    HPI: pt well known to me  73F with metastatic pancreatic cancer (mets to liver and omentum, on chemo, last on 8/14-8/15), PVT, R gastroc DVT/?PE (eliquis d/c'ed for thrombocytopenia), hypothyroidism, recent admission in 7/2023 for melena presenting from home with lethargy and fevers. History obtained mostly from son at bedside. Per son, patient has been having bright red bloody stools for the past week, which was attributed to thrombocytopenia. She was noted to be lethargic today with low grade fever at home, which prompted them to bring her.    In ED, patient was hypotensive & febrile to 103, was given 2L IVF but persistently hypotensive requiring pressor. Labs notable for neutropenia with severe lactic acidosis. Admitted to MICU for likely septic shock requiring pressor.  (19 Aug 2023 01:33)      Patient seen and evaluated at bedside in MICU    Interval HPI: overnight events noted      PAST MEDICAL & SURGICAL HISTORY:  Essential hypertension      Hyperlipidemia      Pancreatic cancer      Hypothyroidism      No pertinent past surgical history          REVIEW OF SYSTEMS:  as per hpi     VITALS:   Vital Signs Last 24 Hrs  T(C): 35.8 (23 Aug 2023 08:00), Max: 38.3 (22 Aug 2023 18:00)  T(F): 96.4 (23 Aug 2023 08:00), Max: 100.9 (22 Aug 2023 18:00)  HR: 101 (23 Aug 2023 13:30) (92 - 115)  BP: --  BP(mean): --  RR: 32 (23 Aug 2023 13:30) (31 - 34)  SpO2: 100% (23 Aug 2023 13:30) (90% - 100%)    Parameters below as of 22 Aug 2023 20:00  Patient On (Oxygen Delivery Method): ventilator    O2 Concentration (%): 100        PHYSICAL EXAM:  GENERAL: intubated, sedated  HEAD:  Atraumatic, Normocephalic  EYES: EOMI, PERRLA, conjunctiva and sclera clear  NECK: Supple, No JVD  CHEST/LUNG: Clear to auscultation bilaterally; No wheeze  HEART: S1, S2; No murmurs, rubs, or gallops  ABDOMEN: Soft, Nontender, Nondistended; Bowel sounds present  EXTREMITIES:  2+ Peripheral Pulses, No clubbing, cyanosis. + edema  NEUROLOGY: sedated       Consultant(s) Notes Reviewed:  [x ] YES  [ ] NO  Care Discussed with Consultants/Other Providers [ x] YES  [ ] NO    MEDS:   MEDICATIONS  (STANDING):  cefTRIAXone   IVPB 2000 milliGRAM(s) IV Intermittent every 24 hours  chlorhexidine 0.12% Liquid 15 milliLiter(s) Oral Mucosa every 12 hours  chlorhexidine 4% Liquid 1 Application(s) Topical <User Schedule>  metoclopramide Injectable 10 milliGRAM(s) IV Push every 6 hours  norepinephrine Infusion 1 MICROgram(s)/kG/Min (65 mL/Hr) IV Continuous <Continuous>  pantoprazole  Injectable 40 milliGRAM(s) IV Push every 12 hours  phenylephrine    Infusion 4 MICROgram(s)/kG/Min (52 mL/Hr) IV Continuous <Continuous>  propofol Infusion 5 MICROgram(s)/kG/Min (1.5 mL/Hr) IV Continuous <Continuous>  sodium bicarbonate  Infusion 0.216 mEq/kG/Hr (100 mL/Hr) IV Continuous <Continuous>  thiamine IVPB 200 milliGRAM(s) IV Intermittent <User Schedule>  vasopressin Infusion 0.01 Unit(s)/Min (1.5 mL/Hr) IV Continuous <Continuous>    MEDICATIONS  (PRN):  sodium chloride 0.9% lock flush 10 milliLiter(s) IV Push every 1 hour PRN Pre/post blood products, medications, blood draw, and to maintain line patency  sodium chloride 0.9% lock flush 10 milliLiter(s) IV Push every 1 hour PRN Pre/post blood products, medications, blood draw, and to maintain line patency      ALLERGIES:  No Known Allergies      LABS:                                                  6.9    27.12 )-----------( 5        ( 23 Aug 2023 00:16 )             21.3   08-23    134<L>  |  98  |  7   ----------------------------<  109<H>  4.6   |  <10<LL>  |  0.64    Ca    7.5<L>      23 Aug 2023 00:16  Phos  3.3     08-23  Mg     1.7     08-23    TPro  3.9<L>  /  Alb  2.6<L>  /  TBili  8.6<H>  /  DBili  x   /  AST  58<H>  /  ALT  23  /  AlkPhos  183<H>  08-23       cultures: Culture Results:   Growth in aerobic bottle: Gram Negative Rods  Growth in anaerobic bottle: Gram Negative Rods  Direct identification is available within approximately 3-5  hours either by Blood Panel Multiplexed PCR or Direct  MALDI-TOF. Details: [email or web address] (08-18 @ 20:50)  Culture Results:   Growth in aerobic bottle: Gram Negative Rods  Growth in anaerobic bottle: Gram Negative Rods (08-18 @ 20:42)       < from: CT Angio Chest PE Protocol w/ IV Cont (08.19.23 @ 02:46) >  IMPRESSION:  1.   Large volume ascites.  2.   Interval increase in size in hepatic metastases.  3.   Nodular omental opacities compatible with peritoneal carcinomatosis.  4.   Small right pleural effusion.  5.   Consolidation in the lung bases.  6.   Chronically thrombosed portal vein.  7.  No pulmonary embolism    --- End of Report ---      < end of copied text >  < from: CT Head No Cont (08.19.23 @ 02:45) >  IMPRESSION:    No evidence of acute intracranial hemorrhage, midline shift or CT   evidence of acute territorial infarct.    If the patient's symptoms persist, consider short interval follow-up head   CT or brain MRI if there are no MRI contraindications.    --- End of Report ---      < end of copied text >

## 2023-08-24 NOTE — PROGRESS NOTE ADULT - ASSESSMENT
73F with metastatic pancreatic cancer (mets to liver and omentum, last chemo on 8/14-8/15), PVT, R gastroc DVT/?PE (eliquis d/c'ed for thrombocytopenia), hypothyroidism, recent admission in 7/2023 for melena presenting from home with lethargy and fever. Septic on presentation in setting of neutropenia. Admitted to MICU for shock state requiring pressor. Requiring CRRT; now paused due to hypotension and capped on multiple pressors. DNR reversed, now full code.    NEUROLOGIC  #Metabolic encephalopathy   #Intubated & sedated  - Baseline mental status AAOx3  - Presenting with lethargy, progressively worsened iso acidemia  - Propofol gtt, RASS goal 0 to -1     CARDIOVASCULAR  #Shock state  - Hypotensive to SBP 50, s/p 2L in ED  - TTE 7/24/23: EF 61%, normal LV and RV systolic function  - Likely in setting of severe sepsis, however, also component of decreased preload from increased intra-abdominal pressure  - Continue levo, abdullahi, and vasopressin gtt  - Paracentesis (8/19) drained 3.3L, ostomy bag continuously draining.    PULMONARY  #Intubated and ventilated  #Acute hypoxic and hypercarbic respiratory failure  - Intubated on 8/19 for airway protection iso encephalopathy   - Settings: 400/30/5/100  - Monitor serial gas, titrate as needed    GI  #BRBPR  - Has been having bloody diarrhea for the past week per family  - H/H stable  - CT AP: Showing large volume ascites (pre-paracentesis), interval increase in hepatic metastasis, nodular omental opacities consistent with peritoneal carcinomatosis, small right pleural effusion + lung consolidation, and a chronically thrombosed portal vein.  - Continue to monitor    RENAL  #TESFAYE  - Cr bump from 0.5 -> 0.96. Now down to 0.64.  - Possibly iso elevated intra-abdominal pressures  - Monitor UOP on aparicio catheter  - CRRT due to acidemia and worsening UOP. Now paused due to hypotension.    HEME/ONC  #Metastatic pancreatic cancer  #Coagulopathy  #Ascites  - Recently diagnosed, follows Dr. Eduardo Martinez in [facility]  - On palliative chemotherapy to reduce size & relieve compression  - Last chemotherapy on 8/14-15   - S/p urgent diagnostic & therapeutic paracentesis  - 1U platelets given in AM (8/23)    ID  #Sepsis  #Neutropenic Fever  - UA positive  - CXR with small bilateral pleural effusions  - F/u CT C/A/P  - F/u RVP, blood and urine cultures  - Switched to CTX    ENDOCRINE  - No active issues  - On D10 50cc/hr for hypoglycemia    ETHICS  - Full code  - Sons are NOK 73F with metastatic pancreatic cancer (mets to liver and omentum, last chemo on 8/14-8/15), PVT, R gastroc DVT/?PE (eliquis d/c'ed for thrombocytopenia), hypothyroidism, recent admission in 7/2023 for melena presenting from home with lethargy and fever. Septic on presentation in setting of neutropenia. Admitted to MICU for shock state requiring pressor. Requiring CRRT; now paused due to hypotension and capped on multiple pressors. DNR reversed, now full code. D/c suzan due to VTach episodes, tube feeds restarted.    NEUROLOGIC  #Metabolic encephalopathy   #Intubated & sedated  - Baseline mental status AAOx3  - Presenting with lethargy, progressively worsened iso acidemia  - Propofol gtt, RASS goal 0 to -1  - Off sedation this AM.     CARDIOVASCULAR  #Shock state  - Hypotensive to SBP 50, s/p 2L in ED  - TTE 7/24/23: EF 61%, normal LV and RV systolic function  - Likely in setting of severe sepsis, however, also component of decreased preload from increased intra-abdominal pressure  - Continue levo, abdullahi, and vasopressin gtt  - Paracentesis (8/19) drained 3.3L, ostomy bag continuously draining.    PULMONARY  #Intubated and ventilated  #Acute hypoxic and hypercarbic respiratory failure  - Intubated on 8/19 for airway protection iso encephalopathy   - Settings: 400/30/5/100  - Monitor serial gas, titrate as needed    GI  #BRBPR  - Has been having bloody diarrhea for the past week per family  - H/H stable  - CT AP: Showing large volume ascites (pre-paracentesis), interval increase in hepatic metastasis, nodular omental opacities consistent with peritoneal carcinomatosis, small right pleural effusion + lung consolidation, and a chronically thrombosed portal vein.  - Continue to monitor    RENAL  #TESFAYE  - Cr bump from 0.5 -> 0.96. Now down to 0.64.  - Possibly iso elevated intra-abdominal pressures  - Monitor UOP on aparicio catheter  - CRRT due to acidemia and worsening UOP. Now paused due to hypotension.    HEME/ONC  #Metastatic pancreatic cancer  #Coagulopathy  #Ascites  - Recently diagnosed, follows Dr. Eduardo Martinez in [facility]  - On palliative chemotherapy to reduce size & relieve compression  - Last chemotherapy on 8/14-15   - S/p urgent diagnostic & therapeutic paracentesis  - 1U platelets given in AM (8/23)    ID  #Sepsis  #Neutropenic Fever  - UA positive  - CXR with small bilateral pleural effusions  - F/u CT C/A/P  - RVP, blood and urine cultures negative  - Switched to CTX    ENDOCRINE  - No active issues  - On D10 50cc/hr for hypoglycemia    ETHICS  - Full code  - Sons are NOK

## 2023-08-24 NOTE — PROGRESS NOTE ADULT - SUBJECTIVE AND OBJECTIVE BOX
AMY JAIMES  73y Female  MRN:[id number]    Patient is a 73y old  Female who presents with a chief complaint of Lethargy (19 Aug 2023 20:57)    HPI: pt well known to me  73F with metastatic pancreatic cancer (mets to liver and omentum, on chemo, last on 8/14-8/15), PVT, R gastroc DVT/?PE (eliquis d/c'ed for thrombocytopenia), hypothyroidism, recent admission in 7/2023 for melena presenting from home with lethargy and fevers. History obtained mostly from son at bedside. Per son, patient has been having bright red bloody stools for the past week, which was attributed to thrombocytopenia. She was noted to be lethargic today with low grade fever at home, which prompted them to bring her.    In ED, patient was hypotensive & febrile to 103, was given 2L IVF but persistently hypotensive requiring pressor. Labs notable for neutropenia with severe lactic acidosis. Admitted to MICU for likely septic shock requiring pressor.  (19 Aug 2023 01:33)      Patient seen and evaluated at bedside in MICU    Interval HPI: overnight events noted      PAST MEDICAL & SURGICAL HISTORY:  Essential hypertension      Hyperlipidemia      Pancreatic cancer      Hypothyroidism      No pertinent past surgical history          REVIEW OF SYSTEMS:  as per hpi     VITALS:   ICU Vital Signs Last 24 Hrs  T(C): 35.9 (24 Aug 2023 12:00), Max: 43 (24 Aug 2023 12:00)  T(F): 109.4 (24 Aug 2023 12:00), Max: 109.4 (24 Aug 2023 12:00)  HR: 95 (24 Aug 2023 12:45) (95 - 118)  BP: --  BP(mean): --  ABP: 85/29 (24 Aug 2023 12:45) (71/26 - 97/43)  ABP(mean): 45 (24 Aug 2023 12:45) (38 - 63)  RR: 32 (24 Aug 2023 12:45) (31 - 35)  SpO2: 100% (24 Aug 2023 12:45) (93% - 100%)    O2 Parameters below as of 24 Aug 2023 07:00  Patient On (Oxygen Delivery Method): ventilator    O2 Concentration (%): 50          PHYSICAL EXAM:  GENERAL: intubated, non responsive   HEAD:  Atraumatic, Normocephalic  EYES: EOMI, PERRLA, conjunctiva and sclera clear  NECK: Supple, No JVD +central line  CHEST/LUNG: Clear to auscultation bilaterally; No wheeze  HEART: S1, S2; No murmurs, rubs, or gallops  ABDOMEN: Soft, Nontender, Nondistended; Bowel sounds present  EXTREMITIES:  2+ Peripheral Pulses, No clubbing, cyanosis. + edema  NEUROLOGY: non responsive        Consultant(s) Notes Reviewed:  [x ] YES  [ ] NO  Care Discussed with Consultants/Other Providers [ x] YES  [ ] NO    MEDS:   MEDICATIONS  (STANDING):  cefTRIAXone   IVPB 2000 milliGRAM(s) IV Intermittent every 24 hours  chlorhexidine 0.12% Liquid 15 milliLiter(s) Oral Mucosa every 12 hours  chlorhexidine 4% Liquid 1 Application(s) Topical <User Schedule>  dextrose 10%. 1000 milliLiter(s) (50 mL/Hr) IV Continuous <Continuous>  norepinephrine Infusion 1 MICROgram(s)/kG/Min (65 mL/Hr) IV Continuous <Continuous>  pantoprazole  Injectable 40 milliGRAM(s) IV Push every 12 hours  phenylephrine    Infusion 4 MICROgram(s)/kG/Min (52 mL/Hr) IV Continuous <Continuous>  propofol Infusion 5 MICROgram(s)/kG/Min (1.5 mL/Hr) IV Continuous <Continuous>  sodium bicarbonate  Infusion 0.216 mEq/kG/Hr (100 mL/Hr) IV Continuous <Continuous>  thiamine IVPB 200 milliGRAM(s) IV Intermittent <User Schedule>  vasopressin Infusion 0.01 Unit(s)/Min (1.5 mL/Hr) IV Continuous <Continuous>    MEDICATIONS  (PRN):  sodium chloride 0.9% lock flush 10 milliLiter(s) IV Push every 1 hour PRN Pre/post blood products, medications, blood draw, and to maintain line patency  sodium chloride 0.9% lock flush 10 milliLiter(s) IV Push every 1 hour PRN Pre/post blood products, medications, blood draw, and to maintain line patency      ALLERGIES:  No Known Allergies      LABS:                                                  6.8    50.47 )-----------( 7        ( 24 Aug 2023 00:19 )             21.0   08-24    133<L>  |  98  |  9   ----------------------------<  98  4.1   |  <10<LL>  |  0.78    Ca    8.1<L>      24 Aug 2023 00:16  Phos  3.5     08-24  Mg     1.6     08-24    TPro  3.9<L>  /  Alb  2.6<L>  /  TBili  8.6<H>  /  DBili  x   /  AST  58<H>  /  ALT  23  /  AlkPhos  183<H>  08-23        cultures: Culture Results:   Growth in aerobic bottle: Gram Negative Rods  Growth in anaerobic bottle: Gram Negative Rods  Direct identification is available within approximately 3-5  hours either by Blood Panel Multiplexed PCR or Direct  MALDI-TOF. Details: [email or web address] (08-18 @ 20:50)  Culture Results:   Growth in aerobic bottle: Gram Negative Rods  Growth in anaerobic bottle: Gram Negative Rods (08-18 @ 20:42)       < from: CT Angio Chest PE Protocol w/ IV Cont (08.19.23 @ 02:46) >  IMPRESSION:  1.   Large volume ascites.  2.   Interval increase in size in hepatic metastases.  3.   Nodular omental opacities compatible with peritoneal carcinomatosis.  4.   Small right pleural effusion.  5.   Consolidation in the lung bases.  6.   Chronically thrombosed portal vein.  7.  No pulmonary embolism    --- End of Report ---      < end of copied text >  < from: CT Head No Cont (08.19.23 @ 02:45) >  IMPRESSION:    No evidence of acute intracranial hemorrhage, midline shift or CT   evidence of acute territorial infarct.    If the patient's symptoms persist, consider short interval follow-up head   CT or brain MRI if there are no MRI contraindications.    --- End of Report ---      < end of copied text >

## 2023-08-24 NOTE — PROGRESS NOTE ADULT - SUBJECTIVE AND OBJECTIVE BOX
[facility] Division of Kidney Diseases & Hypertension  FOLLOW UP NOTE  [phone]--------------------------------------------------------------------------------  Chief Complaint:Sepsis        24 hour events/subjective:        PAST HISTORY  --------------------------------------------------------------------------------  No significant changes to PMH, PSH, FHx, SHx, unless otherwise noted    ALLERGIES & MEDICATIONS  --------------------------------------------------------------------------------  Allergies    No Known Allergies    Intolerances      Standing Inpatient Medications  cefTRIAXone   IVPB 2000 milliGRAM(s) IV Intermittent every 24 hours  chlorhexidine 0.12% Liquid 15 milliLiter(s) Oral Mucosa every 12 hours  chlorhexidine 4% Liquid 1 Application(s) Topical <User Schedule>  dextrose 10%. 1000 milliLiter(s) IV Continuous <Continuous>  norepinephrine Infusion 1 MICROgram(s)/kG/Min IV Continuous <Continuous>  pantoprazole  Injectable 40 milliGRAM(s) IV Push every 12 hours  phenylephrine    Infusion 4 MICROgram(s)/kG/Min IV Continuous <Continuous>  propofol Infusion 5 MICROgram(s)/kG/Min IV Continuous <Continuous>  sodium bicarbonate  Infusion 0.216 mEq/kG/Hr IV Continuous <Continuous>  thiamine IVPB 200 milliGRAM(s) IV Intermittent <User Schedule>  vasopressin Infusion 0.01 Unit(s)/Min IV Continuous <Continuous>    PRN Inpatient Medications  sodium chloride 0.9% lock flush 10 milliLiter(s) IV Push every 1 hour PRN  sodium chloride 0.9% lock flush 10 milliLiter(s) IV Push every 1 hour PRN      REVIEW OF SYSTEMS  --------------------------------------------------------------------------------  Gen: No  fevers/chills  Skin: No rashes  Head/Eyes/Ears/Mouth: No headache; Normal hearing; Normal vision w/o blurriness  Respiratory: No dyspnea, cough, wheezing, hemoptysis  CV: No chest pain, PND, orthopnea  GI: No abdominal pain, diarrhea, constipation, nausea, vomiting  : No increased frequency, dysuria, hematuria, nocturia  MSK: No joint pain/swelling; no back pain; no edema  Neuro: No dizziness/lightheadedness, weakness, seizures, numbness, tingling      All other systems were reviewed and are negative, except as noted.    VITALS/PHYSICAL EXAM  --------------------------------------------------------------------------------  T(C): 36 (08-24-23 @ 15:00), Max: 43 (08-24-23 @ 12:00)  HR: 102 (08-24-23 @ 15:45) (95 - 118)  BP: --  RR: 31 (08-24-23 @ 15:45) (31 - 35)  SpO2: 100% (08-24-23 @ 15:45) (93% - 100%)  Wt(kg): --        08-23-23 @ 07:01  -  08-24-23 @ 07:00  --------------------------------------------------------  IN: 4502 mL / OUT: 410 mL / NET: 4092 mL    08-24-23 @ 07:01  -  08-24-23 @ 17:06  --------------------------------------------------------  IN: 1574 mL / OUT: 685 mL / NET: 889 mL      Physical Exam:  	Gen: NAD, well-appearing  	HEENT: PERRL, supple neck, clear oropharynx  	Pulm: CTA B/L  	CV: RRR, S1S2;  	Back: No spinal or CVA tenderness  	Abd: +BS, soft, nontender/nondistended  	: No suprapubic tenderness                      Extremities: no bilateral LE edema noted.                       Neuro: No focal deficits, intact gait  	Skin: Warm, without rashes  	Vascular access:    LABS/STUDIES  --------------------------------------------------------------------------------              6.8    50.47 >-----------<  7        [08-24-23 @ 00:19]              21.0     133  |  98  |  9   ----------------------------<  98      [08-24-23 @ 00:16]  4.1   |  <10  |  0.78        Ca     8.1     [08-24-23 @ 00:16]      Mg     1.6     [08-24-23 @ 00:16]      Phos  3.5     [08-24-23 @ 00:16]    TPro  3.9  /  Alb  2.6  /  TBili  8.6  /  DBili  x   /  AST  58  /  ALT  23  /  AlkPhos  183  [08-23-23 @ 00:16]    PT/INR: PT 22.8 , INR 2.22       [08-23-23 @ 00:16]  PTT: 72.4       [08-23-23 @ 00:16]      Creatinine Trend:  SCr 0.78 [08-24 @ 00:16]  SCr 0.64 [08-23 @ 00:16]  SCr 0.37 [08-22 @ 05:59]  SCr <0.30 [08-22 @ 00:26]  SCr 0.30 [08-21 @ 17:28]    Urinalysis - [08-24-23 @ 00:16]      Color  / Appearance  / SG  / pH       Gluc 98 / Ketone   / Bili  / Urobili        Blood  / Protein  / Leuk Est  / Nitrite       RBC  / WBC  / Hyaline  / Gran  / Sq Epi  / Non Sq Epi  / Bacteria     Urine Creatinine 37      [08-19-23 @ 10:07]  Urine Sodium 21      [08-19-23 @ 10:07]         [facility] Division of Kidney Diseases & Hypertension  FOLLOW UP NOTE  [phone]--------------------------------------------------------------------------------  Chief Complaint:Sepsis  74 yo F with h/o metastatic pancreatic cancer (mets to liver, and omentum), DVT, and hypothyroidism currently admitted to MICU for septic shock in the setting of neutropenia. Pt. being seen for metabolic acidosis/lactic acidosis.    24 hour events/subjective: Pt. was seen at the [facility]. Pt. is intubated/sedated and requiring IV vasopressor support. She maxed out on pressors - Phenylephrine, levophed and vasopressin. , CRRT was held due to hypotension and increasing pressor requirements. Pt. unable to provide ROS.    PAST HISTORY  --------------------------------------------------------------------------------  No significant changes to PMH, PSH, FHx, SHx, unless otherwise noted    ALLERGIES & MEDICATIONS  --------------------------------------------------------------------------------  Allergies    No Known Allergies    Intolerances      Standing Inpatient Medications  cefTRIAXone   IVPB 2000 milliGRAM(s) IV Intermittent every 24 hours  chlorhexidine 0.12% Liquid 15 milliLiter(s) Oral Mucosa every 12 hours  chlorhexidine 4% Liquid 1 Application(s) Topical <User Schedule>  dextrose 10%. 1000 milliLiter(s) IV Continuous <Continuous>  norepinephrine Infusion 1 MICROgram(s)/kG/Min IV Continuous <Continuous>  pantoprazole  Injectable 40 milliGRAM(s) IV Push every 12 hours  phenylephrine    Infusion 4 MICROgram(s)/kG/Min IV Continuous <Continuous>  propofol Infusion 5 MICROgram(s)/kG/Min IV Continuous <Continuous>  sodium bicarbonate  Infusion 0.216 mEq/kG/Hr IV Continuous <Continuous>  thiamine IVPB 200 milliGRAM(s) IV Intermittent <User Schedule>  vasopressin Infusion 0.01 Unit(s)/Min IV Continuous <Continuous>    PRN Inpatient Medications  sodium chloride 0.9% lock flush 10 milliLiter(s) IV Push every 1 hour PRN  sodium chloride 0.9% lock flush 10 milliLiter(s) IV Push every 1 hour PRN      REVIEW OF SYSTEMS  --------------------------------------------------------------------------------    ROS is not obtainable as the pt is on full vent support and is not awake and alert.       VITALS/PHYSICAL EXAM  --------------------------------------------------------------------------------  T(C): 36 (08-24-23 @ 15:00), Max: 43 (08-24-23 @ 12:00)  HR: 102 (08-24-23 @ 15:45) (95 - 118)  BP: --  RR: 31 (08-24-23 @ 15:45) (31 - 35)  SpO2: 100% (08-24-23 @ 15:45) (93% - 100%)  Wt(kg): --        08-23-23 @ 07:01  -  08-24-23 @ 07:00  --------------------------------------------------------  IN: 4502 mL / OUT: 410 mL / NET: 4092 mL    08-24-23 @ 07:01  -  08-24-23 @ 17:06  --------------------------------------------------------  IN: 1574 mL / OUT: 685 mL / NET: 889 mL      Physical Exam:  Gen: Intubated and sedated.  HEENT: PERRL, supple neck, clear oropharynx  Pulm: Crackles +  CV: RRR, S1S2;  Abd: +BS, soft, edema + on abd wall	  : Yoo +  Extremities: Pitting pedal edema upto thigh and abdomen   Neuro: intubated and sedated.   Skin: Cold extremities on bear hugger.   	  LABS/STUDIES  --------------------------------------------------------------------------------              6.8    50.47 >-----------<  7        [08-24-23 @ 00:19]              21.0     133  |  98  |  9   ----------------------------<  98      [08-24-23 @ 00:16]  4.1   |  <10  |  0.78        Ca     8.1     [08-24-23 @ 00:16]      Mg     1.6     [08-24-23 @ 00:16]      Phos  3.5     [08-24-23 @ 00:16]    TPro  3.9  /  Alb  2.6  /  TBili  8.6  /  DBili  x   /  AST  58  /  ALT  23  /  AlkPhos  183  [08-23-23 @ 00:16]    PT/INR: PT 22.8 , INR 2.22       [08-23-23 @ 00:16]  PTT: 72.4       [08-23-23 @ 00:16]      Creatinine Trend:  SCr 0.78 [08-24 @ 00:16]  SCr 0.64 [08-23 @ 00:16]  SCr 0.37 [08-22 @ 05:59]  SCr <0.30 [08-22 @ 00:26]  SCr 0.30 [08-21 @ 17:28]    Urinalysis - [08-24-23 @ 00:16]      Color  / Appearance  / SG  / pH       Gluc 98 / Ketone   / Bili  / Urobili        Blood  / Protein  / Leuk Est  / Nitrite       RBC  / WBC  / Hyaline  / Gran  / Sq Epi  / Non Sq Epi  / Bacteria     Urine Creatinine 37      [08-19-23 @ 10:07]  Urine Sodium 21      [08-19-23 @ 10:07]

## 2023-08-24 NOTE — PROVIDER CONTACT NOTE (OTHER) - BACKGROUND
Pt is 73yr old female hx of pancreatic ca with mets to liver and omentum, on x3 pressors at MICU max and is no longer candidate for dialysis per team.
pt with hx of metastatic liver ca with mets on max pressors (levo, abdullahi, vaso) Risk benefit of receiving excess fluid explained to family as pt is no longer a candidate for dialysis given bp

## 2023-08-24 NOTE — PROVIDER CONTACT NOTE (OTHER) - SITUATION
Pt family requesting to speak to attending regarding labs (hgb and lactate) and plan of care
Patient had CT value run on Covid swab from 8/19/23.
with hgb of 6.8, platelets of 7

## 2023-08-24 NOTE — PROGRESS NOTE ADULT - SUBJECTIVE AND OBJECTIVE BOX
INTERVAL HPI/OVERNIGHT EVENTS:    SUBJECTIVE: Patient seen and examined at bedside.     ROS: Unable to assess due to mental status.    OBJECTIVE:    VITAL SIGNS:  ICU Vital Signs Last 24 Hrs  T(C): 37.9 (24 Aug 2023 04:00), Max: 37.9 (24 Aug 2023 04:00)  T(F): 100.2 (24 Aug 2023 04:00), Max: 100.2 (24 Aug 2023 04:00)  HR: 104 (24 Aug 2023 07:00) (96 - 118)  BP: --  BP(mean): --  ABP: 88/32 (24 Aug 2023 07:00) (56/25 - 97/43)  ABP(mean): 49 (24 Aug 2023 07:00) (36 - 63)  RR: 32 (24 Aug 2023 07:00) (32 - 34)  SpO2: 98% (24 Aug 2023 07:00) (90% - 100%)    O2 Parameters below as of 23 Aug 2023 20:00  Patient On (Oxygen Delivery Method): ventilator    O2 Concentration (%): 50    Mode: AC/ CMV (Assist Control/ Continuous Mandatory Ventilation), RR (machine): 32, TV (machine): 400, FiO2: 50, PEEP: 5, ITime: 1, MAP: 12, PIP: 27    08-23 @ 07:01  -  08-24 @ 07:00  --------------------------------------------------------  IN: 4502 mL / OUT: 410 mL / NET: 4092 mL    CAPILLARY BLOOD GLUCOSE  POCT Blood Glucose.: 138 mg/dL (22 Aug 2023 12:33)    PHYSICAL EXAM:  GENERAL: intubated, sedated  HEAD:  Atraumatic, Normocephalic  EYES: EOMI, PERRLA, conjunctiva and sclera clear  NECK: Supple, No JVD  CHEST/LUNG: Clear to auscultation bilaterally; No wheeze  HEART: S1, S2; No murmurs, rubs, or gallops  ABDOMEN: Soft, Nontender, Nondistended; Bowel sounds present  EXTREMITIES:  2+ Peripheral Pulses, No clubbing, cyanosis. 2+ pitting edema  NEUROLOGY: sedated  SKIN: No rashes or lesions    MEDICATIONS:  MEDICATIONS  (STANDING):  cefTRIAXone   IVPB 2000 milliGRAM(s) IV Intermittent every 24 hours  chlorhexidine 0.12% Liquid 15 milliLiter(s) Oral Mucosa every 12 hours  chlorhexidine 4% Liquid 1 Application(s) Topical <User Schedule>  dextrose 10%. 1000 milliLiter(s) (50 mL/Hr) IV Continuous <Continuous>  metoclopramide Injectable 10 milliGRAM(s) IV Push every 6 hours  norepinephrine Infusion 1 MICROgram(s)/kG/Min (65 mL/Hr) IV Continuous <Continuous>  pantoprazole  Injectable 40 milliGRAM(s) IV Push every 12 hours  phenylephrine    Infusion 4 MICROgram(s)/kG/Min (52 mL/Hr) IV Continuous <Continuous>  propofol Infusion 5 MICROgram(s)/kG/Min (1.5 mL/Hr) IV Continuous <Continuous>  sodium bicarbonate  Infusion 0.216 mEq/kG/Hr (100 mL/Hr) IV Continuous <Continuous>  thiamine IVPB 200 milliGRAM(s) IV Intermittent <User Schedule>  vasopressin Infusion 0.01 Unit(s)/Min (1.5 mL/Hr) IV Continuous <Continuous>    MEDICATIONS  (PRN):  sodium chloride 0.9% lock flush 10 milliLiter(s) IV Push every 1 hour PRN Pre/post blood products, medications, blood draw, and to maintain line patency  sodium chloride 0.9% lock flush 10 milliLiter(s) IV Push every 1 hour PRN Pre/post blood products, medications, blood draw, and to maintain line patency    ALLERGIES:  Allergies  No Known Allergies    Intolerances    LABS:                        6.8    50.47 )-----------( 7        ( 24 Aug 2023 00:19 )             21.0     08-24    133<L>  |  98  |  9   ----------------------------<  98  4.1   |  <10<LL>  |  0.78    Ca    8.1<L>      24 Aug 2023 00:16  Phos  3.5     08-24  Mg     1.6     08-24    TPro  3.9<L>  /  Alb  2.6<L>  /  TBili  8.6<H>  /  DBili  x   /  AST  58<H>  /  ALT  23  /  AlkPhos  183<H>  08-23    PT/INR - ( 23 Aug 2023 00:16 )   PT: 22.8 sec;   INR: 2.22 ratio      PTT - ( 23 Aug 2023 00:16 )  PTT:72.4 sec  Urinalysis Basic - ( 24 Aug 2023 00:16 )    Color: x / Appearance: x / SG: x / pH: x  Gluc: 98 mg/dL / Ketone: x  / Bili: x / Urobili: x   Blood: x / Protein: x / Nitrite: x   Leuk Esterase: x / RBC: x / WBC x   Sq Epi: x / Non Sq Epi: x / Bacteria: x    RADIOLOGY & ADDITIONAL TESTS: Reviewed. INTERVAL HPI/OVERNIGHT EVENTS:    SUBJECTIVE: Patient seen and examined at bedside. Will d/c reglan given VTach episodes and restart tube feeds. Not transfusing blood products.    ROS: Unable to assess due to mental status.    OBJECTIVE:    VITAL SIGNS:  ICU Vital Signs Last 24 Hrs  T(C): 37.9 (24 Aug 2023 04:00), Max: 37.9 (24 Aug 2023 04:00)  T(F): 100.2 (24 Aug 2023 04:00), Max: 100.2 (24 Aug 2023 04:00)  HR: 104 (24 Aug 2023 07:00) (96 - 118)  BP: --  BP(mean): --  ABP: 88/32 (24 Aug 2023 07:00) (56/25 - 97/43)  ABP(mean): 49 (24 Aug 2023 07:00) (36 - 63)  RR: 32 (24 Aug 2023 07:00) (32 - 34)  SpO2: 98% (24 Aug 2023 07:00) (90% - 100%)    O2 Parameters below as of 23 Aug 2023 20:00  Patient On (Oxygen Delivery Method): ventilator    O2 Concentration (%): 50    Mode: AC/ CMV (Assist Control/ Continuous Mandatory Ventilation), RR (machine): 32, TV (machine): 400, FiO2: 50, PEEP: 5, ITime: 1, MAP: 12, PIP: 27    08-23 @ 07:01  -  08-24 @ 07:00  --------------------------------------------------------  IN: 4502 mL / OUT: 410 mL / NET: 4092 mL    CAPILLARY BLOOD GLUCOSE  POCT Blood Glucose.: 138 mg/dL (22 Aug 2023 12:33)    PHYSICAL EXAM:  GENERAL: intubated, sedated  HEAD:  Atraumatic, Normocephalic  EYES: EOMI, PERRLA, conjunctiva and sclera clear  NECK: Supple, No JVD  CHEST/LUNG: Clear to auscultation bilaterally; No wheeze  HEART: S1, S2; No murmurs, rubs, or gallops  ABDOMEN: Soft, Nontender, Nondistended; Bowel sounds present  EXTREMITIES:  2+ Peripheral Pulses, No clubbing, cyanosis. 2+ pitting edema  NEUROLOGY: sedated  SKIN: No rashes or lesions    MEDICATIONS:  MEDICATIONS  (STANDING):  cefTRIAXone   IVPB 2000 milliGRAM(s) IV Intermittent every 24 hours  chlorhexidine 0.12% Liquid 15 milliLiter(s) Oral Mucosa every 12 hours  chlorhexidine 4% Liquid 1 Application(s) Topical <User Schedule>  dextrose 10%. 1000 milliLiter(s) (50 mL/Hr) IV Continuous <Continuous>  metoclopramide Injectable 10 milliGRAM(s) IV Push every 6 hours  norepinephrine Infusion 1 MICROgram(s)/kG/Min (65 mL/Hr) IV Continuous <Continuous>  pantoprazole  Injectable 40 milliGRAM(s) IV Push every 12 hours  phenylephrine    Infusion 4 MICROgram(s)/kG/Min (52 mL/Hr) IV Continuous <Continuous>  propofol Infusion 5 MICROgram(s)/kG/Min (1.5 mL/Hr) IV Continuous <Continuous>  sodium bicarbonate  Infusion 0.216 mEq/kG/Hr (100 mL/Hr) IV Continuous <Continuous>  thiamine IVPB 200 milliGRAM(s) IV Intermittent <User Schedule>  vasopressin Infusion 0.01 Unit(s)/Min (1.5 mL/Hr) IV Continuous <Continuous>    MEDICATIONS  (PRN):  sodium chloride 0.9% lock flush 10 milliLiter(s) IV Push every 1 hour PRN Pre/post blood products, medications, blood draw, and to maintain line patency  sodium chloride 0.9% lock flush 10 milliLiter(s) IV Push every 1 hour PRN Pre/post blood products, medications, blood draw, and to maintain line patency    ALLERGIES:  Allergies  No Known Allergies    Intolerances    LABS:                        6.8    50.47 )-----------( 7        ( 24 Aug 2023 00:19 )             21.0     08-24    133<L>  |  98  |  9   ----------------------------<  98  4.1   |  <10<LL>  |  0.78    Ca    8.1<L>      24 Aug 2023 00:16  Phos  3.5     08-24  Mg     1.6     08-24    TPro  3.9<L>  /  Alb  2.6<L>  /  TBili  8.6<H>  /  DBili  x   /  AST  58<H>  /  ALT  23  /  AlkPhos  183<H>  08-23    PT/INR - ( 23 Aug 2023 00:16 )   PT: 22.8 sec;   INR: 2.22 ratio      PTT - ( 23 Aug 2023 00:16 )  PTT:72.4 sec  Urinalysis Basic - ( 24 Aug 2023 00:16 )    Color: x / Appearance: x / SG: x / pH: x  Gluc: 98 mg/dL / Ketone: x  / Bili: x / Urobili: x   Blood: x / Protein: x / Nitrite: x   Leuk Esterase: x / RBC: x / WBC x   Sq Epi: x / Non Sq Epi: x / Bacteria: x    RADIOLOGY & ADDITIONAL TESTS: Reviewed.

## 2023-08-24 NOTE — PROGRESS NOTE ADULT - SUBJECTIVE AND OBJECTIVE BOX
Date of Service: 08-24-23 @ 09:49           CARDIOLOGY     PROGRESS  NOTE   ________________________________________________    CHIEF COMPLAINT:Patient is a 73y old  Female who presents with a chief complaint of Lethargy (24 Aug 2023 08:06)  sedated on vent  	  REVIEW OF SYSTEMS:  CONSTITUTIONAL: No fever, weight loss, or fatigue  EYES: No eye pain, visual disturbances, or discharge  ENT:  No difficulty hearing, tinnitus, vertigo; No sinus or throat pain  NECK: No pain or stiffness  RESPIRATORY: No cough, wheezing, chills or hemoptysis; No Shortness of Breath  CARDIOVASCULAR: No chest pain, palpitations, passing out, dizziness, or leg swelling  GASTROINTESTINAL: No abdominal or epigastric pain. No nausea, vomiting, or hematemesis; No diarrhea or constipation. No melena or hematochezia.  GENITOURINARY: No dysuria, frequency, hematuria, or incontinence  NEUROLOGICAL: No headaches, memory loss, loss of strength, numbness, or tremors  SKIN: No itching, burning, rashes, or lesions   LYMPH Nodes: No enlarged glands  ENDOCRINE: No heat or cold intolerance; No hair loss  MUSCULOSKELETAL: No joint pain or swelling; No muscle, back, or extremity pain  PSYCHIATRIC: No depression, anxiety, mood swings, or difficulty sleeping  HEME/LYMPH: No easy bruising, or bleeding gums  ALLERGY AND IMMUNOLOGIC: No hives or eczema	    [ ] All others negative	  [x ] Unable to obtain    PHYSICAL EXAM:  T(C): 36.3 (08-24-23 @ 07:00), Max: 37.9 (08-24-23 @ 04:00)  HR: 95 (08-24-23 @ 09:03) (95 - 118)  BP: --  RR: 32 (08-24-23 @ 08:15) (31 - 35)  SpO2: 100% (08-24-23 @ 09:03) (90% - 100%)  Wt(kg): --  I&O's Summary    23 Aug 2023 07:01  -  24 Aug 2023 07:00  --------------------------------------------------------  IN: 4502 mL / OUT: 410 mL / NET: 4092 mL    24 Aug 2023 07:01  -  24 Aug 2023 09:49  --------------------------------------------------------  IN: 263 mL / OUT: 15 mL / NET: 248 mL        Appearance: sedated on vent    MEDICATIONS  (STANDING):  cefTRIAXone   IVPB 2000 milliGRAM(s) IV Intermittent every 24 hours  chlorhexidine 0.12% Liquid 15 milliLiter(s) Oral Mucosa every 12 hours  chlorhexidine 4% Liquid 1 Application(s) Topical <User Schedule>  dextrose 10%. 1000 milliLiter(s) (50 mL/Hr) IV Continuous <Continuous>  metoclopramide Injectable 10 milliGRAM(s) IV Push every 6 hours  norepinephrine Infusion 1 MICROgram(s)/kG/Min (65 mL/Hr) IV Continuous <Continuous>  pantoprazole  Injectable 40 milliGRAM(s) IV Push every 12 hours  phenylephrine    Infusion 4 MICROgram(s)/kG/Min (52 mL/Hr) IV Continuous <Continuous>  propofol Infusion 5 MICROgram(s)/kG/Min (1.5 mL/Hr) IV Continuous <Continuous>  sodium bicarbonate  Infusion 0.216 mEq/kG/Hr (100 mL/Hr) IV Continuous <Continuous>  thiamine IVPB 200 milliGRAM(s) IV Intermittent <User Schedule>  vasopressin Infusion 0.01 Unit(s)/Min (1.5 mL/Hr) IV Continuous <Continuous>      TELEMETRY: 	    ECG:  	  RADIOLOGY:  OTHER: 	  	  LABS:	 	    CARDIAC MARKERS:                          6.8    50.47 )-----------( 7        ( 24 Aug 2023 00:19 )             21.0     08-24    133<L>  |  98  |  9   ----------------------------<  98  4.1   |  <10<LL>  |  0.78    Ca    8.1<L>      24 Aug 2023 00:16  Phos  3.5     08-24  Mg     1.6     08-24    TPro  3.9<L>  /  Alb  2.6<L>  /  TBili  8.6<H>  /  DBili  x   /  AST  58<H>  /  ALT  23  /  AlkPhos  183<H>  08-23    proBNP:   Lipid Profile:   HgA1c:   TSH:   PT/INR - ( 23 Aug 2023 00:16 )   PT: 22.8 sec;   INR: 2.22 ratio         PTT - ( 23 Aug 2023 00:16 )  PTT:72.4 sec      Assessment and plan  ---------------------------  73 female h/o metastatic pancreatic ca on chemo, dvt/pe previously on eliquis d/c'd yesterday due to thrombocytopenia, hypothyroid, here with [facility] with recent admission for metabolic encephalopathy and thrombocytopenia. GIB x 1 week with increasing somnolence.  pt with metastatic pancreatic cA s/p chemo with hypotension and change of mental status and lethargy  sepsis on broad spectrum abx  sedated on vent  continue pressor  abx  spoke to the family, prognosis is poor  ?septic  pt with endstage pancreatic CA , may consider palliative if family agrees  + rvp/ covid  CRRT held sec to hypotension, on iv Bumex  family  are  aware of poor prognosis, will sign off

## 2023-08-24 NOTE — PROVIDER CONTACT NOTE (OTHER) - ACTION/TREATMENT ORDERED:
As per MD Villagomez hold off on transfusing as excess fluid may cause more harm given patient status. will reassess if patient begins to make more urine
daily labs (CBC, CMP, mag, phos) to be drawn at midnight

## 2023-08-24 NOTE — PROGRESS NOTE ADULT - ASSESSMENT
73 female h/o pancreatic ca with mets on chemo, hypothryroid, dvt/pe on eliquis, now admitted to micu with septic shock    metastatic pancreatic ca  septic shock  ascites  bacteremia  covid19  anuric renal failure  pancytopenia  coagulapathy  hypothermia     vent support  pressors  s/p paracentesis  CVVHD  - now stopped due to hypotension  broad spectrum abx  f/u cult and sens. repeat cult ngtd  transfusion of blood products as need  warming protocol    8/22: cvvhd stopped due to hypotension. now maxxed on 3 pressors. on 100% fio2. remains hypotensive, hypethermic and hypoxic  pt actively dying. d/w family at bedside. now agreeable to no compressions or shock.    8/23: pt remains hypotensive on 3 pressors. no cvvhd 2/2 hypotension. pt at end of life. prognosis poor. family aware   8/24: pt remains on 3 pressors. unable to tolerate cvvhd. minimal urine outpt. no mental status off sedation. family aware of poor prognosis.         prognosis poor    d/w micu team, renal, cards, heme/onc    mngt as per micu      Advanced care planning was discussed with patient and family.  Advanced care planning forms were reviewed and discussed as appropriate.  Differential diagnosis and plan of care discussed with patient after the evaluation.   Pain assessed and judicious use of narcotics when appropriate was discussed.  Importance of Fall prevention discussed.  Counseling on Smoking and Alcohol cessation was offered when appropriate.  Counseling on Diet, exercise, and medication compliance was done.       Approx 60 minutes spent.

## 2023-08-25 NOTE — PROGRESS NOTE ADULT - PROBLEM SELECTOR PLAN 3
Pt. with hypokalemia. Serum potassium remains WNL at 4.0 today. Recommend to monitor serum potassium.    If you have any questions, please feel free to contact me  Ovidio Lee  Nephrology Fellow  [phone]; - Teams preferred.  (After 5pm or on weekends please page the on-call fellow).
Pt. with hypokalemia. Serum potassium remains WNL at 4.1 today. Recommend to monitor serum potassium.    If you have any questions, please feel free to contact me  Ovidio Lee  Nephrology Fellow  [phone]; - Teams preferred.  (After 5pm or on weekends please page the on-call fellow).
Pt. with hypokalemia. Serum potassium remains WNL at 4.6 today. Recommend to monitor serum potassium.    If you have any questions, please feel free to contact me  Ovidio Lee  Nephrology Fellow  [phone]; - Teams preferred.  (After 5pm or on weekends please page the on-call fellow).
Pt. with hypokalemia. Serum potassium remains low at 3.4 this morning. Pt. received IV potassium repletion. Recommend to monitor serum potassium.    If you have any questions, please feel free to contact me  Imelda Meneses  Nephrology Fellow  [phone] / [employer] Teams(Preferred)  (After 5pm or on weekends please page the on-call fellow)
Pt. with hypokalemia. Serum potassium remains WNL at 4.4 today. Recommend to monitor serum potassium.    If you have any questions, please feel free to contact me  Imelda Meneses  Nephrology Fellow  [phone] / [employer] Teams(Preferred)  (After 5pm or on weekends please page the on-call fellow)
Pt. with hypokalemia. Serum potassium improved to 4.0 today with CRRT. Recommend to monitor serum potassium.    If you have any questions, please feel free to contact me  Imelda Meneses  Nephrology Fellow  [phone] / [employer] Teams(Preferred)  (After 5pm or on weekends please page the on-call fellow)
- intubated on 8/19 for airway protection iso encephalopathy  - likely in setting of severe sepsis, however, also component of decreased preload from increased intra-abdominal pressure  - continue levo, abdullahi, and vasopressin gtt  - Paracentesis (8/19) drained 3.3L, ostomy bag continuously draining.  pt currently on max pressors   defer to MICU for management.

## 2023-08-25 NOTE — PROGRESS NOTE ADULT - ASSESSMENT
73F with metastatic pancreatic cancer (mets to liver and omentum, last chemo on 8/14-8/15), PVT, R gastroc DVT/?PE (eliquis d/c'ed for thrombocytopenia), hypothyroidism, recent admission in 7/2023 for melena presenting from home with lethargy and fever. Septic on presentation in setting of neutropenia. Admitted to MICU for shock state requiring pressor. Requiring CRRT; now paused due to hypotension and capped on multiple pressors. DNR reversed, now full code. D/c reglan due to VTach episodes, tube feeds restarted.    NEUROLOGIC  #Metabolic encephalopathy   #Intubated & sedated  - Baseline mental status AAOx3  - Presenting with lethargy, progressively worsened iso acidemia  - Propofol gtt, RASS goal 0 to -1  - Off sedation this AM.     CARDIOVASCULAR  #Shock state  - Hypotensive to SBP 50, s/p 2L in ED  - TTE 7/24/23: EF 61%, normal LV and RV systolic function  - Likely in setting of severe sepsis, however, also component of decreased preload from increased intra-abdominal pressure  - Continue levo, abdullahi, and vasopressin gtt  - Paracentesis (8/19) drained 3.3L, ostomy bag continuously draining.    PULMONARY  #Intubated and ventilated  #Acute hypoxic and hypercarbic respiratory failure  - Intubated on 8/19 for airway protection iso encephalopathy   - Settings: 400/30/5/100  - Monitor serial gas, titrate as needed    GI  #BRBPR  - Has been having bloody diarrhea for the past week per family  - H/H stable  - CT AP: Showing large volume ascites (pre-paracentesis), interval increase in hepatic metastasis, nodular omental opacities consistent with peritoneal carcinomatosis, small right pleural effusion + lung consolidation, and a chronically thrombosed portal vein.  - Continue to monitor    RENAL  #TESFAYE  - Cr bump from 0.5 -> 0.96. Now down to 0.64.  - Possibly iso elevated intra-abdominal pressures  - Monitor UOP on aparicio catheter  - CRRT due to acidemia and worsening UOP. Now paused due to hypotension.    HEME/ONC  #Metastatic pancreatic cancer  #Coagulopathy  #Ascites  - Recently diagnosed, follows Dr. Eduardo Martinez in [facility]  - On palliative chemotherapy to reduce size & relieve compression  - Last chemotherapy on 8/14-15   - S/p urgent diagnostic & therapeutic paracentesis  - 1U platelets given in AM (8/23)    ID  #Sepsis  #Neutropenic Fever  - UA positive  - CXR with small bilateral pleural effusions  - F/u CT C/A/P  - RVP, blood and urine cultures negative  - Switched to CTX    ENDOCRINE  - No active issues  - On D10 50cc/hr for hypoglycemia    ETHICS  - Full code  - Sons are NOK 73F with metastatic pancreatic cancer (mets to liver and omentum, last chemo on 8/14-8/15), PVT, R gastroc DVT/?PE (eliquis d/c'ed for thrombocytopenia), hypothyroidism, recent admission in 7/2023 for melena presenting from home with lethargy and fever. Septic on presentation in setting of neutropenia. Admitted to MICU for shock state requiring pressor. Requiring CRRT; now paused due to hypotension and capped on multiple pressors. DNR reversed, now full code. D/c reglan due to VTach episodes, tube feeds restarted.    NEUROLOGIC  #Metabolic encephalopathy   #Intubated & sedated  - Baseline mental status AAOx3  - Presenting with lethargy, progressively worsened iso acidemia  - Propofol gtt, RASS goal 0 to -1  - Off sedation this AM.     CARDIOVASCULAR  #Shock state  - Hypotensive to SBP 50, s/p 2L in ED  - TTE 7/24/23: EF 61%, normal LV and RV systolic function  - Likely in setting of severe sepsis, however, also component of decreased preload from increased intra-abdominal pressure  - Continue levo, abdullahi, and vasopressin gtt  - Paracentesis (8/19) drained 3.3L, ostomy bag continuously draining.    PULMONARY  #Intubated and ventilated  #Acute hypoxic and hypercarbic respiratory failure  - Intubated on 8/19 for airway protection iso encephalopathy   - Settings: 400/30/5/100  - Monitor serial gas, titrate as needed    GI  #BRBPR  - Has been having bloody diarrhea for the past week per family  - H/H stable  - CT AP: Showing large volume ascites (pre-paracentesis), interval increase in hepatic metastasis, nodular omental opacities consistent with peritoneal carcinomatosis, small right pleural effusion + lung consolidation, and a chronically thrombosed portal vein.  - Continue to monitor    RENAL  #TESFAYE  - Cr bump from 0.5 -> 0.96. Now down to 0.64.  - Possibly iso elevated intra-abdominal pressures  - Monitor UOP on aparicio catheter  - CRRT due to acidemia and worsening UOP. Now paused due to hypotension.    HEME/ONC  #Metastatic pancreatic cancer  #Coagulopathy  #Ascites  - Recently diagnosed, follows Dr. Eduardo Martinez in [facility]  - On palliative chemotherapy to reduce size & relieve compression  - Last chemotherapy on 8/14-15   - S/p urgent diagnostic & therapeutic paracentesis    ID  #Sepsis  #Neutropenic Fever  - UA positive  - CXR with small bilateral pleural effusions  - F/u CT C/A/P  - RVP, blood and urine cultures negative  - Switched to CTX    ENDOCRINE  - No active issues  - On D10 50cc/hr for hypoglycemia    ETHICS  - Full code  - Sons are NOK

## 2023-08-25 NOTE — PROGRESS NOTE ADULT - PROBLEM SELECTOR PLAN 2
Pt. with metabolic acidosis in the setting of septic shock and lactic acidosis. pH is 7.37 and SCO2 remains low at 11 today. Serum lactate remains elevated at 14.8 today. Plan to continue with CRRT today as pt. remains acidotic. Monitor pH, serum lactate, and SCO2.
Pt. with metabolic acidosis in the setting of septic shock and lactic acidosis, malignancy. pH is 7.25 and S. Bicarb remains low at <10 today. Serum lactate remains elevated at >19 today. Consider bicitra as pt. is volume overloaded and unable to tolerate CRRT. Can continue with Bicarb drip and D10 for now. Monitor pH, serum lactate, and SCO2.
Pt. with metabolic acidosis in the setting of septic shock and lactic acidosis. pH is low at 7.32 and SCO2  to <10. Serum lactate noted to be trending up, most recently >16. Pt. was started on IV bicarbonate infusion. SCr is WNL, however, uop noted ot be decreasing. Given worsening acidosis with low uop, plan to initiate CRRT. Monitor pH, serum lactate, and SCO2.
Pt. with metabolic acidosis in the setting of septic shock and lactic acidosis, malignancy. pH is 7.25 and S. Bicarb remains low at <10 today. Serum lactate remains elevated at >19 today. Consider bicitra as pt. is volume overloaded and unable to tolerate CRRT. Can continue with Bicarb drip and D10 for now. Monitor pH, serum lactate, and bicarb.
Pt. with metabolic acidosis in the setting of septic shock and lactic acidosis, malignancy. pH is 7.20 and SCO2 remains low at <10 today. Serum lactate remains elevated at >16 today. Consider bicitra as pt. is volume overloaded and unable to tolerate CRRT. Monitor pH, serum lactate, and SCO2.
recently diagnosed, follows Dr. Eduardo Martinez in [facility]  - on palliative chemotherapy to reduce size & relieve compression  - last chemotherapy on 8/14-15.
Pt. with metabolic acidosis in the setting of septic shock and lactic acidosis, malignancy. pH is 7.25 and S. Bicarb remains low at <10 today. Serum lactate remains elevated at >19 today. Consider bicitra as pt. is volume overloaded and unable to tolerate CRRT. Can continue with Bicarb drip and D10 for now. Monitor pH, serum lactate, and bicarb.

## 2023-08-25 NOTE — PROGRESS NOTE ADULT - ASSESSMENT
73 female h/o pancreatic ca with mets on chemo, hypothryroid, dvt/pe on eliquis, now admitted to micu with septic shock    metastatic pancreatic ca  septic shock  ascites  bacteremia  covid19  anuric renal failure  pancytopenia  coagulapathy  hypothermia     vent support  pressors  s/p paracentesis  CVVHD  - now stopped due to hypotension  broad spectrum abx  f/u cult and sens. repeat cult ngtd  transfusion of blood products as need  warming protocol    8/22: cvvhd stopped due to hypotension. now maxxed on 3 pressors. on 100% fio2. remains hypotensive, hypethermic and hypoxic  pt actively dying. d/w family at bedside. now agreeable to no compressions or shock.    8/23: pt remains hypotensive on 3 pressors. no cvvhd 2/2 hypotension. pt at end of life. prognosis poor. family aware   8/24: pt remains on 3 pressors. unable to tolerate cvvhd. minimal urine outpt. no mental status off sedation. family aware of poor prognosis.   8/25: no clinical change. remains maxed on 3 pressors. minimal UO. no plan for transfusion.         prognosis poor    d/w micu team, renal, cards, heme/onc    mngt as per micu      Advanced care planning was discussed with patient and family.  Advanced care planning forms were reviewed and discussed as appropriate.  Differential diagnosis and plan of care discussed with patient after the evaluation.   Pain assessed and judicious use of narcotics when appropriate was discussed.  Importance of Fall prevention discussed.  Counseling on Smoking and Alcohol cessation was offered when appropriate.  Counseling on Diet, exercise, and medication compliance was done.       Approx 60 minutes spent.

## 2023-08-25 NOTE — PROGRESS NOTE ADULT - SUBJECTIVE AND OBJECTIVE BOX
Date of Service: 08-25-23 @ 10:22           CARDIOLOGY     PROGRESS  NOTE   ________________________________________________    CHIEF COMPLAINT:Patient is a 73y old  Female who presents with a chief complaint of Lethargy (25 Aug 2023 08:26)  sedated on vent  	  REVIEW OF SYSTEMS:  CONSTITUTIONAL: No fever, weight loss, or fatigue  EYES: No eye pain, visual disturbances, or discharge  ENT:  No difficulty hearing, tinnitus, vertigo; No sinus or throat pain  NECK: No pain or stiffness  RESPIRATORY: No cough, wheezing, chills or hemoptysis; No Shortness of Breath  CARDIOVASCULAR: No chest pain, palpitations, passing out, dizziness, or leg swelling  GASTROINTESTINAL: No abdominal or epigastric pain. No nausea, vomiting, or hematemesis; No diarrhea or constipation. No melena or hematochezia.  GENITOURINARY: No dysuria, frequency, hematuria, or incontinence  NEUROLOGICAL: No headaches, memory loss, loss of strength, numbness, or tremors  SKIN: No itching, burning, rashes, or lesions   LYMPH Nodes: No enlarged glands  ENDOCRINE: No heat or cold intolerance; No hair loss  MUSCULOSKELETAL: No joint pain or swelling; No muscle, back, or extremity pain  PSYCHIATRIC: No depression, anxiety, mood swings, or difficulty sleeping  HEME/LYMPH: No easy bruising, or bleeding gums  ALLERGY AND IMMUNOLOGIC: No hives or eczema	    [ ] All others negative	  [ x] Unable to obtain    PHYSICAL EXAM:  T(C): 37.1 (08-25-23 @ 08:00), Max: 43 (08-24-23 @ 12:00)  HR: 106 (08-25-23 @ 10:15) (95 - 114)  BP: --  RR: 32 (08-25-23 @ 10:15) (31 - 33)  SpO2: 98% (08-25-23 @ 10:15) (85% - 100%)  Wt(kg): --  I&O's Summary    24 Aug 2023 07:01  -  25 Aug 2023 07:00  --------------------------------------------------------  IN: 4492 mL / OUT: 1005 mL / NET: 3487 mL    25 Aug 2023 07:01  -  25 Aug 2023 10:22  --------------------------------------------------------  IN: 519 mL / OUT: 10 mL / NET: 509 mL        Appearance: Normal/ jaundice	  sedated on vent    MEDICATIONS  (STANDING):  cefTRIAXone   IVPB 2000 milliGRAM(s) IV Intermittent every 24 hours  chlorhexidine 0.12% Liquid 15 milliLiter(s) Oral Mucosa every 12 hours  chlorhexidine 4% Liquid 1 Application(s) Topical <User Schedule>  dextrose 10%. 1000 milliLiter(s) (50 mL/Hr) IV Continuous <Continuous>  norepinephrine Infusion 1 MICROgram(s)/kG/Min (65 mL/Hr) IV Continuous <Continuous>  pantoprazole  Injectable 40 milliGRAM(s) IV Push every 12 hours  phenylephrine    Infusion 4 MICROgram(s)/kG/Min (52 mL/Hr) IV Continuous <Continuous>  thiamine IVPB 200 milliGRAM(s) IV Intermittent <User Schedule>  vasopressin Infusion 0.04 Unit(s)/Min (6 mL/Hr) IV Continuous <Continuous>      TELEMETRY: 	    ECG:  	  RADIOLOGY:  OTHER: 	  	  LABS:	 	    CARDIAC MARKERS:                                5.9    58.20 )-----------( 5        ( 25 Aug 2023 00:12 )             18.3     08-25    132<L>  |  98  |  12  ----------------------------<  92  4.0   |  <10<LL>  |  0.97    Ca    8.3<L>      25 Aug 2023 00:12  Phos  3.8     08-25  Mg     2.0     08-25      proBNP:   Lipid Profile:   HgA1c:   TSH:         Assessment and plan  ---------------------------  73 female h/o metastatic pancreatic ca on chemo, dvt/pe previously on eliquis d/c'd yesterday due to thrombocytopenia, hypothyroid, here with BRBPR with recent admission for metabolic encephalopathy and thrombocytopenia. GIB x 1 week with increasing somnolence.  pt with metastatic pancreatic cA s/p chemo with hypotension and change of mental status and lethargy  sepsis on broad spectrum abx  sedated on vent  continue pressor  abx  spoke to the family, prognosis is poor  ?septic  pt with endstage pancreatic CA , may consider palliative if family agrees  + rvp/ covid  family  are  aware of poor prognosis

## 2023-08-25 NOTE — PROGRESS NOTE ADULT - SUBJECTIVE AND OBJECTIVE BOX
[facility] Division of Kidney Diseases & Hypertension  FOLLOW UP NOTE  [phone]--------------------------------------------------------------------------------  Chief Complaint:Sepsis  74 yo F with h/o metastatic pancreatic cancer (mets to liver, and omentum), DVT, and hypothyroidism currently admitted to MICU for septic shock in the setting of neutropenia. Pt. being seen for metabolic acidosis/lactic acidosis.    24 hour events/subjective:    Pt. was seen at the [facility]. Pt. is intubated/sedated and requiring IV vasopressor support. She maxed out on pressors - Phenylephrine, levophed and vasopressin. , CRRT was held due to hypotension and increasing pressor requirements. Pt. unable to provide ROS.          PAST HISTORY  --------------------------------------------------------------------------------  No significant changes to PMH, PSH, FHx, SHx, unless otherwise noted    ALLERGIES & MEDICATIONS  --------------------------------------------------------------------------------  Allergies    No Known Allergies    Intolerances      Standing Inpatient Medications  cefTRIAXone   IVPB 2000 milliGRAM(s) IV Intermittent every 24 hours  chlorhexidine 0.12% Liquid 15 milliLiter(s) Oral Mucosa every 12 hours  chlorhexidine 4% Liquid 1 Application(s) Topical <User Schedule>  dextrose 10%. 1000 milliLiter(s) IV Continuous <Continuous>  norepinephrine Infusion 1 MICROgram(s)/kG/Min IV Continuous <Continuous>  pantoprazole  Injectable 40 milliGRAM(s) IV Push every 12 hours  phenylephrine    Infusion 4 MICROgram(s)/kG/Min IV Continuous <Continuous>  thiamine IVPB 200 milliGRAM(s) IV Intermittent <User Schedule>  vasopressin Infusion 0.04 Unit(s)/Min IV Continuous <Continuous>    PRN Inpatient Medications  sodium chloride 0.9% lock flush 10 milliLiter(s) IV Push every 1 hour PRN  sodium chloride 0.9% lock flush 10 milliLiter(s) IV Push every 1 hour PRN      REVIEW OF SYSTEMS  --------------------------------------------------------------------------------  Pt is intubated and is on full vent support.  ROS is not obtainable.     VITALS/PHYSICAL EXAM  --------------------------------------------------------------------------------  T(C): 37.1 (08-25-23 @ 08:00), Max: 43 (08-24-23 @ 12:00)  HR: 109 (08-25-23 @ 08:15) (95 - 114)  BP: --  RR: 32 (08-25-23 @ 08:15) (31 - 33)  SpO2: 97% (08-25-23 @ 08:15) (85% - 100%)  Wt(kg): --        08-24-23 @ 07:01  -  08-25-23 @ 07:00  --------------------------------------------------------  IN: 4492 mL / OUT: 1005 mL / NET: 3487 mL    08-25-23 @ 07:01  -  08-25-23 @ 08:26  --------------------------------------------------------  IN: 173 mL / OUT: 0 mL / NET: 173 mL      Physical Exam:  Gen: Intubated and sedated.  HEENT: PERRL, supple neck, clear oropharynx  Pulm: Crackles +  CV: RRR, S1S2;  Abd: +BS, soft, edema + on abd wall	  : Yoo +  Extremities: Pitting pedal edema upto thigh and abdomen, Upperlimb edema +, Discoloration of the Toes.   Neuro: intubated and sedated.   Skin: Discoloration of toes. Lesions on thigh Lt, Lt UE.    LABS/STUDIES  --------------------------------------------------------------------------------              5.9    58.20 >-----------<  5        [08-25-23 @ 00:12]              18.3     132  |  98  |  12  ----------------------------<  92      [08-25-23 @ 00:12]  4.0   |  <10  |  0.97        Ca     8.3     [08-25-23 @ 00:12]      Mg     2.0     [08-25-23 @ 00:12]      Phos  3.8     [08-25-23 @ 00:12]            Creatinine Trend:  SCr 0.97 [08-25 @ 00:12]  SCr 0.78 [08-24 @ 00:16]  SCr 0.64 [08-23 @ 00:16]  SCr 0.37 [08-22 @ 05:59]  SCr <0.30 [08-22 @ 00:26]    Urinalysis - [08-25-23 @ 00:12]      Color  / Appearance  / SG  / pH       Gluc 92 / Ketone   / Bili  / Urobili        Blood  / Protein  / Leuk Est  / Nitrite       RBC  / WBC  / Hyaline  / Gran  / Sq Epi  / Non Sq Epi  / Bacteria     Urine Creatinine 37      [08-19-23 @ 10:07]  Urine Sodium 21      [08-19-23 @ 10:07]

## 2023-08-25 NOTE — PROGRESS NOTE ADULT - PROBLEM SELECTOR PROBLEM 1
TESFAYE (acute kidney injury)
Functional quadriplegia
TESFAYE (acute kidney injury)

## 2023-08-25 NOTE — PROGRESS NOTE ADULT - PROBLEM SELECTOR PLAN 1
Pt. with TESFAYE in the setting of septic shock. Per review of [place], SCr was 0.54 on 7/28/23. Scr initially during this admission was 0.84 (8/18), and remains WNL. UOP was noted to be decreasing prior to initiation of CRRT. Pt. was initiated on CRRT on 8/20 for severe acidosis, CRRT was discontinued overnight due to hypotension and increasing IV vasopressor requirement( maxed out on pressors - Pheynelephrine, levophed and vasopressin regular dose) Pt. is anuric, documented urine output is 130 cc over the past 24 hours. UA showed proteinuria and hematuria. Pt. with likely hemodynamic-mediated TESFAYE vs ATN. Given hypotension and vasopressor requirement, pt. unlikely to tolerate CRRT. Monitor labs and urine output. Avoid nephrotoxins. Dose medications as per eGFR.
Pt. with TESFAYE in the setting of septic shock. Per review of [place], SCr was 0.54 on 7/28/23. Scr initially during this admission was 0.84 (8/18), and remains WNL. UOP was noted to be decreasing prior to initiation of CRRT. Pt. was initiated on CRRT on 8/20 for severe acidosis, CRRT was discontinued overnight due to hypotension and increasing IV vasopressor requirement( maxed out on pressors - Pheynelephrine, levophed and vasopressin regular dose) Pt. is anuric, documented urine output is 60cc cc over the past 24 hours. UA showed proteinuria and hematuria. Pt. with likely hemodynamic-mediated TESFAYE vs ATN. Given hypotension and vasopressor requirement, pt. unlikely to tolerate CRRT. Monitor labs and urine output. Avoid nephrotoxins. Dose medications as per eGFR.
Pt. with TESFAYE in the setting of septic shock. Per review of [place], SCr was 0.54 on 7/28/23. Scr initially during this admission was 0.84 (8/18), and remains WNL. UOP was noted to be decreasing prior to initiation of CRRT. Pt. was initiated on CRRT on 8/20 for severe acidosis, CRRT was discontinued overnight due to hypotension and increasing IV vasopressor requirement. Pt. is anuric, documented urine output is 25 cc over the past 24 hours. UA showed proteinuria and hematuria. Pt. with likely hemodynamic-mediated TESFAYE vs ATN. Given hypotension and vasopressor requirement, pt. unlikely to tolerate CRRT. Monitor labs and urine output. Avoid nephrotoxins. Dose medications as per eGFR.
ppsv 10% pt requires assistance with ADLs/turn and positioning
Pt. with TESFAYE in the setting of septic shock. Per review of [place], SCr was 0.54 on 7/28/23. Scr initially during this admission was 0.84 (8/18), and remains WNL. UOP was noted to be decreasing prior to initiation of CRRT. Pt. was initiated on CRRT on 8/20 for severe acidosis. Pt. is oliguric, documented urine output is 250 cc over the past 24 hours. UA showed proteinuria and hematuria. Pt. with likely hemodynamic-mediated TESFAYE vs ATN. Plan to continue with CRRT today. Monitor labs and urine output. Avoid nephrotoxins. Dose medications as per eGFR.
Pt. with TESFAYE in the setting of septic shock. Per review of [place], SCr was 0.54 on 7/28/23. Scr initially during this admission was 0.84 (8/18), and remains WNL. UOP was noted to be decreasing prior to initiation of CRRT. Pt. was initiated on CRRT on 8/20 for severe acidosis, CRRT was discontinued overnight due to hypotension and increasing IV vasopressor requirement( maxed out on pressors - Pheynelephrine, levophed and vasopressin regular dose) Pt. is anuric, documented urine output is 105cc over the past 24 hours. UA showed proteinuria and hematuria. Pt. with likely hemodynamic-mediated TESFAYE vs ATN. Given hypotension and vasopressor requirement, pt. unlikely to tolerate CRRT. Monitor labs and urine output. Avoid nephrotoxins. Dose medications as per eGFR.
Pt. with TESFAYE in the setting of septic shock. Per review of [place], SCr was 0.54 on 7/28/23. Scr initially during this admission was 0.84 (8/18), and remains WNL at 0.96 today. Pt. is oliguric but uop improved somewhat following paracentesis, urinary catheter placement, and IVF administration. Documented urine output is 395 cc over the past 24 hours. UA showed proteinuria and hematuria. Pt. with likely hemodynamic-mediated TESFAYE vs ATN. Plan for CRRT given worsening acidosis and low urine output. Monitor labs and urine output. Avoid nephrotoxins. Dose medications as per eGFR.

## 2023-08-25 NOTE — PROGRESS NOTE ADULT - SUBJECTIVE AND OBJECTIVE BOX
INTERVAL HPI/OVERNIGHT EVENTS:    SUBJECTIVE: Patient seen and examined at bedside. No overnight events.    ROS: Unable to assess due to mental status.    OBJECTIVE:    VITAL SIGNS:  ICU Vital Signs Last 24 Hrs  T(C): 36 (25 Aug 2023 06:00), Max: 43 (24 Aug 2023 12:00)  T(F): 96.8 (25 Aug 2023 06:00), Max: 109.4 (24 Aug 2023 12:00)  HR: 109 (25 Aug 2023 06:45) (95 - 114)  BP: --  BP(mean): --  ABP: 88/29 (25 Aug 2023 06:45) (71/26 - 112/48)  ABP(mean): 43 (25 Aug 2023 06:45) (37 - 65)  RR: 32 (25 Aug 2023 06:45) (31 - 33)  SpO2: 100% (25 Aug 2023 06:45) (85% - 100%)    O2 Parameters below as of 24 Aug 2023 20:00  Patient On (Oxygen Delivery Method): ventilator    O2 Concentration (%): 50    Mode: AC/ CMV (Assist Control/ Continuous Mandatory Ventilation), RR (machine): 32, TV (machine): 400, FiO2: 50, PEEP: 5, ITime: 0.64, MAP: 15, PIP: 33    08-24 @ 07:01  -  08-25 @ 07:00  --------------------------------------------------------  IN: 4492 mL / OUT: 1005 mL / NET: 3487 mL    CAPILLARY BLOOD GLUCOSE    PHYSICAL EXAM:  GENERAL: intubated, sedated  HEAD:  Atraumatic, Normocephalic  EYES: EOMI, PERRLA, conjunctiva and sclera clear  NECK: Supple, No JVD  CHEST/LUNG: Clear to auscultation bilaterally; No wheeze  HEART: S1, S2; No murmurs, rubs, or gallops  ABDOMEN: Soft, Nontender, Nondistended; Bowel sounds present  EXTREMITIES:  2+ Peripheral Pulses, No clubbing, cyanosis. 2+ pitting edema  NEUROLOGY: sedated  SKIN: No rashes or lesions    MEDICATIONS:  MEDICATIONS  (STANDING):  cefTRIAXone   IVPB 2000 milliGRAM(s) IV Intermittent every 24 hours  chlorhexidine 0.12% Liquid 15 milliLiter(s) Oral Mucosa every 12 hours  chlorhexidine 4% Liquid 1 Application(s) Topical <User Schedule>  dextrose 10%. 1000 milliLiter(s) (50 mL/Hr) IV Continuous <Continuous>  norepinephrine Infusion 1 MICROgram(s)/kG/Min (65 mL/Hr) IV Continuous <Continuous>  pantoprazole  Injectable 40 milliGRAM(s) IV Push every 12 hours  phenylephrine    Infusion 4 MICROgram(s)/kG/Min (52 mL/Hr) IV Continuous <Continuous>  thiamine IVPB 200 milliGRAM(s) IV Intermittent <User Schedule>  vasopressin Infusion 0.04 Unit(s)/Min (6 mL/Hr) IV Continuous <Continuous>    MEDICATIONS  (PRN):  sodium chloride 0.9% lock flush 10 milliLiter(s) IV Push every 1 hour PRN Pre/post blood products, medications, blood draw, and to maintain line patency  sodium chloride 0.9% lock flush 10 milliLiter(s) IV Push every 1 hour PRN Pre/post blood products, medications, blood draw, and to maintain line patency    ALLERGIES:  Allergies  No Known Allergies    Intolerances    LABS:                        5.9    58.20 )-----------( 5        ( 25 Aug 2023 00:12 )             18.3     08-25    132<L>  |  98  |  12  ----------------------------<  92  4.0   |  <10<LL>  |  0.97    Ca    8.3<L>      25 Aug 2023 00:12  Phos  3.8     08-25  Mg     2.0     08-25    Urinalysis Basic - ( 25 Aug 2023 00:12 )    Color: x / Appearance: x / SG: x / pH: x  Gluc: 92 mg/dL / Ketone: x  / Bili: x / Urobili: x   Blood: x / Protein: x / Nitrite: x   Leuk Esterase: x / RBC: x / WBC x   Sq Epi: x / Non Sq Epi: x / Bacteria: x    RADIOLOGY & ADDITIONAL TESTS: Reviewed.

## 2023-08-25 NOTE — PROGRESS NOTE ADULT - PROBLEM SELECTOR PROBLEM 3
Hypokalemia
Purple DH (Discharge Huddle; Vulnerable Patient)
Septic shock

## 2023-08-25 NOTE — PROGRESS NOTE ADULT - PROBLEM SELECTOR PROBLEM 2
Metabolic acidosis
Pancreatic cancer

## 2023-08-25 NOTE — PROGRESS NOTE ADULT - SUBJECTIVE AND OBJECTIVE BOX
AMY JAIMES  73y Female  MRN:[id number]    Patient is a 73y old  Female who presents with a chief complaint of Lethargy (19 Aug 2023 20:57)    HPI: pt well known to me  73F with metastatic pancreatic cancer (mets to liver and omentum, on chemo, last on 8/14-8/15), PVT, R gastroc DVT/?PE (eliquis d/c'ed for thrombocytopenia), hypothyroidism, recent admission in 7/2023 for melena presenting from home with lethargy and fevers. History obtained mostly from son at bedside. Per son, patient has been having bright red bloody stools for the past week, which was attributed to thrombocytopenia. She was noted to be lethargic today with low grade fever at home, which prompted them to bring her.    In ED, patient was hypotensive & febrile to 103, was given 2L IVF but persistently hypotensive requiring pressor. Labs notable for neutropenia with severe lactic acidosis. Admitted to MICU for likely septic shock requiring pressor.  (19 Aug 2023 01:33)      Patient seen and evaluated at bedside in MICU    Interval HPI: overnight events noted      PAST MEDICAL & SURGICAL HISTORY:  Essential hypertension      Hyperlipidemia      Pancreatic cancer      Hypothyroidism      No pertinent past surgical history          REVIEW OF SYSTEMS:  as per hpi     VITALS:   ICU Vital Signs Last 24 Hrs  T(C): 36.5 (25 Aug 2023 12:00), Max: 37.2 (25 Aug 2023 01:00)  T(F): 97.7 (25 Aug 2023 12:00), Max: 99 (25 Aug 2023 01:00)  HR: 101 (25 Aug 2023 14:00) (96 - 114)  BP: --  BP(mean): --  ABP: 102/35 (25 Aug 2023 14:00) (71/33 - 112/48)  ABP(mean): 52 (25 Aug 2023 14:00) (37 - 65)  RR: 32 (25 Aug 2023 14:00) (31 - 33)  SpO2: 98% (25 Aug 2023 14:00) (85% - 100%)    O2 Parameters below as of 25 Aug 2023 08:00  Patient On (Oxygen Delivery Method): ventilator    O2 Concentration (%): 40            PHYSICAL EXAM:  GENERAL: intubated, non responsive   HEAD:  Atraumatic, Normocephalic  EYES: EOMI, PERRLA, conjunctiva and sclera clear  NECK: Supple, No JVD +central line  CHEST/LUNG: Clear to auscultation bilaterally; No wheeze  HEART: S1, S2; No murmurs, rubs, or gallops  ABDOMEN: Soft, Nontender, Nondistended; Bowel sounds present  EXTREMITIES:  2+ Peripheral Pulses, No clubbing, cyanosis. + edema  NEUROLOGY: non responsive        Consultant(s) Notes Reviewed:  [x ] YES  [ ] NO  Care Discussed with Consultants/Other Providers [ x] YES  [ ] NO    MEDS:   MEDICATIONS  (STANDING):  cefTRIAXone   IVPB 2000 milliGRAM(s) IV Intermittent every 24 hours  chlorhexidine 0.12% Liquid 15 milliLiter(s) Oral Mucosa every 12 hours  chlorhexidine 4% Liquid 1 Application(s) Topical <User Schedule>  dextrose 10%. 1000 milliLiter(s) (50 mL/Hr) IV Continuous <Continuous>  norepinephrine Infusion 1 MICROgram(s)/kG/Min (65 mL/Hr) IV Continuous <Continuous>  pantoprazole  Injectable 40 milliGRAM(s) IV Push every 12 hours  phenylephrine    Infusion 4 MICROgram(s)/kG/Min (52 mL/Hr) IV Continuous <Continuous>  thiamine IVPB 200 milliGRAM(s) IV Intermittent <User Schedule>  vasopressin Infusion 0.04 Unit(s)/Min (6 mL/Hr) IV Continuous <Continuous>    MEDICATIONS  (PRN):  sodium chloride 0.9% lock flush 10 milliLiter(s) IV Push every 1 hour PRN Pre/post blood products, medications, blood draw, and to maintain line patency  sodium chloride 0.9% lock flush 10 milliLiter(s) IV Push every 1 hour PRN Pre/post blood products, medications, blood draw, and to maintain line patency      ALLERGIES:  No Known Allergies      LABS:                                                  5.9    58.20 )-----------( 5        ( 25 Aug 2023 00:12 )             18.3   08-25    132<L>  |  98  |  12  ----------------------------<  92  4.0   |  <10<LL>  |  0.97    Ca    8.3<L>      25 Aug 2023 00:12  Phos  3.8     08-25  Mg     2.0     08-25          cultures: Culture Results:   Growth in aerobic bottle: Gram Negative Rods  Growth in anaerobic bottle: Gram Negative Rods  Direct identification is available within approximately 3-5  hours either by Blood Panel Multiplexed PCR or Direct  MALDI-TOF. Details: [email or web address] (08-18 @ 20:50)  Culture Results:   Growth in aerobic bottle: Gram Negative Rods  Growth in anaerobic bottle: Gram Negative Rods (08-18 @ 20:42)       < from: CT Angio Chest PE Protocol w/ IV Cont (08.19.23 @ 02:46) >  IMPRESSION:  1.   Large volume ascites.  2.   Interval increase in size in hepatic metastases.  3.   Nodular omental opacities compatible with peritoneal carcinomatosis.  4.   Small right pleural effusion.  5.   Consolidation in the lung bases.  6.   Chronically thrombosed portal vein.  7.  No pulmonary embolism    --- End of Report ---      < end of copied text >  < from: CT Head No Cont (08.19.23 @ 02:45) >  IMPRESSION:    No evidence of acute intracranial hemorrhage, midline shift or CT   evidence of acute territorial infarct.    If the patient's symptoms persist, consider short interval follow-up head   CT or brain MRI if there are no MRI contraindications.    --- End of Report ---      < end of copied text >

## 2023-08-26 NOTE — PROGRESS NOTE ADULT - ATTENDING COMMENTS
73F PMH metastatic pancreatic cancer (mets to liver and omentum, last chemo on 8/14-8/15), portal vein thrombosis, R gastroc DVT/?PE (eliquis d/c'ed for thrombocytopenia), and hypothyroidism p/w neutropenic septic shock, acute hypoxemic respiratory failure requiring intubation, TESFAYE 2/2 ATN, refractory lactic acidosis, and hypoglycemia, found to have COVID + and E. coli bacteremia.    - Sedation with propofol, daily sedation vacation as tolerates  - Mixed septic and cardiogenic shock. Bedside ultrasound with at least moderate LV systolic dysfunction likely due to sepsis given recent normal LV systolic function on echo in July 2023. IVC is indeterminate at 1.9 cm. She is overall volume overloaded. Increase fluid removal with CRRT to 75 mL/hour. Lactic acidosis likely due to shock + sepsis + malignancy. Monitor closely. Start high dose thiamine. Start albumin 25% 50mL q6h  - Continue lung protective ventilation. Did not tolerate decreasing rate to 25 given metabolic acidosis. Now on volume a/c 30/400/30%/5. Now with escalating FiO2 requirements. Close respiratory monitoring  - Increase vital tube feeds as tolerates. Continue PPI. S/p paracentesis on 8/19 with drainage of 3.5 liters fluid removal  - TESFAYE 2/2 ATN, now on CRRT. Increase fluid removal to 75 mL/hour. Strict I/O's, close monitoring of kidney function and lytes. Monitor lactate. Start bicarbonate infusion per nephrology  - Thrombocytopenia likely due to sepsis + recent chemo. Close monitoring. No active bleeding. Transfuse for goal PLT>15. Resolved neutropenia. Monitor H/H. No DVT chemoprophylaxis given low PLTS. SCD's  - Pan sensitive E. coli. Antibiotics changed to ceftriaxone 2 gm IV daily. Repeat cultures negative  - Hypoglycemia, likely due to sepsis + liver dysfunction due to liver mets. Continue D10 gtt  - COVID-19 positive, but would not use remdesivir given acute liver dysfxn and TESFAYE. Hold off on steroids given unlikely COVID-19 pneumonia  - Family rescinded DNR, now full code. Prognosis guarded. Appears to be approaching end of life  CC time spent: 35 min
73F PMH metastatic pancreatic cancer (mets to liver and omentum, last chemo on 8/14-8/15), portal vein thrombosis, R gastroc DVT/?PE (eliquis d/c'ed for thrombocytopenia), and hypothyroidism p/w neutropenic septic shock, acute hypoxemic respiratory failure requiring intubation, TESFAYE 2/2 ATN, refractory lactic acidosis, and hypoglycemia, found to have COVID-19 and E. coli bacteremia. Now with refractory shock and lactic acidosis.    - Currently unresponsive. Hold propofol. Can give hydromorphone prn if appears to be in distress/pain  - Refractory shock, mixed septic and cardiogenic. Remains on max vasopressin, norepinephrine, and phenylephrine. Episodes of Vtach this morning s/p magnesium sulfate. Hold metoclopramide. Has new LV systolic dysfunction and appears overall volume overloaded, but unable to tolerate fluid removal with CRRT. Minimal urine output. S/p trial of diuresis yesterday. Monitor lactic acidosis, remains >16, likely due to shock + sepsis + malignancy + liver metastases. Continue thiamine  - Continue lung protective ventilation. FiO2 decreased to 50% today  - Restart vital tube feeds as tolerates. Continue PPI. Hold metoclopramide given VTach episodes this AM. S/p paracentesis on 8/19 with drainage of 3.5 liters fluid removal  - TESFAYE 2/2 ATN, not tolerating CRRT given shock. Close monitoring of kidney function and lytes. Monitor lactate. Off bicarbonate gtt. Remains oliguric  - Thrombocytopenia likely due to sepsis + recent chemo. No evidence of active bleeding. Close monitoring. Hold off on blood products at this time given no active bleeding. Resolved neutropenia, now with leukocytosis, will monitor. Monitor H/H. No DVT chemoprophylaxis given low PLTS. SCD's  - Pan sensitive E. coli. Continue ceftriaxone 2 gm IV daily. Repeat cultures negative  - Hypoglycemia, likely due to sepsis + liver dysfunction due to liver mets. Continue D10 gtt  - COVID-19 positive, but would not use remdesivir given acute liver dysfxn and TESFAYE  - Discussed with family at bedside today ( and sons). Although they do not want to sign the DNR, they are requesting that when patient passes away that they be called to bedside before initiating ACLS and family will decide at that time. They understand that patient is approaching end of life  CC time spent: 35 min
73F PMH metastatic pancreatic cancer (mets to liver and omentum, last chemo on 8/14-8/15), portal vein thrombosis, R gastroc DVT/?PE (eliquis d/c'ed for thrombocytopenia), and hypothyroidism p/w neutropenic septic shock, acute hypoxemic respiratory failure requiring intubation, TESFAYE 2/2 ATN, refractory lactic acidosis, and hypoglycemia, found to have COVID-19 and E. coli bacteremia. Now with refractory shock and lactic acidosis and developing ARDS with worsening hypoxemic respiratory failure.    - Sedation with propofol, sedation vacation as tolerates  - Refractory shock, mixed septic and cardiogenic. Has new LV systolic dysfunction and appears overall volume overloaded, but unable to tolerate fluid removal with CRRT. No urine output. Worsening lactic acidosis, due to shock + sepsis + malignancy + liver metastases. Monitor closely. Continue thiamine. S/p 24 hours of albumin with no improvement, will discontinue  - Continue lung protective ventilation. Now requiring 100% FiO2  - Continue vital tube feeds as tolerates. Continue PPI. S/p paracentesis on 8/19 with drainage of 3.5 liters fluid removal  - ETSFAYE 2/2 ATN, not tolerating CRRT given shock. Close monitoring of kidney function and lytes. Monitor lactate. Off bicarbonate gtt  - Thrombocytopenia likely due to sepsis + recent chemo. Close monitoring. No active bleeding. Transfuse for goal PLT>10. Resolved neutropenia. Monitor H/H. No DVT chemoprophylaxis given low PLTS. SCD's  - Pan sensitive E. coli. Antibiotics changed to ceftriaxone 2 gm IV daily. Repeat cultures negative  - Hypoglycemia, likely due to sepsis + liver dysfunction due to liver mets. Continue D10 gtt  - COVID-19 positive, but would not use remdesivir given acute liver dysfxn and TESFAYE  - Discussed with family at bedside today ( and sons). Although they do not want to sign the DNR, they are requesting that when patient passes away that they be called to bedside before initiating ACLS and family will decide at that time. They understand that patient is approaching end of life  CC time spent: 35 min
73F PMH metastatic pancreatic cancer (mets to liver and omentum, last chemo on 8/14-8/15), portal vein thrombosis, R gastroc DVT/?PE (eliquis d/c'ed for thrombocytopenia), and hypothyroidism p/w neutropenic septic shock, acute hypoxemic respiratory failure requiring intubation, TESFAYE 2/2 ATN, refractory lactic acidosis, and hypoglycemia, found to have COVID-19 and E. coli bacteremia. Now with refractory shock and lactic acidosis.    - Remains unresponsive. Hold propofol. Can give hydromorphone prn if appears to be in distress/pain  - Refractory shock, mixed septic and cardiogenic. Remains on max vasopressin, norepinephrine, and phenylephrine. Has new LV systolic dysfunction and appears overall volume overloaded, but unable to tolerate fluid removal with CRRT. Minimal urine output. Monitor lactic acidosis, 15.7 today, likely due to shock + sepsis + malignancy + liver metastases. Continue thiamine  - Continue lung protective ventilation  - Vital tube feeds as tolerates. Continue PPI. Hold metoclopramide given VTach episodes yesterday. S/p paracentesis on 8/19 with drainage of 3.5 liters fluid removal  - TESFAYE 2/2 ATN, not tolerating CRRT given shock. Close monitoring of kidney function and lytes. Monitor lactate. Off bicarbonate gtt. Remains oliguric  - Thrombocytopenia likely due to sepsis + recent chemo. No evidence of active bleeding. Close monitoring. Hold off on blood products at this time given no active bleeding and patient approaching end of life. Resolved neutropenia, now with leukocytosis, will monitor. Monitor H/H. No DVT chemoprophylaxis given low PLTS. SCD's  - Pan sensitive E. coli bacteremia. Unclear source, possible gut translocation in the setting of recent chemo. Continue ceftriaxone 2 gm IV daily. Repeat cultures negative  - Hypoglycemia, likely due to sepsis + liver dysfunction due to liver mets. Continue D10 gtt  - COVID-19 positive, but would not use remdesivir given acute liver dysfxn and TESFAYE  - Family aware of patient's terminal condition and prognosis. Although they do not want to sign the DNR, they are requesting that when patient passes away that they be called to bedside before initiating ACLS and family will decide at that time. They understand that patient is approaching end of life    CC time spent: 35 min
73F PMH metastatic pancreatic cancer (mets to liver and omentum, last chemo on 8/14-8/15), portal vein thrombosis, R gastroc DVT/?PE (eliquis d/c'ed for thrombocytopenia), and hypothyroidism p/w neutropenic septic shock, acute hypoxemic respiratory failure requiring intubation, TESFAYE 2/2 ATN, refractory lactic acidosis, and hypoglycemia, found to have COVID-19 and E. coli bacteremia. Now with refractory shock and lactic acidosis.    - Currently unresponsive. Hold propofol  - Refractory shock, mixed septic and cardiogenic. Remains on max vasopressin, norepinephrine, and phenylephrine. Has new LV systolic dysfunction and appears overall volume overloaded, but unable to tolerate fluid removal with CRRT. No urine output. Trial of chlorothiazide IV followed by bumetanide 4 mg IV. Monitor lactic acidosis, remains >16, likely due to shock + sepsis + malignancy + liver metastases. Monitor closely. Continue thiamine  - Continue lung protective ventilation. FiO2 decreased to 70% today  - Continue vital tube feeds as tolerates. Continue PPI. S/p paracentesis on 8/19 with drainage of 3.5 liters fluid removal  - TESFAYE 2/2 ATN, not tolerating CRRT given shock. Close monitoring of kidney function and lytes. Monitor lactate. Off bicarbonate gtt. Trial of chlorothiazide IV followed by bumetanide  - Thrombocytopenia likely due to sepsis + recent chemo. Transfuse 1 bag platelets. Close monitoring. Hold off on prbc or cryo transfusion at this time. No active bleeding. Resolved neutropenia, now with leukocytosis. Monitor H/H. No DVT chemoprophylaxis given low PLTS. SCD's  - Pan sensitive E. coli. Continue ceftriaxone 2 gm IV daily. Repeat cultures negative  - Hypoglycemia, likely due to sepsis + liver dysfunction due to liver mets. Continue D10 gtt  - COVID-19 positive, but would not use remdesivir given acute liver dysfxn and TESFAYE  - Discussed with family at bedside today ( and sons). Although they do not want to sign the DNR, they are requesting that when patient passes away that they be called to bedside before initiating ACLS and family will decide at that time. They understand that patient is approaching end of life. Would recommend to start de-escalating life support so as not to prolong this dying process  CC time spent: 35 min
73F PMH metastatic pancreatic cancer (mets to liver and omentum, last chemo on 8/14-8/15), portal vein thrombosis, R gastroc DVT/?PE (eliquis d/c'ed for thrombocytopenia), and hypothyroidism p/w neutropenic septic shock, acute hypoxemic respiratory failure requiring intubation, TESFAYE 2/2 ATN, refractory lactic acidosis, and hypoglycemia, found to have COVID-19 and E. coli bacteremia. Now with refractory shock and lactic acidosis. COVID19+.    - Remains unresponsive. Hold propofol. Can give hydromorphone prn if appears to be in distress/pain  - Refractory shock, mixed septic and cardiogenic. Remains on max vasopressin, norepinephrine, and phenylephrine. Has new LV systolic dysfunction and appears overall volume overloaded, but unable to tolerate fluid removal with CRRT.   - Continue lung protective ventilation  - Vital tube feeds as tolerates. Continue PPI. Hold metoclopramide given VTach episodes yesterday. S/p paracentesis on 8/19 with drainage of 3.5 liters fluid removal  - TESFAYE 2/2 ATN, not tolerating CRRT given shock. Close monitoring of kidney function and lytes. Monitor lactate. Off bicarbonate gtt. Remains oliguric  - Thrombocytopenia likely due to sepsis + recent chemo. No evidence of active bleeding. Hold off on blood products at this time given no active bleeding and patient approaching end of life. Resolved neutropenia, now with leukocytosis, will monitor.   - Pan sensitive E. coli bacteremia. Unclear source, possible gut translocation in the setting of recent chemo. Continue ceftriaxone 2 gm IV daily. Repeat cultures negative  - Hypoglycemia, likely due to sepsis + liver dysfunction due to liver mets. Continue D10 gtt  - Family aware of patient's terminal condition and prognosis. Although they do not want to sign the DNR, they are requesting that when patient passes away that they be called to bedside before initiating ACLS and family will decide at that time. They understand that patient is approaching end of life.  Family updated at bedside.    Lorelei Gray MD
73F with metastatic pancreatic cancer (mets to liver and omentum, last chemo on 8/14-8/15), PVT, R gastroc DVT/?PE (eliquis d/c'ed for thrombocytopenia), hypothyroidism p/w neutropenic septic shock, AHRF s/p intubation, TESFAYE, hypoglycemia and found to be covid + and Ecoli bacteremia    - cont analgosedation, titrate to goal rass  - cont vent support, MV increased given significant acidosis  - on pressors for septic shock now requiring two, maintain map>65  - no a/c for DVT given low plt and high risk of bleeding given her accelerated fibrinolytic state  - s/p cryo and plt and prbc tx, cont transfuse based on levels and bleeding   - s/p paracentesis 3.5L removed, cx negative  - worsening acidosis now on bicarb gtt, plan for CRRT but may be unable to tolerate due to BP  - monitor uo/lytes, appreciate Renal reccs   - cont abx w/ carbapenem and s/p vanco x1  - f/u fungitell  - covid + ve but would not use RDV given acute liver dysfxn and hold off steroids given likely not covid PNA given CT picture that appears to have basilar atelectasis and not  covid pna (w/ ggo)    DVT ppx SCD  GOC: now DNR but want trial of HD
Sepsis  Poor prognosis   Favor palliative measures    Eden Wiley MD  Off: [phone]  contact me on teams    (After 5 pm or on weekends please page the on-call fellow/attending, can check [email or web address] for schedule. Login is tomas rubalcava, schedule under [facility] medicine, psych, derm)
72 yo F with h/o metastatic pancreatic cancer (mets to liver, and omentum), DVT, and hypothyroidism currently admitted to MICU for septic shock in the setting of neutropenia. Nephrology was consulted for metabolic acidosis/lactic acidosis.  Intubated, sedated.  Reviewed dismal condition of patient with family again. Want trial of renal replacement rx.   1.  ARF--main indication is 2.  Unfortunately volume removal will worsen 3 and 4.  Paracentesis to decrease IABP.  Consent obtained for CRRT.  Unlikely to tolerate given 4  2.  Respiratory failure, acute, hypoxic-- >60%-->50%.  NOT likely to benefit from UF  3.  Acidosis--severe lactic.  HCO3 for pH <7.1.  Currently with vent mediated respiratory alkalosis offset  4.  Hypotension--pressor optimization with goal to improve perfusion    discussed with micu team, attending  Dinesh Mason MD  contact me on TEAMS
74 yo F with h/o metastatic pancreatic cancer (mets to liver, and omentum), DVT, and hypothyroidism currently admitted to MICU for septic shock in the setting of neutropenia.   Acidosis and lactate still significantly abnormal despite CRRT  Continue CRRT for now is able to tolerate however this likely is futile  Abx and pressors per MICU  Poor prognosis  No objection to adding IV bicarb gtt     Eden Wiley MD  Off: [phone]  contact me on teams    (After 5 pm or on weekends please page the on-call fellow/attending, can check [email or web address] for schedule. Login is tomas rubalcava, schedule under [facility] medicine, psych, derm)
Eden Wiley MD  Off: [phone]  contact me on teams    (After 5 pm or on weekends please page the on-call fellow/attending, can check [email or web address] for schedule. Login is tomas rubalcava, schedule under [facility] medicine, psych, derm)
Eden Wiley MD  Off: [phone]  contact me on teams    (After 5 pm or on weekends please page the on-call fellow/attending, can check [email or web address] for schedule. Login is tomas rubalcava, schedule under [facility] medicine, psych, derm)
Sepsis  Poor prognosis   Favor palliative measures    Eden Wiley MD  Off: [phone]  contact me on teams    (After 5 pm or on weekends please page the on-call fellow/attending, can check [email or web address] for schedule. Login is tomas rubalcava, schedule under [facility] medicine, psych, derm)

## 2023-08-26 NOTE — PROGRESS NOTE ADULT - SUBJECTIVE AND OBJECTIVE BOX
Patient is a 73y old  Female who presents with a chief complaint of Lethargy (26 Aug 2023 07:24)      24 hour events: NAEO.        REVIEW OF SYSTEMS:    Unable to assess ROS because intubated/mental status      OBJECTIVE:  ICU Vital Signs Last 24 Hrs  T(C): 35.1 (26 Aug 2023 12:00), Max: 36.8 (26 Aug 2023 06:00)  T(F): 95.2 (26 Aug 2023 12:00), Max: 98.2 (26 Aug 2023 06:00)  HR: 93 (26 Aug 2023 12:45) (93 - 104)  BP: --  BP(mean): --  ABP: 86/28 (26 Aug 2023 12:45) (73/30 - 104/30)  ABP(mean): 38 (26 Aug 2023 12:45) (38 - 52)  RR: 32 (26 Aug 2023 12:45) (32 - 36)  SpO2: 60% (26 Aug 2023 12:45) (60% - 98%)    O2 Parameters below as of 26 Aug 2023 08:00  Patient On (Oxygen Delivery Method): ventilator    O2 Concentration (%): 100      Mode: AC/ CMV (Assist Control/ Continuous Mandatory Ventilation), RR (machine): 32, TV (machine): 400, FiO2: 100, PEEP: 5, ITime: 0.71, MAP: 13, PIP: 24    08-25 @ 07:01  -  08-26 @ 07:00  --------------------------------------------------------  IN: 4402 mL / OUT: 145 mL / NET: 4257 mL    08-26 @ 07:01  -  08-26 @ 14:25  --------------------------------------------------------  IN: 1038 mL / OUT: 0 mL / NET: 1038 mL      CAPILLARY BLOOD GLUCOSE            PHYSICAL EXAM:  GENERAL: NAD, ill-developed  HEAD:  Atraumatic, Normocephalic  NECK: Supple, Trachea midline  CHEST/LUNG: +ETT in place; Normal expansion,  Normal auscultation,  Clear to auscultation bilaterally  HEART: Regular rate and rhythm; Edema to BL upper and lower extremities   ABDOMEN: Soft, distended;    Musculoskeletal/EXTREMITIES:2+ Peripheral Pulses, No clubbing, cyanosis, or edema  NEUROLOGY: unable to assess      HOSPITAL MEDICATIONS:  MEDICATIONS  (STANDING):  cefTRIAXone   IVPB 2000 milliGRAM(s) IV Intermittent every 24 hours  chlorhexidine 0.12% Liquid 15 milliLiter(s) Oral Mucosa every 12 hours  chlorhexidine 4% Liquid 1 Application(s) Topical <User Schedule>  dextrose 10%. 1000 milliLiter(s) (50 mL/Hr) IV Continuous <Continuous>  norepinephrine Infusion 1 MICROgram(s)/kG/Min (65 mL/Hr) IV Continuous <Continuous>  pantoprazole  Injectable 40 milliGRAM(s) IV Push every 12 hours  phenylephrine    Infusion 4 MICROgram(s)/kG/Min (52 mL/Hr) IV Continuous <Continuous>  thiamine IVPB 200 milliGRAM(s) IV Intermittent <User Schedule>  vasopressin Infusion 0.04 Unit(s)/Min (6 mL/Hr) IV Continuous <Continuous>    MEDICATIONS  (PRN):  sodium chloride 0.9% lock flush 10 milliLiter(s) IV Push every 1 hour PRN Pre/post blood products, medications, blood draw, and to maintain line patency  sodium chloride 0.9% lock flush 10 milliLiter(s) IV Push every 1 hour PRN Pre/post blood products, medications, blood draw, and to maintain line patency      LABS:                        5.9    58.20 )-----------( 5        ( 25 Aug 2023 00:12 )             18.3       08-25    132<L>  |  98  |  12  ----------------------------<  92  4.0   |  <10<LL>  |  0.97    Ca    8.3<L>      25 Aug 2023 00:12  Phos  3.8     08-25  Mg     2.0     08-25              Urinalysis Basic - ( 25 Aug 2023 00:12 )    Color: x / Appearance: x / SG: x / pH: x  Gluc: 92 mg/dL / Ketone: x  / Bili: x / Urobili: x   Blood: x / Protein: x / Nitrite: x   Leuk Esterase: x / RBC: x / WBC x   Sq Epi: x / Non Sq Epi: x / Bacteria: x              MICROBIOLOGY:     Radiology: ***    Bedside lung ultrasound: ***    Bedside ECHO: ***    EKG:    CENTRAL LINE: Y/N          DATE INSERTED:              REMOVE: Y/N    STOKES: Y/N                        DATE INSERTED:              REMOVE: Y/N    A-LINE: Y/N                       DATE INSERTED:              REMOVE: Y/N    GLOBAL ISSUE/BEST PRACTICE:  Analgesia:  Sedation:  HOB elevation: yes  Stress ulcer prophylaxis:  VTE prophylaxis:  Glycemic control:  Nutrition:    CODE STATUS: ***  [facility] discussion: Y

## 2023-08-26 NOTE — PROGRESS NOTE ADULT - ASSESSMENT
73 female h/o pancreatic ca with mets on chemo, hypothryroid, dvt/pe on eliquis, now admitted to micu with septic shock    metastatic pancreatic ca  septic shock  ascites  bacteremia  covid19  anuric renal failure  pancytopenia  coagulapathy  hypothermia     vent support  pressors  s/p paracentesis  CVVHD  - now stopped due to hypotension  broad spectrum abx  f/u cult and sens. repeat cult ngtd  transfusion of blood products as need  warming protocol    8/22: cvvhd stopped due to hypotension. now maxxed on 3 pressors. on 100% fio2. remains hypotensive, hypethermic and hypoxic  pt actively dying. d/w family at bedside. now agreeable to no compressions or shock.    8/23: pt remains hypotensive on 3 pressors. no cvvhd 2/2 hypotension. pt at end of life. prognosis poor. family aware   8/24: pt remains on 3 pressors. unable to tolerate cvvhd. minimal urine outpt. no mental status off sedation. family aware of poor prognosis.   8/25: no clinical change. remains maxed on 3 pressors. minimal UO. no plan for transfusion.   8/26: pt actively dying. hypotensive on 3 pressors. hypoxic on 100%fio2. d/w family bedside         prognosis poor    d/w micu team, renal, cards, heme/onc    mngt as per micu      Advanced care planning was discussed with patient and family.  Advanced care planning forms were reviewed and discussed as appropriate.  Differential diagnosis and plan of care discussed with patient after the evaluation.   Pain assessed and judicious use of narcotics when appropriate was discussed.  Importance of Fall prevention discussed.  Counseling on Smoking and Alcohol cessation was offered when appropriate.  Counseling on Diet, exercise, and medication compliance was done.       Approx 60 minutes spent.

## 2023-08-26 NOTE — PROGRESS NOTE ADULT - REASON FOR ADMISSION
Lethargy

## 2023-08-26 NOTE — CHART NOTE - NSCHARTNOTEFT_GEN_A_CORE
Family wished to rediscuss code status overnight. Met with son (Katherine) at bedside who stated the entire family is in agreement to rescind DNR at this time after discussion with their cardiologist and oncologist.
Nutrition Follow Up Note  Patient seen for: follow up. Chart reviewed, events noted.    Source: [] Patient       [x] Medical Record        [x] RN        [] Family at bedside       [] Other:    -If unable to interview patient: [x] Trach/Vent/BiPAP  [] Disoriented/confused/inappropriate to interview    Nutrition-Related Events:   - Pressors:  [x] Yes    [] No - norepinephrine at 1.0 micrograms/kG/min, phenylephrine at 4.0 micrograms/kG/min, vasopressin at 0.04 units/min   - Propofol:  [x] Yes    [] No         - Rate: __mL/hr. If maintained x 24 hours, propofol will provide:     Diet Order:   Diet, NPO (23)  Previously on enteral feeds of Vital AF ->  *Feeds stopped for vomiting     Is current diet order appropriate/adequate? See recommendations below    PO intake :   [] >75%  Adequate    [] 50-75%  Fair       [] <50%  Poor   [x] N/A    Nutrition-related concerns:  - Intubated  - Hyponatremia  - Metastatic pancreatic cancer  - Shock state  - Ascites  - Paracentesis () drained 3.3 Liters  - TESFAYE  - Ordered for thiamine     GI: Abdomen distended and taut, previously with bloody diarrhea.  Last BM 8/24 x2.   Bowel Regimen? [] Yes   [x] No  Gastrostomy Output: 75 mL (), 200 mL ()  IV Fluids: dextrose 10% at 50 mL/hr    Weights:   Daily Weight in k.6 (-22), 73.1 (-20)  Wt relatively stable per daily wts  Physical exam not appropriate at this time.     Drug Dosing Weight  Height (cm): 165.1 (19 Aug 2023 09:00)  Weight (kg): 69.3 (19 Aug 2023 09:00)  BMI (kg/m2): 25.4 (19 Aug 2023 09:00)    MEDICATIONS  (STANDING):  cefTRIAXone   IVPB 2000 milliGRAM(s) IV Intermittent every 24 hours  dextrose 10%. 1000 milliLiter(s) (50 mL/Hr) IV Continuous <Continuous>  norepinephrine Infusion 1 MICROgram(s)/kG/Min (65 mL/Hr) IV Continuous <Continuous>  pantoprazole  Injectable 40 milliGRAM(s) IV Push every 12 hours  phenylephrine    Infusion 4 MICROgram(s)/kG/Min (52 mL/Hr) IV Continuous <Continuous>  thiamine IVPB 200 milliGRAM(s) IV Intermittent <User Schedule>  vasopressin Infusion 0.04 Unit(s)/Min (6 mL/Hr) IV Continuous <Continuous>    Pertinent Labs:  @ 00:12: Na 132<L>, BUN 12, Cr 0.97, BG 92, K+ 4.0, Phos 3.8, Mg 2.0    A1C with Estimated Average Glucose Result: 5.3 % (23 @ 06:14)    Skin per nursing documentation: Suspected deep tissue injury ridge. buttocks, sacrum   Edema per nursing documentation: 4+ dependent; generalized    Based on dosing wt 69.3 kG  Estimated Energy Needs: (25-30 kcals/kG) 9127-9938 kcals  Estimated Protein Needs: (1.4-1.6 gm/kG)  gm  Fluid needs deferred to provider.   [place] State Equation: 1561 kcals ()     Previous Nutrition Diagnosis: Increased protein-energy needs   Nutrition Diagnosis is: [x] ongoing  [] resolved [] not applicable     Nutrition Care Plan:  [] In Progress  [] Achieved  [x] Not applicable - current medical condition precludes nutrition intervention at this time.     New Nutrition Diagnosis: [x] Not applicable  Additional diagnosis not appropriate at this time as per MICU note, "...patient is approaching end of life"    Nutrition Interventions:     Education Provided:       [] Yes:  [x] No:     Recommendations:      1) Diet provision deferred to providers and goals of care. RD available for recommendations as appropriate.     Monitoring and Evaluation:   Continue to monitor nutritional intake, tolerance to diet prescription, weights, labs, skin integrity    RD remains available upon request and will follow up per protocol  Maude Arias RD, MS, CDN, CNSC Pager #[phone]
: Hailee Ayala PA-C    INDICATION: Shock    PROCEDURE:  [ X ] LIMITED ECHO  [ X ] LIMITED CHEST  [ ] LIMITED RETROPERITONEAL  [ X ] LIMITED ABDOMINAL  [ ] LIMITED DVT  [ ] NEEDLE GUIDANCE VASCULAR  [ ] NEEDLE GUIDANCE THORACENTESIS  [ ] NEEDLE GUIDANCE PARACENTESIS  [ ] NEEDLE GUIDANCE PERICARDIOCENTESIS  [ ] OTHER    FINDINGS:  Lungs:   - limited evaluation of anterior lung fields d/t large volume ascites, B-lines left sided anteriorly  - b/l air bronchograms at lung bases  - small R sided pleural effusion   Heart:   - Hyperdynamic LV systolic function   - trace pericardial effusion   - RV<LV   - small IVC with respiratory variation   Abdomen:   - large volume ascites, simple in appearance      INTERPRETATION:  b/l basal atelectasis vs consolidation. Hyperdynamic cardiac function, small IVC with respiratory variation; will albumin resuscitate and plan for large volume paracentesis pending GOC.
Case discussed with primary team. Family remains steadfast on goals, understand poor prognosis but want to continue with aggressive medical care. Please reconsult as needed. Can be reached by TEAMS M-F 9-5 Ruth Mccormick Any other time please page [phone] if needed
Patient noted to be in asystole on tele. Patient examined at bedside. No carotid pulse, heart sounds, spontaneous breathing off ventilation. Patient declared  on 23:09 23.

## 2023-08-26 NOTE — PROGRESS NOTE ADULT - ATTENDING SUPERVISION STATEMENT
Resident
Fellow

## 2023-08-26 NOTE — DISCHARGE NOTE FOR THE EXPIRED PATIENT - HOSPITAL COURSE
73F with metastatic pancreatic cancer (mets to liver and omentum, on chemo, last on 8/14-8/15), PVT, R gastroc DVT/?PE (eliquis d/c'ed for thrombocytopenia), hypothyroidism, recent admission in 7/2023 for melena presenting from home with lethargy, fevers, and melena for a week.    In ED, patient was hypotensive & febrile to 103, was given 2L IVF but persistently hypotensive requiring pressor. Labs notable for neutropenia with severe lactic acidosis. Admitted to MICU for likely septic shock requiring pressors.     In the MICU, patient was intubated, started on CRRT, and required multiple pressors. Initially transfused with platelets and pRBCs, but due to poor progonsis the decision was made to withhold blood products. Was started on vanc and zosyn for empiric coverage which was later switched to ceftriaxone following sensitivites. Paracentesis drained out 3.3L and not concerning for SBP. Due to persistent hypotension despite being capped on pressors, CRRT was paused. Throughout stay patient became progressively more acidotic, oliguric, anemic, and thrombocytopenic. Due to [employer] beliefs, family declined to sign MOLST form but verbalized their DNR wishes. Dilaudid PRN was initiated for pain control. Patient passed on 8/26/2023 at 23:09.

## 2023-08-26 NOTE — PROGRESS NOTE ADULT - ASSESSMENT
73F with metastatic pancreatic cancer (mets to liver and omentum, last chemo on 8/14-8/15), PVT, R gastroc DVT/?PE (eliquis d/c'ed for thrombocytopenia), hypothyroidism, recent admission in 7/2023 for melena presenting from home with lethargy and fever. Septic on presentation in setting of neutropenia. Admitted to MICU for shock state requiring pressor. Requiring CRRT; now paused due to hypotension and capped on multiple pressors. DNR reversed, now full code. D/c reglan due to VTach episodes, tube feeds restarted.    NEUROLOGIC  #Metabolic encephalopathy   #Intubated & sedated  - Baseline mental status AAOx3  - Presenting with lethargy, progressively worsened iso acidemia  - Propofol gtt, RASS goal 0 to -1  - Off sedation this AM.     CARDIOVASCULAR  #Shock state  - Hypotensive to SBP 50, s/p 2L in ED  - TTE 7/24/23: EF 61%, normal LV and RV systolic function  - Likely in setting of severe sepsis, however, also component of decreased preload from increased intra-abdominal pressure  - Continue levo, abdullahi, and vasopressin gtt  - Paracentesis (8/19) drained 3.3L, ostomy bag continuously draining  - pressures 70s/30s, MAP 40s; Levophed 1, phenylephrine 4, vasopressin 0.04    PULMONARY  #Intubated and ventilated  #Acute hypoxic and hypercarbic respiratory failure  - Intubated on 8/19 for airway protection iso encephalopathy   - Settings: 400/32/5/100  - Monitor serial gas, titrate as needed    GI  #BRBPR  - Has been having bloody diarrhea for the past week per family  - H/H stable  - CT AP: Showing large volume ascites (pre-paracentesis), interval increase in hepatic metastasis, nodular omental opacities consistent with peritoneal carcinomatosis, small right pleural effusion + lung consolidation, and a chronically thrombosed portal vein.  - Continue to monitor  - Hg 5.9; will not transfuse    RENAL  #TESFAYE  - Cr bump from 0.5 -> 0.96. Now down to 0.64.  - Possibly iso elevated intra-abdominal pressures  - Monitor UOP on aparicio catheter  - CRRT due to acidemia and worsening UOP. Now paused due to hypotension.    HEME/ONC  #Metastatic pancreatic cancer  #Coagulopathy  #Ascites  - Recently diagnosed, follows Dr. Eduardo Martinez in [facility]  - On palliative chemotherapy to reduce size & relieve compression  - Last chemotherapy on 8/14-15   - S/p urgent diagnostic & therapeutic paracentesis    ID  #Sepsis  #Neutropenic Fever  - UA positive  - CXR with small bilateral pleural effusions  - F/u CT C/A/P  - RVP, blood and urine cultures negative  - Switched to CTX    ENDOCRINE  - No active issues  - On D10 50cc/hr for hypoglycemia    ETHICS  - Full code  - Sons are NOK

## 2023-08-26 NOTE — PROGRESS NOTE ADULT - PROVIDER SPECIALTY LIST ADULT
Cardiology
Internal Medicine
MICU
Nephrology
Cardiology
Internal Medicine
MICU
Wound Care
Critical Care
Internal Medicine
MICU
MICU
Nephrology
Palliative Care

## 2023-08-26 NOTE — PROGRESS NOTE ADULT - SUBJECTIVE AND OBJECTIVE BOX
AMY JAIMES  73y Female  MRN:[id number]    Patient is a 73y old  Female who presents with a chief complaint of Lethargy (19 Aug 2023 20:57)    HPI: pt well known to me  73F with metastatic pancreatic cancer (mets to liver and omentum, on chemo, last on 8/14-8/15), PVT, R gastroc DVT/?PE (eliquis d/c'ed for thrombocytopenia), hypothyroidism, recent admission in 7/2023 for melena presenting from home with lethargy and fevers. History obtained mostly from son at bedside. Per son, patient has been having bright red bloody stools for the past week, which was attributed to thrombocytopenia. She was noted to be lethargic today with low grade fever at home, which prompted them to bring her.    In ED, patient was hypotensive & febrile to 103, was given 2L IVF but persistently hypotensive requiring pressor. Labs notable for neutropenia with severe lactic acidosis. Admitted to MICU for likely septic shock requiring pressor.  (19 Aug 2023 01:33)      Patient seen and evaluated at bedside in MICU    Interval HPI: overnight events noted      PAST MEDICAL & SURGICAL HISTORY:  Essential hypertension      Hyperlipidemia      Pancreatic cancer      Hypothyroidism      No pertinent past surgical history          REVIEW OF SYSTEMS:  as per hpi     VITALS:   ICU Vital Signs Last 24 Hrs  T(C): 36.4 (26 Aug 2023 16:00), Max: 36.8 (26 Aug 2023 06:00)  T(F): 97.5 (26 Aug 2023 16:00), Max: 98.2 (26 Aug 2023 06:00)  HR: 79 (26 Aug 2023 21:03) (79 - 104)  BP: --  BP(mean): --  ABP: 85/27 (26 Aug 2023 20:45) (73/30 - 104/30)  ABP(mean): 39 (26 Aug 2023 20:45) (38 - 45)  RR: 33 (26 Aug 2023 20:45) (32 - 38)  SpO2: 67% (26 Aug 2023 15:00) (60% - 96%)    O2 Parameters below as of 26 Aug 2023 20:00  Patient On (Oxygen Delivery Method): ventilator    O2 Concentration (%): 100              PHYSICAL EXAM:  GENERAL: intubated, non responsive   HEAD:  Atraumatic, Normocephalic  EYES: EOMI, PERRLA, conjunctiva and sclera clear  NECK: Supple, No JVD +central line  CHEST/LUNG: Clear to auscultation bilaterally; No wheeze  HEART: S1, S2; No murmurs, rubs, or gallops  ABDOMEN: Soft, Nontender, Nondistended; Bowel sounds present  EXTREMITIES:  2+ Peripheral Pulses, No clubbing, cyanosis. + edema  NEUROLOGY: non responsive        Consultant(s) Notes Reviewed:  [x ] YES  [ ] NO  Care Discussed with Consultants/Other Providers [ x] YES  [ ] NO    MEDS:   MEDICATIONS  (STANDING):  cefTRIAXone   IVPB 2000 milliGRAM(s) IV Intermittent every 24 hours  chlorhexidine 0.12% Liquid 15 milliLiter(s) Oral Mucosa every 12 hours  chlorhexidine 4% Liquid 1 Application(s) Topical <User Schedule>  dextrose 10%. 1000 milliLiter(s) (50 mL/Hr) IV Continuous <Continuous>  norepinephrine Infusion 1 MICROgram(s)/kG/Min (65 mL/Hr) IV Continuous <Continuous>  pantoprazole  Injectable 40 milliGRAM(s) IV Push every 12 hours  phenylephrine    Infusion 4 MICROgram(s)/kG/Min (52 mL/Hr) IV Continuous <Continuous>  vasopressin Infusion 0.04 Unit(s)/Min (6 mL/Hr) IV Continuous <Continuous>    MEDICATIONS  (PRN):  sodium chloride 0.9% lock flush 10 milliLiter(s) IV Push every 1 hour PRN Pre/post blood products, medications, blood draw, and to maintain line patency  sodium chloride 0.9% lock flush 10 milliLiter(s) IV Push every 1 hour PRN Pre/post blood products, medications, blood draw, and to maintain line patency      ALLERGIES:  No Known Allergies      LABS:                                                                       5.9    58.20 )-----------( 5        ( 25 Aug 2023 00:12 )             18.3   08-25    132<L>  |  98  |  12  ----------------------------<  92  4.0   |  <10<LL>  |  0.97    Ca    8.3<L>      25 Aug 2023 00:12  Phos  3.8     08-25  Mg     2.0     08-25          cultures: Culture Results:   Growth in aerobic bottle: Gram Negative Rods  Growth in anaerobic bottle: Gram Negative Rods  Direct identification is available within approximately 3-5  hours either by Blood Panel Multiplexed PCR or Direct  MALDI-TOF. Details: [email or web address] (08-18 @ 20:50)  Culture Results:   Growth in aerobic bottle: Gram Negative Rods  Growth in anaerobic bottle: Gram Negative Rods (08-18 @ 20:42)       < from: CT Angio Chest PE Protocol w/ IV Cont (08.19.23 @ 02:46) >  IMPRESSION:  1.   Large volume ascites.  2.   Interval increase in size in hepatic metastases.  3.   Nodular omental opacities compatible with peritoneal carcinomatosis.  4.   Small right pleural effusion.  5.   Consolidation in the lung bases.  6.   Chronically thrombosed portal vein.  7.  No pulmonary embolism    --- End of Report ---      < end of copied text >  < from: CT Head No Cont (08.19.23 @ 02:45) >  IMPRESSION:    No evidence of acute intracranial hemorrhage, midline shift or CT   evidence of acute territorial infarct.    If the patient's symptoms persist, consider short interval follow-up head   CT or brain MRI if there are no MRI contraindications.    --- End of Report ---      < end of copied text >

## 2023-09-08 LAB — COMMENT - FLUIDS: SIGNIFICANT CHANGE UP

## 2023-09-20 LAB
CULTURE RESULTS: SIGNIFICANT CHANGE UP
SPECIMEN SOURCE: SIGNIFICANT CHANGE UP

## 2023-11-13 PROCEDURE — 82042 OTHER SOURCE ALBUMIN QUAN EA: CPT

## 2023-11-13 PROCEDURE — 84300 ASSAY OF URINE SODIUM: CPT

## 2023-11-13 PROCEDURE — 87449 NOS EACH ORGANISM AG IA: CPT

## 2023-11-13 PROCEDURE — 85045 AUTOMATED RETICULOCYTE COUNT: CPT

## 2023-11-13 PROCEDURE — 82962 GLUCOSE BLOOD TEST: CPT

## 2023-11-13 PROCEDURE — 87150 DNA/RNA AMPLIFIED PROBE: CPT

## 2023-11-13 PROCEDURE — 86965 POOLING BLOOD PLATELETS: CPT

## 2023-11-13 PROCEDURE — 81001 URINALYSIS AUTO W/SCOPE: CPT

## 2023-11-13 PROCEDURE — 84145 PROCALCITONIN (PCT): CPT

## 2023-11-13 PROCEDURE — 82140 ASSAY OF AMMONIA: CPT

## 2023-11-13 PROCEDURE — P9100: CPT

## 2023-11-13 PROCEDURE — P9012: CPT

## 2023-11-13 PROCEDURE — 0225U NFCT DS DNA&RNA 21 SARSCOV2: CPT

## 2023-11-13 PROCEDURE — 85240 CLOT FACTOR VIII AHG 1 STAGE: CPT

## 2023-11-13 PROCEDURE — 85027 COMPLETE CBC AUTOMATED: CPT

## 2023-11-13 PROCEDURE — 85379 FIBRIN DEGRADATION QUANT: CPT

## 2023-11-13 PROCEDURE — 94003 VENT MGMT INPAT SUBQ DAY: CPT

## 2023-11-13 PROCEDURE — 36415 COLL VENOUS BLD VENIPUNCTURE: CPT

## 2023-11-13 PROCEDURE — P9016: CPT

## 2023-11-13 PROCEDURE — 84157 ASSAY OF PROTEIN OTHER: CPT

## 2023-11-13 PROCEDURE — 84295 ASSAY OF SERUM SODIUM: CPT

## 2023-11-13 PROCEDURE — 83735 ASSAY OF MAGNESIUM: CPT

## 2023-11-13 PROCEDURE — 82947 ASSAY GLUCOSE BLOOD QUANT: CPT

## 2023-11-13 PROCEDURE — 85014 HEMATOCRIT: CPT

## 2023-11-13 PROCEDURE — 96375 TX/PRO/DX INJ NEW DRUG ADDON: CPT

## 2023-11-13 PROCEDURE — 89051 BODY FLUID CELL COUNT: CPT

## 2023-11-13 PROCEDURE — 83615 LACTATE (LD) (LDH) ENZYME: CPT

## 2023-11-13 PROCEDURE — 71045 X-RAY EXAM CHEST 1 VIEW: CPT

## 2023-11-13 PROCEDURE — 86850 RBC ANTIBODY SCREEN: CPT

## 2023-11-13 PROCEDURE — 87186 SC STD MICRODIL/AGAR DIL: CPT

## 2023-11-13 PROCEDURE — 85025 COMPLETE CBC W/AUTO DIFF WBC: CPT

## 2023-11-13 PROCEDURE — 87102 FUNGUS ISOLATION CULTURE: CPT

## 2023-11-13 PROCEDURE — P9011: CPT

## 2023-11-13 PROCEDURE — P9045: CPT

## 2023-11-13 PROCEDURE — 36430 TRANSFUSION BLD/BLD COMPNT: CPT

## 2023-11-13 PROCEDURE — 83690 ASSAY OF LIPASE: CPT

## 2023-11-13 PROCEDURE — 87040 BLOOD CULTURE FOR BACTERIA: CPT

## 2023-11-13 PROCEDURE — 82570 ASSAY OF URINE CREATININE: CPT

## 2023-11-13 PROCEDURE — P9059: CPT

## 2023-11-13 PROCEDURE — 99291 CRITICAL CARE FIRST HOUR: CPT

## 2023-11-13 PROCEDURE — 84132 ASSAY OF SERUM POTASSIUM: CPT

## 2023-11-13 PROCEDURE — P9037: CPT

## 2023-11-13 PROCEDURE — 84100 ASSAY OF PHOSPHORUS: CPT

## 2023-11-13 PROCEDURE — 94002 VENT MGMT INPAT INIT DAY: CPT

## 2023-11-13 PROCEDURE — 87641 MR-STAPH DNA AMP PROBE: CPT

## 2023-11-13 PROCEDURE — 86923 COMPATIBILITY TEST ELECTRIC: CPT

## 2023-11-13 PROCEDURE — 83605 ASSAY OF LACTIC ACID: CPT

## 2023-11-13 PROCEDURE — 96374 THER/PROPH/DIAG INJ IV PUSH: CPT

## 2023-11-13 PROCEDURE — 87086 URINE CULTURE/COLONY COUNT: CPT

## 2023-11-13 PROCEDURE — 87070 CULTURE OTHR SPECIMN AEROBIC: CPT

## 2023-11-13 PROCEDURE — 85730 THROMBOPLASTIN TIME PARTIAL: CPT

## 2023-11-13 PROCEDURE — 87205 SMEAR GRAM STAIN: CPT

## 2023-11-13 PROCEDURE — 87077 CULTURE AEROBIC IDENTIFY: CPT

## 2023-11-13 PROCEDURE — 86900 BLOOD TYPING SEROLOGIC ABO: CPT

## 2023-11-13 PROCEDURE — 87075 CULTR BACTERIA EXCEPT BLOOD: CPT

## 2023-11-13 PROCEDURE — 80053 COMPREHEN METABOLIC PANEL: CPT

## 2023-11-13 PROCEDURE — 87640 STAPH A DNA AMP PROBE: CPT

## 2023-11-13 PROCEDURE — 71275 CT ANGIOGRAPHY CHEST: CPT | Mod: MA

## 2023-11-13 PROCEDURE — 85018 HEMOGLOBIN: CPT

## 2023-11-13 PROCEDURE — 80202 ASSAY OF VANCOMYCIN: CPT

## 2023-11-13 PROCEDURE — 82945 GLUCOSE OTHER FLUID: CPT

## 2023-11-13 PROCEDURE — 85384 FIBRINOGEN ACTIVITY: CPT

## 2023-11-13 PROCEDURE — 85362 FIBRIN DEGRADATION PRODUCTS: CPT

## 2023-11-13 PROCEDURE — P9047: CPT

## 2023-11-13 PROCEDURE — 82330 ASSAY OF CALCIUM: CPT

## 2023-11-13 PROCEDURE — 82803 BLOOD GASES ANY COMBINATION: CPT

## 2023-11-13 PROCEDURE — 93005 ELECTROCARDIOGRAM TRACING: CPT

## 2023-11-13 PROCEDURE — 85610 PROTHROMBIN TIME: CPT

## 2023-11-13 PROCEDURE — 74177 CT ABD & PELVIS W/CONTRAST: CPT | Mod: MA

## 2023-11-13 PROCEDURE — 80048 BASIC METABOLIC PNL TOTAL CA: CPT

## 2023-11-13 PROCEDURE — 70450 CT HEAD/BRAIN W/O DYE: CPT | Mod: MA

## 2023-11-13 PROCEDURE — 82435 ASSAY OF BLOOD CHLORIDE: CPT

## 2023-11-13 PROCEDURE — 86901 BLOOD TYPING SEROLOGIC RH(D): CPT

## 2023-11-13 PROCEDURE — 83010 ASSAY OF HAPTOGLOBIN QUANT: CPT

## 2023-11-13 PROCEDURE — 86985 SPLIT BLOOD OR PRODUCTS: CPT

## 2024-02-01 NOTE — ADVANCED PRACTICE NURSE CONSULT - REASON FOR CONSULT
Detail Level: Detailed
Bedside picc order placed.   Indication: DL picc placement for chemotherapy  
Detail Level: Zone
Detail Level: Simple

## 2024-06-21 NOTE — CONSULT NOTE ADULT - PROBLEM/RECOMMENDATION-2
Addended by: TANYA ARMAS on: 6/21/2024 03:00 PM     Modules accepted: Orders    
DISPLAY PLAN FREE TEXT
DISPLAY PLAN FREE TEXT
